# Patient Record
Sex: FEMALE | Race: WHITE | NOT HISPANIC OR LATINO | Employment: OTHER | URBAN - METROPOLITAN AREA
[De-identification: names, ages, dates, MRNs, and addresses within clinical notes are randomized per-mention and may not be internally consistent; named-entity substitution may affect disease eponyms.]

---

## 2017-10-13 ENCOUNTER — HOSPITAL ENCOUNTER (EMERGENCY)
Facility: HOSPITAL | Age: 67
Discharge: HOME | End: 2017-10-13
Attending: EMERGENCY MEDICINE | Admitting: EMERGENCY MEDICINE
Payer: MEDICARE

## 2017-10-13 ENCOUNTER — APPOINTMENT (EMERGENCY)
Dept: RADIOLOGY | Facility: HOSPITAL | Age: 67
End: 2017-10-13
Payer: MEDICARE

## 2017-10-13 VITALS
TEMPERATURE: 99.1 F | SYSTOLIC BLOOD PRESSURE: 183 MMHG | HEART RATE: 112 BPM | DIASTOLIC BLOOD PRESSURE: 79 MMHG | BODY MASS INDEX: 30.43 KG/M2 | RESPIRATION RATE: 16 BRPM | HEIGHT: 62 IN | WEIGHT: 165.34 LBS | OXYGEN SATURATION: 96 %

## 2017-10-13 DIAGNOSIS — S51.859A HUMAN BITE OF FOREARM: Primary | ICD-10-CM
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
## 2017-10-13 DIAGNOSIS — Z20.6 EXPOSURE TO HIV: ICD-10-CM

## 2017-10-13 LAB — ALT SERPL W P-5'-P-CCNC: 38 U/L (ref 12–78)

## 2017-10-13 PROCEDURE — 86706 HEP B SURFACE ANTIBODY: CPT | Performed by: EMERGENCY MEDICINE

## 2017-10-13 PROCEDURE — 99284 EMERGENCY DEPT VISIT MOD MDM: CPT

## 2017-10-13 PROCEDURE — 86803 HEPATITIS C AB TEST: CPT | Performed by: EMERGENCY MEDICINE

## 2017-10-13 PROCEDURE — 87340 HEPATITIS B SURFACE AG IA: CPT | Performed by: EMERGENCY MEDICINE

## 2017-10-13 PROCEDURE — 90715 TDAP VACCINE 7 YRS/> IM: CPT | Performed by: EMERGENCY MEDICINE

## 2017-10-13 PROCEDURE — 87389 HIV-1 AG W/HIV-1&-2 AB AG IA: CPT | Performed by: EMERGENCY MEDICINE

## 2017-10-13 PROCEDURE — 73110 X-RAY EXAM OF WRIST: CPT

## 2017-10-13 PROCEDURE — 90471 IMMUNIZATION ADMIN: CPT

## 2017-10-13 PROCEDURE — 84460 ALANINE AMINO (ALT) (SGPT): CPT | Performed by: EMERGENCY MEDICINE

## 2017-10-13 PROCEDURE — 36415 COLL VENOUS BLD VENIPUNCTURE: CPT | Performed by: EMERGENCY MEDICINE

## 2017-10-13 RX ORDER — EMTRICITABINE AND TENOFOVIR DISOPROXIL FUMARATE 200; 300 MG/1; MG/1
1 TABLET, FILM COATED ORAL DAILY
Qty: 10 TABLET | Refills: 0 | Status: SHIPPED | OUTPATIENT
Start: 2017-10-13 | End: 2022-01-06

## 2017-10-13 RX ORDER — EMTRICITABINE AND TENOFOVIR DISOPROXIL FUMARATE 200; 300 MG/1; MG/1
1 TABLET, FILM COATED ORAL DAILY
Qty: 10 TABLET | Refills: 0 | Status: SHIPPED | OUTPATIENT
Start: 2017-10-13 | End: 2017-10-13

## 2017-10-13 RX ADMIN — EMTRICITABINE: 200 CAPSULE ORAL at 18:04

## 2017-10-13 RX ADMIN — RALTEGRAVIR 400 MG: 400 TABLET, FILM COATED ORAL at 18:04

## 2017-10-13 RX ADMIN — TETANUS TOXOID, REDUCED DIPHTHERIA TOXOID AND ACELLULAR PERTUSSIS VACCINE, ADSORBED 0.5 ML: 5; 2.5; 8; 8; 2.5 SUSPENSION INTRAMUSCULAR at 18:05
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
## 2017-10-13 NOTE — DISCHARGE INSTRUCTIONS
Human Bite   WHAT YOU NEED TO KNOW:   A human bite is any wound that you get from coming into contact with a person's teeth  The wound may be deep and cause injury to bones, muscles, and other body parts  Human bites are often more serious than animal bites  Wounds are more likely to become infected because of the germs in a person's mouth  DISCHARGE INSTRUCTIONS:   Medicines:   · Antibiotics: This medicine will help fight or prevent an infection  Take your antibiotics until they are gone, even if you feel better  · Take your medicine as directed  Contact your healthcare provider if you think your medicine is not helping or if you have side effects  Tell him of her if you are allergic to any medicine  Keep a list of the medicines, vitamins, and herbs you take  Include the amounts, and when and why you take them  Bring the list or the pill bottles to follow-up visits  Carry your medicine list with you in case of an emergency  Follow up with your healthcare provider as directed:  Write down your questions so you remember to ask them during your visits  Rest:  Rest when you feel it is needed  Slowly start to do more each day  Return to your daily activities as directed  When sitting or lying, raise your wounded area above your heart  This helps decrease swelling  You may put pillows under an injured leg when lying in bed  Wear a splint or sling: Your healthcare provider may want you to limit moving your injured area for some time  A sling or splint may be used to support or elevate your injured area and make you more comfortable  Ask for more information on using a splint or sling  Wound care:   · Wash your hands before and after you care for your wound to prevent infection  · Clean your wound with mild soap and water, and pat dry  Do this as often as ordered by your healthcare provider  If you cannot reach the wound, have someone help you  · Carefully check the wound and the area around it   Look for any swelling, redness, or fluid oozing out of it  If there is bleeding, you may apply gentle pressure  · Cover your wound with a layer of clean gauze bandage  If the bandage should be wrapped around your arm or leg, wrap it snugly but not too tight  It is too tight if you feel tingling or lose feeling in that area  · Keep the bandage clean and dry  Contact your healthcare provider if:   · You have a fever  · You have a skin rash, itching, or swelling after taking your medicine  · You have numbness or tingling in the area of the bite  · You have pain or problems moving the injured area or get tender lumps in the groin or armpits  · You have questions about your condition or care  Return to the emergency department if:   · You are have trouble swallowing and your jaw and neck are stiff  · You are have trouble talking, walking, or breathing  · You have increased redness, numbness, or swelling in the bitten area  · Your wound does not stop bleeding even after you apply pressure  · Your pain is the same or worse even after taking medicine  · Your wound or bandage has pus or a bad smell, even if you clean it every day  © 2017 [employer]  Information is for End User's use only and may not be sold, redistributed or otherwise used for commercial purposes  All illustrations and images included in CareNotes® are the copyrighted property of [employer]  or [employer]  The above information is an  only  It is not intended as medical advice for individual conditions or treatments  Talk to your doctor, nurse or pharmacist before following any medical regimen to see if it is safe and effective for you  Postexposure Prophylaxis   WHAT YOU NEED TO KNOW:   Postexposure prophylaxis (PEP) is medical care given to prevent HIV, hepatitis B, and other diseases  PEP may include first aid, testing, and medicines   Exposure can occur when you have contact with certain body fluids from another person  These fluids include blood, semen, and vaginal fluid  They also include any other body fluid that may have blood in it, such as saliva or urine  You are exposed if these fluids touch an open area of your skin, such as a cut  You also are exposed if the fluids touch a mucus membrane  This is a moist area, such as in the eyes, nose, and mouth  You can be exposed by a needlestick through the skin  DISCHARGE INSTRUCTIONS:   Medicines:   · Antiretrovirals: These medicines help prevent HIV  They must be taken for 28 days, unless your healthcare provider tells you otherwise  You may be given a starter pack with enough medicine for 1 to 7 days  You may be given medicine for the full 28 days instead  If you receive a starter pack, you must return to your healthcare provider in 1 to 7 days  At this visit, you will receive the rest of the medicine  · Hepatitis B vaccine: This medicine helps prevent hepatitis B  You will need 3 doses (shots) of the vaccine  The second dose should be taken 1 to 2 months after the first dose  The third dose should be taken 4 to 6 months after the first dose  · Antibiotics: These are germ-killing medicines to help treat or prevent sexually transmitted diseases, such as gonorrhea  · Take your medicine as directed  Contact your healthcare provider if you think your medicine is not helping or if you have side effects  Tell him or her if you are allergic to any medicine  Keep a list of the medicines, vitamins, and herbs you take  Include the amounts, and when and why you take them  Bring the list or the pill bottles to follow-up visits  Carry your medicine list with you in case of an emergency  Follow up with your healthcare provider as directed: If you did not receive any treatment after the exposure, you will need to follow up with your healthcare provider within 1 week   If you were given antiretrovirals, you will need a follow-up visit in about 2 weeks  Further HIV testing will be needed 6 weeks, 3 months, and 6 months after the exposure  You may have HIV testing up to 12 months after the exposure  Precautions: In case you are infected, you will need to take steps to keep others from getting sick  These steps can help you avoid being exposed again:  · Avoid sex, or have safe sex  Safe sex means having sex only with one person who is not infected and who is only having sex with you  It also means using condoms each time you have sex  · Avoid injection drug use  If you cannot do this, always use needles that are sterile (germ-free)  · Follow all safety rules at your workplace if you are at risk of exposure  Be sure to use safety equipment as needed  · Get the hepatitis B vaccine if you have not already  · Do not breastfeed unless you know you are not infected  In case of future exposure: If you think you have been exposed, get first aid right away  If you are at work, follow work policy to report the exposure  The type of first aid you need depends on what part of your body was exposed:  · Open skin:  Wash the area right away with soap and water  Use a gel hand  if you do not have soap or running water  Do not use anything harsh, such as bleach  Do not squeeze or rub the skin  · Eyes:  Rinse your eyes with water or saline (a salt solution) right away  Be sure to clean well by moving your eyelids with your fingers as you rinse  Keep contact lenses in while rinsing your eyes, then remove and clean them as usual  Avoid soap or other   · Mouth:  Spit out the blood or body fluid right away  Rinse your mouth with water or saline several times  Avoid putting soap or other  in your mouth  For support and more information: It can be hard to cope if you think you have been exposed to a disease  You may feel scared and concerned about your health   This is normal  It can be helpful to find out all you can about the risk of exposure  Counseling or joining a support group also can help  Talk to your healthcare provider, or contact the following:   · [facility] for HIV/AIDS, Viral Hepatitis, STD, and TB Prevention, [facility]  Web Address: www cdc gov/[facility]/  · [employer] Post-Exposure Prophylaxis Hotline  Web Address: www [facility]/about_[facility]/pepline/  Contact your healthcare provider if:   · You have nausea or diarrhea  · You are more tired than usual      · You have new headaches, or you feel dizzy  · You have trouble sleeping  · Your eyes or skin turn yellow  · You are not eating because of appetite loss  · You are or may be pregnant  Return to the emergency department if:   · You have a fever  · You have a rash  · You have new muscle pain or pain in your back or abdomen  · You urinate more often than usual, have blood in your urine, or have pain while urinating  · You are more thirsty than usual      · You have trouble swallowing or breathing  © 2017 [employer] [facility] Information is for End User's use only and may not be sold, redistributed or otherwise used for commercial purposes  All illustrations and images included in CareNotes® are the copyrighted property of [employer]  or [employer]ji Massey  The above information is an  only  It is not intended as medical advice for individual conditions or treatments  Talk to your doctor, nurse or pharmacist before following any medical regimen to see if it is safe and effective for you

## 2017-10-15 LAB
HBV SURFACE AB SER-ACNC: <3.1 MIU/ML
HBV SURFACE AG SER QL: NORMAL
HCV AB SER QL: NORMAL

## 2017-10-16 LAB — HIV 1+2 AB+HIV1 P24 AG SERPL QL IA: NORMAL

## 2021-11-20 ENCOUNTER — HOSPITAL ENCOUNTER (INPATIENT)
Facility: HOSPITAL | Age: 71
LOS: 3 days | Discharge: HOME HEALTH | DRG: 069 | End: 2021-11-23
Attending: STUDENT IN AN ORGANIZED HEALTH CARE EDUCATION/TRAINING PROGRAM | Admitting: INTERNAL MEDICINE
Payer: COMMERCIAL

## 2021-11-20 ENCOUNTER — APPOINTMENT (EMERGENCY)
Dept: CT IMAGING | Facility: HOSPITAL | Age: 71
DRG: 069 | End: 2021-11-20
Payer: COMMERCIAL

## 2021-11-20 ENCOUNTER — APPOINTMENT (INPATIENT)
Dept: CT IMAGING | Facility: HOSPITAL | Age: 71
DRG: 069 | End: 2021-11-20
Payer: COMMERCIAL

## 2021-11-20 ENCOUNTER — APPOINTMENT (EMERGENCY)
Dept: RADIOLOGY | Facility: HOSPITAL | Age: 71
DRG: 069 | End: 2021-11-20
Payer: COMMERCIAL

## 2021-11-20 DIAGNOSIS — R47.01 APHASIA: Primary | ICD-10-CM
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
## 2021-11-20 DIAGNOSIS — I16.1 HYPERTENSIVE EMERGENCY: ICD-10-CM

## 2021-11-20 DIAGNOSIS — R47.02 DYSPHASIA: ICD-10-CM

## 2021-11-20 DIAGNOSIS — I65.23 BILATERAL CAROTID ARTERY STENOSIS: ICD-10-CM

## 2021-11-20 PROBLEM — E87.6 HYPOKALEMIA: Status: ACTIVE | Noted: 2021-11-20

## 2021-11-20 PROBLEM — N39.0 UTI (URINARY TRACT INFECTION): Status: ACTIVE | Noted: 2021-11-20

## 2021-11-20 PROBLEM — E83.51 HYPOCALCEMIA: Status: ACTIVE | Noted: 2021-11-20

## 2021-11-20 LAB
2HR DELTA HS TROPONIN: 4 NG/L
4HR DELTA HS TROPONIN: 11 NG/L
ALBUMIN SERPL BCP-MCNC: 2.9 G/DL (ref 3.5–5)
ALP SERPL-CCNC: 53 U/L (ref 46–116)
ALT SERPL W P-5'-P-CCNC: 20 U/L (ref 12–78)
ANION GAP SERPL CALCULATED.3IONS-SCNC: 10 MMOL/L (ref 4–13)
ANION GAP SERPL CALCULATED.3IONS-SCNC: 12 MMOL/L (ref 4–13)
AST SERPL W P-5'-P-CCNC: 16 U/L (ref 5–45)
BACTERIA UR QL AUTO: ABNORMAL /HPF
BASOPHILS # BLD AUTO: 0.06 THOUSANDS/ΜL (ref 0–0.1)
BASOPHILS NFR BLD AUTO: 1 % (ref 0–1)
BILIRUB DIRECT SERPL-MCNC: 0.09 MG/DL (ref 0–0.2)
BILIRUB SERPL-MCNC: 0.37 MG/DL (ref 0.2–1)
BILIRUB UR QL STRIP: NEGATIVE
BUN SERPL-MCNC: 10 MG/DL (ref 5–25)
BUN SERPL-MCNC: 11 MG/DL (ref 5–25)
CALCIUM SERPL-MCNC: 6.4 MG/DL (ref 8.3–10.1)
CALCIUM SERPL-MCNC: 8.7 MG/DL (ref 8.3–10.1)
CARDIAC TROPONIN I PNL SERPL HS: 12 NG/L
CARDIAC TROPONIN I PNL SERPL HS: 19 NG/L
CARDIAC TROPONIN I PNL SERPL HS: 8 NG/L
CHLORIDE SERPL-SCNC: 106 MMOL/L (ref 100–108)
CHLORIDE SERPL-SCNC: 98 MMOL/L (ref 100–108)
CK SERPL-CCNC: 99 U/L (ref 26–192)
CLARITY UR: ABNORMAL
CO2 SERPL-SCNC: 23 MMOL/L (ref 21–32)
CO2 SERPL-SCNC: 24 MMOL/L (ref 21–32)
COLOR UR: YELLOW
CREAT SERPL-MCNC: 0.83 MG/DL (ref 0.6–1.3)
CREAT SERPL-MCNC: 1.07 MG/DL (ref 0.6–1.3)
EOSINOPHIL # BLD AUTO: 0.17 THOUSAND/ΜL (ref 0–0.61)
EOSINOPHIL NFR BLD AUTO: 1 % (ref 0–6)
ERYTHROCYTE [DISTWIDTH] IN BLOOD BY AUTOMATED COUNT: 12.5 % (ref 11.6–15.1)
ETHANOL SERPL-MCNC: <3 MG/DL (ref 0–3)
GFR SERPL CREATININE-BSD FRML MDRD: 52 ML/MIN/1.73SQ M
GFR SERPL CREATININE-BSD FRML MDRD: 71 ML/MIN/1.73SQ M
GLUCOSE SERPL-MCNC: 136 MG/DL (ref 65–140)
GLUCOSE SERPL-MCNC: 144 MG/DL (ref 65–140)
GLUCOSE UR STRIP-MCNC: ABNORMAL MG/DL
HCT VFR BLD AUTO: 37.6 % (ref 34.8–46.1)
HGB BLD-MCNC: 13.3 G/DL (ref 11.5–15.4)
HGB UR QL STRIP.AUTO: ABNORMAL
IMM GRANULOCYTES # BLD AUTO: 0.06 THOUSAND/UL (ref 0–0.2)
IMM GRANULOCYTES NFR BLD AUTO: 1 % (ref 0–2)
INR PPP: 0.93 (ref 0.84–1.19)
KETONES UR STRIP-MCNC: ABNORMAL MG/DL
LACTATE SERPL-SCNC: 1.6 MMOL/L (ref 0.5–2)
LEUKOCYTE ESTERASE UR QL STRIP: ABNORMAL
LYMPHOCYTES # BLD AUTO: 1.88 THOUSANDS/ΜL (ref 0.6–4.47)
LYMPHOCYTES NFR BLD AUTO: 14 % (ref 14–44)
MAGNESIUM SERPL-MCNC: 1.1 MG/DL (ref 1.6–2.6)
MAGNESIUM SERPL-MCNC: 2.8 MG/DL (ref 1.6–2.6)
MCH RBC QN AUTO: 31.6 PG (ref 26.8–34.3)
MCHC RBC AUTO-ENTMCNC: 35.4 G/DL (ref 31.4–37.4)
MCV RBC AUTO: 89 FL (ref 82–98)
MONOCYTES # BLD AUTO: 0.96 THOUSAND/ΜL (ref 0.17–1.22)
MONOCYTES NFR BLD AUTO: 7 % (ref 4–12)
NEUTROPHILS # BLD AUTO: 10 THOUSANDS/ΜL (ref 1.85–7.62)
NEUTS SEG NFR BLD AUTO: 76 % (ref 43–75)
NITRITE UR QL STRIP: NEGATIVE
NON-SQ EPI CELLS URNS QL MICRO: ABNORMAL /HPF
NRBC BLD AUTO-RTO: 0 /100 WBCS
PH UR STRIP.AUTO: 6.5 [PH]
PLATELET # BLD AUTO: 237 THOUSANDS/UL (ref 149–390)
PMV BLD AUTO: 9.2 FL (ref 8.9–12.7)
POTASSIUM SERPL-SCNC: 2.5 MMOL/L (ref 3.5–5.3)
POTASSIUM SERPL-SCNC: 3.9 MMOL/L (ref 3.5–5.3)
POTASSIUM SERPL-SCNC: 3.9 MMOL/L (ref 3.5–5.3)
PROT SERPL-MCNC: 5.6 G/DL (ref 6.4–8.2)
PROT UR STRIP-MCNC: NEGATIVE MG/DL
PROTHROMBIN TIME: 12.5 SECONDS (ref 11.6–14.5)
RBC # BLD AUTO: 4.21 MILLION/UL (ref 3.81–5.12)
RBC #/AREA URNS AUTO: ABNORMAL /HPF
SODIUM SERPL-SCNC: 134 MMOL/L (ref 136–145)
SODIUM SERPL-SCNC: 139 MMOL/L (ref 136–145)
SP GR UR STRIP.AUTO: 1.01 (ref 1–1.03)
UROBILINOGEN UR QL STRIP.AUTO: 0.2 E.U./DL
WBC # BLD AUTO: 13.13 THOUSAND/UL (ref 4.31–10.16)
WBC #/AREA URNS AUTO: ABNORMAL /HPF

## 2021-11-20 PROCEDURE — NC001 PR NO CHARGE: Performed by: EMERGENCY MEDICINE

## 2021-11-20 PROCEDURE — 83735 ASSAY OF MAGNESIUM: CPT | Performed by: INTERNAL MEDICINE

## 2021-11-20 PROCEDURE — 83735 ASSAY OF MAGNESIUM: CPT | Performed by: STUDENT IN AN ORGANIZED HEALTH CARE EDUCATION/TRAINING PROGRAM

## 2021-11-20 PROCEDURE — 70450 CT HEAD/BRAIN W/O DYE: CPT

## 2021-11-20 PROCEDURE — 84132 ASSAY OF SERUM POTASSIUM: CPT | Performed by: INTERNAL MEDICINE

## 2021-11-20 PROCEDURE — 71260 CT THORAX DX C+: CPT

## 2021-11-20 PROCEDURE — 93308 TTE F-UP OR LMTD: CPT | Performed by: NURSE PRACTITIONER

## 2021-11-20 PROCEDURE — 70496 CT ANGIOGRAPHY HEAD: CPT

## 2021-11-20 PROCEDURE — 84295 ASSAY OF SERUM SODIUM: CPT

## 2021-11-20 PROCEDURE — 87086 URINE CULTURE/COLONY COUNT: CPT

## 2021-11-20 PROCEDURE — 74177 CT ABD & PELVIS W/CONTRAST: CPT

## 2021-11-20 PROCEDURE — 82550 ASSAY OF CK (CPK): CPT | Performed by: FAMILY MEDICINE

## 2021-11-20 PROCEDURE — 85014 HEMATOCRIT: CPT

## 2021-11-20 PROCEDURE — 82947 ASSAY GLUCOSE BLOOD QUANT: CPT

## 2021-11-20 PROCEDURE — 80048 BASIC METABOLIC PNL TOTAL CA: CPT | Performed by: FAMILY MEDICINE

## 2021-11-20 PROCEDURE — 87186 SC STD MICRODIL/AGAR DIL: CPT

## 2021-11-20 PROCEDURE — 80076 HEPATIC FUNCTION PANEL: CPT | Performed by: PSYCHIATRY & NEUROLOGY

## 2021-11-20 PROCEDURE — 36415 COLL VENOUS BLD VENIPUNCTURE: CPT

## 2021-11-20 PROCEDURE — 81001 URINALYSIS AUTO W/SCOPE: CPT

## 2021-11-20 PROCEDURE — 85610 PROTHROMBIN TIME: CPT | Performed by: STUDENT IN AN ORGANIZED HEALTH CARE EDUCATION/TRAINING PROGRAM

## 2021-11-20 PROCEDURE — 87077 CULTURE AEROBIC IDENTIFY: CPT

## 2021-11-20 PROCEDURE — 82077 ASSAY SPEC XCP UR&BREATH IA: CPT | Performed by: FAMILY MEDICINE

## 2021-11-20 PROCEDURE — G1004 CDSM NDSC: HCPCS

## 2021-11-20 PROCEDURE — 76705 ECHO EXAM OF ABDOMEN: CPT | Performed by: NURSE PRACTITIONER

## 2021-11-20 PROCEDURE — 99223 1ST HOSP IP/OBS HIGH 75: CPT | Performed by: INTERNAL MEDICINE

## 2021-11-20 PROCEDURE — 71045 X-RAY EXAM CHEST 1 VIEW: CPT

## 2021-11-20 PROCEDURE — 82803 BLOOD GASES ANY COMBINATION: CPT

## 2021-11-20 PROCEDURE — 85025 COMPLETE CBC W/AUTO DIFF WBC: CPT | Performed by: STUDENT IN AN ORGANIZED HEALTH CARE EDUCATION/TRAINING PROGRAM

## 2021-11-20 PROCEDURE — 96375 TX/PRO/DX INJ NEW DRUG ADDON: CPT

## 2021-11-20 PROCEDURE — 93005 ELECTROCARDIOGRAM TRACING: CPT

## 2021-11-20 PROCEDURE — 70498 CT ANGIOGRAPHY NECK: CPT

## 2021-11-20 PROCEDURE — 80048 BASIC METABOLIC PNL TOTAL CA: CPT | Performed by: STUDENT IN AN ORGANIZED HEALTH CARE EDUCATION/TRAINING PROGRAM

## 2021-11-20 PROCEDURE — 72125 CT NECK SPINE W/O DYE: CPT

## 2021-11-20 PROCEDURE — 83605 ASSAY OF LACTIC ACID: CPT | Performed by: FAMILY MEDICINE

## 2021-11-20 PROCEDURE — 84484 ASSAY OF TROPONIN QUANT: CPT | Performed by: STUDENT IN AN ORGANIZED HEALTH CARE EDUCATION/TRAINING PROGRAM

## 2021-11-20 PROCEDURE — 99222 1ST HOSP IP/OBS MODERATE 55: CPT | Performed by: STUDENT IN AN ORGANIZED HEALTH CARE EDUCATION/TRAINING PROGRAM

## 2021-11-20 PROCEDURE — 84132 ASSAY OF SERUM POTASSIUM: CPT

## 2021-11-20 PROCEDURE — 96365 THER/PROPH/DIAG IV INF INIT: CPT

## 2021-11-20 PROCEDURE — 99285 EMERGENCY DEPT VISIT HI MDM: CPT

## 2021-11-20 RX ORDER — TRAMADOL HYDROCHLORIDE 50 MG/1
50 TABLET ORAL EVERY 6 HOURS PRN
COMMUNITY
End: 2021-11-23 | Stop reason: HOSPADM

## 2021-11-20 RX ORDER — IBUPROFEN 600 MG/1
600 TABLET ORAL EVERY 6 HOURS PRN
COMMUNITY
End: 2021-11-23 | Stop reason: HOSPADM

## 2021-11-20 RX ORDER — METOPROLOL TARTRATE 5 MG/5ML
5 INJECTION INTRAVENOUS ONCE
Status: COMPLETED | OUTPATIENT
Start: 2021-11-20 | End: 2021-11-20

## 2021-11-20 RX ORDER — ONDANSETRON 2 MG/ML
4 INJECTION INTRAMUSCULAR; INTRAVENOUS EVERY 6 HOURS PRN
Status: DISCONTINUED | OUTPATIENT
Start: 2021-11-20 | End: 2021-11-23 | Stop reason: HOSPADM

## 2021-11-20 RX ORDER — LABETALOL 20 MG/4 ML (5 MG/ML) INTRAVENOUS SYRINGE
10 EVERY 6 HOURS PRN
Status: DISCONTINUED | OUTPATIENT
Start: 2021-11-20 | End: 2021-11-21

## 2021-11-20 RX ORDER — ATORVASTATIN CALCIUM 40 MG/1
40 TABLET, FILM COATED ORAL
Status: DISCONTINUED | OUTPATIENT
Start: 2021-11-20 | End: 2021-11-20

## 2021-11-20 RX ORDER — ACETAMINOPHEN 325 MG/1
650 TABLET ORAL EVERY 6 HOURS PRN
Status: DISCONTINUED | OUTPATIENT
Start: 2021-11-20 | End: 2021-11-23 | Stop reason: HOSPADM

## 2021-11-20 RX ORDER — POTASSIUM CHLORIDE 29.8 MG/ML
40 INJECTION INTRAVENOUS
Status: DISCONTINUED | OUTPATIENT
Start: 2021-11-20 | End: 2021-11-20

## 2021-11-20 RX ORDER — ASPIRIN 325 MG
325 TABLET ORAL DAILY
Status: DISCONTINUED | OUTPATIENT
Start: 2021-11-21 | End: 2021-11-21

## 2021-11-20 RX ORDER — MAGNESIUM SULFATE HEPTAHYDRATE 40 MG/ML
2 INJECTION, SOLUTION INTRAVENOUS ONCE
Status: COMPLETED | OUTPATIENT
Start: 2021-11-20 | End: 2021-11-20

## 2021-11-20 RX ORDER — POTASSIUM CHLORIDE 14.9 MG/ML
20 INJECTION INTRAVENOUS
Status: DISCONTINUED | OUTPATIENT
Start: 2021-11-20 | End: 2021-11-21

## 2021-11-20 RX ORDER — AMOXICILLIN 500 MG/1
500 CAPSULE ORAL EVERY 8 HOURS SCHEDULED
COMMUNITY
End: 2021-11-23 | Stop reason: HOSPADM

## 2021-11-20 RX ORDER — ASPIRIN 325 MG
325 TABLET ORAL DAILY
Status: DISCONTINUED | OUTPATIENT
Start: 2021-11-21 | End: 2021-11-20

## 2021-11-20 RX ORDER — POTASSIUM CHLORIDE 29.8 MG/ML
40 INJECTION INTRAVENOUS ONCE
Status: DISCONTINUED | OUTPATIENT
Start: 2021-11-20 | End: 2021-11-20

## 2021-11-20 RX ORDER — ATORVASTATIN CALCIUM 40 MG/1
40 TABLET, FILM COATED ORAL EVERY EVENING
Status: DISCONTINUED | OUTPATIENT
Start: 2021-11-20 | End: 2021-11-23 | Stop reason: HOSPADM

## 2021-11-20 RX ORDER — POTASSIUM CHLORIDE 14.9 MG/ML
20 INJECTION INTRAVENOUS
Status: DISCONTINUED | OUTPATIENT
Start: 2021-11-20 | End: 2021-11-20

## 2021-11-20 RX ORDER — MAGNESIUM SULFATE HEPTAHYDRATE 40 MG/ML
2 INJECTION, SOLUTION INTRAVENOUS ONCE
Status: DISCONTINUED | OUTPATIENT
Start: 2021-11-21 | End: 2021-11-21

## 2021-11-20 RX ADMIN — MAGNESIUM SULFATE HEPTAHYDRATE 2 G: 40 INJECTION, SOLUTION INTRAVENOUS at 20:55

## 2021-11-20 RX ADMIN — POTASSIUM CHLORIDE 20 MEQ: 14.9 INJECTION, SOLUTION INTRAVENOUS at 22:09

## 2021-11-20 RX ADMIN — POTASSIUM CHLORIDE 20 MEQ: 14.9 INJECTION, SOLUTION INTRAVENOUS at 16:06

## 2021-11-20 RX ADMIN — IOHEXOL 100 ML: 350 INJECTION, SOLUTION INTRAVENOUS at 13:37

## 2021-11-20 RX ADMIN — METOROPROLOL TARTRATE 5 MG: 5 INJECTION, SOLUTION INTRAVENOUS at 15:37

## 2021-11-20 RX ADMIN — IODIXANOL 85 ML: 320 INJECTION, SOLUTION INTRAVASCULAR at 20:38

## 2021-11-21 ENCOUNTER — APPOINTMENT (INPATIENT)
Dept: NON INVASIVE DIAGNOSTICS | Facility: HOSPITAL | Age: 71
DRG: 069 | End: 2021-11-21
Payer: COMMERCIAL

## 2021-11-21 PROBLEM — E83.51 HYPOCALCEMIA: Status: RESOLVED | Noted: 2021-11-20 | Resolved: 2021-11-21
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
## 2021-11-21 PROBLEM — R82.90 ABNORMAL URINALYSIS: Status: ACTIVE | Noted: 2021-11-20

## 2021-11-21 PROBLEM — N17.9 AKI (ACUTE KIDNEY INJURY) (HCC): Status: ACTIVE | Noted: 2021-11-21

## 2021-11-21 LAB
ALBUMIN SERPL BCP-MCNC: 3.4 G/DL (ref 3.5–5)
ALP SERPL-CCNC: 56 U/L (ref 46–116)
ALT SERPL W P-5'-P-CCNC: 20 U/L (ref 12–78)
ANION GAP SERPL CALCULATED.3IONS-SCNC: 12 MMOL/L (ref 4–13)
AORTIC ROOT: 2.6 CM
APICAL FOUR CHAMBER EJECTION FRACTION: 57 %
AST SERPL W P-5'-P-CCNC: 17 U/L (ref 5–45)
ATRIAL RATE: 90 BPM
BASOPHILS # BLD AUTO: 0.06 THOUSANDS/ΜL (ref 0–0.1)
BASOPHILS NFR BLD AUTO: 1 % (ref 0–1)
BILIRUB SERPL-MCNC: 0.63 MG/DL (ref 0.2–1)
BUN SERPL-MCNC: 14 MG/DL (ref 5–25)
CA-I BLD-SCNC: 1.14 MMOL/L (ref 1.12–1.32)
CALCIUM ALBUM COR SERPL-MCNC: 9.2 MG/DL (ref 8.3–10.1)
CALCIUM SERPL-MCNC: 8.7 MG/DL (ref 8.3–10.1)
CHLORIDE SERPL-SCNC: 100 MMOL/L (ref 100–108)
CHOLEST SERPL-MCNC: 171 MG/DL
CO2 SERPL-SCNC: 22 MMOL/L (ref 21–32)
CREAT SERPL-MCNC: 1.22 MG/DL (ref 0.6–1.3)
E WAVE DECELERATION TIME: 264 MS
EOSINOPHIL # BLD AUTO: 0.08 THOUSAND/ΜL (ref 0–0.61)
EOSINOPHIL NFR BLD AUTO: 1 % (ref 0–6)
ERYTHROCYTE [DISTWIDTH] IN BLOOD BY AUTOMATED COUNT: 12.6 % (ref 11.6–15.1)
FRACTIONAL SHORTENING: 33 % (ref 28–44)
GFR SERPL CREATININE-BSD FRML MDRD: 44 ML/MIN/1.73SQ M
GLUCOSE SERPL-MCNC: 147 MG/DL (ref 65–140)
HCT VFR BLD AUTO: 34.5 % (ref 34.8–46.1)
HDLC SERPL-MCNC: 53 MG/DL
HGB BLD-MCNC: 12.2 G/DL (ref 11.5–15.4)
IMM GRANULOCYTES # BLD AUTO: 0.07 THOUSAND/UL (ref 0–0.2)
IMM GRANULOCYTES NFR BLD AUTO: 1 % (ref 0–2)
INTERVENTRICULAR SEPTUM IN DIASTOLE (PARASTERNAL SHORT AXIS VIEW): 1.6 CM
LDLC SERPL CALC-MCNC: 92 MG/DL (ref 0–100)
LEFT ATRIUM AREA SYSTOLE SINGLE PLANE A4C: 14.1 CM2
LEFT INTERNAL DIMENSION IN SYSTOLE: 2.4 CM (ref 2.1–4)
LEFT VENTRICULAR INTERNAL DIMENSION IN DIASTOLE: 3.6 CM (ref 3.8–5.65)
LEFT VENTRICULAR POSTERIOR WALL IN END DIASTOLE: 1.1 CM
LEFT VENTRICULAR STROKE VOLUME: 35 ML
LYMPHOCYTES # BLD AUTO: 2.23 THOUSANDS/ΜL (ref 0.6–4.47)
LYMPHOCYTES NFR BLD AUTO: 20 % (ref 14–44)
MAGNESIUM SERPL-MCNC: 2.4 MG/DL (ref 1.6–2.6)
MCH RBC QN AUTO: 31.9 PG (ref 26.8–34.3)
MCHC RBC AUTO-ENTMCNC: 35.4 G/DL (ref 31.4–37.4)
MCV RBC AUTO: 90 FL (ref 82–98)
MONOCYTES # BLD AUTO: 1.03 THOUSAND/ΜL (ref 0.17–1.22)
MONOCYTES NFR BLD AUTO: 9 % (ref 4–12)
MV E'TISSUE VEL-LAT: 7 CM/S
MV E'TISSUE VEL-SEP: 5 CM/S
MV PEAK A VEL: 0.74 M/S
MV PEAK E VEL: 49 CM/S
MV STENOSIS PRESSURE HALF TIME: 0 MS
NEUTROPHILS # BLD AUTO: 7.54 THOUSANDS/ΜL (ref 1.85–7.62)
NEUTS SEG NFR BLD AUTO: 68 % (ref 43–75)
NRBC BLD AUTO-RTO: 0 /100 WBCS
P AXIS: 64 DEGREES
PA SYSTOLIC PRESSURE: 30 MMHG
PLATELET # BLD AUTO: 222 THOUSANDS/UL (ref 149–390)
PMV BLD AUTO: 9.1 FL (ref 8.9–12.7)
POTASSIUM SERPL-SCNC: 3.9 MMOL/L (ref 3.5–5.3)
PR INTERVAL: 142 MS
PROT SERPL-MCNC: 6.2 G/DL (ref 6.4–8.2)
QRS AXIS: 37 DEGREES
QRSD INTERVAL: 90 MS
QT INTERVAL: 368 MS
QTC INTERVAL: 450 MS
RBC # BLD AUTO: 3.83 MILLION/UL (ref 3.81–5.12)
RIGHT ATRIUM AREA SYSTOLE A4C: 10.8 CM2
RIGHT VENTRICLE ID DIMENSION: 3 CM
SL CV LV EF: 60
SL CV PED ECHO LEFT VENTRICLE DIASTOLIC VOLUME (MOD BIPLANE) 2D: 55 ML
SL CV PED ECHO LEFT VENTRICLE SYSTOLIC VOLUME (MOD BIPLANE) 2D: 20 ML
SODIUM SERPL-SCNC: 134 MMOL/L (ref 136–145)
T WAVE AXIS: 65 DEGREES
TRICUSPID VALVE PEAK REGURGITATION VELOCITY: 2.21 M/S
TRICUSPID VALVE S': 0.5 CM/S
TRIGL SERPL-MCNC: 129 MG/DL
TV PEAK GRADIENT: 20 MMHG
VENTRICULAR RATE: 90 BPM
WBC # BLD AUTO: 11.01 THOUSAND/UL (ref 4.31–10.16)
Z-SCORE OF LEFT VENTRICULAR DIMENSION IN END SYSTOLE: -2.52

## 2021-11-21 PROCEDURE — 83735 ASSAY OF MAGNESIUM: CPT | Performed by: FAMILY MEDICINE

## 2021-11-21 PROCEDURE — 85025 COMPLETE CBC W/AUTO DIFF WBC: CPT | Performed by: FAMILY MEDICINE

## 2021-11-21 PROCEDURE — 99233 SBSQ HOSP IP/OBS HIGH 50: CPT | Performed by: GENERAL PRACTICE

## 2021-11-21 PROCEDURE — 93306 TTE W/DOPPLER COMPLETE: CPT | Performed by: INTERNAL MEDICINE

## 2021-11-21 PROCEDURE — 80053 COMPREHEN METABOLIC PANEL: CPT | Performed by: FAMILY MEDICINE

## 2021-11-21 PROCEDURE — 80061 LIPID PANEL: CPT | Performed by: FAMILY MEDICINE

## 2021-11-21 PROCEDURE — 93306 TTE W/DOPPLER COMPLETE: CPT

## 2021-11-21 PROCEDURE — 93010 ELECTROCARDIOGRAM REPORT: CPT | Performed by: INTERNAL MEDICINE

## 2021-11-21 PROCEDURE — 82330 ASSAY OF CALCIUM: CPT | Performed by: FAMILY MEDICINE

## 2021-11-21 PROCEDURE — 99231 SBSQ HOSP IP/OBS SF/LOW 25: CPT | Performed by: NURSE PRACTITIONER

## 2021-11-21 PROCEDURE — 97163 PT EVAL HIGH COMPLEX 45 MIN: CPT

## 2021-11-21 PROCEDURE — 99223 1ST HOSP IP/OBS HIGH 75: CPT | Performed by: PSYCHIATRY & NEUROLOGY

## 2021-11-21 RX ORDER — METHOCARBAMOL 500 MG/1
500 TABLET, FILM COATED ORAL EVERY 6 HOURS PRN
Status: DISCONTINUED | OUTPATIENT
Start: 2021-11-21 | End: 2021-11-23 | Stop reason: HOSPADM

## 2021-11-21 RX ORDER — LANOLIN ALCOHOL/MO/W.PET/CERES
6 CREAM (GRAM) TOPICAL
Status: DISCONTINUED | OUTPATIENT
Start: 2021-11-21 | End: 2021-11-23 | Stop reason: HOSPADM

## 2021-11-21 RX ORDER — CLOPIDOGREL BISULFATE 75 MG/1
75 TABLET ORAL DAILY
Status: DISCONTINUED | OUTPATIENT
Start: 2021-11-22 | End: 2021-11-23 | Stop reason: HOSPADM

## 2021-11-21 RX ORDER — ASPIRIN 81 MG/1
81 TABLET, CHEWABLE ORAL DAILY
Status: DISCONTINUED | OUTPATIENT
Start: 2021-11-22 | End: 2021-11-23 | Stop reason: HOSPADM

## 2021-11-21 RX ORDER — CLOPIDOGREL BISULFATE 75 MG/1
300 TABLET ORAL ONCE
Status: COMPLETED | OUTPATIENT
Start: 2021-11-21 | End: 2021-11-21

## 2021-11-21 RX ORDER — METOPROLOL TARTRATE 5 MG/5ML
5 INJECTION INTRAVENOUS EVERY 6 HOURS PRN
Status: DISCONTINUED | OUTPATIENT
Start: 2021-11-21 | End: 2021-11-23 | Stop reason: HOSPADM

## 2021-11-21 RX ORDER — SODIUM CHLORIDE 9 MG/ML
75 INJECTION, SOLUTION INTRAVENOUS CONTINUOUS
Status: DISCONTINUED | OUTPATIENT
Start: 2021-11-21 | End: 2021-11-22

## 2021-11-21 RX ADMIN — CLOPIDOGREL BISULFATE 300 MG: 75 TABLET ORAL at 14:28

## 2021-11-21 RX ADMIN — Medication 6 MG: at 22:13

## 2021-11-21 RX ADMIN — ACETAMINOPHEN 650 MG: 325 TABLET, FILM COATED ORAL at 09:20

## 2021-11-21 RX ADMIN — ENOXAPARIN SODIUM 30 MG: 30 INJECTION SUBCUTANEOUS at 11:59

## 2021-11-21 RX ADMIN — ASPIRIN 325 MG ORAL TABLET 325 MG: 325 PILL ORAL at 09:20

## 2021-11-21 RX ADMIN — ACETAMINOPHEN 650 MG: 325 TABLET, FILM COATED ORAL at 02:56

## 2021-11-21 RX ADMIN — SODIUM CHLORIDE 75 ML/HR: 0.9 INJECTION, SOLUTION INTRAVENOUS at 11:50

## 2021-11-21 RX ADMIN — ATORVASTATIN CALCIUM 40 MG: 40 TABLET, FILM COATED ORAL at 17:06

## 2021-11-22 ENCOUNTER — APPOINTMENT (INPATIENT)
Dept: MRI IMAGING | Facility: HOSPITAL | Age: 71
DRG: 069 | End: 2021-11-22
Payer: COMMERCIAL

## 2021-11-22 PROBLEM — R82.71 ASYMPTOMATIC BACTERIURIA: Status: ACTIVE | Noted: 2021-11-20

## 2021-11-22 LAB
ALBUMIN SERPL BCP-MCNC: 3.2 G/DL (ref 3.5–5)
ALP SERPL-CCNC: 74 U/L (ref 46–116)
ALT SERPL W P-5'-P-CCNC: 21 U/L (ref 12–78)
ANION GAP SERPL CALCULATED.3IONS-SCNC: 9 MMOL/L (ref 4–13)
AST SERPL W P-5'-P-CCNC: 15 U/L (ref 5–45)
BACTERIA UR CULT: ABNORMAL
BASOPHILS # BLD AUTO: 0.05 THOUSANDS/ΜL (ref 0–0.1)
BASOPHILS NFR BLD AUTO: 1 % (ref 0–1)
BILIRUB SERPL-MCNC: 0.24 MG/DL (ref 0.2–1)
BUN SERPL-MCNC: 20 MG/DL (ref 5–25)
CALCIUM ALBUM COR SERPL-MCNC: 8.7 MG/DL (ref 8.3–10.1)
CALCIUM SERPL-MCNC: 8.1 MG/DL (ref 8.3–10.1)
CHLORIDE SERPL-SCNC: 104 MMOL/L (ref 100–108)
CK SERPL-CCNC: 116 U/L (ref 26–192)
CO2 SERPL-SCNC: 24 MMOL/L (ref 21–32)
CREAT SERPL-MCNC: 1.05 MG/DL (ref 0.6–1.3)
EOSINOPHIL # BLD AUTO: 0.36 THOUSAND/ΜL (ref 0–0.61)
EOSINOPHIL NFR BLD AUTO: 4 % (ref 0–6)
ERYTHROCYTE [DISTWIDTH] IN BLOOD BY AUTOMATED COUNT: 12.8 % (ref 11.6–15.1)
GFR SERPL CREATININE-BSD FRML MDRD: 53 ML/MIN/1.73SQ M
GLUCOSE SERPL-MCNC: 149 MG/DL (ref 65–140)
HCT VFR BLD AUTO: 31.6 % (ref 34.8–46.1)
HGB BLD-MCNC: 10.5 G/DL (ref 11.5–15.4)
IMM GRANULOCYTES # BLD AUTO: 0.03 THOUSAND/UL (ref 0–0.2)
IMM GRANULOCYTES NFR BLD AUTO: 0 % (ref 0–2)
LYMPHOCYTES # BLD AUTO: 2.11 THOUSANDS/ΜL (ref 0.6–4.47)
LYMPHOCYTES NFR BLD AUTO: 23 % (ref 14–44)
MAGNESIUM SERPL-MCNC: 2.1 MG/DL (ref 1.6–2.6)
MCH RBC QN AUTO: 31.1 PG (ref 26.8–34.3)
MCHC RBC AUTO-ENTMCNC: 33.2 G/DL (ref 31.4–37.4)
MCV RBC AUTO: 94 FL (ref 82–98)
MONOCYTES # BLD AUTO: 0.81 THOUSAND/ΜL (ref 0.17–1.22)
MONOCYTES NFR BLD AUTO: 9 % (ref 4–12)
NEUTROPHILS # BLD AUTO: 5.84 THOUSANDS/ΜL (ref 1.85–7.62)
NEUTS SEG NFR BLD AUTO: 63 % (ref 43–75)
NRBC BLD AUTO-RTO: 0 /100 WBCS
PHOSPHATE SERPL-MCNC: 3.1 MG/DL (ref 2.3–4.1)
PLATELET # BLD AUTO: 213 THOUSANDS/UL (ref 149–390)
PMV BLD AUTO: 9.2 FL (ref 8.9–12.7)
POTASSIUM SERPL-SCNC: 3.8 MMOL/L (ref 3.5–5.3)
PROT SERPL-MCNC: 5.8 G/DL (ref 6.4–8.2)
RBC # BLD AUTO: 3.38 MILLION/UL (ref 3.81–5.12)
SODIUM SERPL-SCNC: 137 MMOL/L (ref 136–145)
WBC # BLD AUTO: 9.2 THOUSAND/UL (ref 4.31–10.16)

## 2021-11-22 PROCEDURE — 80053 COMPREHEN METABOLIC PANEL: CPT | Performed by: GENERAL PRACTICE

## 2021-11-22 PROCEDURE — 70551 MRI BRAIN STEM W/O DYE: CPT

## 2021-11-22 PROCEDURE — 83735 ASSAY OF MAGNESIUM: CPT | Performed by: GENERAL PRACTICE

## 2021-11-22 PROCEDURE — G1004 CDSM NDSC: HCPCS

## 2021-11-22 PROCEDURE — ND001 PR NO DOCUMENTATION: Performed by: PSYCHIATRY & NEUROLOGY

## 2021-11-22 PROCEDURE — 84100 ASSAY OF PHOSPHORUS: CPT | Performed by: GENERAL PRACTICE

## 2021-11-22 PROCEDURE — 82550 ASSAY OF CK (CPK): CPT | Performed by: GENERAL PRACTICE

## 2021-11-22 PROCEDURE — 85025 COMPLETE CBC W/AUTO DIFF WBC: CPT | Performed by: GENERAL PRACTICE

## 2021-11-22 PROCEDURE — 99233 SBSQ HOSP IP/OBS HIGH 50: CPT | Performed by: GENERAL PRACTICE

## 2021-11-22 PROCEDURE — 97167 OT EVAL HIGH COMPLEX 60 MIN: CPT

## 2021-11-22 RX ORDER — LORAZEPAM 2 MG/ML
0.5 INJECTION INTRAMUSCULAR
Status: DISCONTINUED | OUTPATIENT
Start: 2021-11-22 | End: 2021-11-23 | Stop reason: HOSPADM

## 2021-11-22 RX ADMIN — CLOPIDOGREL BISULFATE 75 MG: 75 TABLET ORAL at 09:50

## 2021-11-22 RX ADMIN — ASPIRIN 81 MG: 81 TABLET, CHEWABLE ORAL at 09:49

## 2021-11-22 RX ADMIN — Medication 6 MG: at 21:38

## 2021-11-22 RX ADMIN — ACETAMINOPHEN 650 MG: 325 TABLET, FILM COATED ORAL at 09:49

## 2021-11-22 RX ADMIN — SODIUM CHLORIDE 75 ML/HR: 0.9 INJECTION, SOLUTION INTRAVENOUS at 01:58

## 2021-11-22 RX ADMIN — METOROPROLOL TARTRATE 5 MG: 5 INJECTION, SOLUTION INTRAVENOUS at 21:44

## 2021-11-22 RX ADMIN — LORAZEPAM 0.5 MG: 2 INJECTION INTRAMUSCULAR; INTRAVENOUS at 12:20

## 2021-11-22 RX ADMIN — ENOXAPARIN SODIUM 30 MG: 30 INJECTION SUBCUTANEOUS at 09:50

## 2021-11-22 RX ADMIN — ATORVASTATIN CALCIUM 40 MG: 40 TABLET, FILM COATED ORAL at 16:58

## 2021-11-22 RX ADMIN — METOROPROLOL TARTRATE 5 MG: 5 INJECTION, SOLUTION INTRAVENOUS at 02:24

## 2021-11-23 VITALS
HEART RATE: 76 BPM | OXYGEN SATURATION: 97 % | RESPIRATION RATE: 18 BRPM | WEIGHT: 149.47 LBS | BODY MASS INDEX: 27.51 KG/M2 | TEMPERATURE: 97.8 F | SYSTOLIC BLOOD PRESSURE: 180 MMHG | DIASTOLIC BLOOD PRESSURE: 74 MMHG | HEIGHT: 62 IN

## 2021-11-23 LAB
ANION GAP SERPL CALCULATED.3IONS-SCNC: 9 MMOL/L (ref 4–13)
BASOPHILS # BLD AUTO: 0.04 THOUSANDS/ΜL (ref 0–0.1)
BASOPHILS NFR BLD AUTO: 1 % (ref 0–1)
BUN SERPL-MCNC: 15 MG/DL (ref 5–25)
CALCIUM SERPL-MCNC: 8.3 MG/DL (ref 8.3–10.1)
CHLORIDE SERPL-SCNC: 101 MMOL/L (ref 100–108)
CO2 SERPL-SCNC: 26 MMOL/L (ref 21–32)
CREAT SERPL-MCNC: 0.94 MG/DL (ref 0.6–1.3)
EOSINOPHIL # BLD AUTO: 0.39 THOUSAND/ΜL (ref 0–0.61)
EOSINOPHIL NFR BLD AUTO: 5 % (ref 0–6)
ERYTHROCYTE [DISTWIDTH] IN BLOOD BY AUTOMATED COUNT: 12.4 % (ref 11.6–15.1)
GFR SERPL CREATININE-BSD FRML MDRD: 61 ML/MIN/1.73SQ M
GLUCOSE SERPL-MCNC: 182 MG/DL (ref 65–140)
HCT VFR BLD AUTO: 31.7 % (ref 34.8–46.1)
HGB BLD-MCNC: 11.1 G/DL (ref 11.5–15.4)
IMM GRANULOCYTES # BLD AUTO: 0.02 THOUSAND/UL (ref 0–0.2)
IMM GRANULOCYTES NFR BLD AUTO: 0 % (ref 0–2)
LYMPHOCYTES # BLD AUTO: 1.76 THOUSANDS/ΜL (ref 0.6–4.47)
LYMPHOCYTES NFR BLD AUTO: 20 % (ref 14–44)
MCH RBC QN AUTO: 32.3 PG (ref 26.8–34.3)
MCHC RBC AUTO-ENTMCNC: 35 G/DL (ref 31.4–37.4)
MCV RBC AUTO: 92 FL (ref 82–98)
MONOCYTES # BLD AUTO: 0.61 THOUSAND/ΜL (ref 0.17–1.22)
MONOCYTES NFR BLD AUTO: 7 % (ref 4–12)
NEUTROPHILS # BLD AUTO: 5.79 THOUSANDS/ΜL (ref 1.85–7.62)
NEUTS SEG NFR BLD AUTO: 67 % (ref 43–75)
NRBC BLD AUTO-RTO: 0 /100 WBCS
PLATELET # BLD AUTO: 217 THOUSANDS/UL (ref 149–390)
PMV BLD AUTO: 9.1 FL (ref 8.9–12.7)
POTASSIUM SERPL-SCNC: 3.7 MMOL/L (ref 3.5–5.3)
RBC # BLD AUTO: 3.44 MILLION/UL (ref 3.81–5.12)
SODIUM SERPL-SCNC: 136 MMOL/L (ref 136–145)
WBC # BLD AUTO: 8.61 THOUSAND/UL (ref 4.31–10.16)

## 2021-11-23 PROCEDURE — 85025 COMPLETE CBC W/AUTO DIFF WBC: CPT | Performed by: INTERNAL MEDICINE

## 2021-11-23 PROCEDURE — 80048 BASIC METABOLIC PNL TOTAL CA: CPT | Performed by: INTERNAL MEDICINE

## 2021-11-23 PROCEDURE — 99239 HOSP IP/OBS DSCHRG MGMT >30: CPT | Performed by: INTERNAL MEDICINE

## 2021-11-23 RX ORDER — ACETAMINOPHEN 325 MG/1
650 TABLET ORAL EVERY 6 HOURS PRN
Refills: 0
Start: 2021-11-23 | End: 2022-04-12 | Stop reason: ALTCHOICE

## 2021-11-23 RX ORDER — CLOPIDOGREL BISULFATE 75 MG/1
75 TABLET ORAL DAILY
Qty: 21 TABLET | Refills: 0 | Status: SHIPPED | OUTPATIENT
Start: 2021-11-24 | End: 2021-12-20 | Stop reason: SDUPTHER

## 2021-11-23 RX ORDER — LISINOPRIL 20 MG/1
20 TABLET ORAL DAILY
Qty: 30 TABLET | Refills: 0 | Status: SHIPPED | OUTPATIENT
Start: 2021-11-23 | End: 2021-12-20 | Stop reason: SDUPTHER

## 2021-11-23 RX ORDER — ASPIRIN 81 MG/1
81 TABLET, CHEWABLE ORAL DAILY
Qty: 30 TABLET | Refills: 0 | Status: SHIPPED | OUTPATIENT
Start: 2021-11-24 | End: 2021-12-20 | Stop reason: SDUPTHER

## 2021-11-23 RX ORDER — POTASSIUM CHLORIDE 20 MEQ/1
40 TABLET, EXTENDED RELEASE ORAL ONCE
Status: COMPLETED | OUTPATIENT
Start: 2021-11-23 | End: 2021-11-23

## 2021-11-23 RX ORDER — ATORVASTATIN CALCIUM 40 MG/1
40 TABLET, FILM COATED ORAL EVERY EVENING
Refills: 0
Start: 2021-11-23 | End: 2022-01-25 | Stop reason: SDUPTHER

## 2021-11-23 RX ORDER — CLOPIDOGREL BISULFATE 75 MG/1
75 TABLET ORAL DAILY
Qty: 21 TABLET | Refills: 0 | Status: SHIPPED | OUTPATIENT
Start: 2021-11-23 | End: 2021-12-20 | Stop reason: SDUPTHER

## 2021-11-23 RX ADMIN — POTASSIUM CHLORIDE 40 MEQ: 1500 TABLET, EXTENDED RELEASE ORAL at 06:29

## 2021-11-23 RX ADMIN — ASPIRIN 81 MG: 81 TABLET, CHEWABLE ORAL at 09:28

## 2021-11-23 RX ADMIN — METOROPROLOL TARTRATE 5 MG: 5 INJECTION, SOLUTION INTRAVENOUS at 06:29

## 2021-11-23 RX ADMIN — CLOPIDOGREL BISULFATE 75 MG: 75 TABLET ORAL at 09:28

## 2021-11-23 RX ADMIN — ENOXAPARIN SODIUM 30 MG: 30 INJECTION SUBCUTANEOUS at 09:28

## 2021-11-24 ENCOUNTER — TELEPHONE (OUTPATIENT)
Dept: NEUROLOGY | Facility: CLINIC | Age: 71
End: 2021-11-24

## 2021-11-26 LAB
BASE EXCESS BLDA CALC-SCNC: 5 MMOL/L (ref -2–3)
GLUCOSE SERPL-MCNC: 180 MG/DL (ref 65–140)
HCO3 BLDA-SCNC: 25.8 MMOL/L (ref 24–30)
HCT VFR BLD CALC: 36 % (ref 34.8–46.1)
HGB BLDA-MCNC: 12.2 G/DL (ref 11.5–15.4)
PCO2 BLD: 26.5 MM HG (ref 42–50)
PCO2 BLD: 27 MMOL/L (ref 21–32)
PH BLD: 7.6 [PH] (ref 7.3–7.4)
PO2 BLD: 113 MM HG (ref 35–45)
POTASSIUM BLD-SCNC: 6.2 MMOL/L (ref 3.5–5.3)
SAO2 % BLD FROM PO2: 99 % (ref 60–85)
SODIUM BLD-SCNC: 126 MMOL/L (ref 136–145)
SPECIMEN SOURCE: ABNORMAL

## 2021-11-30 ENCOUNTER — TELEPHONE (OUTPATIENT)
Dept: NEUROLOGY | Facility: CLINIC | Age: 71
End: 2021-11-30

## 2021-11-30 ENCOUNTER — OFFICE VISIT (OUTPATIENT)
Dept: FAMILY MEDICINE CLINIC | Facility: OTHER | Age: 71
End: 2021-11-30
Payer: COMMERCIAL

## 2021-11-30 VITALS
HEART RATE: 70 BPM | WEIGHT: 141 LBS | SYSTOLIC BLOOD PRESSURE: 118 MMHG | OXYGEN SATURATION: 98 % | HEIGHT: 62 IN | RESPIRATION RATE: 16 BRPM | BODY MASS INDEX: 25.95 KG/M2 | TEMPERATURE: 98.4 F | DIASTOLIC BLOOD PRESSURE: 58 MMHG

## 2021-11-30 DIAGNOSIS — Z12.11 SCREENING FOR COLON CANCER: ICD-10-CM

## 2021-11-30 DIAGNOSIS — Z12.31 ENCOUNTER FOR SCREENING MAMMOGRAM FOR MALIGNANT NEOPLASM OF BREAST: ICD-10-CM

## 2021-11-30 DIAGNOSIS — I70.90 ATHEROSCLEROTIC PLAQUE: ICD-10-CM

## 2021-11-30 DIAGNOSIS — E11.9 TYPE 2 DIABETES MELLITUS WITHOUT COMPLICATION, WITHOUT LONG-TERM CURRENT USE OF INSULIN (HCC): ICD-10-CM

## 2021-11-30 DIAGNOSIS — I10 HYPERTENSION, UNSPECIFIED TYPE: Primary | ICD-10-CM

## 2021-11-30 DIAGNOSIS — R47.01 APHASIA: ICD-10-CM

## 2021-11-30 DIAGNOSIS — Z76.89 ENCOUNTER FOR SUPPORT AND COORDINATION OF TRANSITION OF CARE: ICD-10-CM

## 2021-11-30 LAB — SL AMB POCT HEMOGLOBIN AIC: 5.9 (ref ?–6.5)

## 2021-11-30 PROCEDURE — 99495 TRANSJ CARE MGMT MOD F2F 14D: CPT | Performed by: FAMILY MEDICINE

## 2021-11-30 PROCEDURE — 1111F DSCHRG MED/CURRENT MED MERGE: CPT | Performed by: FAMILY MEDICINE

## 2021-11-30 PROCEDURE — 83036 HEMOGLOBIN GLYCOSYLATED A1C: CPT | Performed by: FAMILY MEDICINE

## 2021-11-30 RX ORDER — ACETAMINOPHEN 325 MG/1
650 TABLET ORAL EVERY 6 HOURS PRN
COMMUNITY
End: 2022-04-12 | Stop reason: ALTCHOICE

## 2021-11-30 RX ORDER — ATORVASTATIN CALCIUM 40 MG/1
TABLET, FILM COATED ORAL
COMMUNITY
Start: 2021-10-22 | End: 2021-12-20 | Stop reason: SDUPTHER

## 2021-11-30 RX ORDER — CLOPIDOGREL BISULFATE 75 MG/1
TABLET ORAL
COMMUNITY
Start: 2021-11-23 | End: 2021-12-20 | Stop reason: SDUPTHER

## 2021-11-30 RX ORDER — LISINOPRIL 20 MG/1
TABLET ORAL
COMMUNITY
Start: 2021-11-23 | End: 2021-12-20 | Stop reason: SDUPTHER

## 2021-11-30 RX ORDER — ASPIRIN 81 MG/1
81 TABLET, CHEWABLE ORAL DAILY
COMMUNITY

## 2021-12-01 ENCOUNTER — TELEPHONE (OUTPATIENT)
Dept: NEUROLOGY | Facility: CLINIC | Age: 71
End: 2021-12-01

## 2021-12-01 PROCEDURE — 3060F POS MICROALBUMINURIA REV: CPT | Performed by: FAMILY MEDICINE

## 2021-12-07 ENCOUNTER — OFFICE VISIT (OUTPATIENT)
Dept: FAMILY MEDICINE CLINIC | Facility: OTHER | Age: 71
End: 2021-12-07
Payer: COMMERCIAL

## 2021-12-07 VITALS
HEART RATE: 97 BPM | OXYGEN SATURATION: 98 % | RESPIRATION RATE: 18 BRPM | WEIGHT: 138.2 LBS | BODY MASS INDEX: 25.43 KG/M2 | HEIGHT: 62 IN | SYSTOLIC BLOOD PRESSURE: 132 MMHG | DIASTOLIC BLOOD PRESSURE: 80 MMHG | TEMPERATURE: 97.8 F

## 2021-12-07 DIAGNOSIS — Z00.00 MEDICARE ANNUAL WELLNESS VISIT, SUBSEQUENT: Primary | ICD-10-CM

## 2021-12-07 DIAGNOSIS — I10 HYPERTENSION, UNSPECIFIED TYPE: ICD-10-CM

## 2021-12-07 DIAGNOSIS — E11.9 TYPE 2 DIABETES MELLITUS WITHOUT COMPLICATION, WITHOUT LONG-TERM CURRENT USE OF INSULIN (HCC): ICD-10-CM

## 2021-12-07 DIAGNOSIS — R79.89 ELEVATED TSH: ICD-10-CM

## 2021-12-07 PROCEDURE — 1170F FXNL STATUS ASSESSED: CPT | Performed by: FAMILY MEDICINE

## 2021-12-07 PROCEDURE — 1125F AMNT PAIN NOTED PAIN PRSNT: CPT | Performed by: FAMILY MEDICINE

## 2021-12-07 PROCEDURE — G0439 PPPS, SUBSEQ VISIT: HCPCS | Performed by: FAMILY MEDICINE

## 2021-12-07 PROCEDURE — 3288F FALL RISK ASSESSMENT DOCD: CPT | Performed by: FAMILY MEDICINE

## 2021-12-07 PROCEDURE — 3725F SCREEN DEPRESSION PERFORMED: CPT | Performed by: FAMILY MEDICINE

## 2021-12-07 RX ORDER — MEDICAL SUPPLY, MISCELLANEOUS
EACH MISCELLANEOUS DAILY
Qty: 1 EACH | Refills: 0 | Status: SHIPPED | OUTPATIENT
Start: 2021-12-07 | End: 2022-04-12

## 2021-12-07 RX ORDER — GLIPIZIDE 5 MG/1
5 TABLET ORAL DAILY
COMMUNITY
Start: 2021-10-22 | End: 2022-01-25 | Stop reason: SDUPTHER

## 2021-12-10 ENCOUNTER — APPOINTMENT (OUTPATIENT)
Dept: LAB | Facility: CLINIC | Age: 71
End: 2021-12-10
Payer: COMMERCIAL

## 2021-12-10 DIAGNOSIS — E11.9 TYPE 2 DIABETES MELLITUS WITHOUT COMPLICATION, WITHOUT LONG-TERM CURRENT USE OF INSULIN (HCC): ICD-10-CM

## 2021-12-10 DIAGNOSIS — I10 HYPERTENSION, UNSPECIFIED TYPE: ICD-10-CM

## 2021-12-10 DIAGNOSIS — I70.90 ATHEROSCLEROTIC PLAQUE: ICD-10-CM

## 2021-12-10 LAB
ALBUMIN SERPL BCP-MCNC: 4 G/DL (ref 3.5–5)
ALP SERPL-CCNC: 73 U/L (ref 46–116)
ALT SERPL W P-5'-P-CCNC: 24 U/L (ref 12–78)
ANION GAP SERPL CALCULATED.3IONS-SCNC: 9 MMOL/L (ref 4–13)
AST SERPL W P-5'-P-CCNC: 18 U/L (ref 5–45)
BASOPHILS # BLD AUTO: 0.08 THOUSANDS/ΜL (ref 0–0.1)
BASOPHILS NFR BLD AUTO: 1 % (ref 0–1)
BILIRUB SERPL-MCNC: 0.77 MG/DL (ref 0.2–1)
BUN SERPL-MCNC: 13 MG/DL (ref 5–25)
CALCIUM SERPL-MCNC: 9.1 MG/DL (ref 8.3–10.1)
CHLORIDE SERPL-SCNC: 95 MMOL/L (ref 100–108)
CHOLEST SERPL-MCNC: 137 MG/DL
CO2 SERPL-SCNC: 25 MMOL/L (ref 21–32)
CREAT SERPL-MCNC: 1.5 MG/DL (ref 0.6–1.3)
EOSINOPHIL # BLD AUTO: 0.11 THOUSAND/ΜL (ref 0–0.61)
EOSINOPHIL NFR BLD AUTO: 1 % (ref 0–6)
ERYTHROCYTE [DISTWIDTH] IN BLOOD BY AUTOMATED COUNT: 12.9 % (ref 11.6–15.1)
GFR SERPL CREATININE-BSD FRML MDRD: 35 ML/MIN/1.73SQ M
GLUCOSE P FAST SERPL-MCNC: 142 MG/DL (ref 65–99)
HCT VFR BLD AUTO: 36 % (ref 34.8–46.1)
HDLC SERPL-MCNC: 55 MG/DL
HGB BLD-MCNC: 12.6 G/DL (ref 11.5–15.4)
IMM GRANULOCYTES # BLD AUTO: 0.06 THOUSAND/UL (ref 0–0.2)
IMM GRANULOCYTES NFR BLD AUTO: 1 % (ref 0–2)
LDLC SERPL CALC-MCNC: 50 MG/DL (ref 0–100)
LYMPHOCYTES # BLD AUTO: 1.4 THOUSANDS/ΜL (ref 0.6–4.47)
LYMPHOCYTES NFR BLD AUTO: 15 % (ref 14–44)
MCH RBC QN AUTO: 32.4 PG (ref 26.8–34.3)
MCHC RBC AUTO-ENTMCNC: 35 G/DL (ref 31.4–37.4)
MCV RBC AUTO: 93 FL (ref 82–98)
MONOCYTES # BLD AUTO: 0.88 THOUSAND/ΜL (ref 0.17–1.22)
MONOCYTES NFR BLD AUTO: 10 % (ref 4–12)
NEUTROPHILS # BLD AUTO: 6.75 THOUSANDS/ΜL (ref 1.85–7.62)
NEUTS SEG NFR BLD AUTO: 72 % (ref 43–75)
NRBC BLD AUTO-RTO: 0 /100 WBCS
PLATELET # BLD AUTO: 252 THOUSANDS/UL (ref 149–390)
PMV BLD AUTO: 9.4 FL (ref 8.9–12.7)
POTASSIUM SERPL-SCNC: 4.5 MMOL/L (ref 3.5–5.3)
PROT SERPL-MCNC: 7.3 G/DL (ref 6.4–8.2)
RBC # BLD AUTO: 3.89 MILLION/UL (ref 3.81–5.12)
SODIUM SERPL-SCNC: 129 MMOL/L (ref 136–145)
TRIGL SERPL-MCNC: 158 MG/DL
TSH SERPL DL<=0.05 MIU/L-ACNC: 3.61 UIU/ML (ref 0.36–3.74)
WBC # BLD AUTO: 9.28 THOUSAND/UL (ref 4.31–10.16)

## 2021-12-10 PROCEDURE — 83036 HEMOGLOBIN GLYCOSYLATED A1C: CPT

## 2021-12-10 PROCEDURE — 36415 COLL VENOUS BLD VENIPUNCTURE: CPT

## 2021-12-10 PROCEDURE — 80053 COMPREHEN METABOLIC PANEL: CPT

## 2021-12-10 PROCEDURE — 84443 ASSAY THYROID STIM HORMONE: CPT

## 2021-12-10 PROCEDURE — 80061 LIPID PANEL: CPT

## 2021-12-10 PROCEDURE — 85025 COMPLETE CBC W/AUTO DIFF WBC: CPT

## 2021-12-11 LAB
EST. AVERAGE GLUCOSE BLD GHB EST-MCNC: 123 MG/DL
HBA1C MFR BLD: 5.9 %

## 2021-12-11 PROCEDURE — 3044F HG A1C LEVEL LT 7.0%: CPT | Performed by: FAMILY MEDICINE

## 2021-12-14 ENCOUNTER — TELEPHONE (OUTPATIENT)
Dept: FAMILY MEDICINE CLINIC | Facility: OTHER | Age: 71
End: 2021-12-14

## 2021-12-17 ENCOUNTER — TELEPHONE (OUTPATIENT)
Dept: OTHER | Facility: OTHER | Age: 71
End: 2021-12-17

## 2021-12-20 ENCOUNTER — TELEPHONE (OUTPATIENT)
Dept: FAMILY MEDICINE CLINIC | Facility: OTHER | Age: 71
End: 2021-12-20

## 2021-12-20 DIAGNOSIS — I70.90 ATHEROSCLEROTIC PLAQUE: Primary | ICD-10-CM

## 2021-12-20 DIAGNOSIS — I16.1 HYPERTENSIVE EMERGENCY: ICD-10-CM

## 2021-12-20 DIAGNOSIS — N17.9 AKI (ACUTE KIDNEY INJURY) (HCC): Primary | ICD-10-CM

## 2021-12-20 PROCEDURE — 3066F NEPHROPATHY DOC TX: CPT | Performed by: FAMILY MEDICINE

## 2021-12-20 PROCEDURE — 4010F ACE/ARB THERAPY RXD/TAKEN: CPT | Performed by: FAMILY MEDICINE

## 2021-12-20 RX ORDER — LISINOPRIL 20 MG/1
20 TABLET ORAL DAILY
Qty: 30 TABLET | Refills: 0 | Status: CANCELLED | OUTPATIENT
Start: 2021-12-20

## 2021-12-20 RX ORDER — CLOPIDOGREL BISULFATE 75 MG/1
75 TABLET ORAL DAILY
Qty: 30 TABLET | Refills: 1 | Status: SHIPPED | OUTPATIENT
Start: 2021-12-20 | End: 2022-01-25 | Stop reason: ALTCHOICE

## 2021-12-20 RX ORDER — LISINOPRIL 20 MG/1
20 TABLET ORAL DAILY
Qty: 30 TABLET | Refills: 0 | Status: SHIPPED | OUTPATIENT
Start: 2021-12-20 | End: 2022-01-20 | Stop reason: SDUPTHER

## 2021-12-20 RX ORDER — CLOPIDOGREL BISULFATE 75 MG/1
75 TABLET ORAL DAILY
Qty: 90 TABLET | Refills: 0 | Status: CANCELLED | OUTPATIENT
Start: 2021-12-20

## 2021-12-22 ENCOUNTER — LAB (OUTPATIENT)
Dept: LAB | Facility: CLINIC | Age: 71
End: 2021-12-22
Payer: COMMERCIAL

## 2021-12-22 DIAGNOSIS — N17.9 AKI (ACUTE KIDNEY INJURY) (HCC): ICD-10-CM

## 2021-12-22 LAB
ANION GAP SERPL CALCULATED.3IONS-SCNC: 9 MMOL/L (ref 4–13)
BUN SERPL-MCNC: 8 MG/DL (ref 5–25)
CALCIUM SERPL-MCNC: 8.8 MG/DL (ref 8.3–10.1)
CHLORIDE SERPL-SCNC: 87 MMOL/L (ref 100–108)
CO2 SERPL-SCNC: 28 MMOL/L (ref 21–32)
CREAT SERPL-MCNC: 1.17 MG/DL (ref 0.6–1.3)
GFR SERPL CREATININE-BSD FRML MDRD: 46 ML/MIN/1.73SQ M
GLUCOSE P FAST SERPL-MCNC: 128 MG/DL (ref 65–99)
POTASSIUM SERPL-SCNC: 4.4 MMOL/L (ref 3.5–5.3)
SODIUM SERPL-SCNC: 124 MMOL/L (ref 136–145)

## 2021-12-22 PROCEDURE — 80048 BASIC METABOLIC PNL TOTAL CA: CPT

## 2021-12-22 PROCEDURE — 36415 COLL VENOUS BLD VENIPUNCTURE: CPT

## 2021-12-23 ENCOUNTER — HOSPITAL ENCOUNTER (INPATIENT)
Facility: HOSPITAL | Age: 71
LOS: 1 days | Discharge: HOME | DRG: 641 | End: 2021-12-24
Attending: EMERGENCY MEDICINE | Admitting: INTERNAL MEDICINE
Payer: COMMERCIAL

## 2021-12-23 ENCOUNTER — TELEPHONE (OUTPATIENT)
Dept: FAMILY MEDICINE CLINIC | Facility: OTHER | Age: 71
End: 2021-12-23

## 2021-12-23 DIAGNOSIS — E87.1 HYPONATREMIA: Primary | ICD-10-CM

## 2021-12-23 DIAGNOSIS — N17.9 AKI (ACUTE KIDNEY INJURY) (HCC): ICD-10-CM

## 2021-12-23 PROBLEM — R19.7 DIARRHEA: Status: ACTIVE | Noted: 2021-12-23

## 2021-12-23 LAB
ALBUMIN SERPL BCP-MCNC: 3.7 G/DL (ref 3.5–5)
ALP SERPL-CCNC: 79 U/L (ref 46–116)
ALT SERPL W P-5'-P-CCNC: 42 U/L (ref 12–78)
ANION GAP SERPL CALCULATED.3IONS-SCNC: 7 MMOL/L (ref 4–13)
ANION GAP SERPL CALCULATED.3IONS-SCNC: 7 MMOL/L (ref 4–13)
AST SERPL W P-5'-P-CCNC: 30 U/L (ref 5–45)
BASOPHILS # BLD AUTO: 0.04 THOUSANDS/ΜL (ref 0–0.1)
BASOPHILS NFR BLD AUTO: 1 % (ref 0–1)
BILIRUB SERPL-MCNC: 0.57 MG/DL (ref 0.2–1)
BUN SERPL-MCNC: 12 MG/DL (ref 5–25)
BUN SERPL-MCNC: 12 MG/DL (ref 5–25)
CALCIUM SERPL-MCNC: 7.6 MG/DL (ref 8.3–10.1)
CALCIUM SERPL-MCNC: 8.5 MG/DL (ref 8.3–10.1)
CHLORIDE SERPL-SCNC: 91 MMOL/L (ref 100–108)
CHLORIDE SERPL-SCNC: 94 MMOL/L (ref 100–108)
CO2 SERPL-SCNC: 26 MMOL/L (ref 21–32)
CO2 SERPL-SCNC: 28 MMOL/L (ref 21–32)
CREAT SERPL-MCNC: 1.26 MG/DL (ref 0.6–1.3)
CREAT SERPL-MCNC: 1.45 MG/DL (ref 0.6–1.3)
EOSINOPHIL # BLD AUTO: 0.01 THOUSAND/ΜL (ref 0–0.61)
EOSINOPHIL NFR BLD AUTO: 0 % (ref 0–6)
ERYTHROCYTE [DISTWIDTH] IN BLOOD BY AUTOMATED COUNT: 12.9 % (ref 11.6–15.1)
GFR SERPL CREATININE-BSD FRML MDRD: 36 ML/MIN/1.73SQ M
GFR SERPL CREATININE-BSD FRML MDRD: 42 ML/MIN/1.73SQ M
GLUCOSE SERPL-MCNC: 101 MG/DL (ref 65–140)
GLUCOSE SERPL-MCNC: 135 MG/DL (ref 65–140)
GLUCOSE SERPL-MCNC: 139 MG/DL (ref 65–140)
GLUCOSE SERPL-MCNC: 142 MG/DL (ref 65–140)
GLUCOSE SERPL-MCNC: 165 MG/DL (ref 65–140)
HCT VFR BLD AUTO: 37.4 % (ref 34.8–46.1)
HGB BLD-MCNC: 12.9 G/DL (ref 11.5–15.4)
IMM GRANULOCYTES # BLD AUTO: 0.04 THOUSAND/UL (ref 0–0.2)
IMM GRANULOCYTES NFR BLD AUTO: 1 % (ref 0–2)
LYMPHOCYTES # BLD AUTO: 1.12 THOUSANDS/ΜL (ref 0.6–4.47)
LYMPHOCYTES NFR BLD AUTO: 14 % (ref 14–44)
MCH RBC QN AUTO: 31 PG (ref 26.8–34.3)
MCHC RBC AUTO-ENTMCNC: 34.5 G/DL (ref 31.4–37.4)
MCV RBC AUTO: 90 FL (ref 82–98)
MONOCYTES # BLD AUTO: 0.66 THOUSAND/ΜL (ref 0.17–1.22)
MONOCYTES NFR BLD AUTO: 8 % (ref 4–12)
NEUTROPHILS # BLD AUTO: 6.09 THOUSANDS/ΜL (ref 1.85–7.62)
NEUTS SEG NFR BLD AUTO: 76 % (ref 43–75)
NRBC BLD AUTO-RTO: 0 /100 WBCS
PLATELET # BLD AUTO: 265 THOUSANDS/UL (ref 149–390)
PMV BLD AUTO: 9.4 FL (ref 8.9–12.7)
POTASSIUM SERPL-SCNC: 3.8 MMOL/L (ref 3.5–5.3)
POTASSIUM SERPL-SCNC: 4.2 MMOL/L (ref 3.5–5.3)
PROT SERPL-MCNC: 6.8 G/DL (ref 6.4–8.2)
RBC # BLD AUTO: 4.16 MILLION/UL (ref 3.81–5.12)
SODIUM SERPL-SCNC: 126 MMOL/L (ref 136–145)
SODIUM SERPL-SCNC: 127 MMOL/L (ref 136–145)
WBC # BLD AUTO: 7.96 THOUSAND/UL (ref 4.31–10.16)

## 2021-12-23 PROCEDURE — 99284 EMERGENCY DEPT VISIT MOD MDM: CPT

## 2021-12-23 PROCEDURE — 36415 COLL VENOUS BLD VENIPUNCTURE: CPT

## 2021-12-23 PROCEDURE — 80053 COMPREHEN METABOLIC PANEL: CPT

## 2021-12-23 PROCEDURE — 80048 BASIC METABOLIC PNL TOTAL CA: CPT | Performed by: INTERNAL MEDICINE

## 2021-12-23 PROCEDURE — 99285 EMERGENCY DEPT VISIT HI MDM: CPT | Performed by: EMERGENCY MEDICINE

## 2021-12-23 PROCEDURE — 82948 REAGENT STRIP/BLOOD GLUCOSE: CPT

## 2021-12-23 PROCEDURE — 96360 HYDRATION IV INFUSION INIT: CPT

## 2021-12-23 PROCEDURE — 85025 COMPLETE CBC W/AUTO DIFF WBC: CPT

## 2021-12-23 PROCEDURE — 99220 PR INITIAL OBSERVATION CARE/DAY 70 MINUTES: CPT | Performed by: INTERNAL MEDICINE

## 2021-12-23 RX ORDER — CLOPIDOGREL BISULFATE 75 MG/1
75 TABLET ORAL DAILY
Status: DISCONTINUED | OUTPATIENT
Start: 2021-12-23 | End: 2021-12-24 | Stop reason: HOSPADM

## 2021-12-23 RX ORDER — ONDANSETRON 2 MG/ML
4 INJECTION INTRAMUSCULAR; INTRAVENOUS EVERY 6 HOURS PRN
Status: DISCONTINUED | OUTPATIENT
Start: 2021-12-23 | End: 2021-12-24 | Stop reason: HOSPADM

## 2021-12-23 RX ORDER — HEPARIN SODIUM 5000 [USP'U]/ML
5000 INJECTION, SOLUTION INTRAVENOUS; SUBCUTANEOUS EVERY 8 HOURS SCHEDULED
Status: DISCONTINUED | OUTPATIENT
Start: 2021-12-23 | End: 2021-12-24 | Stop reason: HOSPADM

## 2021-12-23 RX ORDER — ASPIRIN 81 MG/1
81 TABLET, CHEWABLE ORAL DAILY
Status: DISCONTINUED | OUTPATIENT
Start: 2021-12-23 | End: 2021-12-24 | Stop reason: HOSPADM

## 2021-12-23 RX ORDER — ATORVASTATIN CALCIUM 40 MG/1
40 TABLET, FILM COATED ORAL EVERY EVENING
Status: DISCONTINUED | OUTPATIENT
Start: 2021-12-23 | End: 2021-12-24 | Stop reason: HOSPADM

## 2021-12-23 RX ORDER — SODIUM CHLORIDE 9 MG/ML
75 INJECTION, SOLUTION INTRAVENOUS CONTINUOUS
Status: DISPENSED | OUTPATIENT
Start: 2021-12-23 | End: 2021-12-24

## 2021-12-23 RX ADMIN — SODIUM CHLORIDE 75 ML/HR: 0.9 INJECTION, SOLUTION INTRAVENOUS at 18:34

## 2021-12-23 RX ADMIN — ATORVASTATIN CALCIUM 40 MG: 40 TABLET, FILM COATED ORAL at 18:44

## 2021-12-23 RX ADMIN — SODIUM CHLORIDE 500 ML: 0.9 INJECTION, SOLUTION INTRAVENOUS at 13:24

## 2021-12-23 RX ADMIN — HEPARIN SODIUM 5000 UNITS: 5000 INJECTION INTRAVENOUS; SUBCUTANEOUS at 21:58

## 2021-12-23 RX ADMIN — HEPARIN SODIUM 5000 UNITS: 5000 INJECTION INTRAVENOUS; SUBCUTANEOUS at 16:42

## 2021-12-23 NOTE — ED PROVIDER NOTES
History  Chief Complaint   Patient presents with    Abnormal Lab     pt had bloodwork done yesterday, resulting in low sodium & kidney function was elevated  denies any symptoms  65-year-old female with past medical history of hypertension and diabetes presents today following abnormal outpatient blood work  Patient was sent in by her primary doctor for hyponatremia and MARTINA  Patient is currently asymptomatic in the ED  Patient does report she has had chronic diarrhea for years  Patient states it starts in the evening when she lays down to watch TV  Patient states she has multiple episodes of loose, watery diarrhea that occur about every 10-15 minutes for couple hours at night  Patient states it typically does not wake her up from sleep but sometimes has episodes of fecal incontinence  Patient denies any blood in her stools  Patient denies any urinary incontinence, dysuria, frequency, urgency, hematuria  Patient was evaluated for TIA about 1 month ago  Her  states that her memory has been worsening and she has had difficulty with ambulation since then  Patient reports she sometimes feels unsteady on her feet  Patient denies fevers, recent illness, headaches, lightheadedness, changes in vision, chest pain, shortness of breath, abdominal pain, leg swelling, or any other symptoms at this time  Prior to Admission Medications   Prescriptions Last Dose Informant Patient Reported? Taking?    Blood Pressure Monitoring (B-D ASSURE BPM/AUTO ARM CUFF) MISC   No No   Sig: Use daily   acetaminophen (TYLENOL) 325 mg tablet  Self Yes No   Sig: Take 650 mg by mouth every 6 (six) hours as needed for mild pain   acetaminophen (TYLENOL) 325 mg tablet  Self No No   Sig: Take 2 tablets (650 mg total) by mouth every 6 (six) hours as needed for mild pain, headaches or fever   aspirin 81 mg chewable tablet  Self Yes No   Sig: Chew 81 mg daily   atorvastatin (LIPITOR) 40 mg tablet  Self No No   Sig: Take 1 tablet (40 mg total) by mouth every evening   clopidogrel (PLAVIX) 75 mg tablet   No No   Sig: Take 1 tablet (75 mg total) by mouth daily   emtricitabine-tenofovir (TRUVADA) 200-300 mg per tablet   No No   Sig: Take 1 tablet by mouth daily for 10 days   glipiZIDE (GLUCOTROL) 5 mg tablet  Self Yes No   lisinopril (ZESTRIL) 20 mg tablet   No No   Sig: Take 1 tablet (20 mg total) by mouth daily   raltegravir (ISENTRESS) 400 mg tablet   No No   Sig: Take 1 tablet by mouth every 12 (twelve) hours for 10 days      Facility-Administered Medications: None       Past Medical History:   Diagnosis Date    MARTINA (acute kidney injury) ([facility] ) 11/21/2021    Diabetes mellitus ([place] [address] )     Hypertension        History reviewed  No pertinent surgical history  History reviewed  No pertinent family history  I have reviewed and agree with the history as documented  E-Cigarette/Vaping    E-Cigarette Use Never User      E-Cigarette/Vaping Substances    Nicotine No     THC No     CBD No     Flavoring No     Other No     Unknown No      Social History     Tobacco Use    Smoking status: Never Smoker    Smokeless tobacco: Never Used   Vaping Use    Vaping Use: Never used   Substance Use Topics    Alcohol use: No    Drug use: No        Review of Systems   Constitutional: Negative for chills and fever  HENT: Negative for ear pain and sore throat  Eyes: Negative for pain and visual disturbance  Respiratory: Negative for cough and shortness of breath  Cardiovascular: Negative for chest pain and palpitations  Gastrointestinal: Positive for diarrhea  Negative for abdominal pain and vomiting  Genitourinary: Negative for dysuria and hematuria  Musculoskeletal: Positive for gait problem (chronic )  Negative for arthralgias and back pain  Skin: Negative for color change and rash  Neurological: Negative for seizures and syncope  Psychiatric/Behavioral: Positive for confusion (chronic)     All other systems reviewed and are negative  Physical Exam  ED Triage Vitals [12/23/21 1038]   Temperature Pulse Respirations Blood Pressure SpO2   98 °F (36 7 °C) 82 18 (!) 87/52 98 %      Temp Source Heart Rate Source Patient Position - Orthostatic VS BP Location FiO2 (%)   Oral Monitor Lying Left arm --      Pain Score       --             Orthostatic Vital Signs  Vitals:    12/23/21 1100 12/23/21 1108 12/23/21 1249 12/23/21 1300   BP: 133/61 133/61 107/53 107/53   Pulse: 76  69    Patient Position - Orthostatic VS: Sitting  Lying Lying       Physical Exam  Vitals and nursing note reviewed  Constitutional:       General: She is not in acute distress  Appearance: Normal appearance  She is well-developed  She is not ill-appearing  HENT:      Head: Normocephalic and atraumatic  Nose: Nose normal       Mouth/Throat:      Mouth: Mucous membranes are moist    Eyes:      Extraocular Movements: Extraocular movements intact  Conjunctiva/sclera: Conjunctivae normal       Pupils: Pupils are equal, round, and reactive to light  Cardiovascular:      Rate and Rhythm: Normal rate and regular rhythm  Heart sounds: No murmur heard  Pulmonary:      Effort: Pulmonary effort is normal  No respiratory distress  Breath sounds: Normal breath sounds  Abdominal:      Palpations: Abdomen is soft  Tenderness: There is no abdominal tenderness  Musculoskeletal:         General: Normal range of motion  Cervical back: Neck supple  Skin:     General: Skin is warm and dry  Neurological:      Mental Status: She is alert  Cranial Nerves: Cranial nerves are intact  No cranial nerve deficit  Sensory: Sensation is intact  Motor: Motor function is intact  Coordination: Coordination is intact   Finger-Nose-Finger Test and Heel to [facility] karlos Test normal       Gait: Tandem walk abnormal       Comments: Patient able to ambulate normally but has difficulty with tandem gait          ED Medications  Medications sodium chloride 0 9 % infusion (has no administration in time range)   ondansetron (ZOFRAN) injection 4 mg (has no administration in time range)   heparin (porcine) subcutaneous injection 5,000 Units (has no administration in time range)   aspirin chewable tablet 81 mg (has no administration in time range)   atorvastatin (LIPITOR) tablet 40 mg (has no administration in time range)   clopidogrel (PLAVIX) tablet 75 mg (has no administration in time range)   insulin lispro (HumaLOG) 100 units/mL subcutaneous injection 1-5 Units (1 Units Subcutaneous Not Given 12/23/21 1501)   sodium chloride 0 9 % bolus 500 mL (500 mL Intravenous New Bag 12/23/21 1324)       Diagnostic Studies  Results Reviewed     Procedure Component Value Units Date/Time    Sodium, urine, random [[id number]]     Lab Status: No result Specimen: Urine     Urea nitrogen, urine [[id number]]     Lab Status: No result Specimen: Urine     Creatinine, urine, random [[id number]]     Lab Status: No result Specimen: Urine     Pancreatic elastase, fecal [[id number]]     Lab Status: No result Specimen: Stool     Fingerstick Glucose (POCT) [[id number]]  (Normal) Collected: 12/23/21 1441    Lab Status: Final result Updated: 12/23/21 1443     POC Glucose 101 mg/dl     Calprotectin,Fecal [[id number]]     Lab Status: No result Specimen: Stool     Fecal leukocytes [[id number]]     Lab Status: No result Specimen: Stool     UA (URINE) with reflex to Scope [[id number]]     Lab Status: No result Specimen: Urine     Comprehensive metabolic panel [[id number]]  (Abnormal) Collected: 12/23/21 1152    Lab Status: Final result Specimen: Blood from Arm, Right Updated: 12/23/21 1336     Sodium 126 mmol/L      Potassium 4 2 mmol/L      Chloride 91 mmol/L      CO2 28 mmol/L      ANION GAP 7 mmol/L      BUN 12 mg/dL      Creatinine 1 45 mg/dL      Glucose 139 mg/dL      Calcium 8 5 mg/dL      AST 30 U/L      ALT 42 U/L      Alkaline Phosphatase 79 U/L      Total Protein 6 8 g/dL Albumin 3 7 g/dL      Total Bilirubin 0 57 mg/dL      eGFR 36 ml/min/1 73sq m     Narrative:      National Kidney Disease Foundation guidelines for Chronic Kidney Disease (CKD):     Stage 1 with normal or high GFR (GFR > 90 mL/min/1 73 square meters)    Stage 2 Mild CKD (GFR = 60-89 mL/min/1 73 square meters)    Stage 3A Moderate CKD (GFR = 45-59 mL/min/1 73 square meters)    Stage 3B Moderate CKD (GFR = 30-44 mL/min/1 73 square meters)    Stage 4 Severe CKD (GFR = 15-29 mL/min/1 73 square meters)    Stage 5 End Stage CKD (GFR <15 mL/min/1 73 square meters)  Note: GFR calculation is accurate only with a steady state creatinine    CBC and differential [[id number]]  (Abnormal) Collected: 12/23/21 1152    Lab Status: Final result Specimen: Blood from Arm, Right Updated: 12/23/21 1222     WBC 7 96 Thousand/uL      RBC 4 16 Million/uL      Hemoglobin 12 9 g/dL      Hematocrit 37 4 %      MCV 90 fL      MCH 31 0 pg      MCHC 34 5 g/dL      RDW 12 9 %      MPV 9 4 fL      Platelets 003 Thousands/uL      nRBC 0 /100 WBCs      Neutrophils Relative 76 %      Immat GRANS % 1 %      Lymphocytes Relative 14 %      Monocytes Relative 8 %      Eosinophils Relative 0 %      Basophils Relative 1 %      Neutrophils Absolute 6 09 Thousands/µL      Immature Grans Absolute 0 04 Thousand/uL      Lymphocytes Absolute 1 12 Thousands/µL      Monocytes Absolute 0 66 Thousand/µL      Eosinophils Absolute 0 01 Thousand/µL      Basophils Absolute 0 04 Thousands/µL                  No orders to display         Procedures  Procedures      ED Course                                       MDM  Number of Diagnoses or Management Options  MARTINA (acute kidney injury) ([address] )  Hyponatremia  Diagnosis management comments: 25-year-old female with past medical history of hypertension and diabetes presents today following hyponatremia and MARTINA found on outpatient labwork taken yesterday  Patient is currently asymptomatic in the ED    Patient does report she has had chronic diarrhea for years with multiple episodes occurring each night described as loose, watery stools  Patient was evaluated for TIA about 1 month ago and her  states that her memory has been worsening and she has had difficulty with ambulation since then  Patient denies any acute symptoms currently in the ED  On exam, patient is well appearing and in no acute distress  Patient has benign physical exam aside from difficulty with tandem gait  Labs were repeated here  Patient hyponatremic to 126 (sodium 124 yesterday) and creatinine elevated to 1 45  Patient was started on 500 cc NS bolus  Patient will require admission for hyponatremia and MARTINA  Patient understands and agrees with plan  Amount and/or Complexity of Data Reviewed  Clinical lab tests: ordered and reviewed  Decide to obtain previous medical records or to obtain history from someone other than the patient: yes  Obtain history from someone other than the patient: yes ()  Review and summarize past medical records: yes  Discuss the patient with other providers: yes (SLIM)    Risk of Complications, Morbidity, and/or Mortality  Presenting problems: moderate  Diagnostic procedures: low  Management options: moderate    Patient Progress  Patient progress: stable      Disposition  Final diagnoses:   Hyponatremia   MARTINA (acute kidney injury) ([facility] )     Time reflects when diagnosis was documented in both MDM as applicable and the Disposition within this note     Time User Action Codes Description Comment    12/23/2021  1:53 PM Stefano Freedman Add [E87 1] Hyponatremia     12/23/2021  1:53 PM Stefano Freedman Add [N17 9] MARTINA (acute kidney injury) [facility])       ED Disposition     ED Disposition Condition Date/Time Comment    Admit Stable Thu Dec 23, 2021  1:53 PM Case was discussed with KEVIN and the patient's admission status was agreed to be Admission Status: observation status to the service of Dr Ela Page           Follow-up Information    None         Patient's Medications   Discharge Prescriptions    No medications on file     No discharge procedures on file  PDMP Review     None           ED Provider  Attending physically available and evaluated Horace Evans I managed the patient along with the ED Attending      Electronically Signed by         Mary Goins MD  12/23/21 1216

## 2021-12-23 NOTE — ASSESSMENT & PLAN NOTE
Creatinine 1 45  Baseline around 0 9  Patient is hypovolemic  Status post  bolus in ER  Continue 75 cc/hour  Monitor I&Os  Check UA  Bladder scan

## 2021-12-23 NOTE — H&P
[facility]  H&P- Horace Ojeda 1950, 66 y o  female MRN: [id number]  Unit/Bed#: ED 26 Encounter: [id number]  Primary Care Provider: Adalgisa Ewing MD   Date and time admitted to hospital: 12/23/2021 10:45 AM    * Hyponatremia  Assessment & Plan  Hypovolemic   check urine sodium, BUN, CR  Continue IV fluid  Avoid over-correction  LUCA (acute kidney injury) ([address] )  Assessment & Plan  Creatinine 1 45  Baseline around 0 9  Patient is hypovolemic  Status post  bolus in ER  Continue 75 cc/hour  Monitor I&Os  Check UA  Bladder scan  Diarrhea  Assessment & Plan  Chronic mild intermittent diarrhea with night symptoms-  2-3 watery bowel movement a day  Check stool leukocyte, fecal calprotectin, elastase      Hypertension  Assessment & Plan  Will hold lisinopril for now due to Luca  Blood pressure systolic 175    Type 2 diabetes mellitus without complication, without long-term current use of insulin [facility])  Assessment & Plan  Lab Results   Component Value Date    HGBA1C 5 9 (H) 12/10/2021       Recent Labs     12/23/21  1441   POCGLU 101       Blood Sugar Average: Last 72 hrs:  (P) 101   Hold glipizide  Continue sliding scale insulin        VTE Prophylaxis: Heparin  Code Status: Level 1 - Full Code  Anticipated Length of Stay:  Patient will be admitted on an Observation basis with an anticipated length of stay of  < than 2 midnights  Justification for Hospital Stay: Hyponatremia  Total Time for Visit, including Counseling / Coordination of Care: 55 mins  Greater than 50% of this total time spent on direct patient counseling and coordination of care  Chief Complaint:     Chief Complaint   Patient presents with    Abnormal Lab     pt had bloodwork done yesterday, resulting in low sodium & kidney function was elevated  denies any symptoms        History of Present Illness:    Horace Ojeda is a 66 y o  female who presents with   67-year-old female patient with history of TIA, carotid artery stenosis, hypertension, hyperlipidemia, patient was called by her primary care for abnormal blood work results  Sodium was 124, repeated is 126  Creatinine 1 45  Baseline around 1  Patient has poor oral intake, she has chronic intermittent diarrhea with some night symptoms, 2-3 bowel movement a day  No hematochezia, hematemesis, melena  Denies any chest pain, shortness breath, nausea, vomiting, abdominal pain denies any dizziness, lightheadedness, denies any urinary symptoms, no chest pain, no shortness of breath     Review of Systems:  As above        Past Medical and Surgical History:   Past Medical History:   Diagnosis Date    MARTINA [facility] injury) ([facility] ) 11/21/2021    Diabetes mellitus ([place] [address] )     Hypertension      History reviewed  No pertinent surgical history  Meds/Allergies: Allergies: No Known Allergies  Prior to Admission Medications   Prescriptions Last Dose Informant Patient Reported? Taking?    Blood Pressure Monitoring (B-D ASSURE BPM/AUTO ARM CUFF) MISC   No No   Sig: Use daily   acetaminophen (TYLENOL) 325 mg tablet  Self Yes No   Sig: Take 650 mg by mouth every 6 (six) hours as needed for mild pain   acetaminophen (TYLENOL) 325 mg tablet  Self No No   Sig: Take 2 tablets (650 mg total) by mouth every 6 (six) hours as needed for mild pain, headaches or fever   aspirin 81 mg chewable tablet  Self Yes No   Sig: Chew 81 mg daily   atorvastatin (LIPITOR) 40 mg tablet  Self No No   Sig: Take 1 tablet (40 mg total) by mouth every evening   clopidogrel (PLAVIX) 75 mg tablet   No No   Sig: Take 1 tablet (75 mg total) by mouth daily   emtricitabine-tenofovir (TRUVADA) 200-300 mg per tablet   No No   Sig: Take 1 tablet by mouth daily for 10 days   glipiZIDE (GLUCOTROL) 5 mg tablet  Self Yes No   lisinopril (ZESTRIL) 20 mg tablet   No No   Sig: Take 1 tablet (20 mg total) by mouth daily   raltegravir (ISENTRESS) 400 mg tablet   No No   Sig: Take 1 tablet by mouth every 12 (twelve) hours for 10 days      Facility-Administered Medications: None     Social History:     Social History     Socioeconomic History    Marital status: /Civil Union     Spouse name: Not on file    Number of children: Not on file    Years of education: Not on file    Highest education level: Not on file   Occupational History    Not on file   Tobacco Use    Smoking status: Never Smoker    Smokeless tobacco: Never Used   Vaping Use    Vaping Use: Never used   Substance and Sexual Activity    Alcohol use: No    Drug use: No    Sexual activity: Not Currently   Other Topics Concern    Not on file   Social History Narrative    ** Merged History Encounter **          Social Determinants of Health     Financial Resource Strain: Not on file   Food Insecurity: Not on file   Transportation Needs: Not on file   Physical Activity: Not on file   Stress: Not on file   Social Connections: Not on file   Intimate Partner Violence: Not on file   Housing Stability: Not on file     Lives at home, fairly independent    Family History:  History reviewed  No pertinent family history    Physical Exam:   Vitals:   Blood Pressure: 107/53 (12/23/21 1300)  Pulse: 69 (12/23/21 1249)  Temperature: 98 °F (36 7 °C) (12/23/21 1038)  Temp Source: Oral (12/23/21 1038)  Respirations: 16 (12/23/21 1300)  Height: 5' 2" (157 5 cm) (12/23/21 1038)  Weight - Scale: 62 6 kg (138 lb) (12/23/21 1038)  SpO2: 99 % (12/23/21 1300)    General appearance: alert, appears stated age and cooperative  Constitutional- looks a little weak  HEENT - atraumatic and normocephalic-dry oral mucosa  Neck- supple  Skin - no fresh rash  Extremities no fresh focal deformities  Cardiovascular- S1-S2 heard  Respiratory- bilateral air entry present, no crackles or rhonchi  Skin - no fresh rash  Abdomen - normal bowel sounds present, no rebound tenderness  CNS- No fresh focal deficits  Psych- no acute psychosis     Lab Results: I have personally reviewed pertinent reports  Results from last 7 days   Lab Units 12/23/21  1152   WBC Thousand/uL 7 96   HEMOGLOBIN g/dL 12 9   HEMATOCRIT % 37 4   PLATELETS Thousands/uL 265   NEUTROS PCT % 76*   LYMPHS PCT % 14   MONOS PCT % 8   EOS PCT % 0     Results from last 7 days   Lab Units 12/23/21  1152 12/22/21  0956   SODIUM mmol/L 126* 124*   POTASSIUM mmol/L 4 2 4 4   CHLORIDE mmol/L 91* 87*   CO2 mmol/L 28 28   ANION GAP mmol/L 7 9   BUN mg/dL 12 8   CREATININE mg/dL 1 45* 1 17   CALCIUM mg/dL 8 5 8 8   ALBUMIN g/dL 3 7  --    TOTAL BILIRUBIN mg/dL 0 57  --    ALK PHOS U/L 79  --    ALT U/L 42  --    AST U/L 30  --    EGFR ml/min/1 73sq m 36 46   GLUCOSE RANDOM mg/dL 139  --                                         Imaging: I have personally reviewed pertinent reports  No results found  Epic Records Reviewed: Yes    Rosio Jacobo MD  [facility]    ** Please Note: This note has been constructed using a voice recognition system   **

## 2021-12-23 NOTE — ASSESSMENT & PLAN NOTE
Chronic mild intermittent diarrhea with night symptoms-  2-3 watery bowel movement a day  Check stool leukocyte, fecal calprotectin, elastase

## 2021-12-23 NOTE — ASSESSMENT & PLAN NOTE
Lab Results   Component Value Date    HGBA1C 5 9 (H) 12/10/2021       Recent Labs     12/23/21  1441   POCGLU 101       Blood Sugar Average: Last 72 hrs:  (P) 101   Hold glipizide    Continue sliding scale insulin

## 2021-12-24 VITALS
DIASTOLIC BLOOD PRESSURE: 58 MMHG | HEIGHT: 62 IN | WEIGHT: 138 LBS | TEMPERATURE: 98.3 F | OXYGEN SATURATION: 99 % | SYSTOLIC BLOOD PRESSURE: 142 MMHG | HEART RATE: 93 BPM | BODY MASS INDEX: 25.4 KG/M2 | RESPIRATION RATE: 18 BRPM

## 2021-12-24 PROBLEM — N17.9 AKI (ACUTE KIDNEY INJURY) (HCC): Status: RESOLVED | Noted: 2021-11-21 | Resolved: 2021-12-24

## 2021-12-24 LAB
ANION GAP SERPL CALCULATED.3IONS-SCNC: 7 MMOL/L (ref 4–13)
ANION GAP SERPL CALCULATED.3IONS-SCNC: 7 MMOL/L (ref 4–13)
ANION GAP SERPL CALCULATED.3IONS-SCNC: 8 MMOL/L (ref 4–13)
BUN SERPL-MCNC: 10 MG/DL (ref 5–25)
BUN SERPL-MCNC: 12 MG/DL (ref 5–25)
BUN SERPL-MCNC: 9 MG/DL (ref 5–25)
CALCIUM SERPL-MCNC: 7.8 MG/DL (ref 8.3–10.1)
CALCIUM SERPL-MCNC: 8 MG/DL (ref 8.3–10.1)
CALCIUM SERPL-MCNC: 8.3 MG/DL (ref 8.3–10.1)
CHLORIDE SERPL-SCNC: 96 MMOL/L (ref 100–108)
CHLORIDE SERPL-SCNC: 97 MMOL/L (ref 100–108)
CHLORIDE SERPL-SCNC: 98 MMOL/L (ref 100–108)
CO2 SERPL-SCNC: 24 MMOL/L (ref 21–32)
CO2 SERPL-SCNC: 25 MMOL/L (ref 21–32)
CO2 SERPL-SCNC: 25 MMOL/L (ref 21–32)
CREAT SERPL-MCNC: 0.97 MG/DL (ref 0.6–1.3)
CREAT SERPL-MCNC: 0.98 MG/DL (ref 0.6–1.3)
CREAT SERPL-MCNC: 1.1 MG/DL (ref 0.6–1.3)
GFR SERPL CREATININE-BSD FRML MDRD: 50 ML/MIN/1.73SQ M
GFR SERPL CREATININE-BSD FRML MDRD: 57 ML/MIN/1.73SQ M
GFR SERPL CREATININE-BSD FRML MDRD: 58 ML/MIN/1.73SQ M
GLUCOSE P FAST SERPL-MCNC: 167 MG/DL (ref 65–99)
GLUCOSE SERPL-MCNC: 101 MG/DL (ref 65–140)
GLUCOSE SERPL-MCNC: 102 MG/DL (ref 65–140)
GLUCOSE SERPL-MCNC: 108 MG/DL (ref 65–140)
GLUCOSE SERPL-MCNC: 137 MG/DL (ref 65–140)
GLUCOSE SERPL-MCNC: 167 MG/DL (ref 65–140)
GLUCOSE SERPL-MCNC: 178 MG/DL (ref 65–140)
GLUCOSE SERPL-MCNC: 188 MG/DL (ref 65–140)
POTASSIUM SERPL-SCNC: 3.9 MMOL/L (ref 3.5–5.3)
POTASSIUM SERPL-SCNC: 4 MMOL/L (ref 3.5–5.3)
POTASSIUM SERPL-SCNC: 4.3 MMOL/L (ref 3.5–5.3)
SODIUM SERPL-SCNC: 128 MMOL/L (ref 136–145)
SODIUM SERPL-SCNC: 129 MMOL/L (ref 136–145)
SODIUM SERPL-SCNC: 130 MMOL/L (ref 136–145)

## 2021-12-24 PROCEDURE — 99239 HOSP IP/OBS DSCHRG MGMT >30: CPT | Performed by: INTERNAL MEDICINE

## 2021-12-24 PROCEDURE — 80048 BASIC METABOLIC PNL TOTAL CA: CPT | Performed by: INTERNAL MEDICINE

## 2021-12-24 PROCEDURE — 82948 REAGENT STRIP/BLOOD GLUCOSE: CPT

## 2021-12-24 PROCEDURE — 99232 SBSQ HOSP IP/OBS MODERATE 35: CPT | Performed by: INTERNAL MEDICINE

## 2021-12-24 RX ORDER — SODIUM CHLORIDE 9 MG/ML
100 INJECTION, SOLUTION INTRAVENOUS CONTINUOUS
Status: DISCONTINUED | OUTPATIENT
Start: 2021-12-24 | End: 2021-12-24 | Stop reason: HOSPADM

## 2021-12-24 RX ADMIN — ASPIRIN 81 MG: 81 TABLET, CHEWABLE ORAL at 09:24

## 2021-12-24 RX ADMIN — HEPARIN SODIUM 5000 UNITS: 5000 INJECTION INTRAVENOUS; SUBCUTANEOUS at 13:58

## 2021-12-24 RX ADMIN — ATORVASTATIN CALCIUM 40 MG: 40 TABLET, FILM COATED ORAL at 17:20

## 2021-12-24 RX ADMIN — HEPARIN SODIUM 5000 UNITS: 5000 INJECTION INTRAVENOUS; SUBCUTANEOUS at 05:15

## 2021-12-24 RX ADMIN — CLOPIDOGREL BISULFATE 75 MG: 75 TABLET ORAL at 09:24

## 2021-12-24 RX ADMIN — INSULIN LISPRO 1 UNITS: 100 INJECTION, SOLUTION INTRAVENOUS; SUBCUTANEOUS at 12:30

## 2021-12-24 RX ADMIN — SODIUM CHLORIDE 100 ML/HR: 0.9 INJECTION, SOLUTION INTRAVENOUS at 09:26

## 2021-12-24 NOTE — PLAN OF CARE
Problem: MOBILITY - ADULT  Goal: Maintain or return to baseline ADL function  Description: INTERVENTIONS:  -  Assess patient's ability to carry out ADLs; assess patient's baseline for ADL function and identify physical deficits which impact ability to perform ADLs (bathing, care of mouth/teeth, toileting, grooming, dressing, etc )  - Assess/evaluate cause of self-care deficits   - Assess range of motion  - Assess patient's mobility; develop plan if impaired  - Assess patient's need for assistive devices and provide as appropriate  - Encourage maximum independence but intervene and supervise when necessary  - Involve family in performance of ADLs  - Assess for home care needs following discharge   - Consider OT consult to assist with ADL evaluation and planning for discharge  - Provide patient education as appropriate  Outcome: Progressing     Problem: MOBILITY - ADULT  Goal: Maintains/Returns to pre admission functional level  Description: INTERVENTIONS:  - Perform BMAT or MOVE assessment daily    - Set and communicate daily mobility goal to care team and patient/family/caregiver     - Collaborate with rehabilitation services on mobility goals if consulted  - Out of bed for toileting  - Record patient progress and toleration of activity level   Outcome: Progressing     Problem: Potential for Falls  Goal: Patient will remain free of falls  Description: INTERVENTIONS:  - Educate patient/family on patient safety including physical limitations  - Instruct patient to call for assistance with activity   - Consult OT/PT to assist with strengthening/mobility   - Keep Call bell within reach  - Keep bed low and locked with side rails adjusted as appropriate  - Keep care items and personal belongings within reach  - Initiate and maintain comfort rounds  - Make Fall Risk Sign visible to staff  - Consider moving patient to room near nurses station  Outcome: Progressing

## 2021-12-24 NOTE — DISCHARGE INSTR - AVS FIRST PAGE
Stay well hydrated  Maintain good oral intake  Repeat your blood work on Monday 12/27/2021  Discuss results with your primary care physician

## 2021-12-24 NOTE — ASSESSMENT & PLAN NOTE
Lab Results   Component Value Date    HGBA1C 5 9 (H) 12/10/2021       Recent Labs     12/23/21  2101 12/24/21  0207 12/24/21  0817 12/24/21  1118   POCGLU 165* 102 101 178*       Blood Sugar Average: Last 72 hrs:  (P) 131 5   Hold glipizide    Continue sliding scale insulin

## 2021-12-24 NOTE — ASSESSMENT & PLAN NOTE
Hypovolemic  Increase  cc/hour  Continue BMP Q8H  Avoid over-correction  Last sodium level 130  Ideally would keep the patient overnight on fluids  Patient then patient daughter eager to go home to spend the [place]  Agreed to stay well hydrated, maintain good oral intake, repeat blood work on Monday 12/27    Discuss results with the primary care physician

## 2021-12-24 NOTE — ASSESSMENT & PLAN NOTE
Chronic mild intermittent diarrhea with night symptoms-  2-3 watery bowel movement a day  Pending stool leukocyte, fecal calprotectin, elastase  Outpatient follow-up

## 2021-12-24 NOTE — UTILIZATION REVIEW
Initial Clinical Review  OBSERVATION 12/23/21 @ 1355 CONVERTED TO INPATIENT ADMISSION 12/24/21 @1453 DUE TO CONTINUED STAY REQUIRED TO EVALUATE AND TREAT PATIENT WITH HYPONATREMIA  WITH INCREASED RATE OF IVF-NSS, BMP Q8H    Admission: Date/Time/Statement:   Admission Orders (From admission, onward)     Ordered        12/24/21 1453  Inpatient Admission  Once            12/23/21 1355  Place in Observation  Once                      Orders Placed This Encounter   Procedures    Inpatient Admission     Standing Status:   Standing     Number of Occurrences:   1     Order Specific Question:   Level of Care     Answer:   Med Surg [16]     Order Specific Question:   Estimated length of stay     Answer:   More than 2 Midnights     Order Specific Question:   Certification     Answer:   I certify that inpatient services are medically necessary for this patient for a duration of greater than two midnights  See H&P and MD Progress Notes for additional information about the patient's course of treatment  ED Arrival Information     Expected Arrival Acuity    - 12/23/2021 10:15 Emergent         Means of arrival Escorted by Service Admission type    [facility] Emergency         Arrival complaint    Abnormal Bloodwork        Chief Complaint   Patient presents with    Abnormal Lab     pt had bloodwork done yesterday, resulting in low sodium & kidney function was elevated  denies any symptoms  Initial Presentation: 78-year-old female patient with history of TIA, carotid artery stenosis, hypertension, hyperlipidemia, who presents to ED from home per advise of PCP due to  abnormal blood work results  Per , pt has been having difficulty walking for about 1 month, unsteady on her feet and memory worsening   Sodium was 124, repeated is 126  Creatinine 1 45 from baseline around 1   Patient has poor oral intake, she has chronic intermittent diarrhea On exam, pt looks weak, BP low at 87/52-denies lightheadedness, dizziness  Pt hypovolemic  Pt given IVF bolus in ED  Pt admitted as OBS with hyponatremia, MARTINA  Plan - continue IVF-NSS @75ml/hr, monitor BMP and avoid overcorrection, check urine sodium, BUN, creat  Check UA, bladder scan, I/O  Sol Soulier Hold Lisinopril  Check stool leukocyte, fecal calprotectin, elastase  Date: 12/24  Pt alert, , no new complaints  Pts sodium 129-->128 today  IVF - NSS increased to 100 ml/hr from75 ml/hr  Continue BMP q8h  Lisinopril on Hold  Avoid over correction  Awaiting stool studies ordered due to diarrhea  Abdomen with normal bowel sounds  12/24 Update -pts sodium up to 130 @ 1619  Pts daughter eager to spend [place] with pt and decided to pick her up today  Advised to stay well hydrated, maintain good oral intake  Repeat blood work on Monday 12/27/2021  The patient, initially admitted to the hospital as inpatient, was discharged earlier than expected given the following: Sodium 130 -mildly low-ideally would keep the patient overnight on fluid - patient daughter decided to pick the patient up today he to spend the [place] together      ED Triage Vitals   Temperature Pulse Respirations Blood Pressure SpO2   12/23/21 1038 12/23/21 1038 12/23/21 1038 12/23/21 1038 12/23/21 1038   98 °F (36 7 °C) 82 18 (!) 87/52 98 %      Temp Source Heart Rate Source Patient Position - Orthostatic VS BP Location FiO2 (%)   12/23/21 1038 12/23/21 1038 12/23/21 1038 12/23/21 1038 --   Oral Monitor Lying Left arm       Pain Score       12/23/21 2300       No Pain          Wt Readings from Last 1 Encounters:   12/23/21 62 6 kg (138 lb)     Additional Vital Signs:   Date/Time Temp Pulse Resp BP MAP (mmHg) SpO2   12/24/21 0817 98 7 °F (37 1 °C) 78 -- 135/58 83 98 %   12/23/21 2300 98 3 °F (36 8 °C) 79 18 163/72 -- 99 %   12/23/21 1849 98 2 °F (36 8 °C) 83 18 164/72 -- 99 %   12/23/21 1300 -- -- 16 107/53 76 99 %   12/23/21 1249 -- 69 16 107/53 -- 100 %   12/23/21 1108 -- -- -- 133/61 -- --   12/23/21 1100 -- 76 18 133/61 88 100 %     Opening Best Verbal Response Best Motor Response Yesy Coma Scale Score User   12/23/21 2300 4 5 6 15   12/23/21 1136 4 5 6 15     Pertinent Labs/Diagnostic Test Results:       Results from last 7 days   Lab Units 12/23/21  1152   WBC Thousand/uL 7 96   HEMOGLOBIN g/dL 12 9   HEMATOCRIT % 37 4   PLATELETS Thousands/uL 265   NEUTROS ABS Thousands/µL 6 09         Results from last 7 days   Lab Units 12/24/21  1619 12/24/21  1017 12/24/21  0218 12/23/21  2205 12/23/21  1152   SODIUM mmol/L 130* 128* 129* 127* 126*   POTASSIUM mmol/L 4 3 3 9 4 0 3 8 4 2   CHLORIDE mmol/L 98* 97* 96* 94* 91*   CO2 mmol/L 25 24 25 26 28   ANION GAP mmol/L 7 7 8 7 7   BUN mg/dL 9 10 12 12 12   CREATININE mg/dL 0 97 0 98 1 10 1 26 1 45*   EGFR ml/min/1 73sq m 58 57 50 42 36   CALCIUM mg/dL 8 3 8 0* 7 8* 7 6* 8 5     Results from last 7 days   Lab Units 12/23/21  1152   AST U/L 30   ALT U/L 42   ALK PHOS U/L 79   TOTAL PROTEIN g/dL 6 8   ALBUMIN g/dL 3 7   TOTAL BILIRUBIN mg/dL 0 57     Results from last 7 days   Lab Units 12/24/21  1728 12/24/21  1118 12/24/21  0817 12/24/21  0207 12/23/21  2101 12/23/21  1856 12/23/21  1441   POC GLUCOSE mg/dl 188* 178* 101 102 165* 142* 101     Results from last 7 days   Lab Units 12/24/21  1619 12/24/21  1017 12/24/21  0218 12/23/21  2205 12/23/21  1152   GLUCOSE RANDOM mg/dL 137 167* 108 135 139           ED Treatment:   Medication Administration from 12/23/2021 1015 to 12/23/2021 1819       Date/Time Order Dose Route Action Comments     12/23/2021 1324 sodium chloride 0 9 % bolus 500 mL 500 mL Intravenous New Bag      12/23/2021 1642 heparin (porcine) subcutaneous injection 5,000 Units 5,000 Units Subcutaneous Given      12/23/2021 1658 aspirin chewable tablet 81 mg 81 mg Oral Not Given already taken today per family, refused to take again     12/23/2021 1659 clopidogrel (PLAVIX) tablet 75 mg 75 mg Oral Not Given already taken today per family, refused to take again Past Medical History:   Diagnosis Date    MARTINA (acute kidney injury) ([place] [address] ) 11/21/2021    Diabetes mellitus ([place] [address] )     Hypertension      Present on Admission:   Type 2 diabetes mellitus without complication, without long-term current use of insulin (HCC)   Hypertension   (Resolved) MARTINA (acute kidney injury) ([place] [address] )      Admitting Diagnosis: Hyponatremia [E87 1]  MARTINA (acute kidney injury) ([place] [address] ) [N17 9]  Age/Sex: 66 y o  female  Admission Orders:  Scheduled Medications:  aspirin, 81 mg, Oral, Daily  atorvastatin, 40 mg, Oral, QPM  clopidogrel, 75 mg, Oral, Daily  heparin (porcine), 5,000 Units, Subcutaneous, Q8H [address]  insulin lispro, 1-5 Units, Subcutaneous, Q6H LAURIE      Continuous IV Infusions:sodium chloride 0 9 % infusion  Rate: 75 mL/hr Dose: 75 mL/hr  Freq: Continuous Route: IV  Last Dose: Stopped (12/24/21 0758)  Start: 12/23/21 1430 End: 12/24/21 9528  sodium chloride, 100 mL/hr, Intravenous, Continuous- Start: 12/24/21 0930      PRN Meds:  ondansetron, 4 mg, Intravenous, Q6H PRN      SCD's   BMPQ8h      Network Utilization Review Department  ATTENTION: Please call with any questions or concerns to [phone] and carefully listen to the prompts so that you are directed to the right person  All voicemails are confidential   Everlina Blue all requests for admission clinical reviews, approved or denied determinations and any other requests to dedicated fax number below belonging to the campus where the patient is receiving treatment   List of dedicated fax numbers for the Facilities:  [address] DENIALS (Administrative/Medical Necessity) [phone]   [address] (Maternity/NICU/Pediatrics) 261 [facility],[address] [address] [place] [address] [address] [address] [place] [address] [address] [phone]

## 2021-12-24 NOTE — PROGRESS NOTES
[facility]  Progress Note Lawrence Parisi 1950, 66 y o  female MRN: [id number]  Unit/Bed#: S -30 Encounter: [id number]  Primary Care Provider: Jurgen Avalos MD   Date and time admitted to hospital: 12/23/2021 10:45 AM    * Hyponatremia  Assessment & Plan  Hypovolemic  Increase  cc/hour  Continue BMP Q8H  Avoid over-correction      Diarrhea  Assessment & Plan  Chronic mild intermittent diarrhea with night symptoms-  2-3 watery bowel movement a day  Pending stool leukocyte, fecal calprotectin, elastase  Outpatient follow-up      Hypertension  Assessment & Plan  Held lisinopril for now due to Luca  Resume on discharge  Blood pressure stable    Type 2 diabetes mellitus without complication, without long-term current use of insulin [facility])  Assessment & Plan  Lab Results   Component Value Date    HGBA1C 5 9 (H) 12/10/2021       Recent Labs     12/23/21  2101 12/24/21  0207 12/24/21  0817 12/24/21  1118   POCGLU 165* 102 101 178*       Blood Sugar Average: Last 72 hrs:  (P) 131 5   Hold glipizide  Continue sliding scale insulin        TE Pharmacologic Prophylaxis: Heparin    Patient Centered Rounds: I have performed bedside rounds with nursing staff today  Discussions with Specialists or Other Care Team Provider: RN  Education and Discussions with Family / Patient:  Patient, patient daughter    Current Length of Stay: 0 day(s)  Current Patient Status: Inpatient     Certification Statement: The patient will continue to require additional inpatient hospital stay due to Hyponatremia  Discharge Plan / Estimated Discharge Date:  Potential discharge tomorrow 12/25    Code Status: Level 1 - Full Code  ______________________________________________________________________________    Subjective:   Patient seen and examined by me  No new complaint    Objective:   Vitals: Blood pressure 135/58, pulse 78, temperature 98 7 °F (37 1 °C), temperature source Oral, resp   rate 18, height 5' 2" (1 575 m), weight 62 6 kg (138 lb), SpO2 98 %  Physical Exam:   General appearance: alert, appears stated age and cooperative  Constitutional- looks a little weak  HEENT - atraumatic and normocephalic  Neck- supple  Skin - no fresh rash  Extremities no fresh focal deformities  Cardiovascular- S1-S2 heard  Respiratory- bilateral air entry present, no crackles or rhonchi  Skin - no fresh rash  Abdomen - normal bowel sounds present, no rebound tenderness  CNS- No fresh focal deficits  Psych- no acute psychosis     Additional Data:   Labs:  Results from last 7 days   Lab Units 12/23/21  1152   WBC Thousand/uL 7 96   HEMOGLOBIN g/dL 12 9   HEMATOCRIT % 37 4   MCV fL 90   PLATELETS Thousands/uL 265     Results from last 7 days   Lab Units 12/24/21  1017 12/24/21  0218 12/23/21  2205 12/23/21  1152 12/22/21  0956   SODIUM mmol/L 128* 129* 127* 126* 124*   POTASSIUM mmol/L 3 9 4 0 3 8 4 2 4 4   CHLORIDE mmol/L 97* 96* 94* 91* 87*   CO2 mmol/L 24 25 26 28 28   ANION GAP mmol/L 7 8 7 7 9   BUN mg/dL 10 12 12 12 8   CREATININE mg/dL 0 98 1 10 1 26 1 45* 1 17   CALCIUM mg/dL 8 0* 7 8* 7 6* 8 5 8 8   ALBUMIN g/dL  --   --   --  3 7  --    TOTAL BILIRUBIN mg/dL  --   --   --  0 57  --    ALK PHOS U/L  --   --   --  79  --    ALT U/L  --   --   --  42  --    AST U/L  --   --   --  30  --    EGFR ml/min/1 73sq m 57 50 42 36 46   GLUCOSE RANDOM mg/dL 167* 108 135 139  --                       Results from last 7 days   Lab Units 12/24/21  1118 12/24/21  0817 12/24/21  0207 12/23/21  2101 12/23/21  1856 12/23/21  1441   POC GLUCOSE mg/dl 178* 101 102 165* 142* 101             * I Have Reviewed All Lab Data Listed Above  Cultures:               Imaging:  Imaging Reports Reviewed Today Include:   No results found    Scheduled Meds:  Current Facility-Administered Medications   Medication Dose Route Frequency Provider Last Rate    aspirin  81 mg Oral Daily Clemetine Closs, MD      atorvastatin  40 mg Oral QPM Clemetine Closs, MD  clopidogrel  75 mg Oral Daily Pee Bhandari MD      heparin (porcine)  5,000 Units Subcutaneous Q8H [address] Pee Bhandari MD      insulin lispro  1-5 Units Subcutaneous Q6H [address] Pee Bhandari MD      ondansetron  4 mg Intravenous Q6H PRN Lela Kirkland MD      sodium chloride  100 mL/hr Intravenous Continuous Lela Kirkland  mL/hr (12/24/21 0926)       Lela Kirkland MD  [facility] Internal Medicine    ** Please Note: This note has been constructed using a voice recognition system   **

## 2021-12-24 NOTE — DISCHARGE SUMMARY
Admitting Provider:  Inge Gonzalez MD  Discharge Provider:  Inge Gonzalez MD  Admission Date: 12/23/2021       Discharge Date: 12/24/21   LOS: 0  Primary Care Physician at Discharge: Mayela Kelly [phone]    HOSPITAL COURSE:  Nicole Tavares is a 66 y o  female who presented     David Sanchez is a 67 y o  female who presented  Hyponatremia, MARTINA, patient was dehydrated on exam upon presentation  Started on IV fluids  MARTINA resolved  Sodium improved from 126-130  Patient and patient daughter  Are eager to spending [place] together at home  Daughter decided to pick her up today  Advised to stay well hydrated, maintain good oral intake  Repeat blood work on Monday 12/27/2021  And discuss the results with the primary care physician       The patient, initially admitted to the hospital as inpatient, was discharged earlier than expected given the following: Sodium 130 -mildly low-ideally would keep the patient overnight on fluid - patient daughter decided to pick the patient up today he to spend the Lv together    REASON FOR ADMISSION/ ADMISSION DIAGNOSES    DISCHARGE DIAGNOSES  * Hyponatremia  Assessment & Plan  Hypovolemic  Increase  cc/hour  Continue BMP Q8H  Avoid over-correction  Last sodium level 130  Ideally would keep the patient overnight on fluids  Patient then patient daughter eager to go home to spend the [place]  Agreed to stay well hydrated, maintain good oral intake, repeat blood work on Monday 12/27  Discuss results with the primary care physician      Type 2 diabetes mellitus without complication, without long-term current use of insulin [facility])  Assessment & Plan  Lab Results   Component Value Date    HGBA1C 5 9 (H) 12/10/2021       Recent Labs     12/23/21  2101 12/24/21  0207 12/24/21  0817 12/24/21  1118   POCGLU 165* 102 101 178*       Blood Sugar Average: Last 72 hrs:  (P) 131 5   Hold glipizide    Continue sliding scale insulin      CONSULTING PROVIDERS None    PROCEDURES PERFORMED  * No surgery found *    RADIOLOGY RESULTS  No results found      LABS  Results from last 7 days   Lab Units 12/23/21  1152   WBC Thousand/uL 7 96   HEMOGLOBIN g/dL 12 9   HEMATOCRIT % 37 4   MCV fL 90   PLATELETS Thousands/uL 265     Results from last 7 days   Lab Units 12/24/21  1619 12/24/21  1017 12/24/21  0218 12/23/21  2205 12/23/21  1152 12/22/21  0956   SODIUM mmol/L 130* 128* 129* 127* 126* 124*   POTASSIUM mmol/L 4 3 3 9 4 0 3 8 4 2 4 4   CHLORIDE mmol/L 98* 97* 96* 94* 91* 87*   CO2 mmol/L 25 24 25 26 28 28   BUN mg/dL 9 10 12 12 12 8   CREATININE mg/dL 0 97 0 98 1 10 1 26 1 45* 1 17   CALCIUM mg/dL 8 3 8 0* 7 8* 7 6* 8 5 8 8   ALBUMIN g/dL  --   --   --   --  3 7  --    TOTAL BILIRUBIN mg/dL  --   --   --   --  0 57  --    ALK PHOS U/L  --   --   --   --  79  --    ALT U/L  --   --   --   --  42  --    AST U/L  --   --   --   --  30  --    EGFR ml/min/1 73sq m 58 57 50 42 36 46   GLUCOSE RANDOM mg/dL 137 167* 108 135 139  --                   Results from last 7 days   Lab Units 12/24/21  1118 12/24/21  0817 12/24/21  0207 12/23/21  2101 12/23/21  1856 12/23/21  1441   POC GLUCOSE mg/dl 178* 101 102 165* 142* 101                       Cultures:         Invalid input(s): URIBILINOGEN              PHYSICAL EXAM:  Vitals:   Blood Pressure: 142/58 (12/24/21 1509)  Pulse: 93 (12/24/21 1509)  Temperature: 98 3 °F (36 8 °C) (12/24/21 1509)  Temp Source: Oral (12/24/21 1509)  Respirations: 18 (12/24/21 1509)  Height: 5' 2" (157 5 cm) (12/23/21 1038)  Weight - Scale: 62 6 kg (138 lb) (12/23/21 1038)  SpO2: 99 % (12/24/21 1509)    General appearance: alert, appears stated age and cooperative  HEENT - atraumatic and normocephalic  Neck- supple  Skin - no fresh rash  Extremities no fresh focal deformities  Cardiovascular- S1-S2 heard  Respiratory- bilateral air entry present, no crackles or rhonchi  Skin - no fresh rash  Abdomen - normal bowel sounds present, no rebound tenderness  CNS- No fresh focal deficits  Psych- no acute psychosis     Planned Re-admission: NA  Discharge Disposition: Home with [address] [facility] [id number]: Home    Test Results Pending at Discharge:   Incidental findings: All  Imaging, blood work results discussed with appropriate follow-up given to the patient    Medications   · Summary of Medication Adjustments made as a result of this hospitalization:  No new medication  · Medication Dosing Tapers - Please refer to Discharge Medication List for details on any medication dosing tapers (if applicable to patient)  · Discharge Medication List: See after visit summary for reconciled discharge medications  Diet restrictions:  Refer to my discharge instruction        Diet Orders   (From admission, onward)             Start     Ordered    12/24/21 1037  Room Service  Once        Question:  Type of Service  Answer:  Room Service - Appropriate with Assistance    12/24/21 1036    12/23/21 1420  Diet Regular; Regular House  Diet effective now        References:    Nutrtion Support Algorithm Enteral vs  Parenteral   Question Answer Comment   Diet Type Regular    Regular Regular House    RD to adjust diet per protocol? Yes        12/23/21 1421              Activity restrictions: No strenuous activity  Discharge Condition: stable    Outpatient Follow-Up and Discharge Instructions  See after visit summary section titled Discharge Instructions for information provided to patient and family  Code Status: Level 1 - Full Code  Discharge Statement   I spent 35 minutes discharging the patient  This time was spent on the day of discharge  Greater than 50% of total time was spent with the patient and / or family counseling and / or coordination of care  [facility]    ** Please Note: This note has been constructed using a voice recognition system   **

## 2021-12-24 NOTE — DISCHARGE INSTRUCTIONS
Hyponatremia   WHAT YOU NEED TO KNOW:   Hyponatremia occurs when the amount of sodium (salt) in your blood is lower than normal  Sodium is an electrolyte (mineral) that helps your muscles, heart, and digestive system work properly  It helps control blood pressure and fluid balance  DISCHARGE INSTRUCTIONS:   Follow up with your healthcare provider as directed: You may need to return for more tests  Write down your questions so you remember to ask them during your visits  Nutrition:  You may need to increase your intake of sodium  Foods that are high in sodium include milk, packaged snacks such as pretzels, or processed meats (cruz, sausage, and ham)  Ask your dietitian to help you create a meal plan that is right for you  Liquids: Follow your healthcare provider's advice if you need to limit the amount of liquid you drink  Ask how much liquid to drink each day and which liquids are best for you  You may be asked to drink liquids that have water, sugar, and salt, such as juices, milk, or sports drinks  These liquids help your body hold in fluid and prevent dehydration  Contact your healthcare provider if:   · You have muscle cramps or twitching  · You feel very weak or tired  · You have nausea or are vomiting  · You have questions or concerns about your condition or care  Return to the emergency department if:   · You have a seizure  · You have an irregular heartbeat  · You have trouble breathing  · You cannot move your arms and legs  · You are confused or cannot think clearly  © Copyright [employer] 2021 Information is for End User's use only and may not be sold, redistributed or otherwise used for commercial purposes  All illustrations and images included in CareNotes® are the copyrighted property of A D A M , Inc  or [facility] Douglas Orellana   The above information is an  only  It is not intended as medical advice for individual conditions or treatments   Talk to your doctor, nurse or pharmacist before following any medical regimen to see if it is safe and effective for you

## 2021-12-27 ENCOUNTER — APPOINTMENT (OUTPATIENT)
Dept: LAB | Facility: CLINIC | Age: 71
End: 2021-12-27
Payer: COMMERCIAL

## 2021-12-27 ENCOUNTER — TRANSITIONAL CARE MANAGEMENT (OUTPATIENT)
Dept: FAMILY MEDICINE CLINIC | Facility: OTHER | Age: 71
End: 2021-12-27

## 2021-12-27 DIAGNOSIS — E87.1 HYPONATREMIA: ICD-10-CM

## 2021-12-27 LAB
ANION GAP SERPL CALCULATED.3IONS-SCNC: 8 MMOL/L (ref 4–13)
BUN SERPL-MCNC: 9 MG/DL (ref 5–25)
CALCIUM SERPL-MCNC: 9.1 MG/DL (ref 8.3–10.1)
CHLORIDE SERPL-SCNC: 93 MMOL/L (ref 100–108)
CO2 SERPL-SCNC: 27 MMOL/L (ref 21–32)
CREAT SERPL-MCNC: 1.22 MG/DL (ref 0.6–1.3)
GFR SERPL CREATININE-BSD FRML MDRD: 44 ML/MIN/1.73SQ M
GLUCOSE SERPL-MCNC: 215 MG/DL (ref 65–140)
POTASSIUM SERPL-SCNC: 4.3 MMOL/L (ref 3.5–5.3)
SODIUM SERPL-SCNC: 128 MMOL/L (ref 136–145)

## 2021-12-27 PROCEDURE — 36415 COLL VENOUS BLD VENIPUNCTURE: CPT

## 2021-12-27 PROCEDURE — 80048 BASIC METABOLIC PNL TOTAL CA: CPT

## 2021-12-28 ENCOUNTER — APPOINTMENT (OUTPATIENT)
Dept: LAB | Facility: AMBULARY SURGERY CENTER | Age: 71
End: 2021-12-28
Payer: COMMERCIAL

## 2021-12-28 ENCOUNTER — OFFICE VISIT (OUTPATIENT)
Dept: NEUROLOGY | Facility: CLINIC | Age: 71
End: 2021-12-28
Payer: COMMERCIAL

## 2021-12-28 ENCOUNTER — OFFICE VISIT (OUTPATIENT)
Dept: FAMILY MEDICINE CLINIC | Facility: OTHER | Age: 71
End: 2021-12-28
Payer: COMMERCIAL

## 2021-12-28 VITALS
HEART RATE: 77 BPM | SYSTOLIC BLOOD PRESSURE: 152 MMHG | DIASTOLIC BLOOD PRESSURE: 65 MMHG | BODY MASS INDEX: 25.58 KG/M2 | HEIGHT: 62 IN | WEIGHT: 139 LBS

## 2021-12-28 VITALS
HEIGHT: 62 IN | DIASTOLIC BLOOD PRESSURE: 68 MMHG | SYSTOLIC BLOOD PRESSURE: 150 MMHG | HEART RATE: 78 BPM | RESPIRATION RATE: 16 BRPM | BODY MASS INDEX: 25.4 KG/M2 | WEIGHT: 138 LBS | TEMPERATURE: 98.6 F | OXYGEN SATURATION: 98 %

## 2021-12-28 DIAGNOSIS — R47.01 APHASIA: ICD-10-CM

## 2021-12-28 DIAGNOSIS — N17.9 AKI (ACUTE KIDNEY INJURY) (HCC): ICD-10-CM

## 2021-12-28 DIAGNOSIS — I10 HYPERTENSION, UNSPECIFIED TYPE: ICD-10-CM

## 2021-12-28 DIAGNOSIS — E87.1 HYPONATREMIA: Primary | ICD-10-CM

## 2021-12-28 DIAGNOSIS — Z86.73 HISTORY OF TIA (TRANSIENT ISCHEMIC ATTACK): Primary | ICD-10-CM

## 2021-12-28 DIAGNOSIS — I70.90 ATHEROSCLEROTIC PLAQUE: ICD-10-CM

## 2021-12-28 DIAGNOSIS — E11.9 TYPE 2 DIABETES MELLITUS WITHOUT COMPLICATION, WITHOUT LONG-TERM CURRENT USE OF INSULIN (HCC): ICD-10-CM

## 2021-12-28 DIAGNOSIS — E87.1 HYPONATREMIA: ICD-10-CM

## 2021-12-28 DIAGNOSIS — G31.84 MILD COGNITIVE IMPAIRMENT: ICD-10-CM

## 2021-12-28 LAB
ANION GAP SERPL CALCULATED.3IONS-SCNC: 7 MMOL/L (ref 4–13)
BUN SERPL-MCNC: 13 MG/DL (ref 5–25)
CALCIUM SERPL-MCNC: 9.4 MG/DL (ref 8.3–10.1)
CHLORIDE SERPL-SCNC: 94 MMOL/L (ref 100–108)
CO2 SERPL-SCNC: 28 MMOL/L (ref 21–32)
CREAT SERPL-MCNC: 1.02 MG/DL (ref 0.6–1.3)
GFR SERPL CREATININE-BSD FRML MDRD: 55 ML/MIN/1.73SQ M
GLUCOSE SERPL-MCNC: 164 MG/DL (ref 65–140)
OSMOLALITY UR/SERPL-RTO: 278 MMOL/KG (ref 282–298)
POTASSIUM SERPL-SCNC: 4 MMOL/L (ref 3.5–5.3)
SODIUM SERPL-SCNC: 129 MMOL/L (ref 136–145)

## 2021-12-28 PROCEDURE — 1111F DSCHRG MED/CURRENT MED MERGE: CPT | Performed by: FAMILY MEDICINE

## 2021-12-28 PROCEDURE — 3077F SYST BP >= 140 MM HG: CPT | Performed by: FAMILY MEDICINE

## 2021-12-28 PROCEDURE — 99215 OFFICE O/P EST HI 40 MIN: CPT | Performed by: PHYSICIAN ASSISTANT

## 2021-12-28 PROCEDURE — 1036F TOBACCO NON-USER: CPT | Performed by: FAMILY MEDICINE

## 2021-12-28 PROCEDURE — 83930 ASSAY OF BLOOD OSMOLALITY: CPT

## 2021-12-28 PROCEDURE — 99213 OFFICE O/P EST LOW 20 MIN: CPT | Performed by: FAMILY MEDICINE

## 2021-12-28 PROCEDURE — 1160F RVW MEDS BY RX/DR IN RCRD: CPT | Performed by: FAMILY MEDICINE

## 2021-12-28 PROCEDURE — 80048 BASIC METABOLIC PNL TOTAL CA: CPT

## 2021-12-28 PROCEDURE — 36415 COLL VENOUS BLD VENIPUNCTURE: CPT

## 2021-12-28 PROCEDURE — 3008F BODY MASS INDEX DOCD: CPT | Performed by: FAMILY MEDICINE

## 2021-12-28 PROCEDURE — 3078F DIAST BP <80 MM HG: CPT | Performed by: FAMILY MEDICINE

## 2021-12-30 ENCOUNTER — APPOINTMENT (OUTPATIENT)
Dept: LAB | Facility: AMBULARY SURGERY CENTER | Age: 71
End: 2021-12-30
Payer: COMMERCIAL

## 2021-12-30 LAB
OSMOLALITY UR: 263 MMOL/KG
SODIUM 24H UR-SCNC: 43 MOL/L

## 2021-12-30 PROCEDURE — 83935 ASSAY OF URINE OSMOLALITY: CPT | Performed by: FAMILY MEDICINE

## 2021-12-30 PROCEDURE — 84300 ASSAY OF URINE SODIUM: CPT | Performed by: FAMILY MEDICINE

## 2022-01-06 ENCOUNTER — CONSULT (OUTPATIENT)
Dept: GASTROENTEROLOGY | Facility: CLINIC | Age: 72
End: 2022-01-06
Payer: COMMERCIAL

## 2022-01-06 ENCOUNTER — TELEPHONE (OUTPATIENT)
Dept: GASTROENTEROLOGY | Facility: CLINIC | Age: 72
End: 2022-01-06

## 2022-01-06 ENCOUNTER — CONSULT (OUTPATIENT)
Dept: VASCULAR SURGERY | Facility: CLINIC | Age: 72
End: 2022-01-06
Payer: COMMERCIAL

## 2022-01-06 VITALS
BODY MASS INDEX: 24.66 KG/M2 | WEIGHT: 134 LBS | TEMPERATURE: 98.2 F | DIASTOLIC BLOOD PRESSURE: 56 MMHG | HEIGHT: 62 IN | SYSTOLIC BLOOD PRESSURE: 98 MMHG

## 2022-01-06 VITALS
DIASTOLIC BLOOD PRESSURE: 84 MMHG | BODY MASS INDEX: 24.66 KG/M2 | HEART RATE: 77 BPM | SYSTOLIC BLOOD PRESSURE: 132 MMHG | HEIGHT: 62 IN | RESPIRATION RATE: 18 BRPM | WEIGHT: 134 LBS

## 2022-01-06 DIAGNOSIS — I65.23 BILATERAL CAROTID ARTERY STENOSIS: Primary | ICD-10-CM

## 2022-01-06 DIAGNOSIS — I10 HYPERTENSION, UNSPECIFIED TYPE: ICD-10-CM

## 2022-01-06 DIAGNOSIS — Z12.11 SCREENING FOR COLON CANCER: ICD-10-CM

## 2022-01-06 DIAGNOSIS — R47.1 DYSARTHRIA: ICD-10-CM

## 2022-01-06 PROCEDURE — 99205 OFFICE O/P NEW HI 60 MIN: CPT | Performed by: SURGERY

## 2022-01-06 PROCEDURE — 99203 OFFICE O/P NEW LOW 30 MIN: CPT | Performed by: INTERNAL MEDICINE

## 2022-01-06 RX ORDER — POLYETHYLENE GLYCOL 3350 17 G/17G
POWDER, FOR SOLUTION ORAL
Qty: 238 G | Refills: 0 | Status: SHIPPED | OUTPATIENT
Start: 2022-01-06 | End: 2022-04-12

## 2022-01-06 NOTE — PATIENT INSTRUCTIONS
Scheduled date of colonoscopy (as of today):  3/1/22  Physician performing colonoscopy: Dr Lesli Robles  Location of colonoscopy: [facility]   Bowel prep reviewed with patient: Miralax/dulcolax  Instructions reviewed with patient by: Cee Calloway  Clearances:  Plavix - Dr Jose Verde (pcp), Dr Dorothea Leo Doctor (vascular)

## 2022-01-06 NOTE — TELEPHONE ENCOUNTER
Our mutual patient is scheduled for procedure:  Colonoscopy    On: 3/1/22      With: Dr Corinne Nava is taking the following blood thinner: Plavix    Can this be stopped 5 days prior to the procedure?       Physician Approving clearance: ________________________

## 2022-01-06 NOTE — PATIENT INSTRUCTIONS
1) Carotid stenosis  -when you were in the hospital recently, you had a cat scan that showed blockages in the carotid arteries in the neck  -we are going to do an ultrasound to help figure out the degree of blockage  -I am going to discuss with your neurologist regarding the cause of your symptoms  -we will meet again and decide if surgery is necessary for the carotid arteries  -please continue taking your aspirin and statin medications  -please stop your plavix as you completed the 3 week course recommended by neurology    Carotid Artery Disease   AMBULATORY CARE:   Carotid artery disease (CAD)  means the major blood vessels in your neck are narrowed or becoming blocked  These 2 major blood vessels are called the carotid arteries  They supply your brain with blood  The narrow or blocked blood vessels increase your risk for a stroke  CAD is also called carotid artery stenosis  Have someone call your local emergency number (911 in the [address] [place] [address],[address]) if:   · You have any of the following signs of a stroke:      ? Numbness or drooping on one side of your face     ? Weakness in an arm or leg    ? Confusion or difficulty speaking    ? Dizziness, a severe headache, or vision loss    · You have any of the following signs of a heart attack:      ? Squeezing, pressure, or pain in your chest    ? You may  also have any of the following:     § Discomfort or pain in your back, neck, jaw, stomach, or arm    § Shortness of breath    § Nausea or vomiting    § Lightheadedness or a sudden cold sweat    Call your doctor if:   · You have questions or concerns about your condition or care  Common signs and symptoms:  CAD develops slowly  You may have no signs or symptoms until you have a mini-stroke, or transient ischemic attack (TIA)  A TIA is a temporary lack of blood flow to your brain  A TIA goes away quickly and does not cause permanent damage  A TIA may be a warning sign that you are about to have a stroke   If you have any symptoms of a TIA or stroke, seek care immediately  Warning signs of a stroke: The words BE FAST can help you remember and recognize warning signs of a stroke:  · B = Balance:  Sudden loss of balance    · E = Eyes:  Loss of vision in one or both eyes    · F = Face:  Face droops on one side    · A = Arms:  Arm drops when both arms are raised    · S = Speech:  Speech is slurred or sounds different    · T = Time:  Time to get help immediately       Treatment  depends on how narrow your arteries have become  Treatment also depends on your symptoms and your general health  The goal of treatment is to lower your risk for a stroke  You may need any of the following:  · Medicines:      ? Aspirin,  or other blood thinner, may be recommended  These will help prevent blood clots from forming in your carotid arteries  If your healthcare provider wants you to take aspirin, do not take acetaminophen or ibuprofen instead  ? Cholesterol medicine  lowers your cholesterol level  ? Blood pressure medicine  lowers or helps control your blood pressure  · Procedures  can help open blocked arteries:     ? Carotid endarterectomy (CEA)  is used to cut and remove plaque buildup from your arteries  ? Carotid angioplasty and stenting (DENNISE)  is used to push the plaque against the artery wall with a balloon device  Then, a stent is placed to keep the artery open  A stent is a small metal mesh tube  Prevent a stroke:   · Do not smoke, and avoid secondhand smoke  Nicotine and other chemicals in cigarettes and cigars increase your risk for a stroke  Ask your healthcare provider for information if you currently smoke and need help to quit  E-cigarettes or smokeless tobacco still contain nicotine  Talk to your healthcare provider before you use these products  · Eat a variety of healthy foods  Healthy foods include fruit, vegetables, whole-grain breads, low-fat dairy products, chicken, and fish   Choose fish that are high in omega-3 fatty acids, such as salmon and fresh tuna  Ask your healthcare provider for more information on a heart healthy diet and the DASH eating plan  · Limit sodium (salt)  Sodium may increase your blood pressure  Add less table salt to your foods  Read food labels and choose foods that are low in sodium  Your healthcare provider may suggest you follow a low sodium diet  · Reach or maintain a healthy weight  Extra weight makes your heart work harder  Ask your healthcare provider what a healthy weight is for you  He or she can help you create a safe weight loss plan  Even a weight loss of 10% of your extra body weight can help your heart function better  · Exercise regularly  Exercise helps improve heart function and can help you manage your weight  Exercise can also help lower your cholesterol and blood sugar levels  Try to get at least 30 minutes of exercise 5 times each week  Try to be physically active every day  This may include walking, riding a bicycle, or swimming  Your healthcare provider can help you create an exercise plan that works best for you  · Limit alcohol  Alcohol can increase your blood pressure and triglyceride levels  A drink of alcohol is 12 ounces of beer, 5 ounces of wine, or 1½ ounces of liquor  Follow up with your doctor as directed:  Write down your questions so you remember to ask them during your visits  © Copyright [employer] 2021 Information is for End User's use only and may not be sold, redistributed or otherwise used for commercial purposes  All illustrations and images included in CareNotes® are the copyrighted property of [employer]  or Clara Orellana   The above information is an  only  It is not intended as medical advice for individual conditions or treatments  Talk to your doctor, nurse or pharmacist before following any medical regimen to see if it is safe and effective for you

## 2022-01-06 NOTE — PROGRESS NOTES
Assessment/Plan:    Pt is a 67 yo F w/ DM, HTN, presented recently w/ fall/aphagia, found to have B ICA stenosis    Bilateral carotid artery stenosis  -     Ambulatory referral to Vascular Surgery  -     VAS carotid complete study; Future  -reviewed MRI which was negative for CVA  -reviewed CTA neck which shows heavy calcification of the B ICA; the vessels run quite medially in the neck B; although the official read states 80% stenosis, I do not think they are as severe as this by my examination, closer to 50-60% on the right and 60-70% on the left  -discussed the pathophysiology of carotid disease and indications for treatment; if her symptoms are being considered TIA, then I would favor surgical management for her carotid; however, with negative MRI and no "sided" symptoms, is difficult to tell which is the symptomatic side; best guess would be the left side as she had speech symptoms, however this is not definitive; I have messaged neurology for an opinion regarding this as well  -will get carotid DU as well for second modality to evaluate degree of stenosis  -f/u after carotid DU complete    Hypertension, unspecified type  Dysarthria  -differential for recent symptoms included TIA and hypertensive emergency, and electrolyte abnormalities  -per neurology notes, TIA is the leading diagnosis     Medications  -continue ASA/statin daily  -patient continues plavix although she has completed the recommended 3wk treatment per neurology; I have had her stop this today as therapy is complete    Subjective:      Patient ID: Winston Bright is a 66 y o  female  Patient was in [facility] from 11/20/21 to 11/23/21 for dysphasia  Pt had a CTA head and neck on 11/20/21  Pt denies TIA or CVA symptoms  Pt is on ASA 81 mg, Plavix, and Atorvastatin  HPI:    Patient had a fall in Nov w/ dysarthria that led to stroke workup and finding of carotid stenosis      She had an unwitnessed fall in her kitchen which was followed by speech issues  She was repeated "offline" "online" but could not express other thoughts/words  This lasted about 24 hours and then slowly improved  She also had electrolyte abnormalities and hypertension >200s in the hospital   ALso had MARTINA  Denies any prior episodes or anything more recently  Denies amaurosis, facial droop, unilateral weakness/numbness, speech changes  Currently on ASA, plavix, statin      The following portions of the patient's history were reviewed and updated as appropriate: allergies, current medications, past family history, past medical history, past social history, past surgical history and problem list     Review of Systems   Constitutional: Negative  HENT: Negative  Eyes: Negative for visual disturbance  Respiratory: Positive for cough  Negative for shortness of breath  Cardiovascular: Negative  Negative for chest pain  Gastrointestinal: Negative  Endocrine: Negative  Musculoskeletal: Negative  Skin: Negative  Allergic/Immunologic: Negative  Neurological: Negative for dizziness, facial asymmetry, speech difficulty, weakness and numbness  Hematological: Negative  Psychiatric/Behavioral: Negative  Objective:      /84 (BP Location: Right arm, Patient Position: Sitting)   Pulse 77   Resp 18   Ht 5' 2" (1 575 m)   Wt 60 8 kg (134 lb)   BMI 24 51 kg/m²          Physical Exam  Vitals and nursing note reviewed  Constitutional:       Appearance: She is well-developed  HENT:      Head: Normocephalic and atraumatic  Eyes:      Conjunctiva/sclera: Conjunctivae normal    Cardiovascular:      Rate and Rhythm: Normal rate and regular rhythm  Pulses:           Radial pulses are 2+ on the right side and 2+ on the left side  Dorsalis pedis pulses are 2+ on the right side and 1+ on the left side  Posterior tibial pulses are 0 on the right side and 0 on the left side  Heart sounds: Normal heart sounds  No murmur heard  Comments: No carotid bruits B  Pulmonary:      Effort: Pulmonary effort is normal  No respiratory distress  Breath sounds: Normal breath sounds  No wheezing or rales  Abdominal:      General: There is no distension  Palpations: Abdomen is soft  Tenderness: There is no abdominal tenderness  There is no rebound  Musculoskeletal:         General: Normal range of motion  Cervical back: Normal range of motion and neck supple  Right lower leg: No edema  Left lower leg: No edema  Skin:     General: Skin is warm and dry  Neurological:      Mental Status: She is alert and oriented to person, place, and time  Psychiatric:         Behavior: Behavior normal            I have reviewed and made appropriate changes to the review of systems input by the medical assistant  Vitals:    01/06/22 1104 01/06/22 1105   BP: 130/82 132/84   BP Location: Left arm Right arm   Patient Position: Sitting Sitting   Pulse: 77    Resp: 18    Weight: 60 8 kg (134 lb)    Height: 5' 2" (1 575 m)        Patient Active Problem List   Diagnosis    Type 2 diabetes mellitus without complication, without long-term current use of insulin (HCC)    Atherosclerotic plaque    Aphasia    Hypertension    Dysphasia    Hypertensive emergency    Asymptomatic bacteriuria    Hypokalemia    Fall    Hyponatremia    Diarrhea    History of TIA (transient ischemic attack)    Mild cognitive impairment       No past surgical history on file      Family History   Problem Relation Age of Onset    Breast cancer Mother        Social History     Socioeconomic History    Marital status: /Civil Union     Spouse name: Not on file    Number of children: Not on file    Years of education: Not on file    Highest education level: Not on file   Occupational History    Not on file   Tobacco Use    Smoking status: Never Smoker    Smokeless tobacco: Never Used   Vaping Use    Vaping Use: Never used   Substance and Sexual Activity    Alcohol use: No    Drug use: No    Sexual activity: Not Currently   Other Topics Concern    Not on file   Social History Narrative    ** Merged History Encounter **          Social Determinants of Health     Financial Resource Strain: Not on file   Food Insecurity: Not on file   Transportation Needs: Not on file   Physical Activity: Not on file   Stress: Not on file   Social Connections: Not on file   Intimate Partner Violence: Not on file   Housing Stability: Not on file       No Known Allergies      Current Outpatient Medications:     acetaminophen (TYLENOL) 325 mg tablet, Take 2 tablets (650 mg total) by mouth every 6 (six) hours as needed for mild pain, headaches or fever, Disp: , Rfl: 0    aspirin 81 mg chewable tablet, Chew 81 mg daily, Disp: , Rfl:     atorvastatin (LIPITOR) 40 mg tablet, Take 1 tablet (40 mg total) by mouth every evening, Disp: , Rfl: 0    clopidogrel (PLAVIX) 75 mg tablet, Take 1 tablet (75 mg total) by mouth daily, Disp: 30 tablet, Rfl: 1    glipiZIDE (GLUCOTROL) 5 mg tablet, Take 5 mg by mouth daily  , Disp: , Rfl:     lisinopril (ZESTRIL) 20 mg tablet, Take 1 tablet (20 mg total) by mouth daily, Disp: 30 tablet, Rfl: 0    polyethylene glycol (GLYCOLAX) 17 GM/SCOOP powder, At 5pm take 5mgx2 dulcolax  At 6pm 32oz miralax in 64oz gatorade  Drink 8oz glass every 5 mins until 32oz finished   Drink remaining as rec , Disp: 238 g, Rfl: 0    acetaminophen (TYLENOL) 325 mg tablet, Take 650 mg by mouth every 6 (six) hours as needed for mild pain, Disp: , Rfl:     Blood Pressure Monitoring (B-D ASSURE BPM/AUTO ARM CUFF) MISC, Use daily (Patient not taking: Reported on 1/6/2022 ), Disp: 1 each, Rfl: 0

## 2022-01-06 NOTE — PROGRESS NOTES
[facility] - Outpatient Consultation  Mei Calvo 66 y o  female MRN: [id number]  Encounter: [id number]          ASSESSMENT AND PLAN:        1  Screening for colon cancer    Will proceed with screening colonoscopy  The rationale for colonoscopy with possible biopsy, possible polypectomy, with IV sedation as well as all risks, benefits, and alternatives were discussed with the patient in detail  Risks including but not limited to perforation, bleeding, need for blood transfusion or emergent surgery, and missed neoplasm were reviewed in detail with the patient  The patient demonstrates full understanding and wishes to proceed with the colonoscopy   ______________________________________________________________    HPI:  Mei Calvo is a 66y o  year old female who presents to the office for evaluation for colorectal cancer screening  Reported symptoms: watery stools  Typically once a day  Does take imodium  Family history: no known risk factors  Previous colonoscopy: none    No blood in stool  She has lost approximately 40-50 lbs  The last 4-5 years  This was unintentional  She was started on diabetes medicine glipizide during which the times she lost weight  She is on plavix and aspirin everyday for TIA  She has PMHx of CVA, hypertension, hyponatremia  REVIEW OF SYSTEMS:    CONSTITUTIONAL: Denies any fever, chills, rigors, and weight loss  HEENT: No earache or tinnitus  Denies hearing loss or visual disturbances  CARDIOVASCULAR: No chest pain or palpitations  RESPIRATORY: Denies any cough, hemoptysis, shortness of breath or dyspnea on exertion  GASTROINTESTINAL: As noted in the History of Present Illness  GENITOURINARY: No problems with urination  Denies any hematuria or dysuria  NEUROLOGIC: No dizziness or vertigo, denies headaches  MUSCULOSKELETAL: Denies any muscle or joint pain  SKIN: Denies skin rashes or itching     ENDOCRINE: Denies excessive thirst  Denies intolerance to heat or cold  PSYCHOSOCIAL: Denies depression or anxiety  Denies any recent memory loss  Historical Information   Past Medical History:   Diagnosis Date    MARTINA (acute kidney injury) ([facility] ) 11/21/2021    Diabetes mellitus ([facility] )     Hypertension      No past surgical history on file  Social History   Social History     Substance and Sexual Activity   Alcohol Use No     Social History     Substance and Sexual Activity   Drug Use No     Social History     Tobacco Use   Smoking Status Never Smoker   Smokeless Tobacco Never Used     Family History   Problem Relation Age of Onset    Breast cancer Mother        Meds/Allergies       Current Outpatient Medications:     acetaminophen (TYLENOL) 325 mg tablet    acetaminophen (TYLENOL) 325 mg tablet    aspirin 81 mg chewable tablet    atorvastatin (LIPITOR) 40 mg tablet    Blood Pressure Monitoring (B-D ASSURE BPM/AUTO ARM CUFF) MISC    clopidogrel (PLAVIX) 75 mg tablet    emtricitabine-tenofovir (TRUVADA) 200-300 mg per tablet    glipiZIDE (GLUCOTROL) 5 mg tablet    lisinopril (ZESTRIL) 20 mg tablet    raltegravir (ISENTRESS) 400 mg tablet    No Known Allergies        Objective     There were no vitals taken for this visit  There is no height or weight on file to calculate BMI  PHYSICAL EXAM:      General Appearance:   Alert, cooperative, no distress   HEENT:   Normocephalic, atraumatic, anicteric  Lungs:   Equal chest rise and unlabored breathing, normal cough   Heart:   No visualized JVD   Abdomen:   Soft, non-tender, non-distended; no masses, no organomegaly    Genitalia:   Deferred    Rectal:   Deferred    Extremities:  No cyanosis, clubbing or edema    Pulses:  Musculoskeletal:  2+ and symmetric  Normal range of motion visualized    Skin:  Neuro:  No jaundice, rashes, or lesions   Alert and appropriate       Lab Results:   No visits with results within 1 Day(s) from this visit     Latest known visit with results is:   Appointment on 12/28/2021   Component Date Value    Osmolality Serum 12/28/2021 278*    Sodium 12/28/2021 129*    Potassium 12/28/2021 4 0     Chloride 12/28/2021 94*    CO2 12/28/2021 28     ANION GAP 12/28/2021 7     BUN 12/28/2021 13     Creatinine 12/28/2021 1 02     Glucose 12/28/2021 164*    Calcium 12/28/2021 9 4     eGFR 12/28/2021 55          Radiology Results:   No results found

## 2022-01-13 ENCOUNTER — HOSPITAL ENCOUNTER (OUTPATIENT)
Dept: NON INVASIVE DIAGNOSTICS | Facility: CLINIC | Age: 72
Discharge: HOME | End: 2022-01-13
Payer: COMMERCIAL

## 2022-01-13 DIAGNOSIS — I65.23 BILATERAL CAROTID ARTERY STENOSIS: ICD-10-CM

## 2022-01-13 PROCEDURE — 93880 EXTRACRANIAL BILAT STUDY: CPT

## 2022-01-14 PROCEDURE — 93880 EXTRACRANIAL BILAT STUDY: CPT | Performed by: SURGERY

## 2022-01-20 DIAGNOSIS — I16.1 HYPERTENSIVE EMERGENCY: ICD-10-CM

## 2022-01-21 RX ORDER — LISINOPRIL 20 MG/1
20 TABLET ORAL DAILY
Qty: 30 TABLET | Refills: 0 | Status: SHIPPED | OUTPATIENT
Start: 2022-01-21 | End: 2022-01-24 | Stop reason: SDUPTHER

## 2022-01-24 DIAGNOSIS — I16.1 HYPERTENSIVE EMERGENCY: ICD-10-CM

## 2022-01-24 RX ORDER — LISINOPRIL 20 MG/1
20 TABLET ORAL DAILY
Qty: 30 TABLET | Refills: 0 | Status: SHIPPED | OUTPATIENT
Start: 2022-01-24 | End: 2022-01-25 | Stop reason: SDUPTHER

## 2022-01-25 ENCOUNTER — OFFICE VISIT (OUTPATIENT)
Dept: FAMILY MEDICINE CLINIC | Facility: OTHER | Age: 72
End: 2022-01-25
Payer: COMMERCIAL

## 2022-01-25 VITALS
BODY MASS INDEX: 24.66 KG/M2 | DIASTOLIC BLOOD PRESSURE: 70 MMHG | SYSTOLIC BLOOD PRESSURE: 130 MMHG | WEIGHT: 134 LBS | HEART RATE: 76 BPM | OXYGEN SATURATION: 96 % | RESPIRATION RATE: 16 BRPM | HEIGHT: 62 IN | TEMPERATURE: 98 F

## 2022-01-25 DIAGNOSIS — E87.1 HYPONATREMIA: Primary | ICD-10-CM

## 2022-01-25 DIAGNOSIS — K52.9 CHRONIC DIARRHEA: ICD-10-CM

## 2022-01-25 DIAGNOSIS — E11.9 TYPE 2 DIABETES MELLITUS WITHOUT COMPLICATION, WITHOUT LONG-TERM CURRENT USE OF INSULIN (HCC): ICD-10-CM

## 2022-01-25 DIAGNOSIS — R19.7 WATERY DIARRHEA: ICD-10-CM

## 2022-01-25 DIAGNOSIS — I16.1 HYPERTENSIVE EMERGENCY: ICD-10-CM

## 2022-01-25 DIAGNOSIS — I65.23 BILATERAL CAROTID ARTERY STENOSIS: ICD-10-CM

## 2022-01-25 PROCEDURE — 4010F ACE/ARB THERAPY RXD/TAKEN: CPT | Performed by: SURGERY

## 2022-01-25 PROCEDURE — 99213 OFFICE O/P EST LOW 20 MIN: CPT | Performed by: FAMILY MEDICINE

## 2022-01-25 PROCEDURE — 1123F ACP DISCUSS/DSCN MKR DOCD: CPT | Performed by: FAMILY MEDICINE

## 2022-01-25 RX ORDER — ATORVASTATIN CALCIUM 40 MG/1
40 TABLET, FILM COATED ORAL EVERY EVENING
Qty: 90 TABLET | Refills: 3
Start: 2022-01-25 | End: 2022-03-24 | Stop reason: SDUPTHER

## 2022-01-25 RX ORDER — GLIPIZIDE 5 MG/1
5 TABLET ORAL DAILY
Qty: 90 TABLET | Refills: 3 | Status: SHIPPED | OUTPATIENT
Start: 2022-01-25 | End: 2022-03-24 | Stop reason: SDUPTHER

## 2022-01-25 RX ORDER — LISINOPRIL 20 MG/1
20 TABLET ORAL DAILY
Qty: 90 TABLET | Refills: 3 | Status: SHIPPED | OUTPATIENT
Start: 2022-01-25 | End: 2022-03-24 | Stop reason: SDUPTHER

## 2022-01-25 NOTE — PROGRESS NOTES
Assessment/Plan:    67 yo female with ongoing mild hyponatremia  Workup consistent with SIADH, unknown etiology  Discussed trial of fluid restriction and repeat Na in 1 week with Nephrology referral previously placed and visit upcoming on 2/1  Refilled meds as below  For ongoing diarrhea will check stool culture, fecal leukocytes, fecal calprotectin and celiac panel to complete previous inpatient workup that was not drawn in the hospital as planned  Proceed with GI follow up and colonoscopy as scheduled  Discussed importance of fiber in her diet and provided handout  Follow up as scheduled in march for chronic conditions  Diagnoses and all orders for this visit:    Hyponatremia  -     Basic metabolic panel; Future    Hypertensive emergency  -     lisinopril (ZESTRIL) 20 mg tablet; Take 1 tablet (20 mg total) by mouth daily    Bilateral carotid artery stenosis  -     atorvastatin (LIPITOR) 40 mg tablet; Take 1 tablet (40 mg total) by mouth every evening    Type 2 diabetes mellitus without complication, without long-term current use of insulin (HCC)  -     glipiZIDE (GLUCOTROL) 5 mg tablet; Take 1 tablet (5 mg total) by mouth daily    Chronic diarrhea  -     Stool culture; Future  -     Calprotectin,Fecal; Future  -     FECAL LEUKOCYTES; Future  -     Celiac Disease Antibody Profile; Future    Watery diarrhea  -     Stool culture; Future  -     Calprotectin,Fecal; Future  -     FECAL LEUKOCYTES; Future  -     Celiac Disease Antibody Profile; Future        Subjective:      Patient ID: Jorge Arora is a 66 y o  female  Paitent presents for follow up of hyponatremia  She continues with corrected sodium level of 130 on most recent blood work  Urine soidum was 43, urine osm was 263 and serum osm was 278  Discussed likelihood of SIADH as the cause of her ongoing hyponatremia  Patient remains asymptomatic, ROS as below  She is also with hypertension, better controlled with BP today of 130/70   She has upcoming visit with nephrology for her hyponatremia on 2/1/22  Discussed fluid restriction and recheck of Na level prior to that visit  Patient also reports ongoing watery stools for "months" now  She takes imodium daily with some improvement  She reports this usually occurs at nighttime  She saw GI and will be getting a colonoscopy in March  She had pending labs during her last hospitalization to assess fecal calprotectin and fecal leukocytes but did not get these labs collected inpatient  She denies headaches, fatigue, dizziness, lightheadedness, nausea, vomiting, abdominal pain/bloating, fever, night sweats, weight changes, vision changes, chest pain, SOB, edema  She requested refill of her glipizide, lisinopril, and atorvastatin    The following portions of the patient's history were reviewed and updated as appropriate: allergies, current medications, past family history, past medical history, past social history, past surgical history and problem list     Review of Systems   Constitutional: Negative for activity change, appetite change, chills, diaphoresis, fatigue, fever and unexpected weight change  Eyes: Negative for visual disturbance  Respiratory: Negative for shortness of breath  Cardiovascular: Negative for chest pain, palpitations and leg swelling  Gastrointestinal: Positive for diarrhea  Negative for abdominal distention, abdominal pain, blood in stool, constipation, nausea and vomiting  Genitourinary: Negative for difficulty urinating, frequency and urgency  Neurological: Negative for dizziness, syncope, light-headedness and headaches  Objective:  /70   Pulse 76   Temp 98 °F (36 7 °C)   Resp 16   Ht 5' 2" (1 575 m)   Wt 60 8 kg (134 lb)   SpO2 96%   BMI 24 51 kg/m²      Physical Exam  Constitutional:       General: She is not in acute distress  Appearance: She is well-developed and normal weight  She is not ill-appearing     HENT:      Head: Normocephalic and atraumatic  Nose: Nose normal       Mouth/Throat:      Mouth: Mucous membranes are moist       Pharynx: Oropharynx is clear  Eyes:      General:         Right eye: No discharge  Left eye: No discharge  Extraocular Movements: Extraocular movements intact  Conjunctiva/sclera: Conjunctivae normal       Pupils: Pupils are equal, round, and reactive to light  Neck:      Thyroid: No thyromegaly  Vascular: No carotid bruit  Cardiovascular:      Rate and Rhythm: Normal rate and regular rhythm  Pulses: Normal pulses  Heart sounds: Normal heart sounds  No murmur heard  Pulmonary:      Effort: Pulmonary effort is normal  No respiratory distress  Breath sounds: Normal breath sounds  No wheezing  Abdominal:      General: Abdomen is flat  Bowel sounds are normal  There is no distension  Palpations: Abdomen is soft  There is no mass  Tenderness: There is no abdominal tenderness  There is no guarding or rebound  Musculoskeletal:      Cervical back: Normal range of motion and neck supple  Right lower leg: No edema  Left lower leg: No edema  Lymphadenopathy:      Cervical: No cervical adenopathy  Skin:     General: Skin is warm and dry  Neurological:      Mental Status: She is alert and oriented to person, place, and time  Psychiatric:         Mood and Affect: Mood normal          Behavior: Behavior normal          Thought Content:  Thought content normal

## 2022-01-25 NOTE — PATIENT INSTRUCTIONS
Syndrome of Inappropriate Antidiuretic Hormone Secretion   WHAT YOU NEED TO KNOW:   The syndrome of inappropriate antidiuretic hormone secretion (SIADH) is a condition that causes your body to make too much antidiuretic hormone (ADH)  ADH is a chemical that helps keep the right balance of fluids in your body  Increased ADH may cause too much water to remain inside your body  Chemicals in your blood, such as salt, may decrease  This may prevent your organs from working properly  DISCHARGE INSTRUCTIONS:   Medicines:   · Medicines  will decrease the amount of fluid in your body  You will urinate more often when you take these medicines  · Take your medicine as directed  Contact your healthcare provider if you think your medicine is not helping or if you have side effects  Tell him of her if you are allergic to any medicine  Keep a list of the medicines, vitamins, and herbs you take  Include the amounts, and when and why you take them  Bring the list or the pill bottles to follow-up visits  Carry your medicine list with you in case of an emergency  Eat a variety of healthy foods: You may need to increase the amount of salt you eat  You may also need to increase the amount of protein you eat  Some foods that are high in protein are beans, nuts, eggs, poultry (such as chicken and turkey), and fish  Ask if you need to be on a special diet  Drink liquids as directed:  Ask your healthcare provider how much liquid to drink each day and which liquids are best for you  You may need to limit the amount of liquid you drink to balance the fluid and chemicals in your body  Follow up with your healthcare provider as directed:  Write down your questions so you remember to ask them during your visits  Contact your healthcare provider if:   · You feel weak or have muscle cramps most of the time  · You feel like vomiting when you eat      · Your urine is darker than usual     · You urinate less than usual     · You have trouble staying awake  · You have questions or concerns about your condition or care  Return to the emergency department if:   · You have a sudden, severe headache  · You see or hear things that are not there  · You cannot think clearly  · You have swelling in your arms or legs  · You have a seizure  © Copyright [employer] 2021 Information is for End User's use only and may not be sold, redistributed or otherwise used for commercial purposes  All illustrations and images included in CareNotes® are the copyrighted property of [employer] , Inc  or [facility] Douglas Orellana   The above information is an  only  It is not intended as medical advice for individual conditions or treatments  Talk to your doctor, nurse or pharmacist before following any medical regimen to see if it is safe and effective for you  High Fiber Diet   WHAT YOU NEED TO KNOW:   A high-fiber diet includes foods that have a high amount of fiber  Fiber is the part of fruits, vegetables, and grains that is not broken down by your body  Fiber keeps your bowel movements regular  Fiber can also help lower your cholesterol level, control blood sugar in people with diabetes, and relieve constipation  Fiber can also help you control your weight because it helps you feel full faster  Most adults should eat 25 to 35 grams of fiber each day  Talk to your dietitian or healthcare provider about the amount of fiber you need  DISCHARGE INSTRUCTIONS:   Good sources of fiber:       · Foods with at least 4 grams of fiber per serving:      ? ? to ½ cup of high-fiber cereal (check the nutrition label on the box)    ? ½ cup of blackberries or raspberries    ? 4 dried prunes    ? 1 cooked artichoke    ? ½ cup of cooked legumes, such as lentils, or red, kidney, and cummings beans    · Foods with 1 to 3 grams of fiber per serving:      ? 1 slice of whole-wheat, pumpernickel, or rye bread    ? ½ cup of cooked brown rice    ?  4 whole-wheat crackers    ? 1 cup of oatmeal    ? ½ cup of cereal with 1 to 3 grams of fiber per serving (check the nutrition label on the box)    ? 1 small piece of fruit, such as an apple, banana, pear, kiwi, or orange    ? 3 dates    ? ½ cup of canned apricots, fruit cocktail, peaches, or pears    ? ½ cup of raw or cooked vegetables, such as carrots, cauliflower, cabbage, spinach, squash, or corn    Ways that you can increase fiber in your diet:   · Choose brown or wild rice instead of white rice  · Use whole wheat flour in recipes instead of white or all-purpose flour  · Add beans and peas to casseroles or soups  · Choose fresh fruit and vegetables with peels or skins on instead of juices  Other diet guidelines to follow:   · Add fiber to your diet slowly  You may have abdominal discomfort, bloating, and gas if you add fiber to your diet too quickly  · Drink plenty of liquids as you add fiber to your diet  You may have nausea or develop constipation if you do not drink enough water  Ask how much liquid to drink each day and which liquids are best for you  © Copyright [employer] 2021 Information is for End User's use only and may not be sold, redistributed or otherwise used for commercial purposes  All illustrations and images included in CareNotes® are the copyrighted property of [employer]  or Clara Hernandez  The above information is an  only  It is not intended as medical advice for individual conditions or treatments  Talk to your doctor, nurse or pharmacist before following any medical regimen to see if it is safe and effective for you

## 2022-01-27 ENCOUNTER — OFFICE VISIT (OUTPATIENT)
Dept: VASCULAR SURGERY | Facility: CLINIC | Age: 72
End: 2022-01-27
Payer: COMMERCIAL

## 2022-01-27 VITALS
HEART RATE: 83 BPM | WEIGHT: 133 LBS | RESPIRATION RATE: 18 BRPM | BODY MASS INDEX: 24.48 KG/M2 | HEIGHT: 62 IN | SYSTOLIC BLOOD PRESSURE: 126 MMHG | DIASTOLIC BLOOD PRESSURE: 88 MMHG

## 2022-01-27 DIAGNOSIS — R47.01 APHASIA: ICD-10-CM

## 2022-01-27 DIAGNOSIS — R47.1 DYSARTHRIA: ICD-10-CM

## 2022-01-27 DIAGNOSIS — I10 HYPERTENSION, UNSPECIFIED TYPE: ICD-10-CM

## 2022-01-27 DIAGNOSIS — I65.23 BILATERAL CAROTID ARTERY STENOSIS: Primary | ICD-10-CM

## 2022-01-27 PROCEDURE — 3074F SYST BP LT 130 MM HG: CPT | Performed by: SURGERY

## 2022-01-27 PROCEDURE — 3008F BODY MASS INDEX DOCD: CPT | Performed by: SURGERY

## 2022-01-27 PROCEDURE — 1036F TOBACCO NON-USER: CPT | Performed by: SURGERY

## 2022-01-27 PROCEDURE — 3079F DIAST BP 80-89 MM HG: CPT | Performed by: SURGERY

## 2022-01-27 PROCEDURE — 99215 OFFICE O/P EST HI 40 MIN: CPT | Performed by: SURGERY

## 2022-01-27 PROCEDURE — 1160F RVW MEDS BY RX/DR IN RCRD: CPT | Performed by: SURGERY

## 2022-01-27 NOTE — PATIENT INSTRUCTIONS
1) Carotid stenosis  -when you were in the hospital recently, you had a cat scan that showed blockages in the carotid arteries in the neck  -your ultrasound showed a less severe blockage than the cat scan  -putting everything together with the level of blockage in the neck, lack of stroke on your MRI, and the symptoms you had, I do not recommend surgery at this time  -we are going to continue to monitor this area with ultrasound on a yearly basis    2) Medications:  -please make sure that you continue taking your aspirin and Lipitor (atorvastatin) medications  -you should have stopped plavix (clopidogril) after our last visit    Carotid Artery Disease   AMBULATORY CARE:   Carotid artery disease (CAD)  means the major blood vessels in your neck are narrowed or becoming blocked  These 2 major blood vessels are called the carotid arteries  They supply your brain with blood  The narrow or blocked blood vessels increase your risk for a stroke  CAD is also called carotid artery stenosis  Have someone call your local emergency number (911 in the [address] [place] [address],[address]) if:   · You have any of the following signs of a stroke:      ? Numbness or drooping on one side of your face     ? Weakness in an arm or leg    ? Confusion or difficulty speaking    ? Dizziness, a severe headache, or vision loss    · You have any of the following signs of a heart attack:      ? Squeezing, pressure, or pain in your chest    ? You may  also have any of the following:     § Discomfort or pain in your back, neck, jaw, stomach, or arm    § Shortness of breath    § Nausea or vomiting    § Lightheadedness or a sudden cold sweat    Call your doctor if:   · You have questions or concerns about your condition or care  Common signs and symptoms:  CAD develops slowly  You may have no signs or symptoms until you have a mini-stroke, or transient ischemic attack (TIA)  A TIA is a temporary lack of blood flow to your brain   A TIA goes away quickly and does not cause permanent damage  A TIA may be a warning sign that you are about to have a stroke  If you have any symptoms of a TIA or stroke, seek care immediately  Warning signs of a stroke: The words BE FAST can help you remember and recognize warning signs of a stroke:  · B = Balance:  Sudden loss of balance    · E = Eyes:  Loss of vision in one or both eyes    · F = Face:  Face droops on one side    · A = Arms:  Arm drops when both arms are raised    · S = Speech:  Speech is slurred or sounds different    · T = Time:  Time to get help immediately       Treatment  depends on how narrow your arteries have become  Treatment also depends on your symptoms and your general health  The goal of treatment is to lower your risk for a stroke  You may need any of the following:  · Medicines:      ? Aspirin,  or other blood thinner, may be recommended  These will help prevent blood clots from forming in your carotid arteries  If your healthcare provider wants you to take aspirin, do not take acetaminophen or ibuprofen instead  ? Cholesterol medicine  lowers your cholesterol level  ? Blood pressure medicine  lowers or helps control your blood pressure  · Procedures  can help open blocked arteries:     ? Carotid endarterectomy (CEA)  is used to cut and remove plaque buildup from your arteries  ? Carotid angioplasty and stenting (DENNISE)  is used to push the plaque against the artery wall with a balloon device  Then, a stent is placed to keep the artery open  A stent is a small metal mesh tube  Prevent a stroke:   · Do not smoke, and avoid secondhand smoke  Nicotine and other chemicals in cigarettes and cigars increase your risk for a stroke  Ask your healthcare provider for information if you currently smoke and need help to quit  E-cigarettes or smokeless tobacco still contain nicotine  Talk to your healthcare provider before you use these products  · Eat a variety of healthy foods    Healthy foods include fruit, vegetables, whole-grain breads, low-fat dairy products, chicken, and fish  Choose fish that are high in omega-3 fatty acids, such as salmon and fresh tuna  Ask your healthcare provider for more information on a heart healthy diet and the DASH eating plan  · Limit sodium (salt)  Sodium may increase your blood pressure  Add less table salt to your foods  Read food labels and choose foods that are low in sodium  Your healthcare provider may suggest you follow a low sodium diet  · Reach or maintain a healthy weight  Extra weight makes your heart work harder  Ask your healthcare provider what a healthy weight is for you  He or she can help you create a safe weight loss plan  Even a weight loss of 10% of your extra body weight can help your heart function better  · Exercise regularly  Exercise helps improve heart function and can help you manage your weight  Exercise can also help lower your cholesterol and blood sugar levels  Try to get at least 30 minutes of exercise 5 times each week  Try to be physically active every day  This may include walking, riding a bicycle, or swimming  Your healthcare provider can help you create an exercise plan that works best for you  · Limit alcohol  Alcohol can increase your blood pressure and triglyceride levels  A drink of alcohol is 12 ounces of beer, 5 ounces of wine, or 1½ ounces of liquor  Follow up with your doctor as directed:  Write down your questions so you remember to ask them during your visits  © Copyright [employer] 2021 Information is for End User's use only and may not be sold, redistributed or otherwise used for commercial purposes  All illustrations and images included in CareNotes® are the copyrighted property of [employer]  or [facility] Douglas Orellana   The above information is an  only  It is not intended as medical advice for individual conditions or treatments   Talk to your doctor, nurse or pharmacist before following any medical regimen to see if it is safe and effective for you

## 2022-01-27 NOTE — PROGRESS NOTES
Assessment/Plan:    Pt is a 65 yo F w/ DM, HTN, presented recently w/ fall/aphagia, found to have B ICA stenosis    Bilateral carotid artery stenosis  -     VAS carotid complete study; Future  -reviewed MRI which was negative for CVA  -reviewed CTA neck which shows heavy calcification of the B ICA; the vessels run quite medially in the neck B; although the official read states 80% stenosis, I do not think they are as severe as this by my examination, closer to 50-60% on the right and 60-70% on the left  -reviewed carotid DU which shows B ICA <50% stenosis  -discussed the pathophysiology of carotid disease and indications for treatment; given her whole clinical picture with negative MRI, and no "sided" symptoms, no high grade stenosis of either carotid, and no recurrent symptoms, would favor medical management at this time; would also appreciate neuro input and will continue to communicate with them; also counseled patient on stroke symptoms and to go to ER for evaluation if any further symptoms occurred; repeat symptoms or MRI+ would change our management plan  -plan for repeat carotid DU in 1 year  -f/u 1 year    Dysarthria  Hypertension, unspecified type  -differential for recent symptoms included TIA and hypertensive emergency, and electrolyte abnormalities    Medications  -continue ASA/statin daily  -requested that patient double check to make sure she is taking this medications as she had no list and does not fill her pill box herself    Subjective:      Patient ID: Viky Santos is a 66 y o  female  Patient had a CV on 1/13/22  Pt denies TIA or CVA symptoms  Pt had a TIA on 12/28/21 and is not having long term effects  Pt is on  Atorvastatin  HPI:    Patient had a fall in Nov w/ dysarthria that led to stroke workup and finding of carotid stenosis  She had an unwitnessed fall in her kitchen which was followed by speech issues    She was repeated "offline" "online" but could not express other thoughts/words  This lasted about 24 hours and then slowly improved  She also had electrolyte abnormalities and hypertension >200s in the hospital   Also had MARTINA  Denies any prior episodes or anything more recently  Denies amaurosis, facial droop, unilateral weakness/numbness, speech changes  Denies any further falls  Currently on ASA, statin  Plavix stopped at last visit  Daughter fills her pill container and isn't here today  Patient doesn't know what medications she is taking/not taking  Accompanied by her son today      The following portions of the patient's history were reviewed and updated as appropriate: allergies, current medications, past family history, past medical history, past social history, past surgical history and problem list     Review of Systems   Constitutional: Negative  HENT: Negative  Negative for trouble swallowing  Eyes: Negative for visual disturbance  Respiratory: Negative  Negative for shortness of breath  Cardiovascular: Negative  Negative for chest pain  Gastrointestinal: Negative  Endocrine: Negative  Genitourinary: Negative  Musculoskeletal: Negative  Skin: Negative  Negative for wound  Allergic/Immunologic: Negative  Neurological: Negative for dizziness, facial asymmetry, speech difficulty, weakness and numbness  Hematological: Negative  Psychiatric/Behavioral: Negative  Objective:      /88 (BP Location: Right arm, Patient Position: Sitting)   Pulse 83   Resp 18   Ht 5' 2" (1 575 m)   Wt 60 3 kg (133 lb)   BMI 24 33 kg/m²          Physical Exam  Vitals and nursing note reviewed  Constitutional:       Appearance: She is well-developed  HENT:      Head: Normocephalic and atraumatic  Eyes:      Conjunctiva/sclera: Conjunctivae normal    Cardiovascular:      Rate and Rhythm: Normal rate and regular rhythm  Pulses:           Radial pulses are 2+ on the right side and 2+ on the left side  Dorsalis pedis pulses are 2+ on the right side and 1+ on the left side  Posterior tibial pulses are 0 on the right side and 0 on the left side  Heart sounds: Normal heart sounds  No murmur heard  Comments: No carotid bruits B  Pulmonary:      Effort: Pulmonary effort is normal  No respiratory distress  Breath sounds: Normal breath sounds  No wheezing or rales  Abdominal:      General: There is no distension  Palpations: Abdomen is soft  Tenderness: There is no abdominal tenderness  There is no rebound  Musculoskeletal:         General: Normal range of motion  Cervical back: Normal range of motion and neck supple  Right lower leg: No edema  Left lower leg: No edema  Skin:     General: Skin is warm and dry  Neurological:      Mental Status: She is alert and oriented to person, place, and time  Psychiatric:         Behavior: Behavior normal            I have reviewed and made appropriate changes to the review of systems input by the medical assistant  Vitals:    01/27/22 1132 01/27/22 1134   BP: 122/84 126/88   BP Location: Left arm Right arm   Patient Position: Sitting Sitting   Pulse: 83    Resp: 18    Weight: 60 3 kg (133 lb)    Height: 5' 2" (1 575 m)        Patient Active Problem List   Diagnosis    Type 2 diabetes mellitus without complication, without long-term current use of insulin (HCC)    Atherosclerotic plaque    Aphasia    Hypertension    Hypertensive emergency    Asymptomatic bacteriuria    Hypokalemia    Fall    Hyponatremia    Diarrhea    History of TIA (transient ischemic attack)    Mild cognitive impairment    Bilateral carotid artery stenosis    Dysarthria       History reviewed  No pertinent surgical history      Family History   Problem Relation Age of Onset    Breast cancer Mother        Social History     Socioeconomic History    Marital status: /Civil Union     Spouse name: Not on file    Number of children: Not on file    Years of education: Not on file    Highest education level: Not on file   Occupational History    Not on file   Tobacco Use    Smoking status: Never Smoker    Smokeless tobacco: Never Used   Vaping Use    Vaping Use: Never used   Substance and Sexual Activity    Alcohol use: No    Drug use: No    Sexual activity: Not Currently   Other Topics Concern    Not on file   Social History Narrative    ** Merged History Encounter **          Social Determinants of Health     Financial Resource Strain: Not on file   Food Insecurity: Not on file   Transportation Needs: Not on file   Physical Activity: Not on file   Stress: Not on file   Social Connections: Not on file   Intimate Partner Violence: Not on file   Housing Stability: Not on file       No Known Allergies      Current Outpatient Medications:     atorvastatin (LIPITOR) 40 mg tablet, Take 1 tablet (40 mg total) by mouth every evening, Disp: 90 tablet, Rfl: 3    glipiZIDE (GLUCOTROL) 5 mg tablet, Take 1 tablet (5 mg total) by mouth daily, Disp: 90 tablet, Rfl: 3    lisinopril (ZESTRIL) 20 mg tablet, Take 1 tablet (20 mg total) by mouth daily, Disp: 90 tablet, Rfl: 3    acetaminophen (TYLENOL) 325 mg tablet, Take 650 mg by mouth every 6 (six) hours as needed for mild pain (Patient not taking: Reported on 1/27/2022 ), Disp: , Rfl:     acetaminophen (TYLENOL) 325 mg tablet, Take 2 tablets (650 mg total) by mouth every 6 (six) hours as needed for mild pain, headaches or fever (Patient not taking: Reported on 1/27/2022 ), Disp: , Rfl: 0    aspirin 81 mg chewable tablet, Chew 81 mg daily (Patient not taking: Reported on 1/27/2022 ), Disp: , Rfl:     Blood Pressure Monitoring (B-D ASSURE BPM/AUTO ARM CUFF) MISC, Use daily (Patient not taking: Reported on 1/6/2022 ), Disp: 1 each, Rfl: 0    polyethylene glycol (GLYCOLAX) 17 GM/SCOOP powder, At 5pm take 5mgx2 dulcolax  At 6pm 32oz miralax in 64oz gatorade   Drink 8oz glass every 5 mins until 32oz finished  Drink remaining as rec   (Patient not taking: Reported on 1/27/2022 ), Disp: 238 g, Rfl: 0

## 2022-01-31 ENCOUNTER — LAB (OUTPATIENT)
Dept: LAB | Facility: AMBULARY SURGERY CENTER | Age: 72
End: 2022-01-31
Payer: COMMERCIAL

## 2022-01-31 DIAGNOSIS — E87.1 HYPONATREMIA: ICD-10-CM

## 2022-01-31 DIAGNOSIS — K52.9 CHRONIC DIARRHEA: ICD-10-CM

## 2022-01-31 DIAGNOSIS — R19.7 WATERY DIARRHEA: ICD-10-CM

## 2022-01-31 LAB
ANION GAP SERPL CALCULATED.3IONS-SCNC: 8 MMOL/L (ref 4–13)
BUN SERPL-MCNC: 11 MG/DL (ref 5–25)
CALCIUM SERPL-MCNC: 9.8 MG/DL (ref 8.3–10.1)
CHLORIDE SERPL-SCNC: 100 MMOL/L (ref 100–108)
CO2 SERPL-SCNC: 26 MMOL/L (ref 21–32)
CREAT SERPL-MCNC: 0.99 MG/DL (ref 0.6–1.3)
GFR SERPL CREATININE-BSD FRML MDRD: 57 ML/MIN/1.73SQ M
GLUCOSE P FAST SERPL-MCNC: 147 MG/DL (ref 65–99)
POTASSIUM SERPL-SCNC: 3.9 MMOL/L (ref 3.5–5.3)
SODIUM SERPL-SCNC: 134 MMOL/L (ref 136–145)

## 2022-01-31 PROCEDURE — 82784 ASSAY IGA/IGD/IGG/IGM EACH: CPT

## 2022-01-31 PROCEDURE — 80048 BASIC METABOLIC PNL TOTAL CA: CPT

## 2022-01-31 PROCEDURE — 86364 TISS TRNSGLTMNASE EA IG CLAS: CPT

## 2022-01-31 PROCEDURE — 36415 COLL VENOUS BLD VENIPUNCTURE: CPT

## 2022-01-31 PROCEDURE — 86231 EMA EACH IG CLASS: CPT

## 2022-01-31 PROCEDURE — 86258 DGP ANTIBODY EACH IG CLASS: CPT

## 2022-02-01 ENCOUNTER — CONSULT (OUTPATIENT)
Dept: NEPHROLOGY | Facility: CLINIC | Age: 72
End: 2022-02-01
Payer: COMMERCIAL

## 2022-02-01 VITALS
SYSTOLIC BLOOD PRESSURE: 146 MMHG | BODY MASS INDEX: 24.48 KG/M2 | DIASTOLIC BLOOD PRESSURE: 66 MMHG | WEIGHT: 133 LBS | HEIGHT: 62 IN

## 2022-02-01 DIAGNOSIS — N18.30 BENIGN HYPERTENSION WITH CKD (CHRONIC KIDNEY DISEASE) STAGE III (HCC): Primary | ICD-10-CM

## 2022-02-01 DIAGNOSIS — I12.9 BENIGN HYPERTENSION WITH CKD (CHRONIC KIDNEY DISEASE) STAGE III (HCC): Primary | ICD-10-CM

## 2022-02-01 DIAGNOSIS — N18.31 STAGE 3A CHRONIC KIDNEY DISEASE (HCC): ICD-10-CM

## 2022-02-01 DIAGNOSIS — E87.1 HYPONATREMIA: ICD-10-CM

## 2022-02-01 DIAGNOSIS — R80.9 MICROALBUMINURIA: ICD-10-CM

## 2022-02-01 LAB
ENDOMYSIUM IGA SER QL: NEGATIVE
GLIADIN PEPTIDE IGA SER-ACNC: 3 UNITS (ref 0–19)
GLIADIN PEPTIDE IGG SER-ACNC: 2 UNITS (ref 0–19)
IGA SERPL-MCNC: 153 MG/DL (ref 64–422)
TTG IGA SER-ACNC: <2 U/ML (ref 0–3)
TTG IGG SER-ACNC: <2 U/ML (ref 0–5)

## 2022-02-01 PROCEDURE — 3078F DIAST BP <80 MM HG: CPT | Performed by: INTERNAL MEDICINE

## 2022-02-01 PROCEDURE — 3077F SYST BP >= 140 MM HG: CPT | Performed by: INTERNAL MEDICINE

## 2022-02-01 PROCEDURE — 1160F RVW MEDS BY RX/DR IN RCRD: CPT | Performed by: INTERNAL MEDICINE

## 2022-02-01 PROCEDURE — 3008F BODY MASS INDEX DOCD: CPT | Performed by: INTERNAL MEDICINE

## 2022-02-01 PROCEDURE — 99204 OFFICE O/P NEW MOD 45 MIN: CPT | Performed by: INTERNAL MEDICINE

## 2022-02-01 PROCEDURE — 3066F NEPHROPATHY DOC TX: CPT | Performed by: INTERNAL MEDICINE

## 2022-02-01 PROCEDURE — 1036F TOBACCO NON-USER: CPT | Performed by: INTERNAL MEDICINE

## 2022-02-01 NOTE — PROGRESS NOTES
NEPHROLOGY OUTPATIENT CONSULTATION   John Nguyen 66 y o  female MRN: [id number]  Date: 2/1/2022  Reason for consultation:   Chief Complaint   Patient presents with    Follow-up    Hypertension    Hyponatremia       ASSESSMENT AND PLAN:  Suspect CKD stage IIIA, baseline creatinine 0 9 to 1 2 since November 2021, no prior recent baseline available, creatinine 0 9 in 2018  -most recent creatinine 0 9 in January 2022  -may have mild CKD in the setting of underlying hypertension, diabetes, age-related nephron loss  -patient unable to provide urine sample in the office today  Recent UA showed 0 to 1 RBCs, no proteinuria in November 2021  -will do repeat UA with microscopy, urine microalbumin/creatinine ratio, BMP before next visit  -avoid nephrotoxins or NSAIDs    Microalbuminuria  -last urine microalbumin/creatinine ratio 225 mg in December 2021  -remains on lisinopril 20 mg  -? Suspect in the setting of hypertension, diabetes  -last hemoglobin A1c 5 9 in December 2021  -repeat before next visit    Hyponatremia  -noted chronic hyponatremia issues since November 2021    -workup includes serum osmolality 278, urine sodium 43, urine osmolality 263  -suspect underlying SIADH with somewhat decrease solute intake  -CT scan of chest, abdomen, pelvis in November 2021 shows no obvious evidence of malignancy except thickened uterine endometrium, mild apical scarring and emphysematous changes in the lungs  -strongly recommended to follow strict fluid restriction less than 1 5 L per day  May have somewhat salt liberal diet   -most recent sodium level has improved 134 with fluid restriction alone   -will do repeat BMP, urine sodium, urine osmolality in one month and again before next visit  -patient will be following with with OBGYN regarding CT scan finding of endometrial thickness  Hypertension  -blood pressure slightly above goal in the office today    Her home BP readings are much better controlled 110s/60s  -given better controlled BP readings at home, will not change regimen, continue lisinopril 20 mg daily   -if BP remains above goal, consider renal artery Doppler    HISTORY OF PRESENT ILLNESS:  Shaka Long is a 66y o  year old female with medical issues of hypertension diagnosed in November 2021, bilateral carotid artery stenosis, diabetes for last 2 to 3 years, chronic intermittent diarrhea, CKD stage IIIA with baseline creatinine 0 9 to 1 2 since November 2021 who presents for initial consultation for hyponatremia, hypertension, CKD  Old medical records including [facility]  She was found to have chronic hyponatremia issues since November 2021  Recently was recommended to follow fluid restriction by PCP and since then sodium level has improved 134  She used to have chronic intermittent pain issues including joint pain and was taking NSAIDs up until November 2021 and now remains off NSAIDs  She admits to be following fluid restriction lately  Denies any significant pain issues currently  Denies nausea, vomiting  She does have chronic diarrhea issues and follows with PCP  Denies any obvious history of malignancy  Denies any headache  REVIEW OF SYSTEMS:    More than 10 point review of systems were obtained and discussed in length with the patient  Complete review of systems were negative / unremarkable except mentioned in the HPI section  PHYSICAL EXAM:  Vitals:    02/01/22 1333 02/01/22 1432   BP:  146/66   Weight: 60 3 kg (133 lb)    Height: 5' 2" (1 575 m)      Body mass index is 24 33 kg/m²  Physical Exam  Vitals reviewed  Constitutional:       Appearance: She is well-developed  HENT:      Head: Normocephalic and atraumatic        Right Ear: External ear normal       Left Ear: External ear normal    Eyes:      Conjunctiva/sclera: Conjunctivae normal    Cardiovascular:      Comments: S1, S2 present  Pulmonary:      Effort: Pulmonary effort is normal       Breath sounds: Normal breath sounds  No wheezing or rales  Abdominal:      General: Bowel sounds are normal  There is no distension  Palpations: Abdomen is soft  Tenderness: There is no abdominal tenderness  Musculoskeletal:         General: No deformity  Right lower leg: No edema  Left lower leg: No edema  Lymphadenopathy:      Cervical: No cervical adenopathy  Skin:     Findings: No rash  Neurological:      Mental Status: She is alert and oriented to person, place, and time  Psychiatric:         Behavior: Behavior normal          PAST MEDICAL HISTORY:  Past Medical History:   Diagnosis Date    MARTINA (acute kidney injury) ([facility] ) 11/21/2021    Diabetes mellitus ([place] [address] )     Hypertension        PAST SURGICAL HISTORY:  History reviewed  No pertinent surgical history      ALLERGIES:  No Known Allergies    SOCIAL HISTORY:  Social History     Substance and Sexual Activity   Alcohol Use No     Social History     Substance and Sexual Activity   Drug Use No     Social History     Tobacco Use   Smoking Status Never Smoker   Smokeless Tobacco Never Used       FAMILY HISTORY:  Family History   Problem Relation Age of Onset    Breast cancer Mother        MEDICATIONS:    Current Outpatient Medications:     aspirin 81 mg chewable tablet, Chew 81 mg daily  , Disp: , Rfl:     atorvastatin (LIPITOR) 40 mg tablet, Take 1 tablet (40 mg total) by mouth every evening, Disp: 90 tablet, Rfl: 3    glipiZIDE (GLUCOTROL) 5 mg tablet, Take 1 tablet (5 mg total) by mouth daily, Disp: 90 tablet, Rfl: 3    lisinopril (ZESTRIL) 20 mg tablet, Take 1 tablet (20 mg total) by mouth daily, Disp: 90 tablet, Rfl: 3    acetaminophen (TYLENOL) 325 mg tablet, Take 650 mg by mouth every 6 (six) hours as needed for mild pain (Patient not taking: Reported on 1/27/2022 ), Disp: , Rfl:     acetaminophen (TYLENOL) 325 mg tablet, Take 2 tablets (650 mg total) by mouth every 6 (six) hours as needed for mild pain, headaches or fever (Patient not taking: Reported on 1/27/2022 ), Disp: , Rfl: 0    Blood Pressure Monitoring (B-D ASSURE BPM/AUTO ARM CUFF) MISC, Use daily (Patient not taking: Reported on 1/6/2022 ), Disp: 1 each, Rfl: 0    polyethylene glycol (GLYCOLAX) 17 GM/SCOOP powder, At 5pm take 5mgx2 dulcolax  At 6pm 32oz miralax in 64oz gatorade  Drink 8oz glass every 5 mins until 32oz finished  Drink remaining as rec  (Patient not taking: Reported on 1/27/2022 ), Disp: 238 g, Rfl: 0    Lab Results:   Results for orders placed or performed in visit on 25/00/79   Basic metabolic panel   Result Value Ref Range    Sodium 134 (L) 136 - 145 mmol/L    Potassium 3 9 3 5 - 5 3 mmol/L    Chloride 100 100 - 108 mmol/L    CO2 26 21 - 32 mmol/L    ANION GAP 8 4 - 13 mmol/L    BUN 11 5 - 25 mg/dL    Creatinine 0 99 0 60 - 1 30 mg/dL    Glucose, Fasting 147 (H) 65 - 99 mg/dL    Calcium 9 8 8 3 - 10 1 mg/dL    eGFR 57 ml/min/1 73sq m       Portions of the record may have been created with voice recognition software  Occasional wrong word or "sound a like" substitutions may have occurred due to the inherent limitations of voice recognition software  Read the chart carefully and recognize, using context, where substitutions have occurred

## 2022-02-09 ENCOUNTER — TELEPHONE (OUTPATIENT)
Dept: GERIATRICS | Age: 72
End: 2022-02-09

## 2022-02-09 NOTE — TELEPHONE ENCOUNTER
[address] [place], [place] [address] [phone]    Telephone Intake: Geriatric Assessment     Referral source: Adilene Young MD and Denzel Berumen, DO                                          Caller who is scheduling/relationship to patient: Chris mayers phone number: [phone]              Is there a living will/healthcare POA in place/If so, who? Yes , Dorie Edwards    Reason for referral: Family member concerns and Provider concerns regarding memory concerns and home safety concerns  What is the goal of the visit? initial assessment, diagnosis, medication, plan for future care  Has the patient been seen by a Neurologist or Geriatrician? Yes, 12/28/21 w/6 month follow-up scheduled  Saskia Balling  Has the patient ever been diagnosed with dementia? No      Preferred language? English  Highest education level? High School Graduate  Does the patient wear glasses? Yes   Does the patient use hearing aids? No     Does the patient and/or caregiver have access for a virtual visit (computer or smart phone, working audio and video)? Yes, Serena Hermosillo was informed:   Please make sure the pt is accompanied by someone who knows them well / caregiver / family member to participate in this appointment  If a pt plans to attend alone, please route a message to the assigned provider for approval  Who will accompany the patient (name and relationship)? Dorie Edwards, daughter, and step-father - Borismarcello Italo    Office packet mailed out to: [id number] [facility] [id number]   Patient added to wait list for sooner appointments? Yes     NOTE FOR : If the patient was recently hospitalized, please route intake to assigned provider for chart review

## 2022-02-22 ENCOUNTER — HOSPITAL ENCOUNTER (OUTPATIENT)
Dept: RADIOLOGY | Age: 72
Discharge: HOME | End: 2022-02-22
Payer: COMMERCIAL

## 2022-02-22 VITALS — WEIGHT: 133 LBS | HEIGHT: 62 IN | BODY MASS INDEX: 24.48 KG/M2

## 2022-02-22 DIAGNOSIS — Z12.31 ENCOUNTER FOR SCREENING MAMMOGRAM FOR MALIGNANT NEOPLASM OF BREAST: ICD-10-CM

## 2022-02-22 PROCEDURE — 77067 SCR MAMMO BI INCL CAD: CPT

## 2022-02-22 PROCEDURE — 77063 BREAST TOMOSYNTHESIS BI: CPT

## 2022-02-25 ENCOUNTER — RA CDI HCC (OUTPATIENT)
Dept: OTHER | Facility: HOSPITAL | Age: 72
End: 2022-02-25

## 2022-02-25 NOTE — PROGRESS NOTES
E11 22    [facility]  coding opportunities             Chart Reviewed * (Number of) Inbasket suggestions sent to Provider: 1                  Patients insurance company: [employer] (Medicare Advantage and Commercial)

## 2022-02-28 ENCOUNTER — TELEPHONE (OUTPATIENT)
Dept: GASTROENTEROLOGY | Facility: HOSPITAL | Age: 72
End: 2022-02-28

## 2022-03-01 ENCOUNTER — HOSPITAL ENCOUNTER (OUTPATIENT)
Dept: GASTROENTEROLOGY | Facility: HOSPITAL | Age: 72
Setting detail: OUTPATIENT SURGERY
Discharge: HOME | End: 2022-03-01
Attending: INTERNAL MEDICINE | Admitting: INTERNAL MEDICINE
Payer: COMMERCIAL

## 2022-03-01 ENCOUNTER — ANESTHESIA (OUTPATIENT)
Dept: GASTROENTEROLOGY | Facility: HOSPITAL | Age: 72
End: 2022-03-01

## 2022-03-01 ENCOUNTER — ANESTHESIA EVENT (OUTPATIENT)
Dept: GASTROENTEROLOGY | Facility: HOSPITAL | Age: 72
End: 2022-03-01

## 2022-03-01 VITALS
DIASTOLIC BLOOD PRESSURE: 60 MMHG | SYSTOLIC BLOOD PRESSURE: 139 MMHG | HEART RATE: 80 BPM | TEMPERATURE: 96 F | OXYGEN SATURATION: 100 % | RESPIRATION RATE: 15 BRPM

## 2022-03-01 DIAGNOSIS — Z12.11 SCREENING FOR COLON CANCER: ICD-10-CM

## 2022-03-01 PROCEDURE — 45385 COLONOSCOPY W/LESION REMOVAL: CPT | Performed by: INTERNAL MEDICINE

## 2022-03-01 PROCEDURE — 88305 TISSUE EXAM BY PATHOLOGIST: CPT | Performed by: PATHOLOGY

## 2022-03-01 RX ORDER — LIDOCAINE HYDROCHLORIDE 10 MG/ML
INJECTION, SOLUTION EPIDURAL; INFILTRATION; INTRACAUDAL; PERINEURAL AS NEEDED
Status: DISCONTINUED | OUTPATIENT
Start: 2022-03-01 | End: 2022-03-01

## 2022-03-01 RX ORDER — PROPOFOL 10 MG/ML
INJECTION, EMULSION INTRAVENOUS AS NEEDED
Status: DISCONTINUED | OUTPATIENT
Start: 2022-03-01 | End: 2022-03-01

## 2022-03-01 RX ORDER — EPHEDRINE SULFATE 50 MG/ML
INJECTION INTRAVENOUS AS NEEDED
Status: DISCONTINUED | OUTPATIENT
Start: 2022-03-01 | End: 2022-03-01

## 2022-03-01 RX ORDER — SODIUM CHLORIDE 9 MG/ML
INJECTION, SOLUTION INTRAVENOUS CONTINUOUS PRN
Status: DISCONTINUED | OUTPATIENT
Start: 2022-03-01 | End: 2022-03-01

## 2022-03-01 RX ORDER — PROPOFOL 10 MG/ML
INJECTION, EMULSION INTRAVENOUS CONTINUOUS PRN
Status: DISCONTINUED | OUTPATIENT
Start: 2022-03-01 | End: 2022-03-01

## 2022-03-01 RX ADMIN — PROPOFOL 20 MG: 10 INJECTION, EMULSION INTRAVENOUS at 09:11

## 2022-03-01 RX ADMIN — PROPOFOL 70 MG: 10 INJECTION, EMULSION INTRAVENOUS at 08:55

## 2022-03-01 RX ADMIN — PROPOFOL 20 MG: 10 INJECTION, EMULSION INTRAVENOUS at 09:06

## 2022-03-01 RX ADMIN — PROPOFOL 30 MG: 10 INJECTION, EMULSION INTRAVENOUS at 09:17

## 2022-03-01 RX ADMIN — LIDOCAINE HYDROCHLORIDE 50 MG: 10 INJECTION, SOLUTION EPIDURAL; INFILTRATION; INTRACAUDAL; PERINEURAL at 08:55

## 2022-03-01 RX ADMIN — PROPOFOL 30 MG: 10 INJECTION, EMULSION INTRAVENOUS at 09:02

## 2022-03-01 RX ADMIN — EPHEDRINE SULFATE 10 MG: 50 INJECTION INTRAVENOUS at 09:08

## 2022-03-01 RX ADMIN — SODIUM CHLORIDE: 0.9 INJECTION, SOLUTION INTRAVENOUS at 08:49

## 2022-03-01 RX ADMIN — PROPOFOL 30 MG: 10 INJECTION, EMULSION INTRAVENOUS at 08:58

## 2022-03-01 NOTE — ANESTHESIA POSTPROCEDURE EVALUATION
Post-Op Assessment Note    CV Status:  Stable    Pain management: adequate     Mental Status:  Arousable   PONV Controlled:  None      Post Op Vitals Reviewed: Yes      Staff: CRNA         No complications documented      BP (!) 98/46 (03/01/22 0923)    Temp (!) 96 °F (35 6 °C) (03/01/22 0923)    Pulse 75 (03/01/22 0923)   Resp 15 (03/01/22 0923)    SpO2 100 % (03/01/22 0923)

## 2022-03-01 NOTE — H&P
History and Physical -  [facility]  Sang De Leon 67 y o  female MRN: [id number]                  HPI: Sang De Leon is a 67y o  year old female who presents for colonoscopy      REVIEW OF SYSTEMS: Per the HPI, and otherwise unremarkable  Historical Information   Past Medical History:   Diagnosis Date    [facility] ([facility] ) 11/21/2021    Diabetes mellitus ([facility] )     Hypertension      History reviewed  No pertinent surgical history    Social History   Social History     Substance and Sexual Activity   Alcohol Use No     Social History     Substance and Sexual Activity   Drug Use No     Social History     Tobacco Use   Smoking Status Never Smoker   Smokeless Tobacco Never Used     Family History   Problem Relation Age of Onset    Breast cancer Mother         unknown age   Merle Hussein No Known Problems Father     No Known Problems Daughter     No Known Problems Maternal Grandmother     No Known Problems Maternal Grandfather     No Known Problems Paternal Grandmother     No Known Problems Paternal Grandfather     No Known Problems Son     No Known Problems Son     No Known Problems Maternal Aunt     No Known Problems Maternal Aunt     No Known Problems Maternal Aunt     No Known Problems Maternal Aunt     No Known Problems Paternal Aunt     No Known Problems Paternal Aunt     No Known Problems Paternal Aunt        Meds/Allergies       Current Outpatient Medications:     aspirin 81 mg chewable tablet    atorvastatin (LIPITOR) 40 mg tablet    glipiZIDE (GLUCOTROL) 5 mg tablet    lisinopril (ZESTRIL) 20 mg tablet    acetaminophen (TYLENOL) 325 mg tablet    acetaminophen (TYLENOL) 325 mg tablet    Blood Pressure Monitoring (B-D ASSURE BPM/AUTO ARM CUFF) MISC    polyethylene glycol (GLYCOLAX) 17 GM/SCOOP powder    No Known Allergies    Objective     /57   Pulse 84   Temp 97 5 °F (36 4 °C) (Tympanic)   Resp 18   SpO2 100%       PHYSICAL EXAM    Gen: NAD  Head: NCAT  CV: RRR  CHEST: Clear  ABD: soft, NT/ND  EXT: no edema      ASSESSMENT/PLAN:  This is a 67y o  year old female here for colonoscopy, and she is stable and optimized for her procedure

## 2022-03-01 NOTE — ANESTHESIA PREPROCEDURE EVALUATION
Procedure:  COLONOSCOPY    Relevant Problems   CARDIO   (+) Bilateral carotid artery stenosis   (+) Hypertension      ENDO   (+) Type 2 diabetes mellitus without complication, without long-term current use of insulin (HCC)      /RENAL   (+) Benign hypertension with CKD (chronic kidney disease) stage III (HCC)   (+) Stage 3a chronic kidney disease (HCC)      NEURO/PSYCH   (+) History of TIA (transient ischemic attack)      Other   (+) Hyponatremia   (+) Mild cognitive impairment        Physical Exam    Airway    Mallampati score: III  TM Distance: >3 FB  Neck ROM: full     Dental   No notable dental hx     Cardiovascular      Pulmonary      Other Findings    11/21/21 TTE:    Left Ventricle: Left ventricular cavity size is normal  The left ventricular ejection fraction is 60%  Systolic function is normal  Wall motion is normal  Diastolic function is mildly abnormal, consistent with grade I (abnormal) relaxation  Wall thickness is moderately increased  There is mild to moderate concentric hypertrophy    Atrial Septum: No diagnostic evidence of patent foramen ovale at rest by color flow Doppler  Agitated saline (bubble study) not performed    Tricuspid Valve: There is mild regurgitation  Anesthesia Plan  ASA Score- 3     Anesthesia Type- IV sedation with anesthesia with ASA Monitors  Additional Monitors:   Airway Plan:     Comment: I discussed the risks and benefits of IV sedation anesthesia including the possibility of the need to convert to general anesthesia and the potential risk of awareness  Plan Factors-    Chart reviewed  EKG reviewed  Existing labs reviewed  Patient summary reviewed  Induction- intravenous  Postoperative Plan-     Informed Consent- Anesthetic plan and risks discussed with patient

## 2022-03-07 ENCOUNTER — APPOINTMENT (OUTPATIENT)
Dept: LAB | Facility: CLINIC | Age: 72
End: 2022-03-07
Payer: COMMERCIAL

## 2022-03-07 DIAGNOSIS — E87.1 HYPONATREMIA: ICD-10-CM

## 2022-03-07 LAB
ANION GAP SERPL CALCULATED.3IONS-SCNC: 9 MMOL/L (ref 4–13)
BUN SERPL-MCNC: 10 MG/DL (ref 5–25)
CALCIUM SERPL-MCNC: 8.3 MG/DL (ref 8.3–10.1)
CHLORIDE SERPL-SCNC: 96 MMOL/L (ref 100–108)
CO2 SERPL-SCNC: 29 MMOL/L (ref 21–32)
CREAT SERPL-MCNC: 1.01 MG/DL (ref 0.6–1.3)
GFR SERPL CREATININE-BSD FRML MDRD: 55 ML/MIN/1.73SQ M
GLUCOSE SERPL-MCNC: 180 MG/DL (ref 65–140)
POTASSIUM SERPL-SCNC: 3.7 MMOL/L (ref 3.5–5.3)
SODIUM SERPL-SCNC: 134 MMOL/L (ref 136–145)
URATE SERPL-MCNC: 4.9 MG/DL (ref 2–6.8)

## 2022-03-07 PROCEDURE — 84550 ASSAY OF BLOOD/URIC ACID: CPT

## 2022-03-07 PROCEDURE — 80048 BASIC METABOLIC PNL TOTAL CA: CPT

## 2022-03-07 PROCEDURE — 36415 COLL VENOUS BLD VENIPUNCTURE: CPT

## 2022-03-08 ENCOUNTER — TELEPHONE (OUTPATIENT)
Dept: GASTROENTEROLOGY | Facility: CLINIC | Age: 72
End: 2022-03-08

## 2022-03-08 NOTE — TELEPHONE ENCOUNTER
----- Message from Aga Reed MA sent at 3/8/2022  9:30 AM EST -----    ----- Message -----  From: Shahid Ocampo MD  Sent: 3/8/2022   9:24 AM EST  To: , #    There was no polyp material obtained on pathology just fecal material, it was very small so may have not survived processing or it was mixed with stool    No need to repeat colonoscopy as previously recommended

## 2022-03-10 ENCOUNTER — TELEPHONE (OUTPATIENT)
Dept: NEPHROLOGY | Facility: CLINIC | Age: 72
End: 2022-03-10

## 2022-03-11 NOTE — TELEPHONE ENCOUNTER
I spoke with patient's daughter [facility] and relayed message, [facility] verbalized understanding with no questions or concerns

## 2022-03-14 ENCOUNTER — APPOINTMENT (OUTPATIENT)
Dept: LAB | Facility: CLINIC | Age: 72
End: 2022-03-14
Payer: COMMERCIAL

## 2022-03-14 ENCOUNTER — TELEPHONE (OUTPATIENT)
Dept: NEPHROLOGY | Facility: CLINIC | Age: 72
End: 2022-03-14

## 2022-03-14 DIAGNOSIS — E87.1 HYPONATREMIA: ICD-10-CM

## 2022-03-14 DIAGNOSIS — R19.7 WATERY DIARRHEA: ICD-10-CM

## 2022-03-14 DIAGNOSIS — N18.31 STAGE 3A CHRONIC KIDNEY DISEASE (HCC): ICD-10-CM

## 2022-03-14 DIAGNOSIS — K52.9 CHRONIC DIARRHEA: ICD-10-CM

## 2022-03-14 LAB
BACTERIA UR QL AUTO: ABNORMAL /HPF
BILIRUB UR QL STRIP: NEGATIVE
CLARITY UR: CLEAR
COLOR UR: YELLOW
CREAT UR-MCNC: 87.9 MG/DL
GLUCOSE UR STRIP-MCNC: NEGATIVE MG/DL
HGB UR QL STRIP.AUTO: ABNORMAL
KETONES UR STRIP-MCNC: NEGATIVE MG/DL
LEUKOCYTE ESTERASE UR QL STRIP: ABNORMAL
MICROALBUMIN UR-MCNC: 92.9 MG/L (ref 0–20)
MICROALBUMIN/CREAT 24H UR: 106 MG/G CREATININE (ref 0–30)
MUCOUS THREADS UR QL AUTO: ABNORMAL
NITRITE UR QL STRIP: NEGATIVE
NON-SQ EPI CELLS URNS QL MICRO: ABNORMAL /HPF
OSMOLALITY UR: 188 MMOL/KG
PH UR STRIP.AUTO: 6 [PH]
PROT UR STRIP-MCNC: ABNORMAL MG/DL
RBC #/AREA URNS AUTO: ABNORMAL /HPF
SODIUM 24H UR-SCNC: 15 MOL/L
SP GR UR STRIP.AUTO: 1.01 (ref 1–1.03)
UROBILINOGEN UR QL STRIP.AUTO: 0.2 E.U./DL
WBC #/AREA URNS AUTO: ABNORMAL /HPF

## 2022-03-14 PROCEDURE — 81001 URINALYSIS AUTO W/SCOPE: CPT

## 2022-03-14 PROCEDURE — 83935 ASSAY OF URINE OSMOLALITY: CPT

## 2022-03-14 PROCEDURE — 3060F POS MICROALBUMINURIA REV: CPT | Performed by: INTERNAL MEDICINE

## 2022-03-14 PROCEDURE — 82043 UR ALBUMIN QUANTITATIVE: CPT

## 2022-03-14 PROCEDURE — 82570 ASSAY OF URINE CREATININE: CPT

## 2022-03-14 PROCEDURE — 84300 ASSAY OF URINE SODIUM: CPT

## 2022-03-17 NOTE — TELEPHONE ENCOUNTER
I called and left detailed message for Reba Raphael to call us back at earliest convenience to discuss if patient is having any UTI symptoms  Manda Arnett

## 2022-03-24 ENCOUNTER — OFFICE VISIT (OUTPATIENT)
Dept: FAMILY MEDICINE CLINIC | Facility: OTHER | Age: 72
End: 2022-03-24
Payer: COMMERCIAL

## 2022-03-24 VITALS
HEIGHT: 62 IN | SYSTOLIC BLOOD PRESSURE: 100 MMHG | DIASTOLIC BLOOD PRESSURE: 50 MMHG | OXYGEN SATURATION: 98 % | TEMPERATURE: 98 F | RESPIRATION RATE: 16 BRPM | HEART RATE: 76 BPM | WEIGHT: 128 LBS | BODY MASS INDEX: 23.55 KG/M2

## 2022-03-24 DIAGNOSIS — Z78.0 POSTMENOPAUSAL: ICD-10-CM

## 2022-03-24 DIAGNOSIS — E11.9 TYPE 2 DIABETES MELLITUS WITHOUT COMPLICATION, WITHOUT LONG-TERM CURRENT USE OF INSULIN (HCC): Primary | ICD-10-CM

## 2022-03-24 DIAGNOSIS — I65.23 BILATERAL CAROTID ARTERY STENOSIS: ICD-10-CM

## 2022-03-24 DIAGNOSIS — I10 PRIMARY HYPERTENSION: ICD-10-CM

## 2022-03-24 LAB — SL AMB POCT HEMOGLOBIN AIC: 6 (ref ?–6.5)

## 2022-03-24 PROCEDURE — 99213 OFFICE O/P EST LOW 20 MIN: CPT | Performed by: FAMILY MEDICINE

## 2022-03-24 PROCEDURE — 4010F ACE/ARB THERAPY RXD/TAKEN: CPT | Performed by: INTERNAL MEDICINE

## 2022-03-24 PROCEDURE — 83036 HEMOGLOBIN GLYCOSYLATED A1C: CPT | Performed by: FAMILY MEDICINE

## 2022-03-24 PROCEDURE — 3008F BODY MASS INDEX DOCD: CPT | Performed by: INTERNAL MEDICINE

## 2022-03-24 PROCEDURE — 3044F HG A1C LEVEL LT 7.0%: CPT | Performed by: INTERNAL MEDICINE

## 2022-03-24 RX ORDER — LISINOPRIL 20 MG/1
20 TABLET ORAL DAILY
Qty: 90 TABLET | Refills: 3 | Status: SHIPPED | OUTPATIENT
Start: 2022-03-24

## 2022-03-24 RX ORDER — GLIPIZIDE 5 MG/1
5 TABLET ORAL DAILY
Qty: 90 TABLET | Refills: 3 | Status: SHIPPED | OUTPATIENT
Start: 2022-03-24

## 2022-03-24 RX ORDER — ATORVASTATIN CALCIUM 40 MG/1
40 TABLET, FILM COATED ORAL EVERY EVENING
Qty: 90 TABLET | Refills: 3
Start: 2022-03-24

## 2022-03-24 NOTE — PROGRESS NOTES
Assessment/Plan:       Diagnoses and all orders for this visit:    Type 2 diabetes mellitus without complication, without long-term current use of insulin ([facility])  Comments:  A1c 6 0  continue glipizide  Orders:  -     POCT hemoglobin A1c  -     glipiZIDE (GLUCOTROL) 5 mg tablet; Take 1 tablet (5 mg total) by mouth daily    Postmenopausal  -     DXA bone density spine hip and pelvis; Future    Bilateral carotid artery stenosis  -     atorvastatin (LIPITOR) 40 mg tablet; Take 1 tablet (40 mg total) by mouth every evening    Primary hypertension  Comments:  BP controlled without side effects or symptoms  continue lisinopril   Orders:  -     lisinopril (ZESTRIL) 20 mg tablet; Take 1 tablet (20 mg total) by mouth daily          Subjective:      Patient ID: Sarkis Perla is a 67 y o  female  Hypertension  This is a chronic problem  The current episode started more than 1 month ago  The problem has been resolved since onset  The problem is controlled  Pertinent negatives include no anxiety, blurred vision, chest pain, headaches, malaise/fatigue, neck pain, orthopnea, palpitations, peripheral edema, PND, shortness of breath or sweats  There are no associated agents to hypertension  Risk factors for coronary artery disease include diabetes mellitus  Past treatments include ACE inhibitors  The current treatment provides significant improvement  There are no compliance problems  Hypertensive end-organ damage includes kidney disease  There is no history of PVD or retinopathy  Identifiable causes of hypertension include chronic renal disease  Diabetes  She presents for her follow-up diabetic visit  She has type 2 diabetes mellitus  Her disease course has been stable  There are no hypoglycemic associated symptoms  Pertinent negatives for hypoglycemia include no headaches or sweats   Pertinent negatives for diabetes include no blurred vision, no chest pain, no fatigue, no foot paresthesias, no polydipsia, no polyphagia, no polyuria, no visual change, no weakness and no weight loss  There are no hypoglycemic complications  Symptoms are resolved  Diabetic complications include nephropathy  Pertinent negatives for diabetic complications include no PVD or retinopathy  Risk factors for coronary artery disease include hypertension, post-menopausal and diabetes mellitus  Current diabetic treatment includes oral agent (monotherapy)  She is compliant with treatment all of the time  She is following a generally healthy diet  When asked about meal planning, she reported none  She has not had a previous visit with a dietitian  She participates in exercise intermittently  There is no change in her home blood glucose trend  An ACE inhibitor/angiotensin II receptor blocker is being taken  She does not see a podiatrist Eye exam is current  The following portions of the patient's history were reviewed and updated as appropriate: allergies, current medications, past family history, past medical history, past social history, past surgical history and problem list     Review of Systems   Constitutional: Negative for fatigue, malaise/fatigue and weight loss  Eyes: Negative for blurred vision  Respiratory: Negative for shortness of breath  Cardiovascular: Negative for chest pain, palpitations, orthopnea and PND  Endocrine: Negative for polydipsia, polyphagia and polyuria  Musculoskeletal: Negative for neck pain  Neurological: Negative for weakness and headaches  Objective:    /50   Pulse 76   Temp 98 °F (36 7 °C)   Resp 16   Ht 5' 2" (1 575 m)   Wt 58 1 kg (128 lb)   SpO2 98%   BMI 23 41 kg/m²     Lab Results   Component Value Date    HGBA1C 6 0 03/24/2022      Physical Exam  Constitutional:       General: She is not in acute distress  Appearance: She is normal weight  She is not ill-appearing  HENT:      Head: Normocephalic and atraumatic        Right Ear: External ear normal       Left Ear: External ear normal       Nose: Nose normal       Mouth/Throat:      Mouth: Mucous membranes are moist       Pharynx: Oropharynx is clear  Eyes:      Extraocular Movements: Extraocular movements intact  Conjunctiva/sclera: Conjunctivae normal    Neck:      Vascular: No carotid bruit  Cardiovascular:      Rate and Rhythm: Normal rate and regular rhythm  Pulses: Normal pulses  Heart sounds: Normal heart sounds  Pulmonary:      Effort: Pulmonary effort is normal       Breath sounds: Normal breath sounds  Abdominal:      General: Abdomen is flat  Bowel sounds are normal       Palpations: Abdomen is soft  Musculoskeletal:         General: No swelling  Cervical back: Normal range of motion and neck supple  No tenderness  Right lower leg: No edema  Left lower leg: No edema  Skin:     General: Skin is warm and dry  Capillary Refill: Capillary refill takes less than 2 seconds  Findings: No rash  Neurological:      General: No focal deficit present  Mental Status: She is alert and oriented to person, place, and time  Sensory: No sensory deficit  Motor: No weakness        Gait: Gait normal    Psychiatric:         Mood and Affect: Mood normal          Behavior: Behavior normal

## 2022-03-24 NOTE — PATIENT INSTRUCTIONS
Hypertension   WHAT YOU NEED TO KNOW:   Hypertension is high blood pressure  Your blood pressure is the force of your blood moving against the walls of your arteries  Hypertension causes your blood pressure to get so high that your heart has to work much harder than normal  This can damage your heart  The cause of hypertension may not be known  This is called essential or primary hypertension  Hypertension caused by another medical condition, such as kidney disease, is called secondary hypertension  DISCHARGE INSTRUCTIONS:   Call your local emergency number (911 in the [address] [facility]) or have someone call if:   · You have chest pain  · You have any of the following signs of a heart attack:      ? Squeezing, pressure, or pain in your chest    ? You may  also have any of the following:     § Discomfort or pain in your back, neck, jaw, stomach, or arm    § Shortness of breath    § Nausea or vomiting    § Lightheadedness or a sudden cold sweat    · You become confused or have trouble speaking  · You suddenly feel lightheaded or have trouble breathing  Return to the emergency department if:   · You have a severe headache or vision loss  · You have weakness in an arm or leg  Call your doctor or cardiologist if:   · You feel faint, dizzy, confused, or drowsy  · You have been taking your blood pressure medicine but your pressure is higher than your provider says it should be  · You have questions or concerns about your condition or care  Medicines: You may  need any of the following:  · Antihypertensives  may be used to help lower your blood pressure  Several kinds of medicines are available  Your healthcare provider will choose medicines based on the kind of hypertension you have  You may need more than one type of medicine  Take the medicine exactly as directed  · Diuretics  help decrease extra fluid that collects in your body  This will help lower your blood pressure   You may urinate more often while you take this medicine  · Cholesterol medicine  helps lower your cholesterol level  A low cholesterol level helps prevent heart disease and makes it easier to control your blood pressure  · Take your medicine as directed  Contact your healthcare provider if you think your medicine is not helping or if you have side effects  Tell him or her if you are allergic to any medicine  Keep a list of the medicines, vitamins, and herbs you take  Include the amounts, and when and why you take them  Bring the list or the pill bottles to follow-up visits  Carry your medicine list with you in case of an emergency  Follow up with your doctor or cardiologist as directed: You will need to return to have your blood pressure checked and to have other lab tests done  Write down your questions so you remember to ask them during your visits  Stages of hypertension:       · Normal blood pressure is 119/79 or lower   Your healthcare provider may only check your blood pressure each year if it stays at a normal level  · Elevated blood pressure is 120/79 to 129/79   This is sometimes called prehypertension  Your healthcare provider may suggest lifestyle changes to help lower your blood pressure to a normal level  He or she may then check it again in 3 to 6 months  · Stage 1 hypertension is 130/80  to 139/89   Your provider may recommend lifestyle changes, medication, and checks every 3 to 6 months until your blood pressure is controlled  · Stage 2 hypertension is 140/90 or higher   Your provider will recommend lifestyle changes and have you take 2 kinds of hypertension medicines  You will also need to have your blood pressure checked monthly until it is controlled  Manage hypertension:   · Check your blood pressure at home  Avoid smoking, caffeine, and exercise at least 30 minutes before checking your blood pressure  Sit and rest for 5 minutes before you take your blood pressure   Extend your arm and support it on a flat surface  Your arm should be at the same level as your heart  Follow the directions that came with your blood pressure monitor  Check your blood pressure 2 times, 1 minute apart, before you take your medicine in the morning  Also check your blood pressure before your evening meal  Keep a record of your readings and bring it to your follow-up visits  Ask your healthcare provider what your blood pressure should be  · Manage any other health conditions you have  Health conditions such as diabetes can increase your risk for hypertension  Follow your healthcare provider's instructions and take all your medicines as directed  · Ask about all medicines  Certain medicines can increase your blood pressure  Examples include oral birth control pills, decongestants, herbal supplements, and NSAIDs, such as ibuprofen  Your healthcare provider can tell you which medicines are safe for you to take  This includes prescription and over-the-counter medicines  Lifestyle changes you can make to manage hypertension:  Your healthcare provider may recommend you work with a team to manage hypertension  The team may include medical experts such as a dietitian, an exercise or physical therapist, and a behavior therapist  Your family members may be included in helping you create lifestyle changes  · Limit sodium (salt) as directed  Too much sodium can affect your fluid balance  Check labels to find low-sodium or no-salt-added foods  Some low-sodium foods use potassium salts for flavor  Too much potassium can also cause health problems  Your healthcare provider will tell you how much sodium and potassium are safe for you to have in a day  He or she may recommend that you limit sodium to 2,300 mg a day  · Follow the meal plan recommended by your healthcare provider  A dietitian or your provider can give you more information on low-sodium plans or the DASH (Dietary Approaches to Stop Hypertension) eating plan   The DASH plan is low in sodium, processed sugar, unhealthy fats, and total fat  It is high in potassium, calcium, and fiber  These can be found in vegetables, fruit, and whole-grain foods  · Be physically active throughout the day  Physical activity, such as exercise, can help control your blood pressure and your weight  Be physically active for at least 30 minutes per day, on most days of the week  Include aerobic activity, such as walking or riding a bicycle  Also include strength training at least 2 times each week  Your healthcare providers can help you create a physical activity plan  · Decrease stress  This may help lower your blood pressure  Learn ways to relax, such as deep breathing or listening to music  · Limit alcohol as directed  Alcohol can increase your blood pressure  A drink of alcohol is 12 ounces of beer, 5 ounces of wine, or 1½ ounces of liquor  · Do not smoke  Nicotine and other chemicals in cigarettes and cigars can increase your blood pressure and also cause lung damage  Ask your healthcare provider for information if you currently smoke and need help to quit  E-cigarettes or smokeless tobacco still contain nicotine  Talk to your healthcare provider before you use these products  © Copyright [employer] 2022 Information is for End User's use only and may not be sold, redistributed or otherwise used for commercial purposes  All illustrations and images included in CareNotes® are the copyrighted property of [employer]  or Clara Orellana   The above information is an  only  It is not intended as medical advice for individual conditions or treatments  Talk to your doctor, nurse or pharmacist before following any medical regimen to see if it is safe and effective for you

## 2022-04-26 ENCOUNTER — NEW PATIENT (OUTPATIENT)
Dept: URBAN - METROPOLITAN AREA CLINIC 6 | Facility: CLINIC | Age: 72
End: 2022-04-26

## 2022-04-26 DIAGNOSIS — H25.813: ICD-10-CM

## 2022-04-26 DIAGNOSIS — E11.9: ICD-10-CM

## 2022-04-26 PROCEDURE — 92004 COMPRE OPH EXAM NEW PT 1/>: CPT

## 2022-04-26 ASSESSMENT — VISUAL ACUITY
OS_CC: 20/25-2
OD_CC: 20/25-2
OU_CC: 20/25-1
OU_CC: J2
OU_OTHER: @12""

## 2022-04-26 ASSESSMENT — TONOMETRY
OS_IOP_MMHG: 16
OD_IOP_MMHG: 16

## 2022-05-03 ENCOUNTER — CONSULT (OUTPATIENT)
Dept: GERIATRICS | Age: 72
End: 2022-05-03
Payer: COMMERCIAL

## 2022-05-03 VITALS
OXYGEN SATURATION: 97 % | TEMPERATURE: 97.2 F | DIASTOLIC BLOOD PRESSURE: 60 MMHG | SYSTOLIC BLOOD PRESSURE: 92 MMHG | RESPIRATION RATE: 14 BRPM | HEART RATE: 87 BPM | HEIGHT: 63 IN | BODY MASS INDEX: 21.97 KG/M2 | WEIGHT: 124 LBS

## 2022-05-03 DIAGNOSIS — F41.8 TEST ANXIETY: ICD-10-CM

## 2022-05-03 DIAGNOSIS — I10 PRIMARY HYPERTENSION: ICD-10-CM

## 2022-05-03 DIAGNOSIS — E87.1 HYPONATREMIA: ICD-10-CM

## 2022-05-03 DIAGNOSIS — N18.31 STAGE 3A CHRONIC KIDNEY DISEASE (HCC): ICD-10-CM

## 2022-05-03 DIAGNOSIS — Z86.73 HISTORY OF TIA (TRANSIENT ISCHEMIC ATTACK): ICD-10-CM

## 2022-05-03 DIAGNOSIS — F03.90 MILD DEMENTIA (HCC): Primary | ICD-10-CM

## 2022-05-03 DIAGNOSIS — G31.84 MILD COGNITIVE IMPAIRMENT: ICD-10-CM

## 2022-05-03 DIAGNOSIS — H91.93 BILATERAL HEARING LOSS, UNSPECIFIED HEARING LOSS TYPE: ICD-10-CM

## 2022-05-03 DIAGNOSIS — E11.9 TYPE 2 DIABETES MELLITUS WITHOUT COMPLICATION, WITHOUT LONG-TERM CURRENT USE OF INSULIN (HCC): ICD-10-CM

## 2022-05-03 DIAGNOSIS — E78.49 OTHER HYPERLIPIDEMIA: ICD-10-CM

## 2022-05-03 PROBLEM — F03.A0 MILD DEMENTIA: Status: ACTIVE | Noted: 2022-05-03

## 2022-05-03 PROCEDURE — 1036F TOBACCO NON-USER: CPT | Performed by: STUDENT IN AN ORGANIZED HEALTH CARE EDUCATION/TRAINING PROGRAM

## 2022-05-03 PROCEDURE — 99205 OFFICE O/P NEW HI 60 MIN: CPT | Performed by: STUDENT IN AN ORGANIZED HEALTH CARE EDUCATION/TRAINING PROGRAM

## 2022-05-03 PROCEDURE — 3008F BODY MASS INDEX DOCD: CPT | Performed by: STUDENT IN AN ORGANIZED HEALTH CARE EDUCATION/TRAINING PROGRAM

## 2022-05-03 PROCEDURE — 3074F SYST BP LT 130 MM HG: CPT | Performed by: STUDENT IN AN ORGANIZED HEALTH CARE EDUCATION/TRAINING PROGRAM

## 2022-05-03 PROCEDURE — 3078F DIAST BP <80 MM HG: CPT | Performed by: STUDENT IN AN ORGANIZED HEALTH CARE EDUCATION/TRAINING PROGRAM

## 2022-05-03 RX ORDER — LORAZEPAM 0.5 MG/1
0.25 TABLET ORAL ONCE
Qty: 1 TABLET | Refills: 0 | Status: SHIPPED | OUTPATIENT
Start: 2022-05-03 | End: 2022-08-03 | Stop reason: ALTCHOICE
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
## 2022-05-03 NOTE — PATIENT INSTRUCTIONS
Adult Day Centers  Adult Day Centers offer an interactive, safe, supervised environment for older adults and adults with a dementia-related disease, Parkinsons Disease, or other organic brain syndromes  They provide personal care, nursing services, , therapeutic activities, nutrition, and therapeutic diets and emergency care  Some centers offer additional services such as physical therapy, occupational therapy, speech therapy, medical services, podiatry, etc  This community-based alternative to institutionalization also acts as a reliable source of support for caregivers  Information obtained from website: http://www matthew21st [employer]/  pa Octonius/local-resources/Pages/Adult-Day-Center  aspx    [address]:    1  [facility] [address] , [place] [address]  [address], [address] [place] [address]  Phone: [phone]  Website:  Tera   2  Adult Day Services at [address] [place] [address], [address] [place] [address]  Phone: [phone]  Website: https://info  presterianseniorliving org/adult-day-services-tr-tldmbnklfst-rftjudo-your-very-best-day  3  [facility] Services  [address] [facility], [facility], [place] [address]  Phone: [phone]  Website: [email or web address] [email or web address]  [email or web address]/    [address]:    1  [facility]  [address] [facility] [address]  Phone: [phone]  Website:  Latosha grant  2  [facility] Adult [facility]  [address], [address] [place] [address]  Phone: [phone]  Website:  Chandler cotto  com/    [address]:     1  [address], [facility], [address] [place] [address]  Phone: [phone]  Website: Anzu  com/   2  [address]  [address] [facility], [address] [facility]  Phone: [phone]  3  [facility] [place]  [address], [facility], [address] [address] [address]  [phone]  Website: [employer]aceutical[employer]st pl  com  4  [facility]  [address]  [address], [address], [address], [place], [address]  Phone: [phone]  Website: [employer]  5  [address], [place], [address] [facility] [address]  Phone: [phone]    Home and Community-Based Services Waiver for Individuals Ages 61 and Older  Aging Home and Community-Based Waiver Services may be available to Camila over the age of 61 to enable them to continue to live in their homes and communities with support and services  To be eligible for the Aging Waiver, you must:   · Be a resident of [place]  · Be a [place]  citizen or a qualified Non-citizen  · Have a Social Security Number  · Be 61years of age or older  · Meet the level of care needs for a [place]  · Meet financial requirements as determined by the [employer] assistance office  Services that may be available include:  · Accessibility Adaptations, Equipment, Technology and Medical Supplies  · Adult Daily Living Services  · [employer] Transition Services  · Home Delivered Meals  · [address]  · Non-Medical Transportation Services  · Participant-Directed Community Supports  · Participant-Directed Goods and Services  · [facility]  · Personal Emergency Response System (PERS)  · Respite  · Service Coordination  · TeleCare  · Therapeutic and Counseling Services  · Nursing Services  · Physical Therapy Services  · Specialized Medical Equipment and Supplies  · Speech and Language Therapy Services  · Assistive Technology  · Home Adaptations  · Nutritional Consultation Services    There is no cap on the services that Aging Waiver participants receive, no cost-sharing and no contributions allowed  Learn More  In order to qualify for Aging Waiver services, you must meet eligibility requirements  This includes a level of care assessment and a determination of financial eligibility   For more information, contact your local Area Agency on Aging
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
## 2022-05-04 PROBLEM — E78.49 OTHER HYPERLIPIDEMIA: Status: ACTIVE | Noted: 2022-05-04

## 2022-05-04 PROBLEM — H91.90 HEARING LOSS: Status: ACTIVE | Noted: 2022-05-04

## 2022-05-04 NOTE — ASSESSMENT & PLAN NOTE
Most recent sodium 134, at baseline  Denies any acute symptoms  Would recommend a fluid restriction  Continue routine follow-up with Nephrology

## 2022-05-04 NOTE — ASSESSMENT & PLAN NOTE
Continues atorvastatin 40 mg daily  Recommend adherence to a diabetic, heart healthy diet  Follow-up with PCP for continued monitoring

## 2022-05-04 NOTE — ASSESSMENT & PLAN NOTE
MOCA 18/30, GDS 1, TUGT 11 sec  Patient significant deficits in visual spatial, executive function, attention, language, delayed recall and orientation domains  Given history, physical exam and above neurocognitive screening, this places the patient at a level of mild dementia    Etiology:  Neuro degenerative vs vascular component  CTH: chronic microangiopathy  MRI Brain: unremarkable  TSH: normal  Will order B12, folate, RPR  Will order MRI neuro quant of the brain  Will consider referral for cognitive rehabilitation  Will also recommend referral to occupational therapy for fit to drive  Recommend blister packaging for ease of medication administration  Consider enrolling into a senior center, the memory cafe positive socialization  Encourage use of cognitively stimulating apps as cognifit, brain HQ  Reorientation redirection as needed  Manage chronic conditions  Maintain Falls precautions  Encourage patient remain active mentally, physically and socially  Participate in cognitively stimulating exercises as able  Will discuss pharmacotherapy options care plan conference

## 2022-05-04 NOTE — ASSESSMENT & PLAN NOTE
Patient does own hearing aids however is noncompliant with them  Recommend facing patient directly and standing in close proximity when communicating to avoid confusion  Follow-up with audiology as scheduled

## 2022-05-04 NOTE — ASSESSMENT & PLAN NOTE
No new focal neurological deficits  Continue aspirin, atorvastatin  Maintain Falls precautions  Manage vascular risk factors closely and follow with PCP

## 2022-05-04 NOTE — ASSESSMENT & PLAN NOTE
BP 92/60  Per daughter, patient's blood pressure has been persistently low  Recommend following up with PCP and decreasing lisinopril  Also recommend keeping a blood pressure log and following up with PCP for further monitoring

## 2022-05-04 NOTE — ASSESSMENT & PLAN NOTE
Lab Results   Component Value Date    EGFR 55 03/07/2022    EGFR 57 01/31/2022    EGFR 55 12/28/2021    CREATININE 1 01 03/07/2022    CREATININE 0 99 01/31/2022    CREATININE 1 02 12/28/2021   Avoid nephrotoxic agents  Medications to be renally dosed  Follow-up with PCP continued monitoring

## 2022-05-04 NOTE — ASSESSMENT & PLAN NOTE
Lab Results   Component Value Date    HGBA1C 6 0 03/24/2022     Controlled  Continue glipizide 5 mg daily  Recommend adherence to a diabetic, heart healthy diet  Follow-up with PCP for continued monitoring

## 2022-05-10 ENCOUNTER — APPOINTMENT (OUTPATIENT)
Dept: LAB | Facility: CLINIC | Age: 72
End: 2022-05-10
Payer: COMMERCIAL

## 2022-05-10 DIAGNOSIS — G31.84 MILD COGNITIVE IMPAIRMENT: ICD-10-CM

## 2022-05-10 LAB
FOLATE SERPL-MCNC: >20 NG/ML (ref 3.1–17.5)
RPR SER QL: NORMAL
VIT B12 SERPL-MCNC: 231 PG/ML (ref 100–900)

## 2022-05-10 PROCEDURE — 36415 COLL VENOUS BLD VENIPUNCTURE: CPT

## 2022-05-10 PROCEDURE — 82607 VITAMIN B-12: CPT

## 2022-05-10 PROCEDURE — 86592 SYPHILIS TEST NON-TREP QUAL: CPT

## 2022-05-10 PROCEDURE — 82746 ASSAY OF FOLIC ACID SERUM: CPT

## 2022-06-04 ENCOUNTER — HOSPITAL ENCOUNTER (OUTPATIENT)
Dept: MRI IMAGING | Facility: HOSPITAL | Age: 72
Discharge: HOME | End: 2022-06-04
Payer: COMMERCIAL

## 2022-06-04 DIAGNOSIS — G31.84 MILD COGNITIVE IMPAIRMENT: ICD-10-CM

## 2022-06-04 PROCEDURE — 70551 MRI BRAIN STEM W/O DYE: CPT

## 2022-06-04 PROCEDURE — G1004 CDSM NDSC: HCPCS

## 2022-06-08 ENCOUNTER — TELEPHONE (OUTPATIENT)
Dept: NEPHROLOGY | Facility: CLINIC | Age: 72
End: 2022-06-08

## 2022-06-15 NOTE — PROGRESS NOTES
[facility]  [address] 1 Located within [facility], [address] [facility]  [phone]    Care Conference    NAME: Neela Sotelo  AGE: 67 y o  SEX: female  YOB: 1950  DATE OF ASSESSMENT: 05/03/22  DATE OF CONFERENCE: 06/17/22    Family Present: Estela Myesha (daughter)  Staff Present: Dr Chapito Walters    Patient / Family Goals of Care: Review cognitive decline and associated symptoms, discuss treatment options and care planning  Medical Concerns (Current/Historical): Type 2 diabetes mellitus, Stage 3a chronic kidney disease, Primary Hypertension, History of TIA (transient ischemic attack), Other hyperlipidemia, Hyponatremia    Geriatric Syndromes/Age Related Syndromes: Mild dementia, Bilateral hearing loss, unspecified hearing loss type    Neuropsychological   Mild Dementia  o Claudio Cognitive Assessment: 18/30  o Geriatric Depression Screen: 1/15   Patient with significant deficits in visual spatial, executive function, attention, language, delayed recall and orientation domains   Given history, physical exam and neurocognitive screening, this places the patient at a level of mild dementia   Etiology: Possible early neurodegenerative (not noted on MRI) vs vascular component (chronic microangiopathy on imaging)   Decision-making capacity: The patient should not make any independent medical or financial decisions without the presence of her POA as assessed during this visit   Staging: Mild dementia, FAST stage 4     Remain active physically, mentally and socially   Referral for Cognitive Rehabilitation   Daughter advised to call the office if any driving concerns and will then refer for a driving evaluation   Pharmaceutical and non-pharmaceutical interventions discussed  Daughter interested in trialing Aricept  Educated on side effects given patient's prior history of diarrhea and will start at 5 mg daily  Will follow-up in 5 weeks for medication check in  Daughter advised to call the office should side effects occur   Start vitamin B12 supplementation 500 mcg daily   Recommend Mediterranean diet, shown to decrease risk of memory loss and CVA   Engage in cognitively challenging exercises such as crosswords and puzzles   Consider use of apps such as Lumosity  com, DriverSaveClub.com, [employer] for cognitively stimulating online games   Maintain chronic conditions under control   Repeat cognitive assessment in 6 months    Diagnostic Studies   Review of bloodwork: TSH, B12, folate, RPR   Review of MRI NeuroQuant / previous imaging: IMPRESSION: Normal examination  NeuroQuant analysis was performed: Normal study; does not support neurodegeneration     CTH: chronic microangiopathy    Physical Finding Impacting Function   Timed Up and Go Test: 11 seconds (Fall risk assessment: low)   Activities of Daily Living: Independent   Instrumental Activities of Daily Living: Mostly Independent     Encourage appropriate footwear at all times   Review fall risk prevention tips and adjust within the home environment as needed    Medications Reviewed    Medications seem appropriate for present conditions   Check with PCP before using over the counter medications   Avoid over the counter medications that can affect cognition (e g , Benadryl, Tylenol PM)    Avoid NSAIDs due to risk of GI bleed and renal impairment    Other Findings   Overall health   BMI: 22 32 kg/m2   Continue following with primary care physician regularly  Type 2 diabetes mellitus without complication, without long-term current use of insulin   HGBA1C 6 0 03/24/22   Controlled   Continue glipizide 5 mg daily   Recommend adherence to a diabetic, heart healthy diet   Follow-up with PCP for continued monitoring  Stage 3a chronic kidney disease   Avoid nephrotoxic agents   Medications to be renally dosed   Follow-up with PCP for continued monitoring  Primary Hypertension   Per daughter, patient's blood pressure has been persistently low   Recommend following up with PCP and decreasing lisinopril   Recommend keeping a blood pressure log and following up with PCP for further monitoring  History of TIA (transient ischemic attack)   No new focal neurological deficits   Continue aspirin, atorvastatin   Maintain Falls precautions   Manage vascular risk factors closely and follow with PCP  Other hyperlipidemia   Continues atorvastatin 40 mg daily   Recommend adherence to a diabetic, heart healthy diet   Follow-up with PCP for continued monitoring  Bilateral hearing loss, unspecified hearing loss type   Patient does own hearing aides however is noncompliant with them   Recommend facing patient directly and standing in close proximity when communicating to avoid confusion   Follow-up with audiology as scheduled  Hyponatremia   Most recent sodium 134, at baseline   Denies any acute symptoms   Recommend a fluid restriction   Continue routine follow-up with Nephrology  Vit B12 deficiency   Start vitamin B12 500 mcg daily    Recommended Health Maintenance   Immunizations, if not contraindicated:    Influenza vaccine yearly   Pneumo vaccine every 5 years (65 years and over)   Shingles vaccine   COVID-19 vaccine    Social / Safety Considerations   Consider an Adult Day Program or [employer] for positive socialization, physical exercise, cognitive stimulation and family respite   Consider attending the Memory Cafe for positive socialization    Stay in touch with family and friends   Plan self-care activities for your mental well-being each week   Recommend review of fall risk prevention tips   Recommend use of fall precautions including fall alert device   Consider assistance for medication administration such as blister packaging or use of an automated pill dispenser   Recommend contacting your local [facility] [place] on Aging for possible eligible programs such as OPTIONS, Caregiver Support Program, or [facility] Maintain updated advance directives and provide a copy to your primary care provider   Consider caregiver support groups and educational resources through the Alzheimer's Association; access Alzheimer's Association 24/7 Helpline at 8-698.693.9529  ? [address] [facility] does offer a monthly caregiver support group  If interested, please speak with a    Utilize reorientation and redirection as needed (dependent on situation)   Educational information provided    Patient and family verbalized understanding of above care plan  For care coordination purposes, this care plan will be shared with your primary care provider  With any questions, please contact our office at [phone]

## 2022-06-16 ENCOUNTER — TELEPHONE (OUTPATIENT)
Dept: NEUROLOGY | Facility: CLINIC | Age: 72
End: 2022-06-16

## 2022-06-16 NOTE — TELEPHONE ENCOUNTER
Called patient to reschedule appointment since [facility] no longer here  Spoke to daughter Adin Cedeño who said patient has regular follow ups very often with senior care and other doctors and does not feel she needs to be seen by neurology  Said she will call us with any concerns

## 2022-06-17 ENCOUNTER — TELEMEDICINE (OUTPATIENT)
Dept: GERIATRICS | Age: 72
End: 2022-06-17
Payer: COMMERCIAL

## 2022-06-17 DIAGNOSIS — Z86.73 HISTORY OF TIA (TRANSIENT ISCHEMIC ATTACK): ICD-10-CM

## 2022-06-17 DIAGNOSIS — H91.93 BILATERAL HEARING LOSS, UNSPECIFIED HEARING LOSS TYPE: ICD-10-CM

## 2022-06-17 DIAGNOSIS — E11.9 TYPE 2 DIABETES MELLITUS WITHOUT COMPLICATION, WITHOUT LONG-TERM CURRENT USE OF INSULIN (HCC): ICD-10-CM

## 2022-06-17 DIAGNOSIS — N18.31 STAGE 3A CHRONIC KIDNEY DISEASE (HCC): ICD-10-CM

## 2022-06-17 DIAGNOSIS — E87.1 HYPONATREMIA: ICD-10-CM

## 2022-06-17 DIAGNOSIS — F03.90 MILD DEMENTIA (HCC): Primary | ICD-10-CM

## 2022-06-17 PROCEDURE — 1160F RVW MEDS BY RX/DR IN RCRD: CPT | Performed by: STUDENT IN AN ORGANIZED HEALTH CARE EDUCATION/TRAINING PROGRAM

## 2022-06-17 PROCEDURE — 99483 ASSMT & CARE PLN PT COG IMP: CPT | Performed by: STUDENT IN AN ORGANIZED HEALTH CARE EDUCATION/TRAINING PROGRAM

## 2022-06-17 PROCEDURE — 1036F TOBACCO NON-USER: CPT | Performed by: STUDENT IN AN ORGANIZED HEALTH CARE EDUCATION/TRAINING PROGRAM

## 2022-06-17 RX ORDER — DONEPEZIL HYDROCHLORIDE 5 MG/1
5 TABLET, FILM COATED ORAL
Qty: 30 TABLET | Refills: 1 | Status: SHIPPED | OUTPATIENT
Start: 2022-06-17 | End: 2022-07-17

## 2022-06-17 NOTE — PROGRESS NOTES
Virtual Regular Visit    Verification of patient location:    Patient is located in the following state in which I hold an active license [place]      Assessment/Plan:    Problem List Items Addressed This Visit        Endocrine    Type 2 diabetes mellitus without complication, without long-term current use of insulin (HCC)       Nervous and Auditory    Mild dementia ([facility] ) - Primary    Relevant Medications    donepezil (ARICEPT) 5 mg tablet    Hearing loss       Genitourinary    Stage 3a chronic kidney disease ([facility] )       Other    Hyponatremia    History of TIA (transient ischemic attack)        See care plan for detailed assessment and plan       Reason for visit is   Chief Complaint   Patient presents with    Virtual Regular Visit        Encounter provider Chaim Siu MD    Provider located at [address] [place] [address]  [phone]      Recent Visits  Date Type Provider Dept   06/17/22 Piper Waggoner  [address]   Showing recent visits within past 7 days and meeting all other requirements  Future Appointments  No visits were found meeting these conditions  Showing future appointments within next 150 days and meeting all other requirements       The patient was identified by name and date of birth  Sofiya Lawton was informed that this is a telemedicine visit and that the visit is being conducted through [employer] and patient was informed that this is a secure, HIPAA-compliant platform  She agrees to proceed     My office door was closed  No one else was in the room  She acknowledged consent and understanding of privacy and security of the video platform  The patient has agreed to participate and understands they can discontinue the visit at any time  Patient is aware this is a billable service       Subjective  Sofiya Lawton is a 67 y o  female who presents for her care plan conference   HPI     Patient presents for her care plan conference after recent comprehensive geriatric assessment  She is accompanied by her daughter who participates in her primary care taking  Since her geriatric intake, there have not been any significant or acute changes in cognition, mood or behaviors  The patient remains independent in all ADLs and IADLs  Per daughter, she continues to drive locally without any concerns at this time  No safety concerns at home  The patient has been tolerating oral intake, sleeping well and having regular bowel movements  No cardiorespiratory distress, fever, chills, URI or urinary symptoms  Decision-making capacity:  The patient should not make any medical or financial decisions independently without her POA present as assessed during this visit    Staging:  Mild dementia, FAST stage 4    Medications Review:  Medications appropriate for chronic conditions      COGNITION:  Memory Issues noticed since over 10 years    Memory affected: short term memory loss    Symptoms started: gradual  Over time the memory has:  worsened  Memory issue(s) were noted by: family   Patient has difficulties with medication errors  Difficulty finding the right word while speaking: No  Requires repeat information or asking the same question repeatedly: Yes  Fluctuation in alertness: No  Changes in mood or personality:No  Current or previous treatment for depression or anxiety: No    Family member with dementia and what type?  yes  History of head trauma: Yes  History of stroke: Yes  History of alcohol or substance abuse: No    FUNCTIONAL STATUS:  BADLs  Does patient require assistance with:  Bathing: No  Dressing: No  Toileting: No  Transferring: No  Continence: No  Feeding: No    IADLs  Dose patient require assistance with:  Telephone: No  Shopping: No  Food Preparation: Yes  Housekeeping: Yes  Laundry: No  Transportation: No  Medications: Yes  Finances: Yes    NEUROPSYCH SYMPTOMS:  Does patient get angry or hostile? Resist care from others? Yes  Does patient see or hear things that no one else can see or hear? No  Does patient act impatient and cranky? Does mood frequently change for no apparent reason? Yes  Does patient act suspicious without good reason (example: believes that others are stealing from him or her, or planning to harm him or her in some way)? Yes, in the past  Does patient less interested in his or her usual activities or in the activities and plans of others? Yes  Does patient have trouble sleeping at night? No  Dose patient have abnormal movements while asleep? No    SAFETY:  Hearing and vision issue: Yes, has hearing aids  Any gait or balance disorder: Yes  Uses: none  Any falls in the last year: Yes  Any history of wandering: No  Are there firearms or guns in the home: No  Does patient drive: Yes  Any driving accidents or citations in the last year: No  Any concerns about patient's ability to drive: No    ACP REVIEW:  Does patient have POA: Yes  Does patient have a Living will: No  Any legal assistance needed for healthcare planning?: No      Past Medical History:   Diagnosis Date    MARTINA (acute kidney injury) ([facility] ) 11/21/2021    Diabetes mellitus ([facility] )     Hypertension        History reviewed  No pertinent surgical history      Current Outpatient Medications   Medication Sig Dispense Refill    aspirin 81 mg chewable tablet Chew 81 mg daily        atorvastatin (LIPITOR) 40 mg tablet Take 1 tablet (40 mg total) by mouth every evening 90 tablet 3    donepezil (ARICEPT) 5 mg tablet Take 1 tablet (5 mg total) by mouth daily at bedtime 30 tablet 1    glipiZIDE (GLUCOTROL) 5 mg tablet Take 1 tablet (5 mg total) by mouth daily 90 tablet 3    lisinopril (ZESTRIL) 20 mg tablet Take 1 tablet (20 mg total) by mouth daily 90 tablet 3    LORazepam (Ativan) 0 5 mg tablet Take 0 5 tablets (0 25 mg total) by mouth 1 (one) time for 1 dose Take 0 25 mg ( half tablet ) 45 minutes prior to MRI  May repeat if ineffective after 45 minutes 1 tablet 0     No current facility-administered medications for this visit  No Known Allergies    Review of Systems   Constitutional: Negative for activity change, chills and fever  HENT: Positive for hearing loss  Negative for congestion, ear pain, rhinorrhea and sore throat  Eyes: Negative for discharge  Respiratory: Negative for cough, chest tightness, shortness of breath, wheezing and stridor  Cardiovascular: Negative for chest pain, palpitations and leg swelling  Gastrointestinal: Negative for abdominal pain, constipation, diarrhea, nausea and vomiting  Genitourinary: Negative for decreased urine volume and dysuria  Musculoskeletal: Negative for gait problem  Skin: Negative for color change, pallor, rash and wound  Neurological: Negative for dizziness and speech difficulty  Psychiatric/Behavioral: Positive for decreased concentration  Negative for behavioral problems, confusion, dysphoric mood and sleep disturbance  The patient is not nervous/anxious  Video Exam    There were no vitals filed for this visit  Physical Exam  Constitutional:       General: She is not in acute distress  Appearance: Normal appearance  She is well-developed  She is not ill-appearing or diaphoretic  HENT:      Head: Normocephalic and atraumatic  Right Ear: External ear normal       Left Ear: External ear normal       Nose: Nose normal       Mouth/Throat:      Pharynx: No oropharyngeal exudate  Eyes:      General: No scleral icterus  Right eye: No discharge  Left eye: No discharge  Conjunctiva/sclera: Conjunctivae normal    Neck:      Vascular: No JVD  Pulmonary:      Effort: Pulmonary effort is normal  No respiratory distress  Abdominal:      General: There is no distension  Palpations: Abdomen is soft  Tenderness: There is no abdominal tenderness     Musculoskeletal:         General: Normal range of motion  Cervical back: Normal range of motion  Skin:     General: Skin is dry  Coloration: Skin is not pale  Findings: No erythema or rash  Neurological:      General: No focal deficit present  Mental Status: She is alert and oriented to person, place, and time  Mental status is at baseline  Cranial Nerves: No cranial nerve deficit  Gait: Gait normal       Deep Tendon Reflexes: Reflexes are normal and symmetric  Psychiatric:         Mood and Affect: Mood normal         MOCA 18/30    LABS/IMAGING    Lab Results   Component Value Date    WBC 7 96 12/23/2021    HGB 12 9 12/23/2021    HCT 37 4 12/23/2021    MCV 90 12/23/2021     12/23/2021     Lab Results   Component Value Date    FOLATE >20 0 (H) 05/10/2022     Lab Results   Component Value Date    NEIAYGXB77 231 05/10/2022     Lab Results   Component Value Date    RPR Non-Reactive 05/10/2022     Lab Results   Component Value Date    NWX2WGSXOTID 3 607 12/10/2021           MRI BRAIN WITHOUT CONTRAST, NeuroQuant IMAGING     INDICATION: G31 84: Mild cognitive impairment, so stated      COMPARISON:   None      TECHNIQUE:  Sagittal T1, axial T2, axial [place], axial T1, axial FLAIR, axial diffusion imaging  Coronal T2  Sagittal 3D volumetric imaging processed by Jennifer dexter General Morphology and Age Related Atrophy reports         IMAGE QUALITY:  Diagnostic      FINDINGS:     BRAIN PARENCHYMA:  There is no discrete mass, mass effect or midline shift  Brainstem and cerebellum demonstrate normal signal  There is no intracranial hemorrhage  There is no evidence of acute infarction and diffusion imaging is unremarkable    There   are no white matter changes in the cerebral hemispheres       QUANTITATIVE:      Exam Quality: Adequate for volumetric analysis      Hippocampus  Hippocampal Occupancy Score (HOC):                   0 71        Percentile for similar age: 27     Total hippocampal volume (cc):                           6 54    Percentile for similar age:                              80     Entorhinal cortex (cc)                                            5 50      Percentile for similar age:                                   80                Superior Lateral ventricular volume (cc):             53 72    Percentile for similar age:                             80     Inferior lateral ventricular volume (cc):                    2 71    Percentile for similar age:                                80     Quantitative conclusions:        Hippocampal Volume:                       Normal volume        Entorhinal Volume:                            Normal volume        Superior Lateral Ventricle Volume:     Normal Volume       Inferior Lateral Ventricle Volume:       Normal Volume     Concordance between qualitative and quantitative hippocampal volume assessment: Concordant       Change in brain volumes: No previous volumetric study for comparison     Mean hippocampal volume loss among normal elderly: 0 7% per year, (-0 3 to 1 7;  Conner 2008; also Christiano 2010)  VENTRICLES:  The ventricles are normal in size and contour      SELLA AND PITUITARY GLAND:  Normal      ORBITS:  Normal      PARANASAL SINUSES:  Right mastoid air cell opacification      VASCULATURE:  Evaluation of the major intracranial vasculature demonstrates appropriate flow voids      CALVARIUM AND SKULL BASE:  Normal      EXTRACRANIAL SOFT TISSUES:  Normal            IMPRESSION:     Normal examination      NeuroQuant analysis was performed: Normal study; Does not support neurodegeneration          I spent 50 minutes directly with the patient during this visit    VIRTUAL VISIT DISCLAIMER      Tremayne Deshpande verbally agrees to participate in [employer]   Pt is aware that Virtual Care Services could be limited without vital signs or the ability to perform a full hands-on physical exam  Gayathri Fern Saunders understands she or the provider may request at any time to terminate the video visit and request the patient to seek care or treatment in person

## 2022-06-17 NOTE — PROGRESS NOTES
Coty Ryder [facility]  [address], [address] [facility][1], [address] [place] [address]  [phone]    Care Conference: Resources Provided    LSW participated in today's conference and provided the following resources, along with a copy of care plan:  [id number] [facility] [id number]    General Information  - 10 Ways to Love Your Brain  - Memory loss ladder  - Packet with Alzheimer's Association information including: definition of dementia, communication tips for different stages, common behaviors and response strategies    Caregiver Support  - Flyer for [address] (meeting monthly by [employer])    Safety  - CDC fall prevention / home safety [address]  Aging in [facility]  *Both guides contain information on home care agencies, senior centers, adult day centers and more

## 2022-07-17 DIAGNOSIS — F03.A0 MILD DEMENTIA: ICD-10-CM

## 2022-07-17 RX ORDER — DONEPEZIL HYDROCHLORIDE 5 MG/1
TABLET, FILM COATED ORAL
Qty: 90 TABLET | Refills: 1 | Status: SHIPPED | OUTPATIENT
Start: 2022-07-17 | End: 2022-09-29 | Stop reason: SDUPTHER

## 2022-08-02 ENCOUNTER — TELEMEDICINE (OUTPATIENT)
Dept: GERIATRICS | Age: 72
End: 2022-08-02
Payer: COMMERCIAL

## 2022-08-02 ENCOUNTER — TELEPHONE (OUTPATIENT)
Dept: GERIATRICS | Age: 72
End: 2022-08-02

## 2022-08-02 DIAGNOSIS — F03.A0 MILD DEMENTIA: Primary | ICD-10-CM

## 2022-08-02 DIAGNOSIS — I10 PRIMARY HYPERTENSION: ICD-10-CM

## 2022-08-02 DIAGNOSIS — E11.9 TYPE 2 DIABETES MELLITUS WITHOUT COMPLICATION, WITHOUT LONG-TERM CURRENT USE OF INSULIN (HCC): ICD-10-CM

## 2022-08-02 DIAGNOSIS — H91.93 BILATERAL HEARING LOSS, UNSPECIFIED HEARING LOSS TYPE: ICD-10-CM

## 2022-08-02 DIAGNOSIS — E78.49 OTHER HYPERLIPIDEMIA: ICD-10-CM

## 2022-08-02 DIAGNOSIS — Z86.73 HISTORY OF TIA (TRANSIENT ISCHEMIC ATTACK): ICD-10-CM

## 2022-08-02 PROCEDURE — 99214 OFFICE O/P EST MOD 30 MIN: CPT | Performed by: STUDENT IN AN ORGANIZED HEALTH CARE EDUCATION/TRAINING PROGRAM

## 2022-08-02 NOTE — TELEPHONE ENCOUNTER
Called pt daughter to schedule 6mnth follow up appt with Yeni May   Pt daughter states that she was not home at the time of the call  however she would call tomorrow to conf or change Fed appt/6mnth follow

## 2022-08-02 NOTE — PROGRESS NOTES
Virtual Regular Visit    Verification of patient location:    Patient is located in the following state in which I hold an active license [place]      Assessment/Plan:    Problem List Items Addressed This Visit        Endocrine    Type 2 diabetes mellitus without complication, without long-term current use of insulin ([facility] )       Lab Results   Component Value Date    HGBA1C 6 0 03/24/2022   Patient maintained on glipizide  Recommend healthy lifestyle changes and compliance with a low sugar, heart healthy diet  Exercise as able            Cardiovascular and Mediastinum    Hypertension     Maintained lisinopril 20 mg daily  Continue a diabetic, healthy diet  Exercise as able            Nervous and Auditory    Mild dementia ([facility] ) - Primary     Patient continues episodes of short-term loss  Appointment scheduled for medication follow-up after starting Aricept 5 mg daily  Per patient and daughter, no untoward side effects noted  Will plan to increase Aricept to 10 mg at nighttime  Again educated patient and daughter in side effects of Aricept and to call the office should any occur  The patient is currently enrolled in speech therapy for cognitive rehabilitation  Daughter states the patient is noncompliant with cognitive exercises once her speech therapist sleeves    Patient was encouraged to participate in cognitively stimulating exercises  She was also encouraged to remain active mentally, physically and socially  She remains independent in all ADLs and most IADLs  No safety concerns at this time at home or with driving  Continue reorientation redirection as needed  Manage chronic conditions and follow with PCP  Discussed options to participate in activities to remain active such as joining a senior center however daughter states the patient has declined this option         Hearing loss     Practicing patient directly and standing in close proximity when communicating to avoid confusion  Patient does own hearing aids however not been compliant with this            Other    History of TIA (transient ischemic attack)     No new focal neurological deficits   Continue management of vascular risk factors and follow with PCP         Other hyperlipidemia     Continues on atorvastatin 40 mg daily  Recommend a diabetic, heart healthy diet                    Reason for visit is   Chief Complaint   Patient presents with    Virtual      Follow up     Virtual Regular Visit        Encounter provider Royer Tariq MD    Provider located at [address] [place] [address] [address] [place] [address]  [phone]      Recent Visits  Date Type Provider Dept   08/02/22 Telephone [address]   08/02/22 Javi Couch  [address]   Showing recent visits within past 7 days and meeting all other requirements  Future Appointments  No visits were found meeting these conditions  Showing future appointments within next 150 days and meeting all other requirements       The patient was identified by name and date of birth  Florinda Bartlett was informed that this is a telemedicine visit and that the visit is being conducted through [employer] and patient was informed that this is a secure, HIPAA-compliant platform  She agrees to proceed     My office door was closed  No one else was in the room  She acknowledged consent and understanding of privacy and security of the video platform  The patient has agreed to participate and understands they can discontinue the visit at any time  Patient is aware this is a billable service  Subjective  Florinda Bartlett is a 67 y o  female who presents for follow up of dementia  HPI     Patient presents for follow-up of dementia and medication check in  She is accompanied by her daughter who participates in her primary care taking    At prior visit, daughter had requested trialing Aricept for which the patient was started on 5 mg nighttime  Per patient and daughter, she has not experienced any dizziness, GI symptoms or cardiorespiratory distress  Patient and daughter were reeducated on side effects of Aricept and will plan to increase to 10 mg at nighttime as she has not any untoward side effects  Today daughter explains that the patient remains forgetful with short-term memory loss and does require prompting  No recent safety concerns at home or with her driving  Overall she remains independent in all ADLs and most IADLs  Daughter was frustrated today as the patient continues in speech therapy however does not participate in any cognitively stimulating activities once her speech therapist has gone  The patient is assigned homework from her therapist however she has not been compliant with this and is resistant to her daughter's encouragement  Daughter was also very proactive in finding a senior center for the patient to participate in social activities however the patient has refused to go  The patient was again encouraged and reminded about the importance of staying active mentally, physically and socially  Also discussed potential for cognitive decline if she continues to be very sedentary  Will plan to follow-up for six-month reassessment  Past Medical History:   Diagnosis Date    [facility] ) 11/21/2021    Diabetes mellitus ([facility] )     Hypertension        History reviewed  No pertinent surgical history      Current Outpatient Medications   Medication Sig Dispense Refill    aspirin 81 mg chewable tablet Chew 81 mg daily        atorvastatin (LIPITOR) 40 mg tablet Take 1 tablet (40 mg total) by mouth every evening 90 tablet 3    donepezil (ARICEPT) 5 mg tablet TAKE 1 TABLET BY MOUTH AT BEDTIME 90 tablet 1    glipiZIDE (GLUCOTROL) 5 mg tablet Take 1 tablet (5 mg total) by mouth daily 90 tablet 3    lisinopril (ZESTRIL) 20 mg tablet Take 1 tablet (20 mg total) by mouth daily 90 tablet 3     No current facility-administered medications for this visit  No Known Allergies    Review of Systems   Constitutional: Positive for activity change (chronic)  Negative for chills and fever  HENT: Positive for hearing loss  Negative for congestion, ear pain, rhinorrhea and sore throat  Eyes: Negative for discharge  Respiratory: Negative for cough, chest tightness, shortness of breath, wheezing and stridor  Cardiovascular: Negative for chest pain, palpitations and leg swelling  Gastrointestinal: Negative for abdominal pain, constipation, diarrhea, nausea and vomiting  Genitourinary: Negative for decreased urine volume and dysuria  Musculoskeletal: Negative for gait problem  Skin: Negative for color change, pallor, rash and wound  Neurological: Negative for dizziness and speech difficulty  Psychiatric/Behavioral: Positive for decreased concentration  Negative for confusion, dysphoric mood, hallucinations and sleep disturbance  Video Exam    There were no vitals filed for this visit  Physical Exam  Constitutional:       General: She is not in acute distress  Appearance: Normal appearance  She is not ill-appearing or diaphoretic  HENT:      Head: Normocephalic and atraumatic  Right Ear: External ear normal       Left Ear: External ear normal       Nose: Nose normal       Mouth/Throat:      Mouth: Mucous membranes are moist    Eyes:      General:         Right eye: No discharge  Left eye: No discharge  Conjunctiva/sclera: Conjunctivae normal    Pulmonary:      Effort: Pulmonary effort is normal  No respiratory distress  Abdominal:      General: There is no distension  Palpations: Abdomen is soft  Tenderness: There is no abdominal tenderness  Musculoskeletal:         General: Normal range of motion  Cervical back: Normal range of motion  Skin:     General: Skin is dry     Neurological:      General: No focal deficit present  Mental Status: She is alert and oriented to person, place, and time  Mental status is at baseline  Cranial Nerves: No cranial nerve deficit  Gait: Gait normal           I spent 30 minutes directly with the patient during this visit (video/phone)    VIRTUAL VISIT DISCLAIMER      Sang De Leon verbally agrees to participate in [employer]  Pt is aware that [employer] could be limited without vital signs or the ability to perform a full hands-on physical Nupur Rizo understands she or the provider may request at any time to terminate the video visit and request the patient to seek care or treatment in person

## 2022-08-03 NOTE — ASSESSMENT & PLAN NOTE
No new focal neurological deficits   Continue management of vascular risk factors and follow with PCP

## 2022-08-03 NOTE — ASSESSMENT & PLAN NOTE
Practicing patient directly and standing in close proximity when communicating to avoid confusion  Patient does own hearing aids however not been compliant with this

## 2022-08-03 NOTE — ASSESSMENT & PLAN NOTE
Patient continues episodes of short-term loss  Appointment scheduled for medication follow-up after starting Aricept 5 mg daily  Per patient and daughter, no untoward side effects noted  Will plan to increase Aricept to 10 mg at nighttime  Again educated patient and daughter in side effects of Aricept and to call the office should any occur  The patient is currently enrolled in speech therapy for cognitive rehabilitation  Daughter states the patient is noncompliant with cognitive exercises once her speech therapist sleeves    Patient was encouraged to participate in cognitively stimulating exercises  She was also encouraged to remain active mentally, physically and socially  She remains independent in all ADLs and most IADLs  No safety concerns at this time at home or with driving  Continue reorientation redirection as needed  Manage chronic conditions and follow with PCP  Discussed options to participate in activities to remain active such as joining a senior center however daughter states the patient has declined this option

## 2022-08-03 NOTE — ASSESSMENT & PLAN NOTE
Lab Results   Component Value Date    HGBA1C 6 0 03/24/2022   Patient maintained on glipizide  Recommend healthy lifestyle changes and compliance with a low sugar, heart healthy diet  Exercise as able

## 2022-08-10 ENCOUNTER — APPOINTMENT (OUTPATIENT)
Dept: LAB | Facility: CLINIC | Age: 72
End: 2022-08-10
Payer: COMMERCIAL

## 2022-08-10 DIAGNOSIS — E87.1 HYPONATREMIA: ICD-10-CM

## 2022-08-10 DIAGNOSIS — N18.31 STAGE 3A CHRONIC KIDNEY DISEASE (HCC): ICD-10-CM

## 2022-08-10 LAB
ANION GAP SERPL CALCULATED.3IONS-SCNC: 7 MMOL/L (ref 4–13)
BUN SERPL-MCNC: 11 MG/DL (ref 5–25)
CALCIUM SERPL-MCNC: 9.2 MG/DL (ref 8.4–10.2)
CHLORIDE SERPL-SCNC: 94 MMOL/L (ref 96–108)
CO2 SERPL-SCNC: 31 MMOL/L (ref 21–32)
CREAT SERPL-MCNC: 0.96 MG/DL (ref 0.6–1.3)
GFR SERPL CREATININE-BSD FRML MDRD: 58 ML/MIN/1.73SQ M
GLUCOSE SERPL-MCNC: 202 MG/DL (ref 65–140)
POTASSIUM SERPL-SCNC: 4.2 MMOL/L (ref 3.5–5.3)
SODIUM SERPL-SCNC: 132 MMOL/L (ref 135–147)

## 2022-08-10 PROCEDURE — 80048 BASIC METABOLIC PNL TOTAL CA: CPT

## 2022-08-10 PROCEDURE — 36415 COLL VENOUS BLD VENIPUNCTURE: CPT

## 2022-08-23 ENCOUNTER — OFFICE VISIT (OUTPATIENT)
Dept: NEPHROLOGY | Facility: CLINIC | Age: 72
End: 2022-08-23
Payer: COMMERCIAL

## 2022-08-23 VITALS
BODY MASS INDEX: 22.68 KG/M2 | HEIGHT: 63 IN | SYSTOLIC BLOOD PRESSURE: 124 MMHG | WEIGHT: 128 LBS | HEART RATE: 82 BPM | DIASTOLIC BLOOD PRESSURE: 62 MMHG

## 2022-08-23 DIAGNOSIS — N18.31 STAGE 3A CHRONIC KIDNEY DISEASE (HCC): ICD-10-CM

## 2022-08-23 DIAGNOSIS — N18.30 BENIGN HYPERTENSION WITH CKD (CHRONIC KIDNEY DISEASE) STAGE III (HCC): Primary | ICD-10-CM

## 2022-08-23 DIAGNOSIS — R80.9 MICROALBUMINURIA: ICD-10-CM

## 2022-08-23 DIAGNOSIS — E87.1 HYPONATREMIA: ICD-10-CM

## 2022-08-23 DIAGNOSIS — I12.9 BENIGN HYPERTENSION WITH CKD (CHRONIC KIDNEY DISEASE) STAGE III (HCC): Primary | ICD-10-CM

## 2022-08-23 PROCEDURE — 3066F NEPHROPATHY DOC TX: CPT | Performed by: INTERNAL MEDICINE

## 2022-08-23 PROCEDURE — 99214 OFFICE O/P EST MOD 30 MIN: CPT | Performed by: INTERNAL MEDICINE

## 2022-08-23 PROCEDURE — 3078F DIAST BP <80 MM HG: CPT | Performed by: INTERNAL MEDICINE

## 2022-08-23 PROCEDURE — 1160F RVW MEDS BY RX/DR IN RCRD: CPT | Performed by: INTERNAL MEDICINE

## 2022-08-23 PROCEDURE — 3074F SYST BP LT 130 MM HG: CPT | Performed by: INTERNAL MEDICINE

## 2022-08-23 NOTE — PROGRESS NOTES
NEPHROLOGY OUTPATIENT PROGRESS NOTE   Yael Sexton 67 y o  female MRN: [id number]  DATE: 8/23/2022  Reason for visit:   Chief Complaint   Patient presents with    Follow-up     CKD3a     ASSESSMENT and PLAN:  Suspect CKD stage IIIA, baseline creatinine 0 9 to 1 2 since November 2021, no prior recent baseline available, creatinine 0 9 in 2018  -most recent creatinine 0 9 in August 2022   -may have mild CKD in the setting of underlying hypertension, diabetes, age-related nephron loss  -UA in March 2022 shows multiple WBCs, 2 to 5 RBCs, trace proteinuria, moderate bacteria  Patient denies any urinary complaint  -repeat BMP, urine studies before next visit  -avoid nephrotoxins or NSAIDs     Microalbuminuria  -last urine microalbumin/creatinine ratio slightly improved 106 mg in March 2022     -remains on lisinopril 20 mg  -? Suspect in the setting of hypertension, diabetes  -last hemoglobin A1c 5 9 in December 2021  -repeat before next visit     Hyponatremia  -noted chronic hyponatremia issues since November 2021    -workup includes urine sodium 15, urine osmolality 188 in March 2022  Prior  serum osmolality 278, urine sodium 43, urine osmolality 263  -suspect underlying SIADH with decrease solute intake  -CT scan of chest, abdomen, pelvis in November 2021 shows no obvious evidence of malignancy except thickened uterine endometrium, mild apical scarring and emphysematous changes in the lungs  -strongly recommended to follow strict fluid restriction less than 1 5 L per day  Discussed salt liberal diet  -corrected serum sodium around 133-134   -if dropping further in the future, consider salt tablet   -patient still has not followed with OBGYN regarding endometrial thickness  Daughter mentioned that they will be making appointment soon in the future        Hypertension  -blood pressure controlled in the office today  continue lisinopril 20 mg daily      Diagnoses and all orders for this visit:    Benign hypertension with CKD (chronic kidney disease) stage III (HCC)  -     Basic metabolic panel; Future  -     CBC; Future  -     Microalbumin / creatinine urine ratio; Future    Stage 3a chronic kidney disease (HCC)  -     Basic metabolic panel; Future  -     CBC; Future  -     Microalbumin / creatinine urine ratio; Future    Hyponatremia  -     Basic metabolic panel; Future    Microalbuminuria  -     Microalbumin / creatinine urine ratio; Future        SUBJECTIVE / HPI:  Winston Bright is a 67y o  year old female with medical issues of hypertension diagnosed in November 2021, bilateral carotid artery stenosis, diabetes for last 3 years, chronic intermittent diarrhea, CKD stage IIIA with baseline creatinine 0 9 to 1 2 since November 2021 who presents for regular follow-up of hyponatremia, hypertension, CKD  She was found to have chronic hyponatremia issues since November 2021  Last sodium level relatively stable  She used to have chronic intermittent pain issues including joint pain and was taking NSAIDs up until November 2021 and now remains off NSAIDs      She admits to be following fluid restriction lately  Denies any significant pain issues currently  Denies nausea, vomiting  Denies any obvious history of malignancy  Denies any headache  Overall feels well    REVIEW OF SYSTEMS:  More than 10 point review of systems were obtained and discussed in length with the patient  Complete review of systems were negative / unremarkable except mentioned above  PHYSICAL EXAM:  Vitals:    08/23/22 1412 08/23/22 1425   BP: 108/64 124/62   BP Location: Right arm    Patient Position: Sitting    Cuff Size: Adult    Pulse: 82    Weight: 58 1 kg (128 lb)    Height: 5' 2 5" (1 588 m)      Body mass index is 23 04 kg/m²  Physical Exam  Vitals reviewed  Constitutional:       Appearance: She is well-developed  HENT:      Head: Normocephalic and atraumatic        Right Ear: External ear normal       Left Ear: External ear normal    Eyes:      Conjunctiva/sclera: Conjunctivae normal    Cardiovascular:      Comments: No significant edema in legs  Pulmonary:      Effort: Pulmonary effort is normal       Breath sounds: Normal breath sounds  No wheezing or rales  Abdominal:      General: Bowel sounds are normal  There is no distension  Palpations: Abdomen is soft  Tenderness: There is no abdominal tenderness  Musculoskeletal:         General: No deformity  Lymphadenopathy:      Cervical: No cervical adenopathy  Skin:     Findings: No rash  Neurological:      Mental Status: She is alert and oriented to person, place, and time  Psychiatric:         Behavior: Behavior normal          PAST MEDICAL HISTORY:  Past Medical History:   Diagnosis Date    MARTINA (acute kidney injury) ([facility] ) 11/21/2021    Diabetes mellitus ([place] [address] )     Hypertension        PAST SURGICAL HISTORY:  History reviewed  No pertinent surgical history      SOCIAL HISTORY:  Social History     Substance and Sexual Activity   Alcohol Use No     Social History     Substance and Sexual Activity   Drug Use No     Social History     Tobacco Use   Smoking Status Never Smoker   Smokeless Tobacco Never Used       FAMILY HISTORY:  Family History   Problem Relation Age of Onset    Breast cancer Mother         unknown age   Ardeth Needs No Known Problems Father     No Known Problems Daughter     No Known Problems Maternal Grandmother     No Known Problems Maternal Grandfather     No Known Problems Paternal Grandmother     No Known Problems Paternal Grandfather     No Known Problems Son     No Known Problems Son     No Known Problems Maternal Aunt     No Known Problems Maternal Aunt     No Known Problems Maternal Aunt     No Known Problems Maternal Aunt     No Known Problems Paternal Aunt     No Known Problems Paternal Aunt     No Known Problems Paternal Aunt        MEDICATIONS:    Current Outpatient Medications:     aspirin 81 mg chewable tablet, Chew 81 mg daily , Disp: , Rfl:     atorvastatin (LIPITOR) 40 mg tablet, Take 1 tablet (40 mg total) by mouth every evening, Disp: 90 tablet, Rfl: 3    donepezil (ARICEPT) 5 mg tablet, TAKE 1 TABLET BY MOUTH AT BEDTIME (Patient taking differently: Take 10 mg by mouth daily at bedtime), Disp: 90 tablet, Rfl: 1    glipiZIDE (GLUCOTROL) 5 mg tablet, Take 1 tablet (5 mg total) by mouth daily, Disp: 90 tablet, Rfl: 3    lisinopril (ZESTRIL) 20 mg tablet, Take 1 tablet (20 mg total) by mouth daily, Disp: 90 tablet, Rfl: 3    Lab Results:   Results for orders placed or performed in visit on 16/00/57   Basic metabolic panel   Result Value Ref Range    Sodium 132 (L) 135 - 147 mmol/L    Potassium 4 2 3 5 - 5 3 mmol/L    Chloride 94 (L) 96 - 108 mmol/L    CO2 31 21 - 32 mmol/L    ANION GAP 7 4 - 13 mmol/L    BUN 11 5 - 25 mg/dL    Creatinine 0 96 0 60 - 1 30 mg/dL    Glucose 202 (H) 65 - 140 mg/dL    Calcium 9 2 8 4 - 10 2 mg/dL    eGFR 58 ml/min/1 73sq m

## 2022-09-09 ENCOUNTER — VBI (OUTPATIENT)
Dept: ADMINISTRATIVE | Facility: OTHER | Age: 72
End: 2022-09-09

## 2022-09-29 DIAGNOSIS — F03.A0 MILD DEMENTIA: ICD-10-CM

## 2022-09-29 RX ORDER — DONEPEZIL HYDROCHLORIDE 10 MG/1
10 TABLET, FILM COATED ORAL
Qty: 30 TABLET | Refills: 0 | Status: SHIPPED | OUTPATIENT
Start: 2022-09-29 | End: 2022-10-24

## 2022-09-29 NOTE — TELEPHONE ENCOUNTER
Merary Wilson, daughter called ([phone]) to advise that Donepezil 10mg  be called into [facility]  [phone]  Merary Wilson advised patient is out of medication

## 2022-10-22 DIAGNOSIS — F03.A0 MILD DEMENTIA: ICD-10-CM

## 2022-10-24 RX ORDER — DONEPEZIL HYDROCHLORIDE 10 MG/1
TABLET, FILM COATED ORAL
Qty: 90 TABLET | Refills: 1 | Status: SHIPPED | OUTPATIENT
Start: 2022-10-24

## 2022-10-25 ENCOUNTER — TELEPHONE (OUTPATIENT)
Dept: NEPHROLOGY | Facility: CLINIC | Age: 72
End: 2022-10-25

## 2022-10-25 NOTE — TELEPHONE ENCOUNTER
Spoke with Chapis Burger from billing she checked into this and stated the patient has a $0 balance  Thank you

## 2022-10-25 NOTE — TELEPHONE ENCOUNTER
Patient's daughter, Pedro Haddad, called as they had received a letter from [facility] stating that the 8/23/22 appointment with Dr Jad Silverio was put in under the wrong diagnosis code that they will not cover  Please advise if this would go through Dr Jad Silverio for adjustment or the billing department

## 2022-10-26 NOTE — TELEPHONE ENCOUNTER
Left message with patient's daughter advising to contact our billing department and the patient's insurance in regards to this letter

## 2022-11-30 DIAGNOSIS — I65.23 BILATERAL CAROTID ARTERY STENOSIS: ICD-10-CM

## 2022-11-30 RX ORDER — ATORVASTATIN CALCIUM 40 MG/1
40 TABLET, FILM COATED ORAL EVERY EVENING
Qty: 90 TABLET | Refills: 3 | Status: SHIPPED | OUTPATIENT
Start: 2022-11-30

## 2022-12-11 ENCOUNTER — RA CDI HCC (OUTPATIENT)
Dept: OTHER | Facility: HOSPITAL | Age: 72
End: 2022-12-11

## 2022-12-11 NOTE — PROGRESS NOTES
[id number] 22  [facility]  coding opportunities          Chart Reviewed number of suggestions sent to Provider: 1     Patients Insurance     Medicare Insurance: Joelle Wyman

## 2022-12-19 ENCOUNTER — OFFICE VISIT (OUTPATIENT)
Dept: FAMILY MEDICINE CLINIC | Facility: OTHER | Age: 72
End: 2022-12-19

## 2022-12-19 ENCOUNTER — PATIENT OUTREACH (OUTPATIENT)
Dept: FAMILY MEDICINE CLINIC | Facility: OTHER | Age: 72
End: 2022-12-19

## 2022-12-19 VITALS
SYSTOLIC BLOOD PRESSURE: 102 MMHG | OXYGEN SATURATION: 99 % | HEART RATE: 76 BPM | TEMPERATURE: 98 F | DIASTOLIC BLOOD PRESSURE: 64 MMHG | WEIGHT: 118 LBS | BODY MASS INDEX: 20.91 KG/M2 | HEIGHT: 63 IN | RESPIRATION RATE: 16 BRPM

## 2022-12-19 DIAGNOSIS — E11.9 TYPE 2 DIABETES MELLITUS WITHOUT COMPLICATION, WITHOUT LONG-TERM CURRENT USE OF INSULIN (HCC): ICD-10-CM

## 2022-12-19 DIAGNOSIS — R29.6 FREQUENT FALLS: ICD-10-CM

## 2022-12-19 DIAGNOSIS — R79.89 ELEVATED TSH: ICD-10-CM

## 2022-12-19 DIAGNOSIS — F03.A0 MILD DEMENTIA, UNSPECIFIED DEMENTIA TYPE, UNSPECIFIED WHETHER BEHAVIORAL, PSYCHOTIC, OR MOOD DISTURBANCE OR ANXIETY: ICD-10-CM

## 2022-12-19 DIAGNOSIS — Z59.9 FINANCIAL DIFFICULTIES: ICD-10-CM

## 2022-12-19 DIAGNOSIS — Z71.89 COMPLEX CARE COORDINATION: ICD-10-CM

## 2022-12-19 DIAGNOSIS — I10 PRIMARY HYPERTENSION: ICD-10-CM

## 2022-12-19 DIAGNOSIS — R19.7 DIARRHEA, UNSPECIFIED TYPE: ICD-10-CM

## 2022-12-19 DIAGNOSIS — R63.0 DECREASED APPETITE: ICD-10-CM

## 2022-12-19 DIAGNOSIS — Z00.00 MEDICARE ANNUAL WELLNESS VISIT, SUBSEQUENT: Primary | ICD-10-CM

## 2022-12-19 LAB — SL AMB POCT HEMOGLOBIN AIC: 5.6 (ref ?–6.5)

## 2022-12-19 RX ORDER — LISINOPRIL 5 MG/1
15 TABLET ORAL DAILY
Qty: 90 TABLET | Refills: 0 | Status: SHIPPED | OUTPATIENT
Start: 2022-12-19

## 2022-12-19 RX ORDER — MELATONIN
1000 DAILY
COMMUNITY

## 2022-12-19 RX ORDER — SODIUM FLUORIDE 6 MG/ML
PASTE, DENTIFRICE DENTAL
COMMUNITY
Start: 2022-11-16

## 2022-12-19 RX ORDER — GLIPIZIDE 5 MG/1
2.5 TABLET ORAL DAILY
Qty: 90 TABLET | Refills: 0 | Status: SHIPPED | OUTPATIENT
Start: 2022-12-19

## 2022-12-19 SDOH — ECONOMIC STABILITY - INCOME SECURITY: PROBLEM RELATED TO HOUSING AND ECONOMIC CIRCUMSTANCES, UNSPECIFIED: Z59.9

## 2022-12-19 NOTE — PROGRESS NOTES
Assessment and Plan:     Martha Albert presented today for a Medicare wellness visit and follow-up of chronic conditions  Daughter was present during visit  For frequent falls, discussed multiple etiologies as differential diagnoses (see detailed assessment and plan below)  Will refer to physical therapy and social work for coordination of possible in-home services    Encourage patient to obtain laboratory studies to further evaluate chronic conditions and further investigation of above problem  Decreased lisinopril to 15 mg   Decreased glipizide to 2 5 mg  For decreased appetite, encourage Ensure supplementation along with other nutrition suggestions; TSH also ordered for further work-up in addition to other laboratory evaluations previously ordered  For Diarrhea (see below)  Has upcoming appointment with [facility] in early February  Anticipatory guidance discussed regarding symptoms of cognitive decline/worsening of dementia  We will complete diabetic foot exam at next visit  Fall precautions discussed   DXA scan has been ordered for patient in the past, encouraged to schedule  Both patient and daughter expressed understanding, all questions answered  See below for further details      Problem List Items Addressed This Visit        Endocrine    Type 2 diabetes mellitus without complication, without long-term current use of insulin ([facility] [address] )       Lab Results   Component Value Date    HGBA1C 5 6 12/19/2022     Pts hba1c 5 6  Will decrease glipizide to 2 5mg and recheck a1c at next visit   Hypoglycemic symptoms and precautions discussed   Encouraged patient to eat more of a healthy diet rather than a "tea and toast diet"  Can supplement with Ensure   Despite not having appetite she should try to eat to maintain nutrition, incorporating fresh fruits and vegetables into her diet         Relevant Medications    glipiZIDE (GLUCOTROL) 5 mg tablet    Other Relevant Orders    POCT hemoglobin A1c (Completed)    Lipid Panel with Direct LDL reflex       Cardiovascular and Mediastinum    Primary hypertension     BP reviewed and acceptable, 102/64  Patient lisinopril dose of 20 mg was decreased to 15 mg daily in setting of patient's frequent falls and poor appetite  Hypotensive symptoms discussed with both patient and daughter  Encouraged home BP monitoring  We will follow-up at next office visit with BP check  Expressed understanding, all questions answered         Relevant Medications    lisinopril (ZESTRIL) 5 mg tablet    Other Relevant Orders    Lipid Panel with Direct LDL reflex       Nervous and Auditory    Mild dementia     Current medication Aricept to 10 mg at nighttime  She remains relatively non compliant with speech therapy and other cognitive rehab exercises despite counseling by both geriatrician and family   I encouraged her to continue cognitively stimulating exercises such as puzzles, word searches, etc   Encouraged her to attend activities at her local senior center though daughter states this is difficult to get patient to agree to   Also discussed with daughter to monitor for signs symptoms of decline or change in her functional status  Reports it has been stable since last geriatrician visit in August without acute change  Though currently no concern regarding patient's driving did discuss things to monitor for such as getting lost, disorientation, worsening confusion, and that these are reasons to call our office or geriatrician  Did discuss possibility in near future for patient to obtain driving test per recommendation by geriatrician    We will follow-up with this at next visit  Social work referral also placed to help with complex care coordination and for possible recommendation of services to aid with patient's ambulatory dysfunction and mild dementia  Both patient and daughter expressed understanding, all questions answered         Relevant Orders    Ambulatory Referral to Physical Therapy       Other    Diarrhea Per chart review, this is not a new occurrence for patient -history of chronic intermittent diarrhea with night symptoms, 2-3 watery bowel movements per day  Discussed with both patient and daughter this is likely multifactorial in setting of poor diet, current medications, alternate metabolic etiology  Had been recommended to obtain stool leukocyte, fecal calprotectin, elastase in the past; studies have not yet been obtained, encourage patient to do so  Also encourage patient to incorporate more fiber into her diet and fresh fruits and veggies  Supplement diet with Ensure  We will follow-up on the symptoms at next office visit  With patient daughter expressed understanding, all questions answered         Complex care coordination     Patient with history of mild dementia  Has begun to have multiple incidents of falling within the past few months, did discuss multiple etiologies for this such as hypotension, hypoglycemia, hyponatremia, progression of dementia, side effect of medications, etc  Did recommend physical therapy however as patient with limited driving capabilities/access to transportation (only drives between home and [employer]) would be unable to attend physical therapy in person  In addition patient's daughter has concerns regarding patient's medical insurance and financial difficulties  Also requesting social work aid with programs that offer in-home services for geriatric population        Other Visit Diagnoses     Medicare annual wellness visit, subsequent    -  Primary    Financial difficulties        Relevant Orders    Ambulatory referral to social work care management program    Frequent falls        Relevant Orders    Ambulatory Referral to Physical Therapy    Elevated TSH        Relevant Orders    TSH, 3rd generation with Free T4 reflex    Decreased appetite              Depression Screening and Follow-up Plan: Patient was screened for depression during today's encounter   They screened negative with a PHQ-2 score of 1  Falls Plan of Care: referral to physical therapy  Recommended assistive device to help with gait and balance  Medications that increase falls were reviewed  Referral to geriatrics was provided  Follows with geriatrics       Preventive health issues were discussed with patient, and age appropriate screening tests were ordered as noted in patient's After Visit Summary  Personalized health advice and appropriate referrals for health education or preventive services given if needed, as noted in patient's After Visit Summary  History of Present Illness:     Patient presents for a Medicare Wellness Visit  Faith Baumgarten is accompanied today by her daughter Perla Contreras  Daughter reports that she is concerned that her mom has been having frequent falls over the past few months, about 2 months, and seems unsteady on her feet  Also reports that patient's appetite has been decreased and hardly eats  For example this a m  she had a piece of toast and tea  Also report patient lost 10 pounds since visit in August, was 128lbs, today 118lbs  Patient denies any syncopal or presyncopal symptoms regarding falls, states that sometimes she just gets off balance  Has not had trauma to the head  Did scrape elbow at one point but that has healed  Regarding medication handling, patients significant other handles medication and making sure patient takes them; daughter reports patient is compliant with her medications because of this  Fall  Incident onset: over the past two  months has had multiple incidents of falling; family reports she is unsteady on her feet  Pertinent negatives include no fever, headaches or vomiting  Patient Care Team:  Deanna Chaidez MD as PCP - General (Family Medicine)  García Boyd MD as PCP - [address] [place] [address] (RTE)  García Boyd MD (Internal Medicine)     Review of Systems:     Review of Systems   Constitutional: Positive for appetite change  Negative for fever  Overall reports decreased appetite  Daughter reports patient has lost 10 pounds since August   HENT: Negative for trouble swallowing  Eyes: Negative for discharge  Respiratory: Negative for shortness of breath  Cardiovascular: Negative for chest pain and palpitations  Gastrointestinal: Negative for vomiting  Frequent loose stools   Endocrine: Negative for polyuria  Genitourinary: Negative for difficulty urinating and dysuria  Musculoskeletal: Negative for arthralgias  Skin: Negative for wound  Neurological: Negative for speech difficulty and headaches  Psychiatric/Behavioral: The patient is not nervous/anxious  All other systems reviewed and are negative  Problem List:     Patient Active Problem List   Diagnosis   • Type 2 diabetes mellitus without complication, without long-term current use of insulin ([facility])   • Atherosclerotic plaque   • Aphasia   • Benign hypertension with CKD (chronic kidney disease) stage III ([facility])   • Asymptomatic bacteriuria   • Hypokalemia   • Fall   • Hyponatremia   • Diarrhea   • History of TIA (transient ischemic attack)   • Bilateral carotid artery stenosis   • Dysarthria   • Stage 3a chronic kidney disease ([facility])   • Microalbuminuria   • Primary hypertension   • Mild dementia   • Other hyperlipidemia   • Hearing loss   • Complex care coordination      Past Medical and Surgical History:     Past Medical History:   Diagnosis Date   • MARTINA (acute kidney injury) ([facility] [address] ) 11/21/2021   • Diabetes mellitus ([facility] )    • Hypertension      History reviewed  No pertinent surgical history     Family History:     Family History   Problem Relation Age of Onset   • Breast cancer Mother         unknown age   • No Known Problems Father    • No Known Problems Daughter    • No Known Problems Maternal Grandmother    • No Known Problems Maternal Grandfather    • No Known Problems Paternal Grandmother    • No Known Problems Paternal Grandfather    • No Known Problems Son    • No Known Problems Son    • No Known Problems Maternal Aunt    • No Known Problems Maternal Aunt    • No Known Problems Maternal Aunt    • No Known Problems Maternal Aunt    • No Known Problems Paternal Aunt    • No Known Problems Paternal Aunt    • No Known Problems Paternal Aunt       Social History:     Social History     Socioeconomic History   • Marital status: /Civil Union     Spouse name: None   • Number of children: None   • Years of education: None   • Highest education level: None   Occupational History   • None   Tobacco Use   • Smoking status: Never   • Smokeless tobacco: Never   Vaping Use   • Vaping Use: Never used   Substance and Sexual Activity   • Alcohol use: No   • Drug use: No   • Sexual activity: Not Currently   Other Topics Concern   • None   Social History Narrative    ** Merged History Encounter **          Social Determinants of Health     Financial Resource Strain: High Risk   • Difficulty of Paying Living Expenses: Very hard   Food Insecurity: Not on file   Transportation Needs: No Transportation Needs   • Lack of Transportation (Medical): No   • Lack of Transportation (Non-Medical):  No   Physical Activity: Not on file   Stress: Not on file   Social Connections: Not on file   Intimate Partner Violence: Not on file   Housing Stability: Not on file      Medications and Allergies:     Current Outpatient Medications   Medication Sig Dispense Refill   • aspirin 81 mg chewable tablet Chew 81 mg daily       • atorvastatin (LIPITOR) 40 mg tablet Take 1 tablet (40 mg total) by mouth every evening 90 tablet 3   • cholecalciferol (VITAMIN D3) 1,000 units tablet Take 1,000 Units by mouth daily     • donepezil (ARICEPT) 10 mg tablet TAKE 1 TABLET BY MOUTH DAILY AT BEDTIME 90 tablet 1   • glipiZIDE (GLUCOTROL) 5 mg tablet Take 0 5 tablets (2 5 mg total) by mouth daily 90 tablet 0   • lisinopril (ZESTRIL) 5 mg tablet Take 3 tablets (15 mg total) by mouth daily 90 tablet 0 • Sodium Fluoride 5000 PPM 1 1 % PSTE BRUSH TWICE A DAY  SPIT OUT  DO NOT DRINK OR EAT FOR 1 HOUR  • vitamin B-12 (CYANOCOBALAMIN) 250 MCG tablet Take 250 mcg by mouth daily       No current facility-administered medications for this visit  No Known Allergies   Immunizations:     Immunization History   Administered Date(s) Administered   • COVID-19 MODERNA VACC 0 5 ML IM 04/21/2021, 05/19/2021   • Tdap 10/13/2017      Health Maintenance:         Topic Date Due   • Breast Cancer Screening: Mammogram  02/22/2023   • Hepatitis C Screening  Completed   • Colorectal Cancer Screening  Discontinued         Topic Date Due   • Hepatitis B Vaccine (1 of 3 - 3-dose series) Never done   • Pneumococcal Vaccine: 65+ Years (1 - PCV) Never done   • COVID-19 Vaccine (3 - Booster for Moderna series) 10/19/2021   • Influenza Vaccine (1) Never done      Medicare Screening Tests and Risk Assessments:     Anni Del Toro is here for her Subsequent Wellness visit  Last Medicare Wellness visit information reviewed, patient interviewed and updates made to the record today  Health Risk Assessment:   Patient rates overall health as fair  Patient feels that their physical health rating is slightly worse  Patient is very satisfied with their life  Eyesight was rated as same  Hearing was rated as same  Patient feels that their emotional and mental health rating is same  Patients states they are sometimes angry  Patient states they are sometimes unusually tired/fatigued  Pain experienced in the last 7 days has been none  Patient states that she has experienced weight loss or gain in last 6 months  Depression Screening:   PHQ-2 Score: 1      Fall Risk Screening:    In the past year, patient has experienced: history of falling in past year    Number of falls: 2 or more  Injured during fall?: Yes    Feels unsteady when standing or walking?: Yes    Worried about falling?: No      Urinary Incontinence Screening:   Patient has not leaked urine accidently in the last six months  Home Safety:  Patient does not have trouble with stairs inside or outside of their home  Patient has working smoke alarms and has working carbon monoxide detector  Home safety hazards include: none  Nutrition:   Current diet is Regular  Medications:   Patient is currently taking over-the-counter supplements  OTC medications include: see medication list  Patient is able to manage medications  Activities of Daily Living (ADLs)/Instrumental Activities of Daily Living (IADLs):   Walk and transfer into and out of bed and chair?: Yes  Dress and groom yourself?: Yes    Bathe or shower yourself?: Yes    Feed yourself? Yes  Do your laundry/housekeeping?: Yes  Manage your money, pay your bills and track your expenses?: Yes  Make your own meals?: Yes    Do your own shopping?: Yes    Previous Hospitalizations:   Any hospitalizations or ED visits within the last 12 months?: No      Advance Care Planning:   Living will: No    Durable POA for healthcare: Yes    Advanced directive: Yes      PREVENTIVE SCREENINGS      Cardiovascular Screening:    General: Screening Not Indicated and History Lipid Disorder      Diabetes Screening:     General: Screening Not Indicated and History Diabetes      Colorectal Cancer Screening:     General: Screening Current      Breast Cancer Screening:     General: Screening Current      Cervical Cancer Screening:    General: Screening Not Indicated      Lung Cancer Screening:     General: Screening Not Indicated      Hepatitis C Screening:    General: Screening Current    Screening, Brief Intervention, and Referral to Treatment (SBIRT)    Screening  Typical number of drinks in a day: 0  Typical number of drinks in a week: 0  Interpretation: Low risk drinking behavior      Single Item Drug Screening:  How often have you used an illegal drug (including marijuana) or a prescription medication for non-medical reasons in the past year? never    Single Item Drug Screen Score: 0  Interpretation: Negative screen for possible drug use disorder    No results found  Physical Exam:     /64   Pulse 76   Temp 98 °F (36 7 °C)   Resp 16   Ht 5' 2 5" (1 588 m)   Wt 53 5 kg (118 lb)   SpO2 99%   BMI 21 24 kg/m²     Physical Exam  Vitals and nursing note reviewed  Constitutional:       General: She is not in acute distress  Appearance: She is well-developed  She is not ill-appearing, toxic-appearing or diaphoretic  Comments: Daughter present at bedside  Pt in NAD   HENT:      Head: Normocephalic and atraumatic  Right Ear: External ear normal       Left Ear: External ear normal       Nose: No congestion  Mouth/Throat:      Mouth: Mucous membranes are moist       Pharynx: No oropharyngeal exudate or posterior oropharyngeal erythema  Eyes:      General: No scleral icterus  Conjunctiva/sclera: Conjunctivae normal    Cardiovascular:      Rate and Rhythm: Normal rate and regular rhythm  Heart sounds: Normal heart sounds  No murmur heard  Pulmonary:      Effort: Pulmonary effort is normal  No respiratory distress  Breath sounds: Normal breath sounds  No wheezing  Abdominal:      General: Bowel sounds are normal  There is no distension  Palpations: Abdomen is soft  Tenderness: There is no abdominal tenderness  There is no guarding  Musculoskeletal:      Right lower leg: No edema  Left lower leg: No edema  Skin:     General: Skin is warm  Coloration: Skin is not jaundiced  Neurological:      Mental Status: She is alert and oriented to person, place, and time  Motor: No tremor or seizure activity  Comments: Oriented to person and place  Did state that the month was January (not December) correctly identified season and year     Reported president was trump     Gait appears cautious, not shuffling    Psychiatric:         Attention and Perception: Attention normal          Mood and Affect: Mood normal  Behavior: Behavior normal       Comments: Mood becomes slightly irritable when daughter speaks          Jasper Ponce MD

## 2022-12-19 NOTE — ASSESSMENT & PLAN NOTE
Current medication Aricept to 10 mg at nighttime  She remains relatively non compliant with speech therapy and other cognitive rehab exercises despite counseling by both geriatrician and family   I encouraged her to continue cognitively stimulating exercises such as puzzles, word searches, etc   Encouraged her to attend activities at her local senior center though daughter states this is difficult to get patient to agree to   Also discussed with daughter to monitor for signs symptoms of decline or change in her functional status  Reports it has been stable since last geriatrician visit in August without acute change  Though currently no concern regarding patient's driving did discuss things to monitor for such as getting lost, disorientation, worsening confusion, and that these are reasons to call our office or geriatrician  Did discuss possibility in near future for patient to obtain driving test per recommendation by geriatrician    We will follow-up with this at next visit  Social work referral also placed to help with complex care coordination and for possible recommendation of services to aid with patient's ambulatory dysfunction and mild dementia  Both patient and daughter expressed understanding, all questions answered

## 2022-12-19 NOTE — ASSESSMENT & PLAN NOTE
Lab Results   Component Value Date    HGBA1C 5 6 12/19/2022     Pts hba1c 5 6  Will decrease glipizide to 2 5mg and recheck a1c at next visit   Hypoglycemic symptoms and precautions discussed   Encouraged patient to eat more of a healthy diet rather than a "tea and toast diet"  Can supplement with Ensure   Despite not having appetite she should try to eat to maintain nutrition, incorporating fresh fruits and vegetables into her diet

## 2022-12-19 NOTE — PROGRESS NOTES
SW called pt and reached dtr's voicemail  SW left message with reason for all and requested call back

## 2022-12-19 NOTE — ASSESSMENT & PLAN NOTE
Patient with history of mild dementia  Has begun to have multiple incidents of falling within the past few months, did discuss multiple etiologies for this such as hypotension, hypoglycemia, hyponatremia, progression of dementia, side effect of medications, etc    Did recommend physical therapy however as patient with limited driving capabilities/access to transportation (only drives between home and [employer]) would be unable to attend physical therapy in person  In addition patient's daughter has concerns regarding patient's medical insurance and financial difficulties  Also requesting social work aid with programs that offer in-home services for geriatric population

## 2022-12-19 NOTE — ASSESSMENT & PLAN NOTE
Per chart review, this is not a new occurrence for patient -history of chronic intermittent diarrhea with night symptoms, 2-3 watery bowel movements per day  Discussed with both patient and daughter this is likely multifactorial in setting of poor diet, current medications, alternate metabolic etiology  Had been recommended to obtain stool leukocyte, fecal calprotectin, elastase in the past; studies have not yet been obtained, encourage patient to do so  Also encourage patient to incorporate more fiber into her diet and fresh fruits and veggies  Supplement diet with Ensure  We will follow-up on the symptoms at next office visit  With patient daughter expressed understanding, all questions answered

## 2022-12-19 NOTE — ASSESSMENT & PLAN NOTE
BP reviewed and acceptable, 102/64  Patient lisinopril dose of 20 mg was decreased to 15 mg daily in setting of patient's frequent falls and poor appetite  Hypotensive symptoms discussed with both patient and daughter  Encouraged home BP monitoring  We will follow-up at next office visit with BP check  Expressed understanding, all questions answered

## 2022-12-19 NOTE — PATIENT INSTRUCTIONS
LISINOPRIL CHANGED TO 15MG DAILY   Obtain blood work       Daily [facility] Preventive Visit Patient Instructions  Thank you for completing your [facility] to [facility] Visit or Medicare Annual Wellness Visit today  Your next wellness visit will be due in one year (12/20/2023)  The screening/preventive services that you may require over the next 5-10 years are detailed below  Some tests may not apply to you based off risk factors and/or age  Screening tests ordered at today's visit but not completed yet may show as past due  Also, please note that scanned in results may not display below  Preventive Screenings:  Service Recommendations Previous Testing/Comments   Colorectal Cancer Screening  * Colonoscopy    * Fecal Occult Blood Test (FOBT)/Fecal Immunochemical Test (FIT)  * Fecal DNA/Cologuard Test  * Flexible Sigmoidoscopy Age: 39-70 years old   Colonoscopy: every 10 years (may be performed more frequently if at higher risk)  OR  FOBT/FIT: every 1 year  OR  Cologuard: every 3 years  OR  Sigmoidoscopy: every 5 years  Screening may be recommended earlier than age 39 if at higher risk for colorectal cancer  Also, an individualized decision between you and your healthcare provider will decide whether screening between the ages of 74-80 would be appropriate  Colonoscopy: 03/01/2022  FOBT/FIT: Not on file  Cologuard: Not on file  Sigmoidoscopy: Not on file    Screening Current     Breast Cancer Screening Age: 36 years old  Frequency: every 1-2 years  Not required if history of left and right mastectomy Mammogram: 02/22/2022    Screening Current   Cervical Cancer Screening Between the ages of 21-29, pap smear recommended once every 3 years  Between the ages of 33-67, can perform pap smear with HPV co-testing every 5 years     Recommendations may differ for women with a history of total hysterectomy, cervical cancer, or abnormal pap smears in past  Pap Smear: Not on file    Screening Not Indicated   Hepatitis C Screening Once for adults born between [birth year] and 1965  More frequently in patients at high risk for Hepatitis C Hep C Antibody: 10/13/2017    Screening Current   Diabetes Screening 1-2 times per year if you're at risk for diabetes or have pre-diabetes Fasting glucose: 147 mg/dL (1/31/2022)  A1C: 6 0 (3/24/2022)  Screening Not Indicated  History Diabetes   Cholesterol Screening Once every 5 years if you don't have a lipid disorder  May order more often based on risk factors  Lipid panel: 12/10/2021    Screening Not Indicated  History Lipid Disorder     Other Preventive Screenings Covered by Medicare:  Abdominal Aortic Aneurysm (AAA) Screening: covered once if your at risk  You're considered to be at risk if you have a family history of AAA  Lung Cancer Screening: covers low dose CT scan once per year if you meet all of the following conditions: (1) Age 50-69; (2) No signs or symptoms of lung cancer; (3) Current smoker or have quit smoking within the last 15 years; (4) You have a tobacco smoking history of at least 20 pack years (packs per day multiplied by number of years you smoked); (5) You get a written order from a healthcare provider  Glaucoma Screening: covered annually if you're considered high risk: (1) You have diabetes OR (2) Family history of glaucoma OR (3)  aged 48 and older OR (3)  American aged 72 and older  Osteoporosis Screening: covered every 2 years if you meet one of the following conditions: (1) You're estrogen deficient and at risk for osteoporosis based off medical history and other findings; (2) Have a vertebral abnormality; (3) On glucocorticoid therapy for more than 3 months; (4) Have primary hyperparathyroidism; (5) On osteoporosis medications and need to assess response to drug therapy  Last bone density test (DXA Scan): Not on file  HIV Screening: covered annually if you're between the age of 12-76   Also covered annually if you are younger than 13 and older than 72 with risk factors for HIV infection  For pregnant patients, it is covered up to 3 times per pregnancy  Immunizations:  Immunization Recommendations   Influenza Vaccine Annual influenza vaccination during flu season is recommended for all persons aged >= 6 months who do not have contraindications   Pneumococcal Vaccine   * Pneumococcal conjugate vaccine = PCV13 (Prevnar 13), PCV15 (Vaxneuvance), PCV20 (Prevnar 20)  * Pneumococcal polysaccharide vaccine = PPSV23 (Pneumovax) Adults 25-60 years old: 1-3 doses may be recommended based on certain risk factors  Adults 72 years old: 1-2 doses may be recommended based off what pneumonia vaccine you previously received   Hepatitis B Vaccine 3 dose series if at intermediate or high risk (ex: diabetes, end stage renal disease, liver disease)   Tetanus (Td) Vaccine - COST NOT COVERED BY MEDICARE PART B Following completion of primary series, a booster dose should be given every 10 years to maintain immunity against tetanus  Td may also be given as tetanus wound prophylaxis  Tdap Vaccine - COST NOT COVERED BY MEDICARE PART B Recommended at least once for all adults  For pregnant patients, recommended with each pregnancy  Shingles Vaccine (Shingrix) - COST NOT COVERED BY MEDICARE PART B  2 shot series recommended in those aged 48 and above     Health Maintenance Due:      Topic Date Due    Breast Cancer Screening: Mammogram  02/22/2023    Hepatitis C Screening  Completed    Colorectal Cancer Screening  Discontinued     Immunizations Due:      Topic Date Due    Hepatitis B Vaccine (1 of 3 - 3-dose series) Never done    Pneumococcal Vaccine: 65+ Years (1 - PCV) Never done    COVID-19 Vaccine (3 - Booster for Moderna series) 10/19/2021    Influenza Vaccine (1) Never done     Advance Directives   What are advance directives? Advance directives are legal documents that state your wishes and plans for medical care   These plans are made ahead of time in case you lose your ability to make decisions for yourself  Advance directives can apply to any medical decision, such as the treatments you want, and if you want to donate organs  What are the types of advance directives? There are many types of advance directives, and each state has rules about how to use them  You may choose a combination of any of the following:  Living will: This is a written record of the treatment you want  You can also choose which treatments you do not want, which to limit, and which to stop at a certain time  This includes surgery, medicine, IV fluid, and tube feedings  Durable power of  for healthcare [facility]): This is a written record that states who you want to make healthcare choices for you when you are unable to make them for yourself  This person, called a proxy, is usually a family member or a friend  You may choose more than 1 proxy  Do not resuscitate (DNR) order:  A DNR order is used in case your heart stops beating or you stop breathing  It is a request not to have certain forms of treatment, such as CPR  A DNR order may be included in other types of advance directives  Medical directive: This covers the care that you want if you are in a coma, near death, or unable to make decisions for yourself  You can list the treatments you want for each condition  Treatment may include pain medicine, surgery, blood transfusions, dialysis, IV or tube feedings, and a ventilator (breathing machine)  Values history: This document has questions about your views, beliefs, and how you feel and think about life  This information can help others choose the care that you would choose  Why are advance directives important? An advance directive helps you control your care  Although spoken wishes may be used, it is better to have your wishes written down  Spoken wishes can be misunderstood, or not followed  Treatments may be given even if you do not want them   An advance directive may make it easier for your family to make difficult choices about your care  Fall Prevention    Fall prevention  includes ways to make your home and other areas safer  It also includes ways you can move more carefully to prevent a fall  Health conditions that cause changes in your blood pressure, vision, or muscle strength and coordination may increase your risk for falls  Medicines may also increase your risk for falls if they make you dizzy, weak, or sleepy  Fall prevention tips:   Stand or sit up slowly  Use assistive devices as directed  Wear shoes that fit well and have soles that   Wear a personal alarm  Stay active  Manage your medical conditions  Home Safety Tips:  Add items to prevent falls in the bathroom  Keep paths clear  Install bright lights in your home  Keep items you use often on shelves within reach  [place] or place reflective tape on the edges of your stairs  © Copyright [employer] 2018 Information is for End User's use only and may not be sold, redistributed or otherwise used for commercial purposes   All illustrations and images included in CareNotes® are the copyrighted property of [employer]  or [address] [place] [address] [employer]

## 2022-12-21 ENCOUNTER — PATIENT OUTREACH (OUTPATIENT)
Dept: FAMILY MEDICINE CLINIC | Facility: OTHER | Age: 72
End: 2022-12-21

## 2022-12-21 ENCOUNTER — APPOINTMENT (OUTPATIENT)
Dept: LAB | Facility: AMBULARY SURGERY CENTER | Age: 72
End: 2022-12-21

## 2022-12-21 DIAGNOSIS — R79.89 ELEVATED TSH: ICD-10-CM

## 2022-12-21 DIAGNOSIS — I10 PRIMARY HYPERTENSION: ICD-10-CM

## 2022-12-21 DIAGNOSIS — E11.9 TYPE 2 DIABETES MELLITUS WITHOUT COMPLICATION, WITHOUT LONG-TERM CURRENT USE OF INSULIN (HCC): ICD-10-CM

## 2022-12-21 NOTE — PROGRESS NOTES
SW received message from pt's dtr Martin Sesay returning SW call  SW called and spoke with Martin Sesay explained she is trying to get help for her mother, who has dementia  Martin Sesay said she tried an aide last year for her mom but her mom said it's a waste of her time as she did not participate   Per Martin Sesay, her mother is in denial regading her dementia   She lives home with her spouse, who is  "just as bad" and sits home and 'does nothing'  Martin Sesay asked about food stamps  SW advised Martin Sesay that SW can assist by providing her with SNAP benefits site  Martin Sesay requested SW send something to her email where she can print out the application, as she, herself, already has an account set up to get her own SNAP benefits and she cannot apply again (for her mom)  Martin Sesay explained her level of stress in trying to get help for her mother brought on a TIA  SW offered support group and caregiver resources  Martin Sesay declined, saying she is familiar with dementia and pt has a geriatrician Dr Estrada Brooks saw note in chart from [place] [id number] and told Elise Bauer I can reach out to [place] but not sure if she can provide any additional info as pt is not willing to make any changes  Martin Sesay appreciated the help  Pt still drives, to [employer], and is not homebound  There is an order for PT in chart which Martin Sesay asked about  SW informed Martin Sesay that,  and per doctor, it is for home PT  SW can refer to VNA, however, if they evaluate and pt is not homebound, pt will need to do PT outpatient  Martin Sesay understood  SW will email Elise Bauer and refer pt to VNAs

## 2022-12-22 ENCOUNTER — PATIENT OUTREACH (OUTPATIENT)
Dept: FAMILY MEDICINE CLINIC | Facility: OTHER | Age: 72
End: 2022-12-22

## 2022-12-22 NOTE — PROGRESS NOTES
Referrals for home PT placed  Email sent to daughter with [facility] information  SW received response from [place] that they may be able to accept pt after pt discharges  SW explained pt is not in hospital but home  [id number] responded they will get back to this SW ASAP  CHERELLE sent [place] Text to pt's PCP regarding eligibility and criteria for pts with dementia, referrals sent, option of waiver services and what this entails,  and explained that needs eval and treat at home as she has not been in hospital in the last 30 days  CHREELLE explained that pt may need to go to OP PT if she is not accepted by any VNAs  CHERELLE informed PCP that SW will follow up with PCP by Tuesday, when SW returns to office  SW advised PCP that OP CM SW Anamaria Salazar is covering for this SW while this SW is out of office  SW emailed pt's daughter Daniel Lo regarding referrals sent and that SW will follow up with Daniel Lo Tuesday  Received confirmation of receipt of [employer] updates from PCP, stating she appreciates the help  SW will follow up Tuesday

## 2022-12-23 ENCOUNTER — PATIENT OUTREACH (OUTPATIENT)
Dept: FAMILY MEDICINE CLINIC | Facility: OTHER | Age: 72
End: 2022-12-23

## 2022-12-23 NOTE — PROGRESS NOTES
SW is temporarily covering for SW [employer]  SW had received an updated via [facility] that [facility] is accepting the patient for services  SW had reserved referral via [address]  [facility] will outreach patient's daughter, Parth Barrera about services  SW routed note to Kourtney  SWCM will continue to be available as needed

## 2022-12-27 ENCOUNTER — PATIENT OUTREACH (OUTPATIENT)
Dept: FAMILY MEDICINE CLINIC | Facility: OTHER | Age: 72
End: 2022-12-27

## 2022-12-27 ENCOUNTER — APPOINTMENT (OUTPATIENT)
Dept: LAB | Facility: AMBULARY SURGERY CENTER | Age: 72
End: 2022-12-27

## 2022-12-27 DIAGNOSIS — R79.89 ELEVATED TSH: ICD-10-CM

## 2022-12-27 DIAGNOSIS — E11.9 TYPE 2 DIABETES MELLITUS WITHOUT COMPLICATION, WITHOUT LONG-TERM CURRENT USE OF INSULIN (HCC): ICD-10-CM

## 2022-12-27 DIAGNOSIS — I10 PRIMARY HYPERTENSION: ICD-10-CM

## 2022-12-27 LAB
CHOLEST SERPL-MCNC: 160 MG/DL
HDLC SERPL-MCNC: 65 MG/DL
LDLC SERPL CALC-MCNC: 54 MG/DL (ref 0–100)
TRIGL SERPL-MCNC: 207 MG/DL
TSH SERPL DL<=0.05 MIU/L-ACNC: 2.89 UIU/ML (ref 0.45–4.5)

## 2022-12-27 NOTE — PROGRESS NOTES
LATE NOTE:    SW received email from [address] [place] [address] that [facility] had accepted pt, on Friday 12/23/22  SW sent email to pt's dtr Jesus Snyder, and Dr Gricelda Durham, relaying the above  SW added Anum Stout to referral in [address] [place] [address] to continue to follow if needed while SW was out

## 2022-12-28 ENCOUNTER — PATIENT OUTREACH (OUTPATIENT)
Dept: FAMILY MEDICINE CLINIC | Facility: OTHER | Age: 72
End: 2022-12-28

## 2023-01-03 ENCOUNTER — TELEPHONE (OUTPATIENT)
Dept: FAMILY MEDICINE CLINIC | Facility: OTHER | Age: 73
End: 2023-01-03

## 2023-01-03 NOTE — TELEPHONE ENCOUNTER
The patient's daughter left a voice message stating she has had diahrrea for 2 days  They would like to know what she can take? Please advise    Thank you!

## 2023-01-05 ENCOUNTER — PATIENT OUTREACH (OUTPATIENT)
Dept: FAMILY MEDICINE CLINIC | Facility: OTHER | Age: 73
End: 2023-01-05

## 2023-01-05 DIAGNOSIS — R19.7 WATERY DIARRHEA: ICD-10-CM

## 2023-01-05 DIAGNOSIS — K52.9 CHRONIC DIARRHEA: Primary | ICD-10-CM

## 2023-01-05 NOTE — PROGRESS NOTES
SW attempted to reach pt's daughter confirming start date with [facility]  No response, SW will close episode

## 2023-01-06 ENCOUNTER — TELEPHONE (OUTPATIENT)
Dept: FAMILY MEDICINE CLINIC | Facility: OTHER | Age: 73
End: 2023-01-06

## 2023-01-06 NOTE — TELEPHONE ENCOUNTER
I left a voice message on pt's daughter's voice mail to see if they were able to get the CDiff test completed that Dr Jose Verde has ordered

## 2023-01-06 NOTE — TELEPHONE ENCOUNTER
Daughter called back and patient did not get it done daughter will take care of it on Tuesday   And daughter lives an hour away

## 2023-01-19 ENCOUNTER — HOSPITAL ENCOUNTER (INPATIENT)
Facility: HOSPITAL | Age: 73
LOS: 5 days | Discharge: HOME | End: 2023-01-24
Attending: SURGERY | Admitting: INTERNAL MEDICINE

## 2023-01-19 ENCOUNTER — APPOINTMENT (EMERGENCY)
Dept: RADIOLOGY | Facility: HOSPITAL | Age: 73
End: 2023-01-19

## 2023-01-19 ENCOUNTER — TELEPHONE (OUTPATIENT)
Dept: FAMILY MEDICINE CLINIC | Facility: OTHER | Age: 73
End: 2023-01-19

## 2023-01-19 ENCOUNTER — APPOINTMENT (EMERGENCY)
Dept: CT IMAGING | Facility: HOSPITAL | Age: 73
End: 2023-01-19

## 2023-01-19 DIAGNOSIS — R42 POSTURAL DIZZINESS WITH PRESYNCOPE: ICD-10-CM

## 2023-01-19 DIAGNOSIS — E87.1 HYPONATREMIA: Primary | ICD-10-CM

## 2023-01-19 DIAGNOSIS — R55 SYNCOPE AND COLLAPSE: ICD-10-CM

## 2023-01-19 DIAGNOSIS — S00.432A HEMATOMA OF LEFT AURICULAR REGION: ICD-10-CM

## 2023-01-19 DIAGNOSIS — S00.431A HEMATOMA OF RIGHT AURICULAR REGION: ICD-10-CM

## 2023-01-19 DIAGNOSIS — F32.A DEPRESSION: ICD-10-CM

## 2023-01-19 DIAGNOSIS — R26.2 AMBULATORY DYSFUNCTION: ICD-10-CM

## 2023-01-19 DIAGNOSIS — R55 POSTURAL DIZZINESS WITH PRESYNCOPE: ICD-10-CM

## 2023-01-19 PROBLEM — I10 HYPERTENSION: Status: ACTIVE | Noted: 2023-01-19

## 2023-01-19 PROBLEM — R73.03 PREDIABETES: Status: ACTIVE | Noted: 2023-01-19

## 2023-01-19 LAB
2HR DELTA HS TROPONIN: 0 NG/L
2HR DELTA HS TROPONIN: 0 NG/L
ANION GAP SERPL CALCULATED.3IONS-SCNC: 10 MMOL/L (ref 4–13)
ANION GAP SERPL CALCULATED.3IONS-SCNC: 10 MMOL/L (ref 4–13)
BASE EXCESS BLDA CALC-SCNC: 0 MMOL/L (ref -2–3)
BASE EXCESS BLDA CALC-SCNC: 0 MMOL/L (ref -2–3)
BASOPHILS # BLD AUTO: 0.04 THOUSANDS/ÂΜL (ref 0–0.1)
BASOPHILS # BLD AUTO: 0.04 THOUSANDS/ÂΜL (ref 0–0.1)
BASOPHILS NFR BLD AUTO: 0 % (ref 0–1)
BASOPHILS NFR BLD AUTO: 0 % (ref 0–1)
BUN SERPL-MCNC: 11 MG/DL (ref 5–25)
BUN SERPL-MCNC: 11 MG/DL (ref 5–25)
CA-I BLD-SCNC: 1.21 MMOL/L (ref 1.12–1.32)
CA-I BLD-SCNC: 1.21 MMOL/L (ref 1.12–1.32)
CALCIUM SERPL-MCNC: 9.7 MG/DL (ref 8.4–10.2)
CALCIUM SERPL-MCNC: 9.7 MG/DL (ref 8.4–10.2)
CARDIAC TROPONIN I PNL SERPL HS: 5 NG/L
CHLORIDE SERPL-SCNC: 92 MMOL/L (ref 96–108)
CHLORIDE SERPL-SCNC: 92 MMOL/L (ref 96–108)
CO2 SERPL-SCNC: 24 MMOL/L (ref 21–32)
CO2 SERPL-SCNC: 24 MMOL/L (ref 21–32)
CREAT SERPL-MCNC: 1.28 MG/DL (ref 0.6–1.3)
CREAT SERPL-MCNC: 1.28 MG/DL (ref 0.6–1.3)
EOSINOPHIL # BLD AUTO: 0.11 THOUSAND/ÂΜL (ref 0–0.61)
EOSINOPHIL # BLD AUTO: 0.11 THOUSAND/ÂΜL (ref 0–0.61)
EOSINOPHIL NFR BLD AUTO: 1 % (ref 0–6)
EOSINOPHIL NFR BLD AUTO: 1 % (ref 0–6)
ERYTHROCYTE [DISTWIDTH] IN BLOOD BY AUTOMATED COUNT: 12.9 % (ref 11.6–15.1)
ERYTHROCYTE [DISTWIDTH] IN BLOOD BY AUTOMATED COUNT: 12.9 % (ref 11.6–15.1)
GFR SERPL CREATININE-BSD FRML MDRD: 41 ML/MIN/1.73SQ M
GFR SERPL CREATININE-BSD FRML MDRD: 41 ML/MIN/1.73SQ M
GLUCOSE SERPL-MCNC: 141 MG/DL (ref 65–140)
GLUCOSE SERPL-MCNC: 141 MG/DL (ref 65–140)
GLUCOSE SERPL-MCNC: 147 MG/DL (ref 65–140)
GLUCOSE SERPL-MCNC: 147 MG/DL (ref 65–140)
GLUCOSE SERPL-MCNC: 175 MG/DL (ref 65–140)
GLUCOSE SERPL-MCNC: 175 MG/DL (ref 65–140)
GLUCOSE SERPL-MCNC: 183 MG/DL (ref 65–140)
GLUCOSE SERPL-MCNC: 183 MG/DL (ref 65–140)
HCO3 BLDA-SCNC: 25 MMOL/L (ref 24–30)
HCO3 BLDA-SCNC: 25 MMOL/L (ref 24–30)
HCT VFR BLD AUTO: 33.4 % (ref 34.8–46.1)
HCT VFR BLD AUTO: 33.4 % (ref 34.8–46.1)
HCT VFR BLD CALC: 35 % (ref 34.8–46.1)
HCT VFR BLD CALC: 35 % (ref 34.8–46.1)
HGB BLD-MCNC: 11.4 G/DL (ref 11.5–15.4)
HGB BLD-MCNC: 11.4 G/DL (ref 11.5–15.4)
HGB BLDA-MCNC: 11.9 G/DL (ref 11.5–15.4)
HGB BLDA-MCNC: 11.9 G/DL (ref 11.5–15.4)
IMM GRANULOCYTES # BLD AUTO: 0.04 THOUSAND/UL (ref 0–0.2)
IMM GRANULOCYTES # BLD AUTO: 0.04 THOUSAND/UL (ref 0–0.2)
IMM GRANULOCYTES NFR BLD AUTO: 0 % (ref 0–2)
IMM GRANULOCYTES NFR BLD AUTO: 0 % (ref 0–2)
LYMPHOCYTES # BLD AUTO: 1.33 THOUSANDS/ÂΜL (ref 0.6–4.47)
LYMPHOCYTES # BLD AUTO: 1.33 THOUSANDS/ÂΜL (ref 0.6–4.47)
LYMPHOCYTES NFR BLD AUTO: 12 % (ref 14–44)
LYMPHOCYTES NFR BLD AUTO: 12 % (ref 14–44)
MCH RBC QN AUTO: 31.5 PG (ref 26.8–34.3)
MCH RBC QN AUTO: 31.5 PG (ref 26.8–34.3)
MCHC RBC AUTO-ENTMCNC: 34.1 G/DL (ref 31.4–37.4)
MCHC RBC AUTO-ENTMCNC: 34.1 G/DL (ref 31.4–37.4)
MCV RBC AUTO: 92 FL (ref 82–98)
MCV RBC AUTO: 92 FL (ref 82–98)
MONOCYTES # BLD AUTO: 0.69 THOUSAND/ÂΜL (ref 0.17–1.22)
MONOCYTES # BLD AUTO: 0.69 THOUSAND/ÂΜL (ref 0.17–1.22)
MONOCYTES NFR BLD AUTO: 6 % (ref 4–12)
MONOCYTES NFR BLD AUTO: 6 % (ref 4–12)
NEUTROPHILS # BLD AUTO: 8.57 THOUSANDS/ÂΜL (ref 1.85–7.62)
NEUTROPHILS # BLD AUTO: 8.57 THOUSANDS/ÂΜL (ref 1.85–7.62)
NEUTS SEG NFR BLD AUTO: 81 % (ref 43–75)
NEUTS SEG NFR BLD AUTO: 81 % (ref 43–75)
NRBC BLD AUTO-RTO: 0 /100 WBCS
NRBC BLD AUTO-RTO: 0 /100 WBCS
OSMOLALITY UR/SERPL-RTO: 270 MMOL/KG (ref 282–298)
OSMOLALITY UR/SERPL-RTO: 270 MMOL/KG (ref 282–298)
PCO2 BLD: 26 MMOL/L (ref 21–32)
PCO2 BLD: 26 MMOL/L (ref 21–32)
PCO2 BLD: 42.5 MM HG (ref 42–50)
PCO2 BLD: 42.5 MM HG (ref 42–50)
PH BLD: 7.38 [PH] (ref 7.3–7.4)
PH BLD: 7.38 [PH] (ref 7.3–7.4)
PLATELET # BLD AUTO: 312 THOUSANDS/UL (ref 149–390)
PLATELET # BLD AUTO: 312 THOUSANDS/UL (ref 149–390)
PMV BLD AUTO: 8.9 FL (ref 8.9–12.7)
PMV BLD AUTO: 8.9 FL (ref 8.9–12.7)
PO2 BLD: 25 MM HG (ref 35–45)
PO2 BLD: 25 MM HG (ref 35–45)
POTASSIUM BLD-SCNC: 4.3 MMOL/L (ref 3.5–5.3)
POTASSIUM BLD-SCNC: 4.3 MMOL/L (ref 3.5–5.3)
POTASSIUM SERPL-SCNC: 3.9 MMOL/L (ref 3.5–5.3)
POTASSIUM SERPL-SCNC: 3.9 MMOL/L (ref 3.5–5.3)
RBC # BLD AUTO: 3.62 MILLION/UL (ref 3.81–5.12)
RBC # BLD AUTO: 3.62 MILLION/UL (ref 3.81–5.12)
SAO2 % BLD FROM PO2: 45 % (ref 60–85)
SAO2 % BLD FROM PO2: 45 % (ref 60–85)
SODIUM BLD-SCNC: 128 MMOL/L (ref 136–145)
SODIUM BLD-SCNC: 128 MMOL/L (ref 136–145)
SODIUM SERPL-SCNC: 126 MMOL/L (ref 135–147)
SODIUM SERPL-SCNC: 126 MMOL/L (ref 135–147)
SPECIMEN SOURCE: ABNORMAL
SPECIMEN SOURCE: ABNORMAL
TSH SERPL DL<=0.05 MIU/L-ACNC: 2.64 UIU/ML (ref 0.45–4.5)
TSH SERPL DL<=0.05 MIU/L-ACNC: 2.64 UIU/ML (ref 0.45–4.5)
WBC # BLD AUTO: 10.78 THOUSAND/UL (ref 4.31–10.16)
WBC # BLD AUTO: 10.78 THOUSAND/UL (ref 4.31–10.16)

## 2023-01-19 RX ORDER — GLIPIZIDE 5 MG/1
5 TABLET ORAL
COMMUNITY
End: 2023-02-02

## 2023-01-19 RX ORDER — ATORVASTATIN CALCIUM 40 MG/1
40 TABLET, FILM COATED ORAL DAILY
COMMUNITY
End: 2023-02-02

## 2023-01-19 RX ORDER — INSULIN LISPRO 100 [IU]/ML
1-5 INJECTION, SOLUTION INTRAVENOUS; SUBCUTANEOUS
Status: DISCONTINUED | OUTPATIENT
Start: 2023-01-19 | End: 2023-01-24 | Stop reason: HOSPADM

## 2023-01-19 RX ORDER — ATORVASTATIN CALCIUM 40 MG/1
40 TABLET, FILM COATED ORAL
Status: DISCONTINUED | OUTPATIENT
Start: 2023-01-19 | End: 2023-01-24 | Stop reason: HOSPADM

## 2023-01-19 RX ORDER — ASPIRIN 81 MG/1
81 TABLET ORAL DAILY
COMMUNITY

## 2023-01-19 RX ORDER — DONEPEZIL HYDROCHLORIDE 5 MG/1
10 TABLET, FILM COATED ORAL
COMMUNITY
End: 2023-02-02

## 2023-01-19 RX ORDER — ENOXAPARIN SODIUM 100 MG/ML
40 INJECTION SUBCUTANEOUS DAILY
Status: DISCONTINUED | OUTPATIENT
Start: 2023-01-20 | End: 2023-01-24 | Stop reason: HOSPADM

## 2023-01-19 RX ORDER — LOPERAMIDE HYDROCHLORIDE 2 MG/1
2 CAPSULE ORAL
Status: DISCONTINUED | OUTPATIENT
Start: 2023-01-19 | End: 2023-01-19

## 2023-01-19 RX ORDER — LISINOPRIL 5 MG/1
15 TABLET ORAL DAILY
COMMUNITY
End: 2023-01-24

## 2023-01-19 RX ORDER — DONEPEZIL HYDROCHLORIDE 5 MG/1
10 TABLET, FILM COATED ORAL
Status: DISCONTINUED | OUTPATIENT
Start: 2023-01-19 | End: 2023-01-24 | Stop reason: HOSPADM

## 2023-01-19 RX ORDER — ACETAMINOPHEN 325 MG/1
650 TABLET ORAL EVERY 6 HOURS PRN
Status: DISCONTINUED | OUTPATIENT
Start: 2023-01-19 | End: 2023-01-24 | Stop reason: HOSPADM

## 2023-01-19 RX ORDER — ASPIRIN 81 MG/1
81 TABLET, CHEWABLE ORAL DAILY
Status: DISCONTINUED | OUTPATIENT
Start: 2023-01-20 | End: 2023-01-24 | Stop reason: HOSPADM

## 2023-01-19 RX ORDER — SODIUM CHLORIDE 9 MG/ML
100 INJECTION, SOLUTION INTRAVENOUS CONTINUOUS
Status: DISCONTINUED | OUTPATIENT
Start: 2023-01-19 | End: 2023-01-23

## 2023-01-19 RX ADMIN — SODIUM CHLORIDE 75 ML/HR: 0.9 INJECTION, SOLUTION INTRAVENOUS at 18:27

## 2023-01-19 RX ADMIN — ATORVASTATIN CALCIUM 40 MG: 40 TABLET, FILM COATED ORAL at 18:54

## 2023-01-19 RX ADMIN — ACETAMINOPHEN 650 MG: 325 TABLET, FILM COATED ORAL at 22:38

## 2023-01-19 RX ADMIN — DONEPEZIL HYDROCHLORIDE 10 MG: 5 TABLET ORAL at 21:04

## 2023-01-19 NOTE — ASSESSMENT & PLAN NOTE
Blood pressure stable on this admission  Typically maintained on lisinopril 5 mg  Orthostatics positive  Plan: Will hold antihypertensives for this admission  Will need to reconsider if patient truly needs them on discharge

## 2023-01-19 NOTE — TELEPHONE ENCOUNTER
FYI ~ Daughter called and said mom (pt) fell again and she is on the way to the [facility] ER with the squad

## 2023-01-19 NOTE — H&P
H&P - Trauma   uLcia Robbins 67 y o  female MRN: [id number]  Unit/Bed#: ED-03 Encounter: [id number]    Trauma Alert: Level B   Model of Arrival: Ambulance    Trauma Team: Attending Dr Wisam Nassar, Residents Ciro and S[facility]  Consultants:     None     Assessment/Plan   Active Problems / Assessment:   Fall out of bed   Hyponatremia (Na 128 on istat)      Plan:   No traumatic injuries on CT head or CT cervical spine   Will obtain CBC, BMP, and UA   Will admit to medicine for syncope and hyponatremia   Will require PT/OT evaluation     History of Present Illness     Chief Complaint: Fall out of bed   Mechanism:Fall     HPI:    Lucia Robbins is a 67 y o  female with PMHX of HTN, DM2 and mild dementia, who presents with EMS status post fall out of bed   reports hearing her fall and walked upstairs to find patient on the ground  Patient was able to stand up on her own but began complaining of neck pain, which prompted  to call EMS  Patient does not remember the incident but denies LOC  EMS reported confusion en route  Additional history obtained from  after initial trauma evaluation who stated that patient has been having near syncopal events for the past few weeks  Review of Systems   Constitutional: Negative for chills and fever  HENT: Negative for congestion  Respiratory: Negative for shortness of breath  Cardiovascular: Negative for chest pain  Gastrointestinal: Negative for abdominal pain  Genitourinary:        Incontinence   Musculoskeletal: Negative for back pain  Skin: Negative for wound  Neurological: Negative for headaches  12-point, complete review of systems was reviewed and negative except as stated above  Historical Information     No past medical history on file  No past surgical history on file  Unable to obtain history due to Dementia         There is no immunization history on file for this patient    Last Tetanus: Unknown   Family History: Non-contributory    1  Before the illness or injury that brought you to the Emergency, did you need someone to help you on a regular basis? 1=Yes   2  Since the illness or injury that brought you to the Emergency, have you needed more help than usual to take care of yourself? 1=Yes   3  Have you been hospitalized for one or more nights during the past 6 months (excluding a stay in the Emergency Department)? 0=No   4  In general, do you see well? 0=Yes   5  In general, do you have serious problems with your memory? 1=Yes   6  Do you take more than three different medications everyday? 1=Yes   TOTAL   4     Did you order a geriatric consult if the score was 2 or greater?: yes     Meds/Allergies   all current active meds have been reviewed  Allergies have not been reviewed; Not on File    Objective   Initial Vitals:   Temperature: 97 8 °F (36 6 °C) (01/19/23 1408)  Pulse: 74 (01/19/23 1408)  Respirations: 18 (01/19/23 1408)  Blood Pressure: 142/95 (01/19/23 1408)    Primary Survey:   Airway:        Status: patent;        Pre-hospital Interventions: none        Hospital Interventions: none  Breathing:        Pre-hospital Interventions: none       Effort: normal       Right breath sounds: normal       Left breath sounds: normal  Circulation:        Rhythm: regular       Rate: regular   Right Pulses Left Pulses    R radial: 2+  R femoral: 2+  R pedal: 2+     L radial: 2+  L femoral: 2+  L pedal: 2+       Disability:        GCS: Eye: 4; Verbal: 5 Motor: 6 Total: 15       Right Pupil: round;  reactive         Left Pupil:  round;  reactive      R Motor Strength L Motor Strength    R : 5/5  R dorsiflex: 5/5  R plantarflex: 5/5 L : 5/5  L dorsiflex: 5/5  L plantarflex: 5/5        Sensory:  No sensory deficit  Exposure:       Completed: Yes      Secondary Survey:  Physical Exam  Constitutional:       General: She is not in acute distress  Appearance: Normal appearance  She is not toxic-appearing     HENT:      Head: Normocephalic and atraumatic  Ears:      Comments: Ecchymosis to external aspect of right ear      Nose: Nose normal       Mouth/Throat:      Mouth: Mucous membranes are moist    Eyes:      Extraocular Movements: Extraocular movements intact  Pupils: Pupils are equal, round, and reactive to light  Neck:      Comments: Cervical collar placed  Cardiovascular:      Rate and Rhythm: Normal rate and regular rhythm  Pulses: Normal pulses  Radial pulses are 2+ on the right side and 2+ on the left side  Femoral pulses are 2+ on the right side and 2+ on the left side  Dorsalis pedis pulses are 2+ on the right side and 2+ on the left side  Heart sounds: Normal heart sounds  No murmur heard  Pulmonary:      Effort: Pulmonary effort is normal  No respiratory distress  Breath sounds: Normal breath sounds  No wheezing  Abdominal:      General: Abdomen is flat  Palpations: Abdomen is soft  Tenderness: There is no abdominal tenderness  Genitourinary:     Comments: Incontinent of urine   Musculoskeletal:         General: No deformity or signs of injury (no external signs of trauma to extremities )  Right lower leg: No edema  Left lower leg: No edema  Comments: No spinous process tenderness, step offs or deformities     Skin:     General: Skin is warm and dry  Capillary Refill: Capillary refill takes less than 2 seconds  Comments: Evidence of sacral irritation and erythema but no ulcers or skin breakdown noted    Neurological:      General: No focal deficit present  Mental Status: She is alert and oriented to person, place, and time  GCS: GCS eye subscore is 4  GCS verbal subscore is 5  GCS motor subscore is 6  Motor: Motor function is intact  No weakness           Invasive Devices     Peripheral Intravenous Line  Duration           Peripheral IV 01/19/23 Right Antecubital <1 day          Drain  Duration           External Urinary Catheter <1 day              Lab Results: Results: I have personally reviewed all pertinent laboratory/tests results    Imaging Results: I have personally reviewed pertinent reports  Chest Xray(s): negative for acute findings   FAST exam(s): negative for acute findings   CT Scan(s): negative for acute findings   Additional Xray(s): N/A     Other Studies:     Code Status: No Order  Advance Directive and Living Will:      Power of :    POLST:    I have spent 60 minutes with Patient and family today in which greater than 50% of this time was spent in counseling/coordination of care regarding trauma alert

## 2023-01-19 NOTE — ASSESSMENT & PLAN NOTE
Patient presents with a sodium of 126   reports her diet consist mostly of tea, toast and diet soda  She has been unsteady on her feet for several months due to dizziness  Patient reports drinking only a couple glasses of water per day  Found to be orthostatic positive in the emergency department  CXR negative for cardiopulmonary pathology  CT negative for intracranial abnormalities  Serum osmolality depressed at 270    Patient appears dry on physical exam   Given the patient's history, her low sodium is most likely in the setting of poor solute intake    Plan:  Urine sodium and urine osmolality ordered  We will start on gentle IV normal saline in the meantime, goal is to correct sodium by 6 to 8 mEq within the first 24 hours  Trend BMP

## 2023-01-19 NOTE — CASE MANAGEMENT
CM responded to trauma alert  Patient was transported into trauma bay by [facility] for evaluation  Patient able to verbally respond to medical team's questions  Per EMS patients  was there when patient was picked up  CM located patients emergency contact from [address], daughter/ [address] [place] [address] [address]; [phone]  Call made to [facility], general update provided  [facility] stating she will be at the hospital in a bit, and she informed her brother Jared Mcintosh, who should be here shortly  [facility] confirmed phone number is good for call backs  Trauma AP aware of above  No current identified CM needs  CM will follow and update screening, assessment, and discharge planning as appropriate

## 2023-01-19 NOTE — H&P
Jaime  H&P- Tim Hull 1950, 67 y o  female MRN: [id number]  Unit/Bed#: ED-03 Encounter: [id number]  Primary Care Provider: Cara Campbell MD   Date and time admitted to hospital: 1/19/2023  2:05 PM    * Postural dizziness with presyncope  Assessment & Plan  Patient brought to the emergency department by her  after being noticeably dizzy in the kitchen this morning  Her symptoms have been occurring for months according to her  however the patient seems indifferent  She has reported poor oral intake with her diet consisting mostly of tea, toast and diet soda  Patient has no prior history of coronary artery disease but did have TIA in 2021  Hemodynamically stable on arrival to the emergency department  No troponins or EKG available however bedside monitor shows her to be in normal sinus rhythm and saturating well on room air  Patient appears dry on physical exam   Orthostatics are positive  No focal neurological deficits identified  Trauma work-up negative  CT head negative for intracranial pathology  CXR negative  Given patient's history, her symptoms are most likely vasovagal in nature secondary to poor oral intake  will continue to work-up for orthostatic hypotension versus cardiogenic syncope  Plan:  EKG ordered  Troponins ordered  IV hydration  TSH levels    Ambulatory dysfunction  Assessment & Plan  Patient is reportedly very unsteady on her feet  Uses a walker to ambulate in her two-story home  Has a history of multiple prior falls  She has been complaining of dizziness for several months  Plan:  PT eval and treat -recommendations are appreciated  Case management consultation-recommendations are appreciated  Fall precautions  Up with assistance    Hypertension  Assessment & Plan  Blood pressure stable on this admission  Typically maintained on lisinopril 5 mg  Orthostatics positive  Plan:   Will hold antihypertensives for this admission  Will need to reconsider if patient truly needs them on discharge  Prediabetes  Assessment & Plan  Mealtime sliding scale insulin  Hypoglycemia protocol  Diabetic diet    Hyponatremia  Assessment & Plan  Patient presents with a sodium of 126   reports her diet consist mostly of tea, toast and diet soda  She has been unsteady on her feet for several months due to dizziness  Patient reports drinking only a couple glasses of water per day  Found to be orthostatic positive in the emergency department  CXR negative for cardiopulmonary pathology  CT negative for intracranial abnormalities  Serum osmolality depressed at 270  Patient appears dry on physical exam   Given the patient's history, her low sodium is most likely in the setting of poor solute intake    Plan:  Urine sodium and urine osmolality ordered  We will start on gentle IV normal saline in the meantime, goal is to correct sodium by 6 to 8 mEq within the first 24 hours  Trend BMP          VTE Pharmacologic Prophylaxis: VTE Score: 3 Moderate Risk (Score 3-4) - Pharmacological DVT Prophylaxis Ordered: enoxaparin (Lovenox)  Code Status: No Order   Discussion with family: Updated  () at bedside  Anticipated Length of Stay: Patient will be admitted on an observation basis with an anticipated length of stay of less than 2 midnights secondary to Orthostatic hypotension  Chief Complaint: Dizziness    History of Present Illness:  Chapin Jay is a 67 y o  female with a PMH of prediabetes, hypertension, and TIA who is being brought to the emergency department by her  due to concerns over dizziness  Patient is rather indifferent to her symptoms and the majority of the history was collected from the   He reports today that he was calling up to her on the second floor of their home and she took a rather long time for her to respond    Patient eventually came downstairs to the kitchen to make breakfast and was seen propping herself up on the countertop and struggling to maintain her balance prompting her  to bring her to the emergency department  Patient did apparently hit the ground during that episode  The patient's  states that the symptoms have been persistent over the last several months and she has sustained multiple falls  Patient gets around her home with a walker  She has no prior history of myocardial infarction but did have a TIA in 2021 with residual issues with her memory  Her  reports that the patient's diet is very poor and mostly consists of tea, toast and diet sodas  Patient also apparently loves to chew gum and cough drops prior to falling asleep which worries him about her choking  Other than her dizziness she has had no other complaints  Patient denies any fever, headache, chills, chest pain, respiratory distress, sick contacts, difficulty in urination or constipation  Her  does note that she frequently has diarrhea at nighttime  On arrival to the emergency department patient was hemodynamically stable and saturating well on room air  Bedside monitor showed normal sinus rhythm and troponins were negative  Orthostatics, however, was strongly positive  Initial blood work was also remarkable for hyponatremia of 126  Trauma work-up was negative  Patient is being admitted for further management of orthostatic hypotension and hyponatremia  Review of Systems:  Pertinent positives and negatives of the review of systems are mentioned in the HPI  The review of systems is otherwise negative  Past Medical and Surgical History:   No past medical history on file  No past surgical history on file  Meds/Allergies:  Prior to Admission medications    Not on File     I have reviewed home medications using recent Epic encounter      Allergies: Not on File    Social History:  Marital Status: /Civil Union   Occupation:   Patient Pre-hospital Living Situation: Home  Patient Pre-hospital Level of Mobility: walks with walker  Patient Pre-hospital Diet Restrictions:   Substance Use History:   Social History     Substance and Sexual Activity   Alcohol Use Not on file     Social History     Tobacco Use   Smoking Status Not on file   Smokeless Tobacco Not on file     Social History     Substance and Sexual Activity   Drug Use Not on file       Family History:  No family history on file  Physical Exam:     Vitals:   Blood Pressure: 91/51 (01/19/23 1635)  Pulse: 73 (01/19/23 1635)  Temperature: 97 8 °F (36 6 °C) (01/19/23 1408)  Temp Source: Oral (01/19/23 1408)  Respirations: 18 (01/19/23 1631)  Weight - Scale: 58 2 kg (128 lb 4 9 oz) (01/19/23 1408)  SpO2: 96 % (01/19/23 1635)    Physical Exam  Vitals and nursing note reviewed  Constitutional:       General: She is not in acute distress  Appearance: She is well-developed  HENT:      Head: Normocephalic and atraumatic  Mouth/Throat:      Mouth: Mucous membranes are moist       Pharynx: Oropharynx is clear  No oropharyngeal exudate  Eyes:      General: No scleral icterus  Extraocular Movements: Extraocular movements intact  Conjunctiva/sclera: Conjunctivae normal       Pupils: Pupils are equal, round, and reactive to light  Cardiovascular:      Rate and Rhythm: Normal rate and regular rhythm  Heart sounds: No murmur heard  Comments: Pulses weak  Pulmonary:      Effort: Pulmonary effort is normal  No respiratory distress  Breath sounds: Normal breath sounds  Abdominal:      General: Abdomen is flat  Bowel sounds are normal  There is no distension  Palpations: Abdomen is soft  Tenderness: There is no abdominal tenderness  There is no guarding  Musculoskeletal:         General: No swelling  Cervical back: Normal range of motion and neck supple  Right lower leg: No edema  Left lower leg: No edema        Comments: Lower extremity skin appears dry and redundant   Skin:     General: Skin is warm and dry  Capillary Refill: Capillary refill takes less than 2 seconds  Coloration: Skin is not jaundiced  Findings: No erythema or rash  Neurological:      General: No focal deficit present  Mental Status: She is alert and oriented to person, place, and time  Mental status is at baseline  Cranial Nerves: No cranial nerve deficit  Sensory: No sensory deficit  Motor: No weakness  Psychiatric:         Mood and Affect: Mood normal          Behavior: Behavior normal           Additional Data:     Lab Results:  Results from last 7 days   Lab Units 01/19/23  1459   WBC Thousand/uL 10 78*   HEMOGLOBIN g/dL 11 4*   HEMATOCRIT % 33 4*   PLATELETS Thousands/uL 312   NEUTROS PCT % 81*   LYMPHS PCT % 12*   MONOS PCT % 6   EOS PCT % 1     Results from last 7 days   Lab Units 01/19/23  1459   SODIUM mmol/L 126*   POTASSIUM mmol/L 3 9   CHLORIDE mmol/L 92*   CO2 mmol/L 24   BUN mg/dL 11   CREATININE mg/dL 1 28   ANION GAP mmol/L 10   CALCIUM mg/dL 9 7   GLUCOSE RANDOM mg/dL 183*                       Lines/Drains:  Invasive Devices     Peripheral Intravenous Line  Duration           Peripheral IV 01/19/23 Right Antecubital <1 day          Drain  Duration           External Urinary Catheter <1 day                    Imaging: Reviewed radiology reports from this admission including: chest xray, CT head and CT spine  TRAUMA - CT head wo contrast   Final Result by Matthew Thorne MD (01/19 1447)      No acute intracranial abnormality  I personally discussed this study with Jarod Hathaway on 1/19/2023 at 2:47 PM             Workstation performed: [id number]         TRAUMA - CT spine cervical wo contrast   Final Result by Matthew Thorne MD (01/19 1447)      No cervical spine fracture or traumatic malalignment                  I personally discussed this study with Jarod Hathaway on 1/19/2023 at 2:47 PM  Workstation performed: [id number]         XR Trauma multiple (SLB/SLRA trauma bay ONLY)   Final Result by Víctor Ruby MD (01/19 1432)      No supine radiographic evidence of acute thoracic injury  Workstation performed: [id number]         XR chest 1 view   Final Result by Víctor Ruby MD (01/19 145)      No supine radiographic evidence of acute thoracic injury  Workstation performed: [id number]             EKG and Other Studies Reviewed on Admission:   · EKG: Reviewed  ** Please Note: This note has been constructed using a voice recognition system   **

## 2023-01-19 NOTE — ED NOTES
Pt given dinner meal at this time    Arminda Bravo, [facility]  01/19/23 6982

## 2023-01-19 NOTE — ASSESSMENT & PLAN NOTE
Patient brought to the emergency department by her  after being noticeably dizzy in the kitchen this morning  Her symptoms have been occurring for months according to her  however the patient seems indifferent  She has reported poor oral intake with her diet consisting mostly of tea, toast and diet soda  Patient has no prior history of coronary artery disease but did have TIA in 2021  Hemodynamically stable on arrival to the emergency department  No troponins or EKG available however bedside monitor shows her to be in normal sinus rhythm and saturating well on room air  Patient appears dry on physical exam   Orthostatics are positive  No focal neurological deficits identified  Trauma work-up negative  CT head negative for intracranial pathology  CXR negative  Given patient's history, her symptoms are most likely vasovagal in nature secondary to poor oral intake  will continue to work-up for orthostatic hypotension versus cardiogenic syncope      Plan:  EKG ordered  Troponins ordered  IV hydration  TSH levels

## 2023-01-19 NOTE — ASSESSMENT & PLAN NOTE
Patient is reportedly very unsteady on her feet  Uses a walker to ambulate in her two-story home  Has a history of multiple prior falls  She has been complaining of dizziness for several months      Plan:  PT eval and treat -recommendations are appreciated  Case management consultation-recommendations are appreciated  Fall precautions  Up with assistance

## 2023-01-20 ENCOUNTER — TELEPHONE (OUTPATIENT)
Dept: FAMILY MEDICINE CLINIC | Facility: OTHER | Age: 73
End: 2023-01-20

## 2023-01-20 LAB
4HR DELTA HS TROPONIN: 3 NG/L
4HR DELTA HS TROPONIN: 3 NG/L
ANION GAP SERPL CALCULATED.3IONS-SCNC: 7 MMOL/L (ref 4–13)
ANION GAP SERPL CALCULATED.3IONS-SCNC: 7 MMOL/L (ref 4–13)
ANION GAP SERPL CALCULATED.3IONS-SCNC: 8 MMOL/L (ref 4–13)
ANION GAP SERPL CALCULATED.3IONS-SCNC: 8 MMOL/L (ref 4–13)
ATRIAL RATE: 69 BPM
ATRIAL RATE: 69 BPM
BACTERIA UR QL AUTO: ABNORMAL /HPF
BACTERIA UR QL AUTO: ABNORMAL /HPF
BILIRUB UR QL STRIP: NEGATIVE
BILIRUB UR QL STRIP: NEGATIVE
BUN SERPL-MCNC: 12 MG/DL (ref 5–25)
BUN SERPL-MCNC: 12 MG/DL (ref 5–25)
BUN SERPL-MCNC: 13 MG/DL (ref 5–25)
BUN SERPL-MCNC: 13 MG/DL (ref 5–25)
CALCIUM SERPL-MCNC: 8.6 MG/DL (ref 8.4–10.2)
CALCIUM SERPL-MCNC: 8.6 MG/DL (ref 8.4–10.2)
CALCIUM SERPL-MCNC: 8.8 MG/DL (ref 8.4–10.2)
CALCIUM SERPL-MCNC: 8.8 MG/DL (ref 8.4–10.2)
CARDIAC TROPONIN I PNL SERPL HS: 8 NG/L
CARDIAC TROPONIN I PNL SERPL HS: 8 NG/L
CHLORIDE SERPL-SCNC: 95 MMOL/L (ref 96–108)
CHLORIDE SERPL-SCNC: 95 MMOL/L (ref 96–108)
CHLORIDE SERPL-SCNC: 98 MMOL/L (ref 96–108)
CHLORIDE SERPL-SCNC: 98 MMOL/L (ref 96–108)
CLARITY UR: ABNORMAL
CLARITY UR: ABNORMAL
CO2 SERPL-SCNC: 24 MMOL/L (ref 21–32)
COLOR UR: ABNORMAL
COLOR UR: ABNORMAL
CREAT SERPL-MCNC: 0.73 MG/DL (ref 0.6–1.3)
CREAT SERPL-MCNC: 0.73 MG/DL (ref 0.6–1.3)
CREAT SERPL-MCNC: 0.92 MG/DL (ref 0.6–1.3)
CREAT SERPL-MCNC: 0.92 MG/DL (ref 0.6–1.3)
ERYTHROCYTE [DISTWIDTH] IN BLOOD BY AUTOMATED COUNT: 12.9 % (ref 11.6–15.1)
ERYTHROCYTE [DISTWIDTH] IN BLOOD BY AUTOMATED COUNT: 12.9 % (ref 11.6–15.1)
GFR SERPL CREATININE-BSD FRML MDRD: 61 ML/MIN/1.73SQ M
GFR SERPL CREATININE-BSD FRML MDRD: 61 ML/MIN/1.73SQ M
GFR SERPL CREATININE-BSD FRML MDRD: 81 ML/MIN/1.73SQ M
GFR SERPL CREATININE-BSD FRML MDRD: 81 ML/MIN/1.73SQ M
GLUCOSE SERPL-MCNC: 114 MG/DL (ref 65–140)
GLUCOSE SERPL-MCNC: 114 MG/DL (ref 65–140)
GLUCOSE SERPL-MCNC: 129 MG/DL (ref 65–140)
GLUCOSE SERPL-MCNC: 129 MG/DL (ref 65–140)
GLUCOSE SERPL-MCNC: 132 MG/DL (ref 65–140)
GLUCOSE SERPL-MCNC: 132 MG/DL (ref 65–140)
GLUCOSE SERPL-MCNC: 166 MG/DL (ref 65–140)
GLUCOSE SERPL-MCNC: 166 MG/DL (ref 65–140)
GLUCOSE SERPL-MCNC: 91 MG/DL (ref 65–140)
GLUCOSE SERPL-MCNC: 91 MG/DL (ref 65–140)
GLUCOSE SERPL-MCNC: 96 MG/DL (ref 65–140)
GLUCOSE SERPL-MCNC: 96 MG/DL (ref 65–140)
GLUCOSE UR STRIP-MCNC: NEGATIVE MG/DL
GLUCOSE UR STRIP-MCNC: NEGATIVE MG/DL
HCT VFR BLD AUTO: 29.4 % (ref 34.8–46.1)
HCT VFR BLD AUTO: 29.4 % (ref 34.8–46.1)
HGB BLD-MCNC: 10.3 G/DL (ref 11.5–15.4)
HGB BLD-MCNC: 10.3 G/DL (ref 11.5–15.4)
HGB UR QL STRIP.AUTO: NEGATIVE
HGB UR QL STRIP.AUTO: NEGATIVE
HYALINE CASTS #/AREA URNS LPF: ABNORMAL /LPF
HYALINE CASTS #/AREA URNS LPF: ABNORMAL /LPF
KETONES UR STRIP-MCNC: NEGATIVE MG/DL
KETONES UR STRIP-MCNC: NEGATIVE MG/DL
LEUKOCYTE ESTERASE UR QL STRIP: ABNORMAL
LEUKOCYTE ESTERASE UR QL STRIP: ABNORMAL
MCH RBC QN AUTO: 32.2 PG (ref 26.8–34.3)
MCH RBC QN AUTO: 32.2 PG (ref 26.8–34.3)
MCHC RBC AUTO-ENTMCNC: 35 G/DL (ref 31.4–37.4)
MCHC RBC AUTO-ENTMCNC: 35 G/DL (ref 31.4–37.4)
MCV RBC AUTO: 92 FL (ref 82–98)
MCV RBC AUTO: 92 FL (ref 82–98)
NITRITE UR QL STRIP: POSITIVE
NITRITE UR QL STRIP: POSITIVE
NON-SQ EPI CELLS URNS QL MICRO: ABNORMAL /HPF
NON-SQ EPI CELLS URNS QL MICRO: ABNORMAL /HPF
OSMOLALITY UR: 268 MMOL/KG
OSMOLALITY UR: 268 MMOL/KG
P AXIS: -4 DEGREES
P AXIS: -4 DEGREES
PH UR STRIP.AUTO: 5.5 [PH]
PH UR STRIP.AUTO: 5.5 [PH]
PLATELET # BLD AUTO: 254 THOUSANDS/UL (ref 149–390)
PLATELET # BLD AUTO: 254 THOUSANDS/UL (ref 149–390)
PMV BLD AUTO: 8.5 FL (ref 8.9–12.7)
PMV BLD AUTO: 8.5 FL (ref 8.9–12.7)
POTASSIUM SERPL-SCNC: 3.7 MMOL/L (ref 3.5–5.3)
POTASSIUM SERPL-SCNC: 3.7 MMOL/L (ref 3.5–5.3)
POTASSIUM SERPL-SCNC: 4 MMOL/L (ref 3.5–5.3)
POTASSIUM SERPL-SCNC: 4 MMOL/L (ref 3.5–5.3)
PR INTERVAL: 138 MS
PR INTERVAL: 138 MS
PROT UR STRIP-MCNC: ABNORMAL MG/DL
PROT UR STRIP-MCNC: ABNORMAL MG/DL
QRS AXIS: 53 DEGREES
QRS AXIS: 53 DEGREES
QRSD INTERVAL: 82 MS
QRSD INTERVAL: 82 MS
QT INTERVAL: 406 MS
QT INTERVAL: 406 MS
QTC INTERVAL: 435 MS
QTC INTERVAL: 435 MS
RBC # BLD AUTO: 3.2 MILLION/UL (ref 3.81–5.12)
RBC # BLD AUTO: 3.2 MILLION/UL (ref 3.81–5.12)
RBC #/AREA URNS AUTO: ABNORMAL /HPF
RBC #/AREA URNS AUTO: ABNORMAL /HPF
SODIUM 24H UR-SCNC: 23 MOL/L
SODIUM 24H UR-SCNC: 23 MOL/L
SODIUM SERPL-SCNC: 126 MMOL/L (ref 135–147)
SODIUM SERPL-SCNC: 126 MMOL/L (ref 135–147)
SODIUM SERPL-SCNC: 130 MMOL/L (ref 135–147)
SODIUM SERPL-SCNC: 130 MMOL/L (ref 135–147)
SP GR UR STRIP.AUTO: 1.01 (ref 1–1.03)
SP GR UR STRIP.AUTO: 1.01 (ref 1–1.03)
T WAVE AXIS: -76 DEGREES
T WAVE AXIS: -76 DEGREES
UROBILINOGEN UR STRIP-ACNC: <2 MG/DL
UROBILINOGEN UR STRIP-ACNC: <2 MG/DL
VENTRICULAR RATE: 69 BPM
VENTRICULAR RATE: 69 BPM
WBC # BLD AUTO: 8.69 THOUSAND/UL (ref 4.31–10.16)
WBC # BLD AUTO: 8.69 THOUSAND/UL (ref 4.31–10.16)
WBC #/AREA URNS AUTO: ABNORMAL /HPF
WBC #/AREA URNS AUTO: ABNORMAL /HPF

## 2023-01-20 RX ORDER — MIRTAZAPINE 15 MG/1
15 TABLET, FILM COATED ORAL
Status: DISCONTINUED | OUTPATIENT
Start: 2023-01-20 | End: 2023-01-24 | Stop reason: HOSPADM

## 2023-01-20 RX ADMIN — INSULIN LISPRO 1 UNITS: 100 INJECTION, SOLUTION INTRAVENOUS; SUBCUTANEOUS at 17:24

## 2023-01-20 RX ADMIN — ATORVASTATIN CALCIUM 40 MG: 40 TABLET, FILM COATED ORAL at 17:24

## 2023-01-20 RX ADMIN — ASPIRIN 81 MG: 81 TABLET, CHEWABLE ORAL at 10:00

## 2023-01-20 RX ADMIN — ACETAMINOPHEN 650 MG: 325 TABLET, FILM COATED ORAL at 21:40

## 2023-01-20 RX ADMIN — MIRTAZAPINE 15 MG: 15 TABLET, FILM COATED ORAL at 21:40

## 2023-01-20 RX ADMIN — DONEPEZIL HYDROCHLORIDE 10 MG: 5 TABLET ORAL at 21:40

## 2023-01-20 RX ADMIN — ENOXAPARIN SODIUM 40 MG: 40 INJECTION SUBCUTANEOUS at 10:00

## 2023-01-20 NOTE — UTILIZATION REVIEW
Initial Clinical Review    Admission: Date/Time/Statement:   Admission Orders (From admission, onward)     Ordered        01/19/23 1721  Inpatient Admission  Once                      Orders Placed This Encounter   Procedures   • Inpatient Admission     Standing Status:   Standing     Number of Occurrences:   1     Order Specific Question:   Level of Care     Answer:   Med Surg [16]     Order Specific Question:   Estimated length of stay     Answer:   More than 2 Midnights     Order Specific Question:   Certification     Answer:   I certify that inpatient services are medically necessary for this patient for a duration of greater than two midnights  See H&P and MD Progress Notes for additional information about the patient's course of treatment  ED Arrival Information     Expected   -    Arrival   1/19/2023 14:05    Acuity   Emergent            Means of arrival   Ambulance    Escorted by   [facility]    Service   Hospitalist    Admission type   [address] complaint   Trauma B           Chief Complaint   Patient presents with   • Trauma       Initial Presentation: 67 y o  female with a PMH of prediabetes, hypertension, and TIA who is being brought to the emergency department by her  due to concerns over dizziness  Patient is rather indifferent to her symptoms and the majority of the history was collected from the   He reports today that he was calling up to her on the second floor of their home and she took a rather long time for her to respond  Patient eventually came downstairs to the kitchen to make breakfast and was seen propping herself up on the countertop and struggling to maintain her balance prompting her  to bring her to the emergency department  Patient did apparently hit the ground during that episode  The patient's  states that the symptoms have been persistent over the last several months and she has sustained multiple falls  Patient gets around her home with a walker  Her  reports that the patient's diet is very poor and mostly consists of tea, toast and diet sodas  On arrival to the emergency department patient was hemodynamically stable and saturating well on room air  Bedside monitor showed normal sinus rhythm and troponins were negative  Orthostatics, however, was strongly positive  Initial blood work was also remarkable for hyponatremia of 126  Plan: Inpatient admission for evaluation and treatment of Postural dizziness with presyncope, ambulatory dysfunction, HTN, prediabetes, hyponatremia: troponins, IV fluids, TSH levels, PT eval, fall precautions, hold antihypertensives, SSI, diabetic diet, urine sodium and urine osmolality, trend BMP  Date: 1/20   Day 2:     Trauma: S/p syncope and collapse, Right ear auricle hematoma  Recommend ENT consult for auricular hematoma drainage  Trauma service signing off  Internal medicine: She says she does not feel dizzy when sitting, only when she gets up to walk around  She says she is eating and drinking okay, and drinks about a cup or two of water a day  Continue IV fluids  Will hold antihypertensives for this admission  Will need to reconsider if patient truly needs them on discharge  Continue diabetic diet  Fall precautions  CM on consult for discharge dispo       ED Triage Vitals   Temperature Pulse Respirations Blood Pressure SpO2   01/19/23 1408 01/19/23 1408 01/19/23 1408 01/19/23 1408 01/19/23 1408   97 8 °F (36 6 °C) 74 18 142/95 98 %      Temp Source Heart Rate Source Patient Position - Orthostatic VS BP Location FiO2 (%)   01/19/23 1408 01/19/23 1408 01/19/23 1430 01/19/23 1430 --   Oral Monitor Lying Left arm       Pain Score       01/19/23 1430       No Pain          Wt Readings from Last 1 Encounters:   01/19/23 58 2 kg (128 lb 4 9 oz)     Additional Vital Signs:     Date/Time Temp Pulse Resp BP MAP (mmHg) SpO2 O2 Device   01/20/23 07:36:37 98 4 °F (36 9 °C) 74 -- 159/69 99 100 % --   01/19/23 20:30:49 98 2 °F (36 8 °C) 78 -- 141/62 88 99 % --   01/19/23 1858 -- 85 18 168/72 -- 99 % None (Room air)   01/19/23 1815 -- 78 18 144/65 93 97 % None (Room air)   01/19/23 1800 -- 76 18 152/67 96 97 % None (Room air)   01/19/23 1745 -- 82 -- 152/65 94 98 % --   01/19/23 1730 -- 77 18 142/66 -- 97 % --   01/19/23 1715 -- 75 -- 161/67 97 99 % --   01/19/23 1700 -- 75 18 141/65 -- 98 % --   01/19/23 1645 -- 70 18 134/64 -- 97 % None (Room air)   01/19/23 1635 -- 73 -- 91/51 -- 96 % --   01/19/23 1631 -- 73 18 -- -- 98 % None (Room air)   01/19/23 1626 -- 98 18 91/51 -- -- --   01/19/23 1624 -- 93 18 87/49 Abnormal  -- 98 % --   01/19/23 1622 -- 86 18 121/57 -- 99 % None (Room air)   01/19/23 1621 -- 71 18 147/65 -- 99 % None (Room air)   01/19/23 1619 -- 74 -- -- -- 99 % --   01/19/23 1615 -- 73 18 145/63 -- 99 % None (Room air)   01/19/23 1605 -- 75 -- -- -- 99 % --   01/19/23 1600 -- 70 18 121/59 -- 95 % --   01/19/23 1555 -- 70 -- -- -- 96 % --   01/19/23 1545 -- 73 18 137/64 -- 98 % --   01/19/23 1530 -- 71 18 122/72 -- 99 % None (Room air)   01/19/23 1520 -- 69 -- -- -- 99 % --   01/19/23 1515 -- 67 18 121/80 -- 97 % None (Room air)   01/19/23 1500 -- 68 18  117/58 84 99 % None (Room air)   01/19/23 1447 -- 70 18 121/58 83 98 % None (Room air)   01/19/23 1445 -- 71 18 131/56 80 98 % None (Room air)   01/19/23 14:43:04 -- 74 18 132/62 -- 98 % None (Room air)   01/19/23 1440 -- -- -- -- -- -- None (Room air)   01/19/23 1430 -- 75 18 137/55 79 97 % None (Room air)   01/19/23 1415 -- 72 18 135/63 -- 98 % None (Room air)     Pertinent Labs/Diagnostic Test Results:   TRAUMA - CT head wo contrast   Final Result by Bernadette Whitley MD (01/19 1447)      No acute intracranial abnormality        I personally discussed this study with Yon Eason on 1/19/2023 at 2:47 PM             Workstation performed: [id number]         TRAUMA - CT spine cervical wo contrast   Final Result by Leo Romo MD (01/19 5427)      No cervical spine fracture or traumatic malalignment  I personally discussed this study with Ren Erickson on 1/19/2023 at 2:47 PM                    Workstation performed: [id number]         XR Trauma multiple (SLB/[facility]RA trauma bay ONLY)   Final Result by Leo Romo MD (01/19 1432)      No supine radiographic evidence of acute thoracic injury  Workstation performed: [id number]         XR chest 1 view   Final Result by Leo Romo MD (01/19 1456)      No supine radiographic evidence of acute thoracic injury                 Workstation performed: [id number]           1/19 EKG:  Normal sinus rhythm  T wave abnormality, consider anterolateral ischemia  Abnormal ECG  No previous ECGs available      Results from last 7 days   Lab Units 01/20/23  0442 01/19/23  1459 01/19/23  1419   WBC Thousand/uL 8 69 10 78*  --    HEMOGLOBIN g/dL 10 3* 11 4*  --    I STAT HEMOGLOBIN g/dl  --   --  11 9   HEMATOCRIT % 29 4* 33 4*  --    HEMATOCRIT, ISTAT %  --   --  35   PLATELETS Thousands/uL 254 312  --    NEUTROS ABS Thousands/µL  --  8 57*  --          Results from last 7 days   Lab Units 01/20/23  0442 01/19/23  1459 01/19/23  1419   SODIUM mmol/L 126* 126*  --    POTASSIUM mmol/L 4 0 3 9  --    CHLORIDE mmol/L 95* 92*  --    CO2 mmol/L 24 24  --    CO2, I-STAT mmol/L  --   --  26   ANION GAP mmol/L 7 10  --    BUN mg/dL 12 11  --    CREATININE mg/dL 0 92 1 28  --    EGFR ml/min/1 73sq m 61 41  --    CALCIUM mg/dL 8 8 9 7  --    CALCIUM, IONIZED, ISTAT mmol/L  --   --  1 21         Results from last 7 days   Lab Units 01/20/23  1119 01/20/23  0753 01/19/23  2140 01/19/23  1850   POC GLUCOSE mg/dl 129 96 147* 141*     Results from last 7 days   Lab Units 01/20/23  0442 01/19/23  1459   GLUCOSE RANDOM mg/dL 91 183*     Results from last 7 days   Lab Units 01/19/23  1613   OSMOLALITY, SERUM mmol/*         Results from last 7 days   Lab Units 01/19/23  1419   PH, KAREN I-STAT  7 376   PCO2, KAREN ISTAT mm HG 42 5   PO2, KAREN ISTAT mm HG 25 0*   HCO3, KAREN ISTAT mmol/L 25 0   I STAT BASE EXC mmol/L 0   I STAT O2 SAT % 45*         Results from last 7 days   Lab Units 01/20/23  0002 01/19/23  1824 01/19/23  1613   HS TNI 0HR ng/L  --   --  5   HS TNI 2HR ng/L  --  5  --    HSTNI D2 ng/L  --  0  --    HS TNI 4HR ng/L 8  --   --    HSTNI D4 ng/L 3  --   --              Results from last 7 days   Lab Units 01/19/23  1613   TSH 3RD GENERATON uIU/mL 2 639         Results from last 7 days   Lab Units 01/19/23  1613   OSMOLALITY, SERUM mmol/*         ED Treatment:   Medication Administration from 01/19/2023 1402 to 01/19/2023 1958       Date/Time Order Dose Route Action     01/19/2023 1827 EST sodium chloride 0 9 % infusion 75 mL/hr Intravenous New Bag     01/19/2023 1854 EST atorvastatin (LIPITOR) tablet 40 mg 40 mg Oral Given        History reviewed  No pertinent past medical history  Present on Admission:  • Postural dizziness with presyncope      Admitting Diagnosis: Syncope and collapse [R55]  Hyponatremia [E87 1]  Unspecified multiple injuries, initial encounter [T07  XXXA]  Age/Sex: 67 y o  female  Admission Orders:  Scheduled Medications:  aspirin, 81 mg, Oral, Daily  atorvastatin, 40 mg, Oral, Daily With Dinner  donepezil, 10 mg, Oral, HS  enoxaparin, 40 mg, Subcutaneous, Daily  insulin lispro, 1-5 Units, Subcutaneous, TID AC  mirtazapine, 15 mg, Oral, HS      Continuous IV Infusions:  sodium chloride, 100 mL/hr, Intravenous, Continuous      PRN Meds:  acetaminophen, 650 mg, Oral, Q6H PRN        IP CONSULT TO NUTRITION SERVICES  IP CONSULT TO ENT    Network Utilization Review Department  ATTENTION: Please call with any questions or concerns to [phone] and carefully listen to the prompts so that you are directed to the right person   All voicemails are confidential   Dandy Ochoa all requests for admission clinical reviews, approved or denied determinations and any other requests to dedicated fax number below belonging to the campus where the patient is receiving treatment   List of dedicated fax numbers for the Facilities:  [address] [place] [address] DENIALS (Administrative/Medical Necessity) [phone]   [address] [place] [address] (Maternity/NICU/Pediatrics) [phone] [address] [phone]   [facility] [address] [phone]   [address] 28 [phone]   1558 [facility] 134 815 [facility] [phone]

## 2023-01-20 NOTE — PHYSICAL THERAPY NOTE
PHYSICAL THERAPY EVALUATION NOTE    Patient Name: Helen Segura  YMB[facility] Date: 1/20/2023  AGE:   67 y o  Mrn:   [id number]  ADMIT DX:  Syncope and collapse [R55]  Hyponatremia [E87 1]  Unspecified multiple injuries, initial encounter [T07  XXXA]    History reviewed  No pertinent past medical history  Length Of Stay: 1  PHYSICAL THERAPY EVALUATION :    01/20/23 0905   PT Last Visit   PT Visit Date 01/20/23   Pain Assessment   Pain Assessment Tool 0-10   Pain Score No Pain   Restrictions/Precautions   Other Precautions Chair Alarm; Bed Alarm;Cognitive; Fall Risk;Multiple lines  (Masimo)   Home Living   Type of [address] [place] [address] Ave Two level;1/2 bath on main level;Bed/bath upstairs; Other (Comment)  (1 RAO)   Additional Comments lives w/ spouse  ambulates w/o device  owns walker (which spouse uses)  independent w/ ADLs and IADLs  1 fall in last 6 months  General   Additional Pertinent History 1/19/23 at 16:35 blood pressure was 91/51 and at 16:24 was 87/49  Family/Caregiver Present No   Cognition   Arousal/Participation Alert   Orientation Level Oriented to person; Other (Comment)  (pt was identiifed w/ full name, birth date)   Following Commands Follows one step commands with increased time or repetition   Subjective   Subjective pt seen supine in bed  agreed to PT eval  denied pain or dizziness  occasional cues were needed for task focus  RUE Assessment   RUE Assessment WFL   LUE Assessment   LUE Assessment WFL   RLE Assessment   RLE Assessment WFL  (3+ to 4-/5)   LLE Assessment   LLE Assessment WFL  (3+ to 4-/5)   Vision-Basic Assessment   Current Vision Wears glasses only for reading   Light Touch   RLE Light Touch Grossly intact   LLE Light Touch Grossly intact   Bed Mobility   Supine to Sit 5  Supervision   Additional items HOB elevated; Bedrails; Increased time required   Transfers   Sit to Stand 5  Supervision Additional items Increased time required;Verbal cues  (for LE positioning)   Stand to Sit 5  Supervision   Additional items Increased time required;Verbal cues  (for body positioning)   Ambulation/Elevation   Gait pattern Foward flexed; Short stride; Excessively slow   Gait Assistance 5  Supervision   Additional items Verbal cues  (for full step length)   Assistive Device None   Distance 140 feet x2 w/ standing rest break  (additional not possible due to fatigue)   Stair Management Assistance 5  Supervision   Additional items Verbal cues; Increased time required  (for foot clearance)   Stair Management Technique One rail R;Step to pattern   Number of Stairs 5   Balance   Static Sitting Good   Dynamic Sitting Fair   Static Standing Fair -   Ambulatory Fair -   Activity Tolerance   Activity Tolerance Patient limited by fatigue   Nurse Made Aware spoke to [address] [place] [address]   Assessment   Problem List Decreased strength;Decreased endurance; Impaired balance;Decreased mobility; Decreased safety awareness   Assessment Pt was brought to the emergency department by her  due to concerns over dizziness  Dx: HTN, prediabetes, hyponatremia, ambulatory dysfunction, syncope and collapse, and right ear auricle hematoma  order placed for PT eval and tx, w/ activity order of up w/ assist and fall precautions  pt presents w/ comorbidities of HTN, carotid artery stenosis, DM, CKD, hyperlipidemia, and TIA and personal factors of advanced age, living in 2 story house, stair(s) to enter home and positive fall history  pt presents w/ weakness, decreased endurance, impaired balance, gait deviations, decreased safety awareness and fall risk   these impairments are evident in findings from physical examination (weakness), mobility assessment (need for standby assist w/ all phases of mobility when usually mobilizing independently, tolerance to only 140 feet of ambulation and need for cueing for mobility technique), and Barthel Index: 70/100 and Comfortable Gait Speed: 0 37 m/s (less than 1 0 m/s indicates need for intervention to address falls risk)  pt needed input for task focus and mobility technique  pt is at risk for falls due to physical and safety awareness deficits  pt's clinical presentation is unstable/unpredictable (evident in poor blood pressure control, need for assist w/ all phases of mobility when usually mobilizing independently, tolerance to only 140 feet of ambulation and need for input for mobility technique/safety)  pt needs inpatient PT tx to improve mobility deficits and progress mobility training as appropriate  discharge recommendation is for home w/ family support and home PT to reduce fall risk and maximize level of functional independence  Goals   Patient Goals get home  STG Expiration Date 01/30/23   Short Term Goal #1 pt will: Increase bilateral LE strength 1/2 grade to facilitate independent mobility, Perform bed mobility independently to increase level of independence, Perform all transfers independently to improve independence, Ambulate 300 ft  without assistive device independently w/o LOB to improve functional independence, Navigate 10 stair(s) independently with unilateral handrail to facilitate return to previous living environment, Increase ambulatory balance 1/2 grade to decrease risk for falls, Complete exercise program independently to increase strength and endurance, Tolerate 3 hr OOB to faciliate upright tolerance, Improve gait speed to 0 47 m/s to reduce fall risk and increase independence and Improve Barthel Index score to 90 or greater to facilitate independence   PT Treatment Day 1   Plan   Treatment/Interventions Functional transfer training;LE strengthening/ROM; Elevations; Endurance training; Therapeutic exercise;Patient/family training;Equipment eval/education;Gait training;Bed mobility   PT Frequency 2-3x/wk   Recommendation   PT Discharge Recommendation Home with home health rehabilitation AM-PAC Basic Mobility Inpatient   Turning in Flat Bed Without Bedrails 4   Lying on Back to Sitting on Edge of Flat Bed Without Bedrails 3   Moving Bed to Chair 3   Standing Up From Chair Using Arms 3   Walk in Room 3   Climb 3-5 Stairs With Railing 3   Basic Mobility Inpatient Raw Score 19   Basic Mobility Standardized Score 42 48   Highest Level Of Mobility   -HL Goal 6: Walk 10 steps or more   JH-HLM Achieved 8: Walk 250 feet ot more   Barthel Index   Feeding 10   Bathing 0   Grooming Score 5   Dressing Score 10   Bladder Score 5   Bowels Score 10   Toilet Use Score 5   Transfers (Bed/Chair) Score 10   Mobility (Level Surface) Score 10   Stairs Score 5   Barthel Index Score 70   Additional Treatment Session   Start Time 0905   End Time 0915   Treatment Assessment Pt agreed to particiapte in PT intervention  Pt was initiated w/ participation in exercise program to address physical and mobility deficits noted during eval  Seated heel/toe raises x 20 each  long arc quads and seated hip flexion 10 each  Pt had fair understanding of exercise technique after initial introduction and demonstration  Occasional rest breaks were needed due to fatigue  Handout was provided to expedite understanding of technique  continued inpatient PT is indicated to facilitate return to independent level of mobility and function  Additional Treatment Day 1   End of Consult   Patient Position at End of Consult Bedside chair;Bed/Chair alarm activated; All needs within reach     The patient's AM-PAC Basic Mobility Inpatient Short Form Raw Score is 19  A Raw score of greater than 16 suggests the patient may benefit from discharge to home  Please also refer to the recommendation of the Physical Therapist for safe discharge planning      Comfortable Gait Speed: 0 37 m/s  Gait Speed Interpretation:  Gain of 0 1 m/s is a predictor of well-being in those w/ abnormal walking speed compared to age-patched peers  <0 7m/s is at an increased risk for falls    Household ambulator: <0 4 m/s  Limited community ambulator: 0 4-0 8 m/s  [employer] ambulator: 0 8-1 2 m/s  Able to safely cross streets: >1 2 m/s     Skilled PT recommended while in hospital and upon DC to progress pt toward treatment goals       Ashlyn Gibbons, PT

## 2023-01-20 NOTE — CONSULTS
Consultation - ENT   Tad Mcdowell 67 y o  female MRN: [id number]  Unit/Bed#: S -01 Encounter: [id number]      Assessment/Plan     Assessment:  Right auricular ecchymosis, but I don't appreciate any hematoma  Plan:  Avoid additional trauma to right ear  Supportive care  No need for I&D  History of Present Illness   Physician Requesting Consult: Jey Andersen DO  Reason for Consult / Principal Problem: right auricular hematoma  HPI: Tad Mcdowell is a 67y o  year old female who presents with right ear injured in fall  Consults    Review of Systems    Historical Information   History reviewed  No pertinent past medical history  History reviewed  No pertinent surgical history  Social History   Social History     Substance and Sexual Activity   Alcohol Use None     Social History     Substance and Sexual Activity   Drug Use Never     Social History     Tobacco Use   Smoking Status Never   Smokeless Tobacco Never     Family History: non-contributory    Meds/Allergies   all current active meds have been reviewed    No Known Allergies    Objective     No intake or output data in the 24 hours ending 01/20/23 1517    Invasive Devices     Peripheral Intravenous Line  Duration           Peripheral IV 01/19/23 Right Antecubital 1 day          Drain  Duration           External Urinary Catheter 1 day                Physical Exam  Neck: no thyromegaly nor adenopathy  Nose: unremarkable  Oropharynx: unremarkable  Ears:    AD: ecchymosis and mild edema of auricle, but no hematoma  EAC normal    AS: external ear normal, EAC normal     Lab Results: I have personally reviewed pertinent lab results  Imaging Studies: I have personally reviewed pertinent reports  EKG, Pathology, and Other Studies: I have personally reviewed pertinent reports

## 2023-01-20 NOTE — QUICK NOTE
Updated daughter Lyndsey Rose by phone today  She is primary  / POA and should be updated daily  Best to update in afternoon        Brissa Umanzor,

## 2023-01-20 NOTE — PROGRESS NOTES
Progress Note - Trauma Tertiary Survery   Logan Arreola 67 y o  female [id number]   Unit/Bed#: S -01 Encounter: [id number]     Assessment & Plan   Summary of Diagnosed Injuries:   - S/p syncope and collapse  - Right ear auricle hematoma  - No additional injuries on trauma imaging    PLAN:  - Recommend ENT consult for auricular hematoma drainage  - No additional injuries identified on tertiary trauma survey  - No additional images warranted  - Trauma service signing off      VTE Prophylaxis:Sequential compression device (Venodyne)  and Enoxaparin (Lovenox)     Disposition: All further workup per internal medicine    Code status:  Level 1 - Full Code    Consultants: None     Subjective   Transfer from: none    Mechanism of Injury:Fall     Chief Complaint: "I'm fine"    HPI/Last 24 hour events: Pt denies any pain this morning  She is resting comfortably in bed  She denies headaches, dizziness, chest pain, shortness of breath, abdominal pain, numbness or tingling in extremities  Objective   Vitals:   Temp:  [97 8 °F (36 6 °C)-98 2 °F (36 8 °C)] 98 2 °F (36 8 °C)  HR:  [67-98] 78  Resp:  [18] 18  BP: ()/(49-95) 141/62    I/O       01/18 0701  01/19 0700 01/19 0701  01/20 0700          Unmeasured Urine Occurrence  1 x           Physical Exam:   GENERAL APPEARANCE: Patient in no acute distress  HEENT: NCAT; PERRL, EOMs intact; Mucous membranes moist; right auricular hematoma  NECK / BACK: Nontender, ROM intact  CV: Regular rate and rhythm; no murmur/gallops/rubs appreciated  CHEST / LUNGS: Clear to auscultation; no wheezes/rales/rhonci  ABD: NABS; soft; non-distended; non-tender  : Voiding  EXT: +2 pulses bilaterally upper & lower extremities; no edema  NEURO: GCS 15; no focal neurologic deficits; neurovascularly intact  SKIN: Warm, dry and well perfused; no rash; no jaundice        Invasive Devices     Peripheral Intravenous Line  Duration           Peripheral IV 01/19/23 Right Antecubital <1 day          Drain  Duration           External Urinary Catheter <1 day                   Lab Results:   Results: I have personally reviewed all pertinent laboratory/tests results, BMP/CMP:   Lab Results   Component Value Date    SODIUM 126 (L) 01/20/2023    K 4 0 01/20/2023    CL 95 (L) 01/20/2023    CO2 24 01/20/2023    CO2 26 01/19/2023    BUN 12 01/20/2023    CREATININE 0 92 01/20/2023    GLUCOSE 175 (H) 01/19/2023    CALCIUM 8 8 01/20/2023    EGFR 61 01/20/2023    and CBC:   Lab Results   Component Value Date    WBC 8 69 01/20/2023    HGB 10 3 (L) 01/20/2023    HCT 29 4 (L) 01/20/2023    MCV 92 01/20/2023     01/20/2023    MCH 32 2 01/20/2023    MCHC 35 0 01/20/2023    RDW 12 9 01/20/2023    MPV 8 5 (L) 01/20/2023    NRBC 0 01/19/2023       Imaging Results: I have personally reviewed pertinent reports  Chest Xray(s): negative for acute findings   FAST exam(s): negative for acute findings   CT Scan(s): negative for acute findings   Additional Xray(s): negative for acute findings     Other Studies:   TRAUMA - CT head wo contrast   Final Result by Radha Poe MD (01/19 8017)      No acute intracranial abnormality  I personally discussed this study with Laura Asp on 1/19/2023 at 2:47 PM             Workstation performed: [id number]         TRAUMA - CT spine cervical wo contrast   Final Result by Radha Poe MD (01/19 3527)      No cervical spine fracture or traumatic malalignment  I personally discussed this study with Laura Asp on 1/19/2023 at 2:47 PM                    Workstation performed: [id number]         XR Trauma multiple ([facility] trauma bay ONLY)   Final Result by Radha Poe MD (01/19 6767)      No supine radiographic evidence of acute thoracic injury                 Workstation performed: [id number]         XR chest 1 view   Final Result by Radha Poe MD (01/19 6784)      No supine radiographic evidence of acute thoracic injury                 Workstation performed: [id number]

## 2023-01-20 NOTE — PLAN OF CARE
Problem: Potential for Falls  Goal: Patient will remain free of falls  Description: INTERVENTIONS:  - Educate patient/family on patient safety including physical limitations  - Instruct patient to call for assistance with activity   - Consult OT/PT to assist with strengthening/mobility   - Keep Call bell within reach  - Keep bed low and locked with side rails adjusted as appropriate  - Keep care items and personal belongings within reach  - Initiate and maintain comfort rounds  - Make Fall Risk Sign visible to staff  - Offer Toileting every  Hours, in advance of need  - Initiate/Maintain alarm  - Obtain necessary fall risk management equipment:   - Apply yellow socks and bracelet for high fall risk patients  - Consider moving patient to room near nurses station  Outcome: Progressing     Problem: MOBILITY - ADULT  Goal: Maintain or return to baseline ADL function  Description: INTERVENTIONS:  -  Assess patient's ability to carry out ADLs; assess patient's baseline for ADL function and identify physical deficits which impact ability to perform ADLs (bathing, care of mouth/teeth, toileting, grooming, dressing, etc )  - Assess/evaluate cause of self-care deficits   - Assess range of motion  - Assess patient's mobility; develop plan if impaired  - Assess patient's need for assistive devices and provide as appropriate  - Encourage maximum independence but intervene and supervise when necessary  - Involve family in performance of ADLs  - Assess for home care needs following discharge   - Consider OT consult to assist with ADL evaluation and planning for discharge  - Provide patient education as appropriate  Outcome: Progressing  Goal: Maintains/Returns to pre admission functional level  Description: INTERVENTIONS:  - Perform BMAT or MOVE assessment daily    - Set and communicate daily mobility goal to care team and patient/family/caregiver     - Collaborate with rehabilitation services on mobility goals if consulted  - Perform Range of Motion  times a day  - Reposition patient every  hours    - Dangle patient  times a day  - Stand patient  times a day  - Ambulate patient  times a day  - Out of bed to chair  times a day   - Out of bed for meals  times a day  - Out of bed for toileting  - Record patient progress and toleration of activity level   Outcome: Progressing     Problem: SKIN/TISSUE INTEGRITY - ADULT  Goal: Skin Integrity remains intact(Skin Breakdown Prevention)  Description: Assess:  -Perform Shad assessment every   -Clean and moisturize skin every   -Inspect skin when repositioning, toileting, and assisting with ADLS  -Assess under medical devices such as  every   -Assess extremities for adequate circulation and sensation     Bed Management:  -Have minimal linens on bed & keep smooth, unwrinkled  -Change linens as needed when moist or perspiring  -Avoid sitting or lying in one position for more than  hours while in bed  -Keep HOB at degrees     Toileting:  -Offer bedside commode  -Assess for incontinence every   -Use incontinent care products after each incontinent episode such as     Activity:  -Mobilize patient  times a day  -Encourage activity and walks on unit  -Encourage or provide ROM exercises   -Turn and reposition patient every  Hours  -Use appropriate equipment to lift or move patient in bed  -Instruct/ Assist with weight shifting every  when out of bed in chair  -Consider limitation of chair time hour intervals    Skin Care:  -Avoid use of baby powder, tape, friction and shearing, hot water or constrictive clothing  -Relieve pressure over bony prominences using   -Do not massage red bony areas    Next Steps:  -Teach patient strategies to minimize risks such as    -Consider consults to  interdisciplinary teams such as   Outcome: Progressing  Goal: Incision(s), wounds(s) or drain site(s) healing without S/S of infection  Description: INTERVENTIONS  - Assess and document dressing, incision, wound bed, drain sites and surrounding tissue  - Provide patient and family education  - Perform skin care/dressing changes every   Outcome: Progressing     Problem: MUSCULOSKELETAL - ADULT  Goal: Maintain or return mobility to safest level of function  Description: INTERVENTIONS:  - Assess patient's ability to carry out ADLs; assess patient's baseline for ADL function and identify physical deficits which impact ability to perform ADLs (bathing, care of mouth/teeth, toileting, grooming, dressing, etc )  - Assess/evaluate cause of self-care deficits   - Assess range of motion  - Assess patient's mobility  - Assess patient's need for assistive devices and provide as appropriate  - Encourage maximum independence but intervene and supervise when necessary  - Involve family in performance of ADLs  - Assess for home care needs following discharge   - Consider OT consult to assist with ADL evaluation and planning for discharge  - Provide patient education as appropriate  Outcome: Progressing  Goal: Maintain proper alignment of affected body part  Description: INTERVENTIONS:  - Support, maintain and protect limb and body alignment  - Provide patient/ family with appropriate education  Outcome: Progressing     Problem: SAFETY ADULT  Goal: Patient will remain free of falls  Description: INTERVENTIONS:  - Educate patient/family on patient safety including physical limitations  - Instruct patient to call for assistance with activity   - Consult OT/PT to assist with strengthening/mobility   - Keep Call bell within reach  - Keep bed low and locked with side rails adjusted as appropriate  - Keep care items and personal belongings within reach  - Initiate and maintain comfort rounds  - Make Fall Risk Sign visible to staff  - Offer Toileting every  Hours, in advance of need  - Initiate/Maintain alarm  - Obtain necessary fall risk management equipment:   - Apply yellow socks and bracelet for high fall risk patients  - Consider moving patient to room near nurses station  Outcome: Progressing  Goal: Maintain or return to baseline ADL function  Description: INTERVENTIONS:  -  Assess patient's ability to carry out ADLs; assess patient's baseline for ADL function and identify physical deficits which impact ability to perform ADLs (bathing, care of mouth/teeth, toileting, grooming, dressing, etc )  - Assess/evaluate cause of self-care deficits   - Assess range of motion  - Assess patient's mobility; develop plan if impaired  - Assess patient's need for assistive devices and provide as appropriate  - Encourage maximum independence but intervene and supervise when necessary  - Involve family in performance of ADLs  - Assess for home care needs following discharge   - Consider OT consult to assist with ADL evaluation and planning for discharge  - Provide patient education as appropriate  Outcome: Progressing  Goal: Maintains/Returns to pre admission functional level  Description: INTERVENTIONS:  - Perform BMAT or MOVE assessment daily    - Set and communicate daily mobility goal to care team and patient/family/caregiver  - Collaborate with rehabilitation services on mobility goals if consulted  - Perform Range of Motion  times a day  - Reposition patient every  hours    - Dangle patient  times a day  - Stand patient  times a day  - Ambulate patient  times a day  - Out of bed to chair  times a day   - Out of bed for meals times a day  - Out of bed for toileting  - Record patient progress and toleration of activity level   Outcome: Progressing     Problem: DISCHARGE PLANNING  Goal: Discharge to home or other facility with appropriate resources  Description: INTERVENTIONS:  - Identify barriers to discharge w/patient and caregiver  - Arrange for needed discharge resources and transportation as appropriate  - Identify discharge learning needs (meds, wound care, etc )  - Arrange for interpretive services to assist at discharge as needed  - Refer to Case Management Department for coordinating discharge planning if the patient needs post-hospital services based on physician/advanced practitioner order or complex needs related to functional status, cognitive ability, or social support system  Outcome: Progressing     Problem: Knowledge Deficit  Goal: Patient/family/caregiver demonstrates understanding of disease process, treatment plan, medications, and discharge instructions  Description: Complete learning assessment and assess knowledge base    Interventions:  - Provide teaching at level of understanding  - Provide teaching via preferred learning methods  Outcome: Progressing

## 2023-01-20 NOTE — DISCHARGE SUMMARY
[facility]  Discharge- Chemo Alvarado 1950, 67 y o  female MRN: [id number]  Unit/Bed#: S -01 Encounter: [id number]  Primary Care Provider: Kalyani Hendrickson MD   Date and time admitted to hospital: 1/19/2023  2:05 PM    * Postural dizziness with presyncope  Assessment & Plan  She continues to have positive orthostatics after IV hydration, Na correction, compression stockings, and increase in midodrine  However, patient is no longer experiencing any symptoms related to changes in positioning  -EKG unremarkable, troponin WNL  - TSH WNL  - Trauma workup negative  -Na status improved with fluids  Plan:  · Orthostatics qshift   · Midodrine to 5mg TID started   · Thigh high compression stocking  · Hold BP medications  · Measure BP readings at home (sitting, not lying down)  · Midodrine side effect is supine HTN    Abnormal urinalysis  Assessment & Plan  Patient has no urinary sxs  No white count, fever, etc   Maintain off antibiotics and monitor for symptom development      Hypertension  Assessment & Plan  Blood pressure stable on this admission  Typically maintained on lisinopril 5 mg  Orthostatics positive but patient asymptomatic  Plan: Will hold antihypertensives for this admission  Will need to reconsider if patient truly needs them on discharge  Will hold antihypertensives and Dc with midodrine  Monitor home Bps when sitting up, follow up with PCP    [facility])  Assessment & Plan  Mealtime sliding scale insulin  Hypoglycemia protocol  Diabetic diet      Hyponatremia  Assessment & Plan  Patient presents with a sodium of 126   reports her diet consist mostly of tea, toast and diet soda  She has been unsteady on her feet for several months due to dizziness  Patient reports drinking only a couple glasses of water per day  Found to be orthostatic positive in the emergency department  CXR negative for cardiopulmonary pathology    CT negative for intracranial abnormalities  Serum osmolality depressed at 270  Patient appears dry on physical exam   Given the patient's history, her low sodium is most likely in the setting of poor solute intake  Serum sodium today:130    Urine sodium: 23  Urine OSM: 268  Serum osm: 270    Sodium status resolved with fluids and increased PO intake  Plan:  Trend BMP    Ambulatory dysfunction  Assessment & Plan  Patient is reportedly very unsteady on her feet  Uses a walker to ambulate in her two-story home  Has a history of multiple prior falls  She has been complaining of dizziness for several months  PT [facility] - home health w/ rehabilitation    Plan:  · Case management consultation-recommendations are appreciated  · Fall precautions  · Up with assistance      Medical Problems     Resolved Problems  Date Reviewed: 1/22/2023   None       Discharging Resident: Marifer Klein  Discharging Attending: Brandee Weiss MD  PCP: Sherine Soriano MD  Admission Date:   Admission Orders (From admission, onward)     Ordered        01/19/23 1721  Inpatient Admission  Once                      Discharge Date: 01/24/23    Consultations During Hospital Stay:  · ENT    Procedures Performed:   · none    Significant Findings / Test Results:   · Positive orthostatics    Incidental Findings:   · none    Test Results Pending at Discharge (will require follow up):  · none     Outpatient Tests Requested:  · none    Complications:  none    Reason for Admission: Postural syncope with presyncope    Hospital Course:   Delmis Silva is a 67 y o  female patient with a PMHx of prediabetes, HTN, and TIA who originally presented to the hospital on 1/19/2023 due to a syncopal episode, presyncopal symptoms, and poor diet reported by her   In the ED, trauma workup was negative  The patient was found to be dry on exam and had strongly positive orthostatic vitals  Additionally, she was found to be hyponatremic    She was started on IVF, home medications were restarted except for her hypertensive medications, which were held, and she was admitted for observation  Her sodium status began to resolve with improved PO intake and fluids  Once she achieved proper volume and hydration status fluids were stopped, however she continued to have positive orthostatic readings so midodrine was started at 2 5mg and eventually titrated up to 5mg  Her symptoms resolved towards the end of her hospital stay, however she continued to have positive orthostatic vital readings  She was deemed stable for discharge with plans for home blood pressure monitoring and pcp followup  Please see above list of diagnoses and related plan for additional information  Condition at Discharge: stable and asymptomatic     Discharge Day Visit / Exam:   Subjective:  No acute events overnight  Patient was relaxing in bed upon examination  She denies any light headedness, weakness, or dizziness with postural changes or at rest   She says she has been eating and drinking okay and feels ready to go home  She denies any fever/chills, headache, blurry vision, chest pain, SOB, abdominal pain, N/V/D  Vitals: Blood Pressure: 108/53 (01/24/23 0903)  Pulse: 96 (01/24/23 0903)  Temperature: 98 °F (36 7 °C) (01/24/23 0807)  Temp Source: Oral (01/23/23 2137)  Respirations: 12 (01/24/23 0807)  Height: 5' 2 5" (158 8 cm) (per previous encounter) (01/20/23 1302)  Weight - Scale: 58 2 kg (128 lb 4 9 oz) (01/19/23 1408)  SpO2: 100 % (01/24/23 0903)  Exam:   Physical Exam  Constitutional:       General: She is not in acute distress  Appearance: She is not ill-appearing  HENT:      Head: Normocephalic and atraumatic  Right Ear: External ear normal       Left Ear: External ear normal       Nose: Nose normal       Mouth/Throat:      Mouth: Mucous membranes are moist       Pharynx: Oropharynx is clear  Eyes:      Extraocular Movements: Extraocular movements intact  Conjunctiva/sclera: Conjunctivae normal    Cardiovascular:      Rate and Rhythm: Normal rate and regular rhythm  Pulses: Normal pulses  Heart sounds: Normal heart sounds  Pulmonary:      Breath sounds: Normal breath sounds  Abdominal:      General: Abdomen is flat  Bowel sounds are normal       Palpations: Abdomen is soft  Musculoskeletal:         General: Normal range of motion  Cervical back: Normal range of motion  Skin:     General: Skin is warm and dry  Neurological:      General: No focal deficit present  Mental Status: She is alert and oriented to person, place, and time  Discussion with Family: Updated  (daughter) via phone  Attending updated daughter    Discharge instructions/Information to patient and family:   See after visit summary for information provided to patient and family  Provisions for Follow-Up Care:  See after visit summary for information related to follow-up care and any pertinent home health orders  Disposition:   Home    Planned Readmission: none    Discharge Medications:  See after visit summary for reconciled discharge medications provided to patient and/or family        **Please Note: This note may have been constructed using a voice recognition system**

## 2023-01-20 NOTE — ASSESSMENT & PLAN NOTE
Patient brought to the emergency department by her  after being noticeably dizzy in the kitchen this morning  Her symptoms have been occurring for months according to her  however the patient seems indifferent  She has reported poor oral intake with her diet consisting mostly of tea, toast and diet soda  Patient has no prior history of coronary artery disease but did have TIA in 2021  Hemodynamically stable on arrival to the emergency department  No troponins or EKG available however bedside monitor shows her to be in normal sinus rhythm and saturating well on room air  Patient appears dry on physical exam   Orthostatics are positive  No focal neurological deficits identified  Trauma work-up negative  CT head negative for intracranial pathology  CXR negative  Given patient's history, her symptoms are most likely vasovagal in nature secondary to poor oral intake  will continue to work-up for orthostatic hypotension versus cardiogenic syncope      -EKG unremarkable, troponin WNL  - TSH WNL    Plan:  Repeat orthostatics  IV hydration

## 2023-01-20 NOTE — PLAN OF CARE
Problem: PHYSICAL THERAPY ADULT  Goal: Performs mobility at highest level of function for planned discharge setting  See evaluation for individualized goals  Description: Treatment/Interventions: Functional transfer training, LE strengthening/ROM, Elevations, Endurance training, Therapeutic exercise, Patient/family training, Equipment eval/education, Gait training, Bed mobility          See flowsheet documentation for full assessment, interventions and recommendations  Note:    Problem List: Decreased strength, Decreased endurance, Impaired balance, Decreased mobility, Decreased safety awareness  Assessment: Pt was brought to the emergency department by her  due to concerns over dizziness  Dx: HTN, prediabetes, hyponatremia, ambulatory dysfunction, syncope and collapse, and right ear auricle hematoma  order placed for PT eval and tx, w/ activity order of up w/ assist and fall precautions  pt presents w/ comorbidities of HTN, carotid artery stenosis, DM, CKD, hyperlipidemia, and TIA and personal factors of advanced age, living in 2 story house, stair(s) to enter home and positive fall history  pt presents w/ weakness, decreased endurance, impaired balance, gait deviations, decreased safety awareness and fall risk  these impairments are evident in findings from physical examination (weakness), mobility assessment (need for standby assist w/ all phases of mobility when usually mobilizing independently, tolerance to only 140 feet of ambulation and need for cueing for mobility technique), and Barthel Index: 70/100 and Comfortable Gait Speed: 0 37 m/s (less than 1 0 m/s indicates need for intervention to address falls risk)  pt needed input for task focus and mobility technique  pt is at risk for falls due to physical and safety awareness deficits   pt's clinical presentation is unstable/unpredictable (evident in poor blood pressure control, need for assist w/ all phases of mobility when usually mobilizing independently, tolerance to only 140 feet of ambulation and need for input for mobility technique/safety)  pt needs inpatient PT tx to improve mobility deficits and progress mobility training as appropriate  discharge recommendation is for home w/ family support and home PT to reduce fall risk and maximize level of functional independence  PT Discharge Recommendation: Home with home health rehabilitation    See flowsheet documentation for full assessment

## 2023-01-20 NOTE — TELEPHONE ENCOUNTER
The patient's daughter called asking if anyone from our hospital would be at the hospital to check on her mom    She stated that she was having a hard time talking with a dr in the hospital and could not find any nurses to see what was going on with her mom

## 2023-01-20 NOTE — ASSESSMENT & PLAN NOTE
Patient presents with a sodium of 126   reports her diet consist mostly of tea, toast and diet soda  She has been unsteady on her feet for several months due to dizziness  Patient reports drinking only a couple glasses of water per day  Found to be orthostatic positive in the emergency department  CXR negative for cardiopulmonary pathology  CT negative for intracranial abnormalities  Serum osmolality depressed at 270    Patient appears dry on physical exam   Given the patient's history, her low sodium is most likely in the setting of poor solute intake    Plan:  Urine sodium and urine osmolality ordered  We will start on gentle IV normal saline in the meantime, goal is to correct sodium by 6 to 8 mEq within the first 24 hours  Trend BMP  Potential for salt tabs and endocrine consult

## 2023-01-20 NOTE — PROGRESS NOTES
[facility]  Progress Note - Tad Mcdowell 1950, 67 y o  female MRN: [id number]  Unit/Bed#: S -01 Encounter: [id number]  Primary Care Provider: Chacho Andujar MD   Date and time admitted to hospital: 1/19/2023  2:05 PM    * Postural dizziness with presyncope  Assessment & Plan  Patient brought to the emergency department by her  after being noticeably dizzy in the kitchen this morning  Her symptoms have been occurring for months according to her  however the patient seems indifferent  She has reported poor oral intake with her diet consisting mostly of tea, toast and diet soda  Patient has no prior history of coronary artery disease but did have TIA in 2021  Hemodynamically stable on arrival to the emergency department  No troponins or EKG available however bedside monitor shows her to be in normal sinus rhythm and saturating well on room air  Patient appears dry on physical exam   Orthostatics are positive  No focal neurological deficits identified  Trauma work-up negative  CT head negative for intracranial pathology  CXR negative  Given patient's history, her symptoms are most likely vasovagal in nature secondary to poor oral intake  will continue to work-up for orthostatic hypotension versus cardiogenic syncope  -EKG unremarkable, troponin WNL  - TSH WNL    Plan:  Repeat orthostatics  IV hydration      Hypertension  Assessment & Plan  Blood pressure stable on this admission  Typically maintained on lisinopril 5 mg  Orthostatics positive  Plan: Will hold antihypertensives for this admission  Will need to reconsider if patient truly needs them on discharge  Prediabetes  Assessment & Plan  Mealtime sliding scale insulin  Hypoglycemia protocol  Diabetic diet    Hyponatremia  Assessment & Plan  Patient presents with a sodium of 126   reports her diet consist mostly of tea, toast and diet soda    She has been unsteady on her feet for several months due to dizziness  Patient reports drinking only a couple glasses of water per day  Found to be orthostatic positive in the emergency department  CXR negative for cardiopulmonary pathology  CT negative for intracranial abnormalities  Serum osmolality depressed at 270  Patient appears dry on physical exam   Given the patient's history, her low sodium is most likely in the setting of poor solute intake    Plan:  Urine sodium and urine osmolality ordered  We will start on gentle IV normal saline in the meantime, goal is to correct sodium by 6 to 8 mEq within the first 24 hours  Trend BMP  Potential for salt tabs and endocrine consult          Ambulatory dysfunction  Assessment & Plan  Patient is reportedly very unsteady on her feet  Uses a walker to ambulate in her two-story home  Has a history of multiple prior falls  She has been complaining of dizziness for several months  Plan:  PT eval and treat -recommendations are appreciated  Case management consultation-recommendations are appreciated  Fall precautions  Up with assistance        VTE Pharmacologic Prophylaxis: VTE Score: 3 Moderate Risk (Score 3-4) - Pharmacological DVT Prophylaxis Ordered: enoxaparin (Lovenox)  Patient Centered Rounds: I performed bedside rounds with nursing staff today  Discussions with Specialists or Other Care Team Provider: PT    Education and Discussions with Family / Patient: Updated  () at bedside  Current Length of Stay: 1 day(s)  Current Patient Status: Inpatient   Discharge Plan: Anticipate discharge in 24-48 hrs to home with home services  Code Status: Level 1 - Full Code    Subjective:   No acute events overnight  Patient resting comfortably in a chair upon examination  She says she does not feel dizzy when sitting, only when she gets up to walk around  She says she is eating and drinking okay, and drinks about a cup or two of water a day    She denies headache, blurry vision, chest pain, SOB, stomach pain, N/V/D at this time  Objective:     Vitals:   Temp (24hrs), Av 1 °F (36 7 °C), Min:97 7 °F (36 5 °C), Max:98 4 °F (36 9 °C)    Temp:  [97 7 °F (36 5 °C)-98 4 °F (36 9 °C)] 97 7 °F (36 5 °C)  HR:  [69-98] 75  Resp:  [18] 18  BP: ()/(49-79) 92/79  SpO2:  [95 %-100 %] 100 %  Body mass index is 23 09 kg/m²  Input and Output Summary (last 24 hours):   No intake or output data in the 24 hours ending 23 1517    Physical Exam:   Physical Exam  Vitals and nursing note reviewed  Constitutional:       General: She is not in acute distress  Appearance: She is well-developed  She is not ill-appearing  HENT:      Head: Normocephalic and atraumatic  Nose: No congestion or rhinorrhea  Mouth/Throat:      Mouth: Mucous membranes are moist       Pharynx: Oropharynx is clear  Eyes:      Conjunctiva/sclera: Conjunctivae normal    Cardiovascular:      Rate and Rhythm: Normal rate and regular rhythm  Heart sounds: No murmur heard  Pulmonary:      Effort: Pulmonary effort is normal  No respiratory distress  Breath sounds: Normal breath sounds  Abdominal:      General: Bowel sounds are normal       Palpations: Abdomen is soft  Tenderness: There is no abdominal tenderness  Musculoskeletal:         General: No swelling  Cervical back: Neck supple  Skin:     General: Skin is warm and dry  Capillary Refill: Capillary refill takes less than 2 seconds  Neurological:      General: No focal deficit present  Mental Status: She is alert and oriented to person, place, and time     Psychiatric:         Mood and Affect: Mood normal          Behavior: Behavior normal           Additional Data:     Labs:  Results from last 7 days   Lab Units 23  0442 23  1459   WBC Thousand/uL 8 69 10 78*   HEMOGLOBIN g/dL 10 3* 11 4*   HEMATOCRIT % 29 4* 33 4*   PLATELETS Thousands/uL 254 312   NEUTROS PCT %  --  81*   LYMPHS PCT %  --  12* MONOS PCT %  --  6   EOS PCT %  --  1     Results from last 7 days   Lab Units 01/20/23  0442   SODIUM mmol/L 126*   POTASSIUM mmol/L 4 0   CHLORIDE mmol/L 95*   CO2 mmol/L 24   BUN mg/dL 12   CREATININE mg/dL 0 92   ANION GAP mmol/L 7   CALCIUM mg/dL 8 8   GLUCOSE RANDOM mg/dL 91         Results from last 7 days   Lab Units 01/20/23  1119 01/20/23  0753 01/19/23  2140 01/19/23  1850   POC GLUCOSE mg/dl 129 96 147* 141*               Lines/Drains:  Invasive Devices     Peripheral Intravenous Line  Duration           Peripheral IV 01/19/23 Right Antecubital 1 day          Drain  Duration           External Urinary Catheter 1 day                      Imaging: Reviewed radiology reports from this admission including: CT head    Recent Cultures (last 7 days):         Last 24 Hours Medication List:   Current Facility-Administered Medications   Medication Dose Route Frequency Provider Last Rate   • acetaminophen  650 mg Oral Q6H PRN Sanjay Marquez MD     • aspirin  81 mg Oral Daily Alyssa Causey MD     • atorvastatin  40 mg Oral Daily With Huber Garcia MD     • donepezil  10 mg Oral HS Alyssa Causey MD     • enoxaparin  40 mg Subcutaneous Daily Alyssa Causey MD     • insulin lispro  1-5 Units Subcutaneous TID AC Alyssa Causey MD     • mirtazapine  15 mg Oral HS Sole Colvin MD     • sodium chloride  100 mL/hr Intravenous Continuous Barbara Heath  mL/hr (01/20/23 0900)        Today, Patient Was Seen By: Ranjith Hubbard DO    **Please Note: This note may have been constructed using a voice recognition system  **

## 2023-01-21 LAB
ALBUMIN SERPL BCP-MCNC: 3.6 G/DL (ref 3.5–5)
ALBUMIN SERPL BCP-MCNC: 3.6 G/DL (ref 3.5–5)
ANION GAP SERPL CALCULATED.3IONS-SCNC: 8 MMOL/L (ref 4–13)
ANION GAP SERPL CALCULATED.3IONS-SCNC: 8 MMOL/L (ref 4–13)
BASOPHILS # BLD AUTO: 0.04 THOUSANDS/ÂΜL (ref 0–0.1)
BASOPHILS # BLD AUTO: 0.04 THOUSANDS/ÂΜL (ref 0–0.1)
BASOPHILS NFR BLD AUTO: 1 % (ref 0–1)
BASOPHILS NFR BLD AUTO: 1 % (ref 0–1)
BUN SERPL-MCNC: 12 MG/DL (ref 5–25)
BUN SERPL-MCNC: 12 MG/DL (ref 5–25)
C DIFF TOX GENS STL QL NAA+PROBE: NEGATIVE
C DIFF TOX GENS STL QL NAA+PROBE: NEGATIVE
CA-I BLD-SCNC: 1.13 MMOL/L (ref 1.12–1.32)
CA-I BLD-SCNC: 1.13 MMOL/L (ref 1.12–1.32)
CALCIUM SERPL-MCNC: 8.8 MG/DL (ref 8.4–10.2)
CALCIUM SERPL-MCNC: 8.8 MG/DL (ref 8.4–10.2)
CHLORIDE SERPL-SCNC: 98 MMOL/L (ref 96–108)
CHLORIDE SERPL-SCNC: 98 MMOL/L (ref 96–108)
CO2 SERPL-SCNC: 24 MMOL/L (ref 21–32)
CO2 SERPL-SCNC: 24 MMOL/L (ref 21–32)
CORTIS AM PEAK SERPL-MCNC: 12.1 UG/DL (ref 4.2–22.4)
CORTIS AM PEAK SERPL-MCNC: 12.1 UG/DL (ref 4.2–22.4)
CREAT SERPL-MCNC: 0.71 MG/DL (ref 0.6–1.3)
CREAT SERPL-MCNC: 0.71 MG/DL (ref 0.6–1.3)
EOSINOPHIL # BLD AUTO: 0.23 THOUSAND/ÂΜL (ref 0–0.61)
EOSINOPHIL # BLD AUTO: 0.23 THOUSAND/ÂΜL (ref 0–0.61)
EOSINOPHIL NFR BLD AUTO: 3 % (ref 0–6)
EOSINOPHIL NFR BLD AUTO: 3 % (ref 0–6)
ERYTHROCYTE [DISTWIDTH] IN BLOOD BY AUTOMATED COUNT: 12.6 % (ref 11.6–15.1)
ERYTHROCYTE [DISTWIDTH] IN BLOOD BY AUTOMATED COUNT: 12.6 % (ref 11.6–15.1)
FSH SERPL-ACNC: 43.8 MIU/ML
FSH SERPL-ACNC: 43.8 MIU/ML
GFR SERPL CREATININE-BSD FRML MDRD: 84 ML/MIN/1.73SQ M
GFR SERPL CREATININE-BSD FRML MDRD: 84 ML/MIN/1.73SQ M
GLUCOSE SERPL-MCNC: 105 MG/DL (ref 65–140)
GLUCOSE SERPL-MCNC: 105 MG/DL (ref 65–140)
GLUCOSE SERPL-MCNC: 117 MG/DL (ref 65–140)
GLUCOSE SERPL-MCNC: 117 MG/DL (ref 65–140)
GLUCOSE SERPL-MCNC: 119 MG/DL (ref 65–140)
GLUCOSE SERPL-MCNC: 119 MG/DL (ref 65–140)
GLUCOSE SERPL-MCNC: 121 MG/DL (ref 65–140)
GLUCOSE SERPL-MCNC: 121 MG/DL (ref 65–140)
GLUCOSE SERPL-MCNC: 140 MG/DL (ref 65–140)
GLUCOSE SERPL-MCNC: 140 MG/DL (ref 65–140)
HCT VFR BLD AUTO: 29.4 % (ref 34.8–46.1)
HCT VFR BLD AUTO: 29.4 % (ref 34.8–46.1)
HGB BLD-MCNC: 9.9 G/DL (ref 11.5–15.4)
HGB BLD-MCNC: 9.9 G/DL (ref 11.5–15.4)
IMM GRANULOCYTES # BLD AUTO: 0.03 THOUSAND/UL (ref 0–0.2)
IMM GRANULOCYTES # BLD AUTO: 0.03 THOUSAND/UL (ref 0–0.2)
IMM GRANULOCYTES NFR BLD AUTO: 0 % (ref 0–2)
IMM GRANULOCYTES NFR BLD AUTO: 0 % (ref 0–2)
LH SERPL-ACNC: 15.9 MIU/ML
LH SERPL-ACNC: 15.9 MIU/ML
LYMPHOCYTES # BLD AUTO: 1.99 THOUSANDS/ÂΜL (ref 0.6–4.47)
LYMPHOCYTES # BLD AUTO: 1.99 THOUSANDS/ÂΜL (ref 0.6–4.47)
LYMPHOCYTES NFR BLD AUTO: 25 % (ref 14–44)
LYMPHOCYTES NFR BLD AUTO: 25 % (ref 14–44)
MCH RBC QN AUTO: 31 PG (ref 26.8–34.3)
MCH RBC QN AUTO: 31 PG (ref 26.8–34.3)
MCHC RBC AUTO-ENTMCNC: 33.7 G/DL (ref 31.4–37.4)
MCHC RBC AUTO-ENTMCNC: 33.7 G/DL (ref 31.4–37.4)
MCV RBC AUTO: 92 FL (ref 82–98)
MCV RBC AUTO: 92 FL (ref 82–98)
MONOCYTES # BLD AUTO: 0.76 THOUSAND/ÂΜL (ref 0.17–1.22)
MONOCYTES # BLD AUTO: 0.76 THOUSAND/ÂΜL (ref 0.17–1.22)
MONOCYTES NFR BLD AUTO: 9 % (ref 4–12)
MONOCYTES NFR BLD AUTO: 9 % (ref 4–12)
NEUTROPHILS # BLD AUTO: 5.04 THOUSANDS/ÂΜL (ref 1.85–7.62)
NEUTROPHILS # BLD AUTO: 5.04 THOUSANDS/ÂΜL (ref 1.85–7.62)
NEUTS SEG NFR BLD AUTO: 62 % (ref 43–75)
NEUTS SEG NFR BLD AUTO: 62 % (ref 43–75)
NRBC BLD AUTO-RTO: 0 /100 WBCS
NRBC BLD AUTO-RTO: 0 /100 WBCS
PHOSPHATE SERPL-MCNC: 3.2 MG/DL (ref 2.3–4.1)
PHOSPHATE SERPL-MCNC: 3.2 MG/DL (ref 2.3–4.1)
PLATELET # BLD AUTO: 261 THOUSANDS/UL (ref 149–390)
PLATELET # BLD AUTO: 261 THOUSANDS/UL (ref 149–390)
PMV BLD AUTO: 8.6 FL (ref 8.9–12.7)
PMV BLD AUTO: 8.6 FL (ref 8.9–12.7)
POTASSIUM SERPL-SCNC: 3.8 MMOL/L (ref 3.5–5.3)
POTASSIUM SERPL-SCNC: 3.8 MMOL/L (ref 3.5–5.3)
PROLACTIN SERPL-MCNC: 14.2 NG/ML
PROLACTIN SERPL-MCNC: 14.2 NG/ML
RBC # BLD AUTO: 3.19 MILLION/UL (ref 3.81–5.12)
RBC # BLD AUTO: 3.19 MILLION/UL (ref 3.81–5.12)
SODIUM SERPL-SCNC: 130 MMOL/L (ref 135–147)
SODIUM SERPL-SCNC: 130 MMOL/L (ref 135–147)
T4 FREE SERPL-MCNC: 0.91 NG/DL (ref 0.76–1.46)
T4 FREE SERPL-MCNC: 0.91 NG/DL (ref 0.76–1.46)
TSH SERPL DL<=0.05 MIU/L-ACNC: 3.42 UIU/ML (ref 0.45–4.5)
TSH SERPL DL<=0.05 MIU/L-ACNC: 3.42 UIU/ML (ref 0.45–4.5)
WBC # BLD AUTO: 8.09 THOUSAND/UL (ref 4.31–10.16)
WBC # BLD AUTO: 8.09 THOUSAND/UL (ref 4.31–10.16)

## 2023-01-21 RX ORDER — ALBUMIN (HUMAN) 12.5 G/50ML
25 SOLUTION INTRAVENOUS ONCE
Status: COMPLETED | OUTPATIENT
Start: 2023-01-21 | End: 2023-01-21

## 2023-01-21 RX ORDER — LOPERAMIDE HYDROCHLORIDE 2 MG/1
2 CAPSULE ORAL 3 TIMES DAILY PRN
Status: DISCONTINUED | OUTPATIENT
Start: 2023-01-21 | End: 2023-01-22

## 2023-01-21 RX ADMIN — ENOXAPARIN SODIUM 40 MG: 40 INJECTION SUBCUTANEOUS at 08:18

## 2023-01-21 RX ADMIN — DONEPEZIL HYDROCHLORIDE 10 MG: 5 TABLET ORAL at 20:34

## 2023-01-21 RX ADMIN — ALBUMIN (HUMAN) 25 G: 0.25 INJECTION, SOLUTION INTRAVENOUS at 17:25

## 2023-01-21 RX ADMIN — ATORVASTATIN CALCIUM 40 MG: 40 TABLET, FILM COATED ORAL at 17:25

## 2023-01-21 RX ADMIN — ASPIRIN 81 MG: 81 TABLET, CHEWABLE ORAL at 08:18

## 2023-01-21 RX ADMIN — ACETAMINOPHEN 650 MG: 325 TABLET, FILM COATED ORAL at 20:34

## 2023-01-21 RX ADMIN — LOPERAMIDE HYDROCHLORIDE 2 MG: 2 CAPSULE ORAL at 22:03

## 2023-01-21 RX ADMIN — MIRTAZAPINE 15 MG: 15 TABLET, FILM COATED ORAL at 20:34

## 2023-01-21 NOTE — ASSESSMENT & PLAN NOTE
Patient brought to the emergency department by her  after being noticeably dizzy in the kitchen this morning  Her symptoms have been occurring for months according to her  however the patient seems indifferent  She has reported poor oral intake with her diet consisting mostly of tea, toast and diet soda  Patient has no prior history of coronary artery disease but did have TIA in 2021  Hemodynamically stable on arrival to the emergency department  No troponins or EKG available however bedside monitor shows her to be in normal sinus rhythm and saturating well on room air  Patient appears dry on physical exam   Orthostatics are positive  No focal neurological deficits identified  Trauma work-up negative  CT head negative for intracranial pathology  CXR negative  Given patient's history, her symptoms are most likely vasovagal in nature secondary to poor oral intake  will continue to work-up for orthostatic hypotension versus cardiogenic syncope      -EKG unremarkable, troponin WNL  - TSH WNL    Plan:  · Orthostatics qshift   ·  ml/hr  · Midodrine 2 5 TID started 1/22  · Thigh high compression stocking  · F/U adrenal insufficieny labs     Dc to [facility]

## 2023-01-21 NOTE — QUICK NOTE
Received notification from nurse that patient's family would like to speak with physician  Discussed with nurse that family would be able to be spoken to following patient care that was occurring at the time  When room was visited patient said that her family had already left  Patient instructed that when her family tell her they will be in next, she may notify the nurse tomorrow when she expects them, who will communicate with the physician      Maninder Plaza MD  Psychiatry  Resident

## 2023-01-21 NOTE — PROGRESS NOTES
[facility]  Progress Note - Aravind Stout 1950, 67 y o  female MRN: [id number]  Unit/Bed#: S -01 Encounter: [id number]  Primary Care Provider: Anel Sawyer MD   Date and time admitted to hospital: 1/19/2023  2:05 PM    * Postural dizziness with presyncope  Assessment & Plan  Patient brought to the emergency department by her  after being noticeably dizzy in the kitchen this morning  Her symptoms have been occurring for months according to her  however the patient seems indifferent  She has reported poor oral intake with her diet consisting mostly of tea, toast and diet soda  Patient has no prior history of coronary artery disease but did have TIA in 2021  Hemodynamically stable on arrival to the emergency department  No troponins or EKG available however bedside monitor shows her to be in normal sinus rhythm and saturating well on room air  Patient appears dry on physical exam   Orthostatics are positive  No focal neurological deficits identified  Trauma work-up negative  CT head negative for intracranial pathology  CXR negative  Given patient's history, her symptoms are most likely vasovagal in nature secondary to poor oral intake  will continue to work-up for orthostatic hypotension versus cardiogenic syncope  -EKG unremarkable, troponin WNL  - TSH WNL    Plan:  Repeat orthostatics  IV hydration      Hyponatremia  Assessment & Plan  Patient presents with a sodium of 126   reports her diet consist mostly of tea, toast and diet soda  She has been unsteady on her feet for several months due to dizziness  Patient reports drinking only a couple glasses of water per day  Found to be orthostatic positive in the emergency department  CXR negative for cardiopulmonary pathology  CT negative for intracranial abnormalities  Serum osmolality depressed at 270    Patient appears dry on physical exam   Given the patient's history, her low sodium is most likely in the setting of poor solute intake  Serum sodium today:130    Urine sodium: 23  Urine OSM: 268  Serum osm: 270    Plan: We will start on gentle IV normal saline in the meantime, goal is to correct sodium by 6 to 8 mEq within the first 24 hours  Trend BMP  Endocrine lab work up pending            Ambulatory dysfunction  Assessment & Plan  Patient is reportedly very unsteady on her feet  Uses a walker to ambulate in her two-story home  Has a history of multiple prior falls  She has been complaining of dizziness for several months  Plan:  PT eval and treat -recommendations are appreciated  Case management consultation-recommendations are appreciated  Fall precautions  Up with assistance        VTE Pharmacologic Prophylaxis: VTE Score: 3 Moderate Risk (Score 3-4) - Pharmacological DVT Prophylaxis Ordered: enoxaparin (Lovenox)  Patient Centered Rounds: I performed bedside rounds with nursing staff today  Discussions with Specialists or Other Care Team Provider: PT    Education and Discussions with Family / Patient: Updated  (daughter) via phone  Current Length of Stay: 2 day(s)  Current Patient Status: Inpatient   Discharge Plan: Anticipate discharge in 24-48 hrs to home with home services  Code Status: Level 1 - Full Code    Subjective:   Patient seen at bedside this morning  Patient had 2 bouts of diarrhea last night  Patient resting comfortably in the bed  Patient denies any pain or shortness of breath     Objective:     Vitals:   Temp (24hrs), Av 1 °F (36 7 °C), Min:97 7 °F (36 5 °C), Max:98 3 °F (36 8 °C)    Temp:  [97 7 °F (36 5 °C)-98 3 °F (36 8 °C)] 98 3 °F (36 8 °C)  HR:  [69-99] 69  Resp:  [17] 17  BP: ()/(67-79) 126/69  SpO2:  [96 %-100 %] 99 %  Body mass index is 23 09 kg/m²  Input and Output Summary (last 24 hours):   No intake or output data in the 24 hours ending 23 0811    Physical Exam:   Physical Exam  Vitals and nursing note reviewed  Constitutional:       General: She is not in acute distress  Appearance: She is well-developed  She is not ill-appearing  HENT:      Head: Normocephalic and atraumatic  Nose: No congestion or rhinorrhea  Mouth/Throat:      Mouth: Mucous membranes are moist       Pharynx: Oropharynx is clear  Eyes:      Conjunctiva/sclera: Conjunctivae normal    Cardiovascular:      Rate and Rhythm: Normal rate and regular rhythm  Heart sounds: No murmur heard  Pulmonary:      Effort: Pulmonary effort is normal  No respiratory distress  Breath sounds: Normal breath sounds  Abdominal:      General: Bowel sounds are normal       Palpations: Abdomen is soft  Tenderness: There is no abdominal tenderness  Musculoskeletal:         General: No swelling  Cervical back: Neck supple  Skin:     General: Skin is warm and dry  Capillary Refill: Capillary refill takes less than 2 seconds  Neurological:      General: No focal deficit present  Mental Status: She is alert and oriented to person, place, and time     Psychiatric:         Mood and Affect: Mood normal          Behavior: Behavior normal           Additional Data:     Labs:  Results from last 7 days   Lab Units 01/21/23  0649   WBC Thousand/uL 8 09   HEMOGLOBIN g/dL 9 9*   HEMATOCRIT % 29 4*   PLATELETS Thousands/uL 261   NEUTROS PCT % 62   LYMPHS PCT % 25   MONOS PCT % 9   EOS PCT % 3     Results from last 7 days   Lab Units 01/21/23  0649   SODIUM mmol/L 130*   POTASSIUM mmol/L 3 8   CHLORIDE mmol/L 98   CO2 mmol/L 24   BUN mg/dL 12   CREATININE mg/dL 0 71   ANION GAP mmol/L 8   CALCIUM mg/dL 8 8   ALBUMIN g/dL 3 6   GLUCOSE RANDOM mg/dL 105         Results from last 7 days   Lab Units 01/20/23  2200 01/20/23  1625 01/20/23  1119 01/20/23  0753 01/19/23  2140 01/19/23  1850   POC GLUCOSE mg/dl 132 166* 129 96 147* 141*               Lines/Drains:  Invasive Devices     Peripheral Intravenous Line  Duration           Peripheral IV 01/21/23 Left;Ventral (anterior) Forearm <1 day          Drain  Duration           External Urinary Catheter 1 day                      Imaging: Reviewed radiology reports from this admission including: CT head    Recent Cultures (last 7 days):         Last 24 Hours Medication List:   Current Facility-Administered Medications   Medication Dose Route Frequency Provider Last Rate   • acetaminophen  650 mg Oral Q6H PRN Orion Christianson MD     • aspirin  81 mg Oral Daily Emma Torres MD     • atorvastatin  40 mg Oral Daily With Nicole Mejia MD     • donepezil  10 mg Oral HS Emma Torres MD     • enoxaparin  40 mg Subcutaneous Daily Emma Torres MD     • insulin lispro  1-5 Units Subcutaneous TID AC Emma Torres MD     • mirtazapine  15 mg Oral HS Brent Burgos MD     • sodium chloride  100 mL/hr Intravenous Continuous Macrina Kruse  mL/hr (01/20/23 0900)        Today, Patient Was Seen By: Juan José Abad DO    **Please Note: This note may have been constructed using a voice recognition system  **

## 2023-01-21 NOTE — PLAN OF CARE
Problem: Potential for Falls  Goal: Patient will remain free of falls  Description: INTERVENTIONS:  - Educate patient/family on patient safety including physical limitations  - Instruct patient to call for assistance with activity   - Consult OT/PT to assist with strengthening/mobility   - Keep Call bell within reach  - Keep bed low and locked with side rails adjusted as appropriate  - Keep care items and personal belongings within reach  - Initiate and maintain comfort rounds  - Make Fall Risk Sign visible to staff  - Offer Toileting every 2 Hours, in advance of need  - Initiate/Maintain bed alarm  - Obtain necessary fall risk management equipment: bed alarm  - Apply yellow socks and bracelet for high fall risk patients  - Consider moving patient to room near nurses station  Outcome: Progressing     Problem: MOBILITY - ADULT  Goal: Maintain or return to baseline ADL function  Description: INTERVENTIONS:  -  Assess patient's ability to carry out ADLs; assess patient's baseline for ADL function and identify physical deficits which impact ability to perform ADLs (bathing, care of mouth/teeth, toileting, grooming, dressing, etc )  - Assess/evaluate cause of self-care deficits   - Assess range of motion  - Assess patient's mobility; develop plan if impaired  - Assess patient's need for assistive devices and provide as appropriate  - Encourage maximum independence but intervene and supervise when necessary  - Involve family in performance of ADLs  - Assess for home care needs following discharge   - Consider OT consult to assist with ADL evaluation and planning for discharge  - Provide patient education as appropriate  Outcome: Progressing  Goal: Maintains/Returns to pre admission functional level  Description: INTERVENTIONS:  - Perform BMAT or MOVE assessment daily    - Set and communicate daily mobility goal to care team and patient/family/caregiver     - Collaborate with rehabilitation services on mobility goals if consulted  - Perform Range of Motion 3 times a day  - Reposition patient every 2 hours    - Dangle patient 3 times a day  - Stand patient 2 times a day  - Ambulate patient 2 times a day  - Out of bed to chair 3 times a day   - Out of bed for meals 3 times a day  - Out of bed for toileting  - Record patient progress and toleration of activity level   Outcome: Progressing         Bed Management:  -Have minimal linens on bed & keep smooth, unwrinkled  -Change linens as needed when moist or perspiring  -Avoid sitting or lying in one position for more than 2 hours while in bed  -Keep HOB at 30 degrees     Toileting:  -Offer bedside commode  -Assess for incontinence every 2 hours   -Use incontinent care products after each incontinent episode such as incontinent foam cleanser    Activity:  -Mobilize patient 3 times a day  -Encourage activity and walks on unit  -Encourage or provide ROM exercises   -Turn and reposition patient every 2 Hours  -Use appropriate equipment to lift or move patient in bed  -Instruct/ Assist with weight shifting every 2 when out of bed in chair  -Consider limitation of chair time 1 hour intervals    Skin Care:  -Avoid use of baby powder, tape, friction and shearing, hot water or constrictive clothing  -Relieve pressure over bony prominences using waffle cushion and foam wedges  -Do not massage red bony areas    Outcome: Progressing  Goal: Incision(s), wounds(s) or drain site(s) healing without S/S of infection  Description: INTERVENTIONS  - Assess and document dressing, incision, wound bed, drain sites and surrounding tissue  - Provide patient and family education  - Perform skin care/dressing changes every shift  Outcome: Progressing     Problem: MUSCULOSKELETAL - ADULT  Goal: Maintain or return mobility to safest level of function  Description: INTERVENTIONS:  - Assess patient's ability to carry out ADLs; assess patient's baseline for ADL function and identify physical deficits which impact ability to perform ADLs (bathing, care of mouth/teeth, toileting, grooming, dressing, etc )  - Assess/evaluate cause of self-care deficits   - Assess range of motion  - Assess patient's mobility  - Assess patient's need for assistive devices and provide as appropriate  - Encourage maximum independence but intervene and supervise when necessary  - Involve family in performance of ADLs  - Assess for home care needs following discharge   - Consider OT consult to assist with ADL evaluation and planning for discharge  - Provide patient education as appropriate  Outcome: Progressing  Goal: Maintain proper alignment of affected body part  Description: INTERVENTIONS:  - Support, maintain and protect limb and body alignment  - Provide patient/ family with appropriate education  Outcome: Progressing     Problem: SAFETY ADULT  Goal: Patient will remain free of falls  Description: INTERVENTIONS:  - Educate patient/family on patient safety including physical limitations  - Instruct patient to call for assistance with activity   - Consult OT/PT to assist with strengthening/mobility   - Keep Call bell within reach  - Keep bed low and locked with side rails adjusted as appropriate  - Keep care items and personal belongings within reach  - Initiate and maintain comfort rounds  - Make Fall Risk Sign visible to staff  - Offer Toileting every 2 Hours, in advance of need  - Initiate/Maintain bed alarm  - Obtain necessary fall risk management equipment: bed alarm  - Apply yellow socks and bracelet for high fall risk patients  - Consider moving patient to room near nurses station  Outcome: Progressing  Goal: Maintain or return to baseline ADL function  Description: INTERVENTIONS:  -  Assess patient's ability to carry out ADLs; assess patient's baseline for ADL function and identify physical deficits which impact ability to perform ADLs (bathing, care of mouth/teeth, toileting, grooming, dressing, etc )  - Assess/evaluate cause of self-care deficits   - Assess range of motion  - Assess patient's mobility; develop plan if impaired  - Assess patient's need for assistive devices and provide as appropriate  - Encourage maximum independence but intervene and supervise when necessary  - Involve family in performance of ADLs  - Assess for home care needs following discharge   - Consider OT consult to assist with ADL evaluation and planning for discharge  - Provide patient education as appropriate  Outcome: Progressing  Goal: Maintains/Returns to pre admission functional level  Description: INTERVENTIONS:  - Perform BMAT or MOVE assessment daily    - Set and communicate daily mobility goal to care team and patient/family/caregiver  - Collaborate with rehabilitation services on mobility goals if consulted  - Perform Range of Motion 3 times a day  - Reposition patient every 2 hours    - Dangle patient 3 times a day  - Stand patient 3 times a day  - Ambulate patient 3 times a day  - Out of bed to chair 3 times a day   - Out of bed for meals 3 times a day  - Out of bed for toileting  - Record patient progress and toleration of activity level   Outcome: Progressing

## 2023-01-21 NOTE — PLAN OF CARE
Problem: Potential for Falls  Goal: Patient will remain free of falls  Description: INTERVENTIONS:  - Educate patient/family on patient safety including physical limitations  - Instruct patient to call for assistance with activity   - Consult OT/PT to assist with strengthening/mobility   - Keep Call bell within reach  - Keep bed low and locked with side rails adjusted as appropriate  - Keep care items and personal belongings within reach  - Initiate and maintain comfort rounds  - Make Fall Risk Sign visible to staff  - Offer Toileting every  Hours, in advance of need  - Initiate/Maintain larm  - Obtain necessary fall risk management equipment: - Apply yellow socks and bracelet for high fall risk patients  - Consider moving patient to room near nurses station  Outcome: Progressing     Problem: MOBILITY - ADULT  Goal: Maintain or return to baseline ADL function  Description: INTERVENTIONS:  -  Assess patient's ability to carry out ADLs; assess patient's baseline for ADL function and identify physical deficits which impact ability to perform ADLs (bathing, care of mouth/teeth, toileting, grooming, dressing, etc )  - Assess/evaluate cause of self-care deficits   - Assess range of motion  - Assess patient's mobility; develop plan if impaired  - Assess patient's need for assistive devices and provide as appropriate  - Encourage maximum independence but intervene and supervise when necessary  - Involve family in performance of ADLs  - Assess for home care needs following discharge   - Consider OT consult to assist with ADL evaluation and planning for discharge  - Provide patient education as appropriate  Outcome: Progressing  Goal: Maintains/Returns to pre admission functional level  Description: INTERVENTIONS:  - Perform BMAT or MOVE assessment daily    - Set and communicate daily mobility goal to care team and patient/family/caregiver     - Collaborate with rehabilitation services on mobility goals if consulted  - Perform Range of Motion times a day  - Reposition patient every hours    - Dangle patient times a day  - Stand patient times a day  - Ambulate patient  times a day  - Out of bed to chair times a day   - Out of bed for meals  times a day  - Out of bed for toileting  - Record patient progress and toleration of activity level   Outcome: Progressing     Problem: SKIN/TISSUE INTEGRITY - ADULT  Goal: Skin Integrity remains intact(Skin Breakdown Prevention)  Description: Assess:  -Perform Shad assessment every   -Clean and moisturize skin every   -Inspect skin when repositioning, toileting, and assisting with ADLS  -Assess under medical devices such as  every   -Assess extremities for adequate circulation and sensation     Bed Management:  -Have minimal linens on bed & keep smooth, unwrinkled  -Change linens as needed when moist or perspiring  -Avoid sitting or lying in one position for more than hours while in bed  -Keep HOB at degrees     Toileting:  -Offer bedside commode  -Assess for incontinence every -Use incontinent care products after each incontinent episode such as     Activity:  -Mobilize patient  times a day  -Encourage activity and walks on unit  -Encourage or provide ROM exercises   -Turn and reposition patient every Hours  -Use appropriate equipment to lift or move patient in bed  -Instruct/ Assist with weight shifting every  when out of bed in chair  -Consider limitation of chair time hour intervals    Skin Care:  -Avoid use of baby powder, tape, friction and shearing, hot water or constrictive clothing  -Relieve pressure over bony prominences using   -Do not massage red bony areas    Next Steps:  -Teach patient strategies to minimize risks such as    -Consider consults to  interdisciplinary teams such as   Outcome: Progressing  Goal: Incision(s), wounds(s) or drain site(s) healing without S/S of infection  Description: INTERVENTIONS  - Assess and document dressing, incision, wound bed, drain sites and surrounding tissue  - Provide patient and family education  - Perform skin care/dressing changes every Outcome: Progressing     Problem: MUSCULOSKELETAL - ADULT  Goal: Maintain or return mobility to safest level of function  Description: INTERVENTIONS:  - Assess patient's ability to carry out ADLs; assess patient's baseline for ADL function and identify physical deficits which impact ability to perform ADLs (bathing, care of mouth/teeth, toileting, grooming, dressing, etc )  - Assess/evaluate cause of self-care deficits   - Assess range of motion  - Assess patient's mobility  - Assess patient's need for assistive devices and provide as appropriate  - Encourage maximum independence but intervene and supervise when necessary  - Involve family in performance of ADLs  - Assess for home care needs following discharge   - Consider OT consult to assist with ADL evaluation and planning for discharge  - Provide patient education as appropriate  Outcome: Progressing  Goal: Maintain proper alignment of affected body part  Description: INTERVENTIONS:  - Support, maintain and protect limb and body alignment  - Provide patient/ family with appropriate education  Outcome: Progressing     Problem: SAFETY ADULT  Goal: Patient will remain free of falls  Description: INTERVENTIONS:  - Educate patient/family on patient safety including physical limitations  - Instruct patient to call for assistance with activity   - Consult OT/PT to assist with strengthening/mobility   - Keep Call bell within reach  - Keep bed low and locked with side rails adjusted as appropriate  - Keep care items and personal belongings within reach  - Initiate and maintain comfort rounds  - Make Fall Risk Sign visible to staff  - Offer Toileting every Hours, in advance of need  - Initiate/Maintain larm  - Obtain necessary fall risk management equipment:   - Apply yellow socks and bracelet for high fall risk patients  - Consider moving patient to room near nurses station  Outcome: Progressing  Goal: Maintain or return to baseline ADL function  Description: INTERVENTIONS:  -  Assess patient's ability to carry out ADLs; assess patient's baseline for ADL function and identify physical deficits which impact ability to perform ADLs (bathing, care of mouth/teeth, toileting, grooming, dressing, etc )  - Assess/evaluate cause of self-care deficits   - Assess range of motion  - Assess patient's mobility; develop plan if impaired  - Assess patient's need for assistive devices and provide as appropriate  - Encourage maximum independence but intervene and supervise when necessary  - Involve family in performance of ADLs  - Assess for home care needs following discharge   - Consider OT consult to assist with ADL evaluation and planning for discharge  - Provide patient education as appropriate  Outcome: Progressing  Goal: Maintains/Returns to pre admission functional level  Description: INTERVENTIONS:  - Perform BMAT or MOVE assessment daily    - Set and communicate daily mobility goal to care team and patient/family/caregiver  - Collaborate with rehabilitation services on mobility goals if consulted  - Perform Range of Motion  times a day  - Reposition patient every  hours    - Dangle patient  times a day  - Stand patient times a day  - Ambulate patient  times a day  - Out of bed to chair  times a day   - Out of bed for meals es a day  - Out of bed for toileting  - Record patient progress and toleration of activity level   Outcome: Progressing     Problem: DISCHARGE PLANNING  Goal: Discharge to home or other facility with appropriate resources  Description: INTERVENTIONS:  - Identify barriers to discharge w/patient and caregiver  - Arrange for needed discharge resources and transportation as appropriate  - Identify discharge learning needs (meds, wound care, etc )  - Arrange for interpretive services to assist at discharge as needed  - Refer to Case Management Department for coordinating discharge planning if the patient needs post-hospital services based on physician/advanced practitioner order or complex needs related to functional status, cognitive ability, or social support system  Outcome: Progressing     Problem: Knowledge Deficit  Goal: Patient/family/caregiver demonstrates understanding of disease process, treatment plan, medications, and discharge instructions  Description: Complete learning assessment and assess knowledge base  Interventions:  - Provide teaching at level of understanding  - Provide teaching via preferred learning methods  Outcome: Progressing     Problem: Prexisting or High Potential for Compromised Skin Integrity  Goal: Skin integrity is maintained or improved  Description: INTERVENTIONS:  - Identify patients at risk for skin breakdown  - Assess and monitor skin integrity  - Assess and monitor nutrition and hydration status  - Monitor labs   - Assess for incontinence   - Turn and reposition patient  - Assist with mobility/ambulation  - Relieve pressure over bony prominences  - Avoid friction and shearing  - Provide appropriate hygiene as needed including keeping skin clean and dry  - Evaluate need for skin moisturizer/barrier cream  - Collaborate with interdisciplinary team   - Patient/family teaching  - Consider wound care consult   Outcome: Progressing     Problem: Nutrition/Hydration-ADULT  Goal: Nutrient/Hydration intake appropriate for improving, restoring or maintaining nutritional needs  Description: Monitor and assess patient's nutrition/hydration status for malnutrition  Collaborate with interdisciplinary team and initiate plan and interventions as ordered  Monitor patient's weight and dietary intake as ordered or per policy  Utilize nutrition screening tool and intervene as necessary  Determine patient's food preferences and provide high-protein, high-caloric foods as appropriate       INTERVENTIONS:  - Monitor oral intake, urinary output, labs, and treatment plans  - Assess nutrition and hydration status and recommend course of action  - Evaluate amount of meals eaten  - Assist patient with eating if necessary   - Allow adequate time for meals  - Recommend/ encourage appropriate diets, oral nutritional supplements, and vitamin/mineral supplements  - Order, calculate, and assess calorie counts as needed  - Recommend, monitor, and adjust tube feedings and TPN/PPN based on assessed needs  - Assess need for intravenous fluids  - Provide specific nutrition/hydration education as appropriate  - Include patient/family/caregiver in decisions related to nutrition  Outcome: Progressing

## 2023-01-21 NOTE — ASSESSMENT & PLAN NOTE
Patient is reportedly very unsteady on her feet  Uses a walker to ambulate in her two-story home  Has a history of multiple prior falls  She has been complaining of dizziness for several months      Plan:  · PT eval and treat -[address]  · Case management consultation-recommendations are appreciated  · Fall precautions  · Up with assistance

## 2023-01-21 NOTE — ASSESSMENT & PLAN NOTE
Patient presents with a sodium of 126   reports her diet consist mostly of tea, toast and diet soda  She has been unsteady on her feet for several months due to dizziness  Patient reports drinking only a couple glasses of water per day  Found to be orthostatic positive in the emergency department  CXR negative for cardiopulmonary pathology  CT negative for intracranial abnormalities  Serum osmolality depressed at 270  Patient appears dry on physical exam   Given the patient's history, her low sodium is most likely in the setting of poor solute intake   Serum sodium today:130    Urine sodium: 23  Urine OSM: 268  Serum osm: 270    Plan:  gentle IV normal saline in the meantime  Trend BMP  Endocrine lab work up pending

## 2023-01-22 PROBLEM — E11.9 DIABETES (HCC): Status: ACTIVE | Noted: 2023-01-19

## 2023-01-22 LAB
BACTERIA UR CULT: ABNORMAL
BACTERIA UR CULT: ABNORMAL
CAMPYLOBACTER DNA SPEC NAA+PROBE: NORMAL
CAMPYLOBACTER DNA SPEC NAA+PROBE: NORMAL
GLUCOSE SERPL-MCNC: 109 MG/DL (ref 65–140)
GLUCOSE SERPL-MCNC: 109 MG/DL (ref 65–140)
GLUCOSE SERPL-MCNC: 122 MG/DL (ref 65–140)
GLUCOSE SERPL-MCNC: 122 MG/DL (ref 65–140)
GLUCOSE SERPL-MCNC: 151 MG/DL (ref 65–140)
GLUCOSE SERPL-MCNC: 151 MG/DL (ref 65–140)
GLUCOSE SERPL-MCNC: 178 MG/DL (ref 65–140)
GLUCOSE SERPL-MCNC: 178 MG/DL (ref 65–140)
SALMONELLA DNA SPEC QL NAA+PROBE: NORMAL
SALMONELLA DNA SPEC QL NAA+PROBE: NORMAL
SHIGA TOXIN STX GENE SPEC NAA+PROBE: NORMAL
SHIGA TOXIN STX GENE SPEC NAA+PROBE: NORMAL
SHIGELLA DNA SPEC QL NAA+PROBE: NORMAL
SHIGELLA DNA SPEC QL NAA+PROBE: NORMAL

## 2023-01-22 RX ORDER — LOPERAMIDE HYDROCHLORIDE 2 MG/1
2 CAPSULE ORAL 3 TIMES DAILY PRN
Status: DISCONTINUED | OUTPATIENT
Start: 2023-01-22 | End: 2023-01-24 | Stop reason: HOSPADM

## 2023-01-22 RX ORDER — MIDODRINE HYDROCHLORIDE 2.5 MG/1
2.5 TABLET ORAL
Status: DISCONTINUED | OUTPATIENT
Start: 2023-01-22 | End: 2023-01-23

## 2023-01-22 RX ADMIN — MIDODRINE HYDROCHLORIDE 2.5 MG: 2.5 TABLET ORAL at 18:01

## 2023-01-22 RX ADMIN — Medication 1 PACKET: at 11:52

## 2023-01-22 RX ADMIN — MIRTAZAPINE 15 MG: 15 TABLET, FILM COATED ORAL at 21:11

## 2023-01-22 RX ADMIN — DONEPEZIL HYDROCHLORIDE 10 MG: 5 TABLET ORAL at 21:11

## 2023-01-22 RX ADMIN — ENOXAPARIN SODIUM 40 MG: 40 INJECTION SUBCUTANEOUS at 08:44

## 2023-01-22 RX ADMIN — MIDODRINE HYDROCHLORIDE 2.5 MG: 2.5 TABLET ORAL at 13:31

## 2023-01-22 RX ADMIN — INSULIN LISPRO 1 UNITS: 100 INJECTION, SOLUTION INTRAVENOUS; SUBCUTANEOUS at 18:01

## 2023-01-22 RX ADMIN — ACETAMINOPHEN 650 MG: 325 TABLET, FILM COATED ORAL at 21:36

## 2023-01-22 RX ADMIN — ATORVASTATIN CALCIUM 40 MG: 40 TABLET, FILM COATED ORAL at 18:01

## 2023-01-22 RX ADMIN — ASPIRIN 81 MG: 81 TABLET, CHEWABLE ORAL at 08:45

## 2023-01-22 NOTE — PLAN OF CARE
Problem: Potential for Falls  Goal: Patient will remain free of falls  Description: INTERVENTIONS:  - Educate patient/family on patient safety including physical limitations  - Instruct patient to call for assistance with activity   - Consult OT/PT to assist with strengthening/mobility   - Keep Call bell within reach  - Keep bed low and locked with side rails adjusted as appropriate  - Keep care items and personal belongings within reach  - Initiate and maintain comfort rounds  - Make Fall Risk Sign visible to staff  - Offer Toileting every 2 Hours, in advance of need  - Initiate/Maintain bed alarm  - Obtain necessary fall risk management equipment: bed alarm  - Apply yellow socks and bracelet for high fall risk patients  - Consider moving patient to room near nurses station  Outcome: Progressing     Problem: MOBILITY - ADULT  Goal: Maintain or return to baseline ADL function  Description: INTERVENTIONS:  -  Assess patient's ability to carry out ADLs; assess patient's baseline for ADL function and identify physical deficits which impact ability to perform ADLs (bathing, care of mouth/teeth, toileting, grooming, dressing, etc )  - Assess/evaluate cause of self-care deficits   - Assess range of motion  - Assess patient's mobility; develop plan if impaired  - Assess patient's need for assistive devices and provide as appropriate  - Encourage maximum independence but intervene and supervise when necessary  - Involve family in performance of ADLs  - Assess for home care needs following discharge   - Consider OT consult to assist with ADL evaluation and planning for discharge  - Provide patient education as appropriate  Outcome: Progressing  Goal: Maintains/Returns to pre admission functional level  Description: INTERVENTIONS:  - Perform BMAT or MOVE assessment daily    - Set and communicate daily mobility goal to care team and patient/family/caregiver     - Collaborate with rehabilitation services on mobility goals if consulted  - Perform Range of Motion 3 times a day  - Reposition patient every 2 hours    - Dangle patient 3 times a day  - Stand patient 3 times a day  - Ambulate patient 3 times a day  - Out of bed to chair 3 times a day   - Out of bed for meals 3 times a day  - Out of bed for toileting  - Record patient progress and toleration of activity level   Outcome: Progressing     Problem: MUSCULOSKELETAL - ADULT  Goal: Maintain or return mobility to safest level of function  Description: INTERVENTIONS:  - Assess patient's ability to carry out ADLs; assess patient's baseline for ADL function and identify physical deficits which impact ability to perform ADLs (bathing, care of mouth/teeth, toileting, grooming, dressing, etc )  - Assess/evaluate cause of self-care deficits   - Assess range of motion  - Assess patient's mobility  - Assess patient's need for assistive devices and provide as appropriate  - Encourage maximum independence but intervene and supervise when necessary  - Involve family in performance of ADLs  - Assess for home care needs following discharge   - Consider OT consult to assist with ADL evaluation and planning for discharge  - Provide patient education as appropriate  Outcome: Progressing  Goal: Maintain proper alignment of affected body part  Description: INTERVENTIONS:  - Support, maintain and protect limb and body alignment  - Provide patient/ family with appropriate education  Outcome: Progressing     Problem: SAFETY ADULT  Goal: Patient will remain free of falls  Description: INTERVENTIONS:  - Educate patient/family on patient safety including physical limitations  - Instruct patient to call for assistance with activity   - Consult OT/PT to assist with strengthening/mobility   - Keep Call bell within reach  - Keep bed low and locked with side rails adjusted as appropriate  - Keep care items and personal belongings within reach  - Initiate and maintain comfort rounds  - Make Fall Risk Sign visible to staff  - Offer Toileting every 2 Hours, in advance of need  - Initiate/Maintain bed alarm  - Obtain necessary fall risk management equipment: bed alarm  - Apply yellow socks and bracelet for high fall risk patients  - Consider moving patient to room near nurses station  Outcome: Progressing  Goal: Maintain or return to baseline ADL function  Description: INTERVENTIONS:  -  Assess patient's ability to carry out ADLs; assess patient's baseline for ADL function and identify physical deficits which impact ability to perform ADLs (bathing, care of mouth/teeth, toileting, grooming, dressing, etc )  - Assess/evaluate cause of self-care deficits   - Assess range of motion  - Assess patient's mobility; develop plan if impaired  - Assess patient's need for assistive devices and provide as appropriate  - Encourage maximum independence but intervene and supervise when necessary  - Involve family in performance of ADLs  - Assess for home care needs following discharge   - Consider OT consult to assist with ADL evaluation and planning for discharge  - Provide patient education as appropriate  Outcome: Progressing  Goal: Maintains/Returns to pre admission functional level  Description: INTERVENTIONS:  - Perform BMAT or MOVE assessment daily    - Set and communicate daily mobility goal to care team and patient/family/caregiver  - Collaborate with rehabilitation services on mobility goals if consulted  - Perform Range of Motion 3 times a day  - Reposition patient every 2 hours    - Dangle patient 3 times a day  - Stand patient 3 times a day  - Ambulate patient 3 times a day  - Out of bed to chair 3 times a day   - Out of bed for meals 3 times a day  - Out of bed for toileting  - Record patient progress and toleration of activity level   Outcome: Progressing

## 2023-01-22 NOTE — PROGRESS NOTES
[facility]  Progress Note - [facility] 1950, 67 y o  female MRN: [id number]  Unit/Bed#: S -01 Encounter: [id number]  Primary Care Provider: Bre Dubon MD   Date and time admitted to hospital: 1/19/2023  2:05 PM    * Postural dizziness with presyncope  Assessment & Plan  Patient brought to the emergency department by her  after being noticeably dizzy in the kitchen this morning  Her symptoms have been occurring for months according to her  however the patient seems indifferent  She has reported poor oral intake with her diet consisting mostly of tea, toast and diet soda  Patient has no prior history of coronary artery disease but did have TIA in 2021  Hemodynamically stable on arrival to the emergency department  No troponins or EKG available however bedside monitor shows her to be in normal sinus rhythm and saturating well on room air  Patient appears dry on physical exam   Orthostatics are positive  No focal neurological deficits identified  Trauma work-up negative  CT head negative for intracranial pathology  CXR negative  Given patient's history, her symptoms are most likely vasovagal in nature secondary to poor oral intake  will continue to work-up for orthostatic hypotension versus cardiogenic syncope  -EKG unremarkable, troponin WNL  - TSH WNL    Plan:  · Orthostatics qshift   ·  ml/hr  · Midodrine 2 5 TID started 1/22  · Thigh high compression stocking  · F/U adrenal insufficieny labs     Dc to [facility]    Hyponatremia  Assessment & Plan  Patient presents with a sodium of 126   reports her diet consist mostly of tea, toast and diet soda  She has been unsteady on her feet for several months due to dizziness  Patient reports drinking only a couple glasses of water per day  Found to be orthostatic positive in the emergency department  CXR negative for cardiopulmonary pathology  CT negative for intracranial abnormalities  Serum osmolality depressed at 270  Patient appears dry on physical exam   Given the patient's history, her low sodium is most likely in the setting of poor solute intake  Serum sodium today:130    Urine sodium: 23  Urine OSM: 268  Serum osm: 270    Plan:  gentle IV normal saline in the meantime  Trend BMP  Endocrine lab work up pending            Hypertension  Assessment & Plan  Blood pressure stable on this admission  Typically maintained on lisinopril 5 mg  Orthostatics positive  Plan: Will hold antihypertensives for this admission  Will need to reconsider if patient truly needs them on discharge  Diabetes [facility])  Assessment & Plan  Mealtime sliding scale insulin  Hypoglycemia protocol  Diabetic diet  Hba1c for am natasha    Ambulatory dysfunction  Assessment & Plan  Patient is reportedly very unsteady on her feet  Uses a walker to ambulate in her two-story home  Has a history of multiple prior falls  She has been complaining of dizziness for several months  Plan:  · PT eval and treat -[facility]  · Case management consultation-recommendations are appreciated  · Fall precautions  · Up with assistance        VTE Pharmacologic Prophylaxis: VTE Score: 3 Moderate Risk (Score 3-4) - Pharmacological DVT Prophylaxis Ordered: enoxaparin (Lovenox)  Patient Centered Rounds: I performed bedside rounds with nursing staff today  Discussions with Specialists or Other Care Team Provider: None    Education and Discussions with Family / Patient: Updated  (daughter) via phone  Current Length of Stay: 3 day(s)  Current Patient Status: Inpatient   Discharge Plan: Anticipate discharge in 48-72 hrs to home with home services  Code Status: Level 1 - Full Code    Subjective:   No complaints such as suprapubic pain       Objective:     Vitals:   Temp (24hrs), Av 1 °F (36 7 °C), Min:97 7 °F (36 5 °C), Max:98 4 °F (36 9 °C)    Temp:  [97 7 °F (36 5 °C)-98 4 °F (36 9 °C)] 98 4 °F (36 9 °C)  HR:  [62-92] 77  BP: ()/(38-73) 84/43  SpO2:  [97 %-100 %] 98 %  Body mass index is 23 09 kg/m²  Input and Output Summary (last 24 hours): Intake/Output Summary (Last 24 hours) at 1/22/2023 1458  Last data filed at 1/21/2023 1901  Gross per 24 hour   Intake --   Output 850 ml   Net -850 ml       Physical Exam:   Physical Exam  Vitals and nursing note reviewed  Constitutional:       General: She is not in acute distress  Appearance: She is well-developed  She is not ill-appearing, toxic-appearing or diaphoretic  HENT:      Head: Normocephalic and atraumatic  Eyes:      Extraocular Movements: Extraocular movements intact  Conjunctiva/sclera: Conjunctivae normal    Cardiovascular:      Rate and Rhythm: Normal rate and regular rhythm  Heart sounds: No murmur heard  Pulmonary:      Effort: Pulmonary effort is normal  No respiratory distress  Breath sounds: Normal breath sounds  No wheezing or rales  Abdominal:      General: There is no distension  Palpations: Abdomen is soft  Tenderness: There is no abdominal tenderness  There is no guarding  Musculoskeletal:         General: No swelling  Cervical back: Neck supple  Right lower leg: No edema  Left lower leg: No edema  Skin:     General: Skin is warm and dry  Capillary Refill: Capillary refill takes less than 2 seconds  Neurological:      Mental Status: She is alert     Psychiatric:         Mood and Affect: Mood normal           Additional Data:     Labs:  Results from last 7 days   Lab Units 01/21/23  0649   WBC Thousand/uL 8 09   HEMOGLOBIN g/dL 9 9*   HEMATOCRIT % 29 4*   PLATELETS Thousands/uL 261   NEUTROS PCT % 62   LYMPHS PCT % 25   MONOS PCT % 9   EOS PCT % 3     Results from last 7 days   Lab Units 01/21/23  0649   SODIUM mmol/L 130*   POTASSIUM mmol/L 3 8   CHLORIDE mmol/L 98   CO2 mmol/L 24   BUN mg/dL 12   CREATININE mg/dL 0 71   ANION GAP mmol/L 8   CALCIUM mg/dL 8 8   ALBUMIN g/dL 3 6   GLUCOSE RANDOM mg/dL 105         Results from last 7 days   Lab Units 01/22/23  1106 01/22/23  0812 01/21/23  2135 01/21/23  1541 01/21/23  1125 01/21/23  0810 01/20/23  2200 01/20/23  1625 01/20/23  1119 01/20/23  0753 01/19/23  2140 01/19/23  1850   POC GLUCOSE mg/dl 122 109 121 140 117 119 132 166* 129 96 147* 141*               Lines/Drains:  Invasive Devices     Drain  Duration           External Urinary Catheter 3 days                      Imaging: No pertinent imaging reviewed  Recent Cultures (last 7 days):   Results from last 7 days   Lab Units 01/21/23  0041 01/20/23  1443   URINE CULTURE   --  >100,000 cfu/ml Escherichia coli*   C DIFF TOXIN B BY PCR  Negative  --        Last 24 Hours Medication List:   Current Facility-Administered Medications   Medication Dose Route Frequency Provider Last Rate   • acetaminophen  650 mg Oral Q6H PRN Gurpreet Weathers MD     • aspirin  81 mg Oral Daily Garland Chavira MD     • atorvastatin  40 mg Oral Daily With Kourtney Vasquez MD     • donepezil  10 mg Oral HS Garland Chavira MD     • enoxaparin  40 mg Subcutaneous Daily Garland Chavira MD     • insulin lispro  1-5 Units Subcutaneous TID AC Garland Chavira MD     • loperamide  2 mg Oral TID PRN Bindu Gravely, DO     • midodrine  2 5 mg Oral TID AC Cristina Leblanc MD     • mirtazapine  15 mg Oral HS Cristina Leblanc MD     • psyllium  1 packet Oral Daily Bindu Gravely, DO     • sodium chloride  100 mL/hr Intravenous Continuous Bindu Gravely, DO Stopped (01/22/23 1426)        Today, Patient Was Seen By: Cristina Leblanc MD    **Please Note: This note may have been constructed using a voice recognition system  **

## 2023-01-23 LAB
ANION GAP SERPL CALCULATED.3IONS-SCNC: 9 MMOL/L (ref 4–13)
ANION GAP SERPL CALCULATED.3IONS-SCNC: 9 MMOL/L (ref 4–13)
BUN SERPL-MCNC: 9 MG/DL (ref 5–25)
BUN SERPL-MCNC: 9 MG/DL (ref 5–25)
CALCIUM SERPL-MCNC: 8 MG/DL (ref 8.4–10.2)
CALCIUM SERPL-MCNC: 8 MG/DL (ref 8.4–10.2)
CHLORIDE SERPL-SCNC: 106 MMOL/L (ref 96–108)
CHLORIDE SERPL-SCNC: 106 MMOL/L (ref 96–108)
CO2 SERPL-SCNC: 21 MMOL/L (ref 21–32)
CO2 SERPL-SCNC: 21 MMOL/L (ref 21–32)
CREAT SERPL-MCNC: 0.75 MG/DL (ref 0.6–1.3)
CREAT SERPL-MCNC: 0.75 MG/DL (ref 0.6–1.3)
EST. AVERAGE GLUCOSE BLD GHB EST-MCNC: 108 MG/DL
EST. AVERAGE GLUCOSE BLD GHB EST-MCNC: 108 MG/DL
GFR SERPL CREATININE-BSD FRML MDRD: 79 ML/MIN/1.73SQ M
GFR SERPL CREATININE-BSD FRML MDRD: 79 ML/MIN/1.73SQ M
GH SERPL-MCNC: 0.8 NG/ML (ref 0–10)
GH SERPL-MCNC: 0.8 NG/ML (ref 0–10)
GLUCOSE SERPL-MCNC: 147 MG/DL (ref 65–140)
GLUCOSE SERPL-MCNC: 147 MG/DL (ref 65–140)
GLUCOSE SERPL-MCNC: 171 MG/DL (ref 65–140)
GLUCOSE SERPL-MCNC: 171 MG/DL (ref 65–140)
GLUCOSE SERPL-MCNC: 191 MG/DL (ref 65–140)
GLUCOSE SERPL-MCNC: 191 MG/DL (ref 65–140)
GLUCOSE SERPL-MCNC: 87 MG/DL (ref 65–140)
GLUCOSE SERPL-MCNC: 87 MG/DL (ref 65–140)
GLUCOSE SERPL-MCNC: 95 MG/DL (ref 65–140)
GLUCOSE SERPL-MCNC: 95 MG/DL (ref 65–140)
HBA1C MFR BLD: 5.4 %
HBA1C MFR BLD: 5.4 %
IGF-I SERPL-MCNC: 73 NG/ML (ref 48–191)
IGF-I SERPL-MCNC: 73 NG/ML (ref 48–191)
POTASSIUM SERPL-SCNC: 3.6 MMOL/L (ref 3.5–5.3)
POTASSIUM SERPL-SCNC: 3.6 MMOL/L (ref 3.5–5.3)
SODIUM SERPL-SCNC: 136 MMOL/L (ref 135–147)
SODIUM SERPL-SCNC: 136 MMOL/L (ref 135–147)
TESTOST FREE SERPL-MCNC: 0.5 PG/ML (ref 0–4.2)
TESTOST FREE SERPL-MCNC: 0.5 PG/ML (ref 0–4.2)
TESTOST SERPL-MCNC: <3 NG/DL (ref 3–67)
TESTOST SERPL-MCNC: <3 NG/DL (ref 3–67)

## 2023-01-23 RX ORDER — MIDODRINE HYDROCHLORIDE 5 MG/1
5 TABLET ORAL
Status: DISCONTINUED | OUTPATIENT
Start: 2023-01-23 | End: 2023-01-24 | Stop reason: HOSPADM

## 2023-01-23 RX ADMIN — DONEPEZIL HYDROCHLORIDE 10 MG: 5 TABLET ORAL at 21:34

## 2023-01-23 RX ADMIN — SODIUM CHLORIDE 100 ML/HR: 0.9 INJECTION, SOLUTION INTRAVENOUS at 00:47

## 2023-01-23 RX ADMIN — ASPIRIN 81 MG: 81 TABLET, CHEWABLE ORAL at 08:53

## 2023-01-23 RX ADMIN — Medication 1 PACKET: at 08:53

## 2023-01-23 RX ADMIN — INSULIN LISPRO 1 UNITS: 100 INJECTION, SOLUTION INTRAVENOUS; SUBCUTANEOUS at 12:24

## 2023-01-23 RX ADMIN — MIRTAZAPINE 15 MG: 15 TABLET, FILM COATED ORAL at 21:34

## 2023-01-23 RX ADMIN — MIDODRINE HYDROCHLORIDE 5 MG: 5 TABLET ORAL at 12:23

## 2023-01-23 RX ADMIN — ATORVASTATIN CALCIUM 40 MG: 40 TABLET, FILM COATED ORAL at 15:31

## 2023-01-23 RX ADMIN — MIDODRINE HYDROCHLORIDE 5 MG: 5 TABLET ORAL at 15:31

## 2023-01-23 RX ADMIN — ENOXAPARIN SODIUM 40 MG: 40 INJECTION SUBCUTANEOUS at 08:53

## 2023-01-23 RX ADMIN — MIDODRINE HYDROCHLORIDE 2.5 MG: 2.5 TABLET ORAL at 08:53

## 2023-01-23 NOTE — PLAN OF CARE
Problem: Potential for Falls  Goal: Patient will remain free of falls  Description: INTERVENTIONS:  - Educate patient/family on patient safety including physical limitations  - Instruct patient to call for assistance with activity   - Consult OT/PT to assist with strengthening/mobility   - Keep Call bell within reach  - Keep bed low and locked with side rails adjusted as appropriate  - Keep care items and personal belongings within reach  - Initiate and maintain comfort rounds  - Make Fall Risk Sign visible to staff  - Offer Toileting every 2 Hours, in advance of need  - Initiate/Maintain bed alarm  - Apply yellow socks and bracelet for high fall risk patients  - Consider moving patient to room near nurses station  Outcome: Progressing     Problem: MOBILITY - ADULT  Goal: Maintain or return to baseline ADL function  Description: INTERVENTIONS:  -  Assess patient's ability to carry out ADLs; assess patient's baseline for ADL function and identify physical deficits which impact ability to perform ADLs (bathing, care of mouth/teeth, toileting, grooming, dressing, etc )  - Assess/evaluate cause of self-care deficits   - Assess range of motion  - Assess patient's mobility; develop plan if impaired  - Assess patient's need for assistive devices and provide as appropriate  - Encourage maximum independence but intervene and supervise when necessary  - Involve family in performance of ADLs  - Assess for home care needs following discharge   - Consider OT consult to assist with ADL evaluation and planning for discharge  - Provide patient education as appropriate  Outcome: Progressing  Goal: Maintains/Returns to pre admission functional level  Description: INTERVENTIONS:  - Perform BMAT or MOVE assessment daily    - Set and communicate daily mobility goal to care team and patient/family/caregiver  - Collaborate with rehabilitation services on mobility goals if consulted  - Perform Range of Motion 3 times a day    - Reposition patient every 2 hours    - Dangle patient 3 times a day  - Stand patient 3 times a day  - Ambulate patient 3 times a day  - Out of bed to chair 3 times a day   - Out of bed for meals 3 times a day  - Out of bed for toileting  - Record patient progress and toleration of activity level   Outcome: Progressing       Bed Management:  -Have minimal linens on bed & keep smooth, unwrinkled  -Change linens as needed when moist or perspiring  -Avoid sitting or lying in one position for more than 2 hours while in bed  -Keep HOB at 30degrees   Activity:  -Mobilize patient 3 times a day  -Encourage activity and walks on unit  -Encourage or provide ROM exercises   -Turn and reposition patient every 2 Hours  -Use appropriate equipment to lift or move patient in bed  -Instruct/ Assist with weight shifting every 2 when out of bed in chair  -Consider limitation of chair time 2 hour intervals    Skin Care:  -Avoid use of baby powder, tape, friction and shearing, hot water or constrictive clothing  -Relieve pressure over bony prominences using allevyn  -Do not massage red bony areas    Goal: Incision(s), wounds(s) or drain site(s) healing without S/S of infection  Description: INTERVENTIONS  - Assess and document dressing, incision, wound bed, drain sites and surrounding tissue  - Provide patient and family education  - Perform skin care/dressing changes every shift  Outcome: Progressing     Problem: MUSCULOSKELETAL - ADULT  Goal: Maintain or return mobility to safest level of function  Description: INTERVENTIONS:  - Assess patient's ability to carry out ADLs; assess patient's baseline for ADL function and identify physical deficits which impact ability to perform ADLs (bathing, care of mouth/teeth, toileting, grooming, dressing, etc )  - Assess/evaluate cause of self-care deficits   - Assess range of motion  - Assess patient's mobility  - Assess patient's need for assistive devices and provide as appropriate  - Encourage maximum independence but intervene and supervise when necessary  - Involve family in performance of ADLs  - Assess for home care needs following discharge   - Consider OT consult to assist with ADL evaluation and planning for discharge  - Provide patient education as appropriate  Outcome: Progressing  Goal: Maintain proper alignment of affected body part  Description: INTERVENTIONS:  - Support, maintain and protect limb and body alignment  - Provide patient/ family with appropriate education  Outcome: Progressing     Problem: SAFETY ADULT  Goal: Patient will remain free of falls  Description: INTERVENTIONS:  - Educate patient/family on patient safety including physical limitations  - Instruct patient to call for assistance with activity   - Consult OT/PT to assist with strengthening/mobility   - Keep Call bell within reach  - Keep bed low and locked with side rails adjusted as appropriate  - Keep care items and personal belongings within reach  - Initiate and maintain comfort rounds  - Make Fall Risk Sign visible to staff  - Offer Toileting every 2 Hours, in advance of need  - Initiate/Maintain bed alarm  - Apply yellow socks and bracelet for high fall risk patients  - Consider moving patient to room near nurses station  Outcome: Progressing  Goal: Maintain or return to baseline ADL function  Description: INTERVENTIONS:  -  Assess patient's ability to carry out ADLs; assess patient's baseline for ADL function and identify physical deficits which impact ability to perform ADLs (bathing, care of mouth/teeth, toileting, grooming, dressing, etc )  - Assess/evaluate cause of self-care deficits   - Assess range of motion  - Assess patient's mobility; develop plan if impaired  - Assess patient's need for assistive devices and provide as appropriate  - Encourage maximum independence but intervene and supervise when necessary  - Involve family in performance of ADLs  - Assess for home care needs following discharge   - Consider OT consult to assist with ADL evaluation and planning for discharge  - Provide patient education as appropriate  Outcome: Progressing  Goal: Maintains/Returns to pre admission functional level  Description: INTERVENTIONS:  - Perform BMAT or MOVE assessment daily    - Set and communicate daily mobility goal to care team and patient/family/caregiver  - Collaborate with rehabilitation services on mobility goals if consulted  - Perform Range of Motion 3 times a day  - Reposition patient every 2 hours  - Dangle patient 3 times a day  - Stand patient 3 times a day  - Ambulate patient 3 times a day  - Out of bed to chair 3 times a day   - Out of bed for meals 3 times a day  - Out of bed for toileting  - Record patient progress and toleration of activity level   Outcome: Progressing     Problem: DISCHARGE PLANNING  Goal: Discharge to home or other facility with appropriate resources  Description: INTERVENTIONS:  - Identify barriers to discharge w/patient and caregiver  - Arrange for needed discharge resources and transportation as appropriate  - Identify discharge learning needs (meds, wound care, etc )  - Arrange for interpretive services to assist at discharge as needed  - Refer to Case Management Department for coordinating discharge planning if the patient needs post-hospital services based on physician/advanced practitioner order or complex needs related to functional status, cognitive ability, or social support system  Outcome: Progressing     Problem: Knowledge Deficit  Goal: Patient/family/caregiver demonstrates understanding of disease process, treatment plan, medications, and discharge instructions  Description: Complete learning assessment and assess knowledge base    Interventions:  - Provide teaching at level of understanding  - Provide teaching via preferred learning methods  Outcome: Progressing     Problem: Prexisting or High Potential for Compromised Skin Integrity  Goal: Skin integrity is maintained or improved  Description: INTERVENTIONS:  - Identify patients at risk for skin breakdown  - Assess and monitor skin integrity  - Assess and monitor nutrition and hydration status  - Monitor labs   - Assess for incontinence   - Turn and reposition patient  - Assist with mobility/ambulation  - Relieve pressure over bony prominences  - Avoid friction and shearing  - Provide appropriate hygiene as needed including keeping skin clean and dry  - Evaluate need for skin moisturizer/barrier cream  - Collaborate with interdisciplinary team   - Patient/family teaching  - Consider wound care consult   Outcome: Progressing     Problem: Nutrition/Hydration-ADULT  Goal: Nutrient/Hydration intake appropriate for improving, restoring or maintaining nutritional needs  Description: Monitor and assess patient's nutrition/hydration status for malnutrition  Collaborate with interdisciplinary team and initiate plan and interventions as ordered  Monitor patient's weight and dietary intake as ordered or per policy  Utilize nutrition screening tool and intervene as necessary  Determine patient's food preferences and provide high-protein, high-caloric foods as appropriate       INTERVENTIONS:  - Monitor oral intake, urinary output, labs, and treatment plans  - Assess nutrition and hydration status and recommend course of action  - Evaluate amount of meals eaten  - Assist patient with eating if necessary   - Allow adequate time for meals  - Recommend/ encourage appropriate diets, oral nutritional supplements, and vitamin/mineral supplements  - Order, calculate, and assess calorie counts as needed  - Recommend, monitor, and adjust tube feedings and TPN/PPN based on assessed needs  - Assess need for intravenous fluids  - Provide specific nutrition/hydration education as appropriate  - Include patient/family/caregiver in decisions related to nutrition  Outcome: Progressing

## 2023-01-23 NOTE — PROGRESS NOTES
[facility]  Progress Note - Sarah Peralta 1950, 67 y o  female MRN: [id number]  Unit/Bed#: S -01 Encounter: [id number]  Primary Care Provider: Norberto Leslie MD   Date and time admitted to hospital: 1/19/2023  2:05 PM    * Postural dizziness with presyncope  Assessment & Plan  Patient brought to the emergency department by her  after being noticeably dizzy in the kitchen this morning  Her symptoms have been occurring for months according to her  however the patient seems indifferent  She has reported poor oral intake with her diet consisting mostly of tea, toast and diet soda  Patient has no prior history of coronary artery disease but did have TIA in 2021  Hemodynamically stable on arrival to the emergency department  No troponins or EKG available however bedside monitor shows her to be in normal sinus rhythm and saturating well on room air  Patient appears dry on physical exam   Orthostatics are positive  No focal neurological deficits identified  Trauma work-up negative  CT head negative for intracranial pathology  CXR negative  Given patient's history, her symptoms are most likely vasovagal in nature secondary to poor oral intake  will continue to work-up for orthostatic hypotension versus cardiogenic syncope  -EKG unremarkable, troponin WNL  - TSH WNL    Continued positive orthostatics after IV hydration and Na correction  Plan:  · Orthostatics qshift   · DC IVF  · Increase in midodrine to 5mg TID started   · Thigh high compression stocking  · F/U adrenal insufficieny labs     Hypertension  Assessment & Plan  Blood pressure stable on this admission  Typically maintained on lisinopril 5 mg  Orthostatics positive  Plan: Will hold antihypertensives for this admission  Will need to reconsider if patient truly needs them on discharge    Consider amlodipine due to high pressures     Diabetes [facility])  Assessment & Plan  Mealtime sliding scale insulin  Hypoglycemia protocol  Diabetic diet  Hba1c for am natasha    Hyponatremia  Assessment & Plan  Patient presents with a sodium of 126   reports her diet consist mostly of tea, toast and diet soda  She has been unsteady on her feet for several months due to dizziness  Patient reports drinking only a couple glasses of water per day  Found to be orthostatic positive in the emergency department  CXR negative for cardiopulmonary pathology  CT negative for intracranial abnormalities  Serum osmolality depressed at 270  Patient appears dry on physical exam   Given the patient's history, her low sodium is most likely in the setting of poor solute intake  Serum sodium today:130    Urine sodium: 23  Urine OSM: 268  Serum osm: 270    Based on serum and urine studies suspicion for SIADH low at this time  More likely low volume status and decreased PO intake  Plan:  Trend BMP  Endocrine lab work up pending            Ambulatory dysfunction  Assessment & Plan  Patient is reportedly very unsteady on her feet  Uses a walker to ambulate in her two-story home  Has a history of multiple prior falls  She has been complaining of dizziness for several months  Plan:  · PT eval and treat   · Case management consultation-recommendations are appreciated  · Fall precautions  · Up with assistance        VTE Pharmacologic Prophylaxis: VTE Score: 3 Moderate Risk (Score 3-4) - Pharmacological DVT Prophylaxis Ordered: enoxaparin (Lovenox)  Patient Centered Rounds: I performed bedside rounds with nursing staff today  Discussions with Specialists or Other Care Team Provider: PT    Education and Discussions with Family / Patient: Updated  (daughter) via phone  Current Length of Stay: 4 day(s)  Current Patient Status: Inpatient   Discharge Plan: Anticipate discharge tomorrow to home  Code Status: Level 1 - Full Code    Subjective:   No acute events overnight    Patient resting comfortably in bed upon examination  She says her diarrhea has been better, the imodium helped the other day as well as restarting her home metamucil  She is not having any dizziness or pain with urination  She endorses drinking 3-4 of the hospital cups of water a day  She denies fever/chills, headache, blurry vision, chest pain, SOB, N/V/D  Objective:     Vitals:   Temp (24hrs), Av 4 °F (36 9 °C), Min:98 3 °F (36 8 °C), Max:98 4 °F (36 9 °C)    Temp:  [98 3 °F (36 8 °C)-98 4 °F (36 9 °C)] 98 3 °F (36 8 °C)  HR:  [60-82] 72  Resp:  [16] 16  BP: (103-172)/(46-71) 103/46  SpO2:  [97 %-100 %] 99 %  Body mass index is 23 09 kg/m²  Input and Output Summary (last 24 hours): Intake/Output Summary (Last 24 hours) at 2023 1342  Last data filed at 2023 2112  Gross per 24 hour   Intake --   Output 400 ml   Net -400 ml       Physical Exam:   Physical Exam  Vitals and nursing note reviewed  Constitutional:       General: She is not in acute distress  Appearance: She is well-developed  She is not ill-appearing  HENT:      Head: Normocephalic and atraumatic  Nose: No congestion or rhinorrhea  Mouth/Throat:      Mouth: Mucous membranes are moist       Pharynx: Oropharynx is clear  Eyes:      Conjunctiva/sclera: Conjunctivae normal    Cardiovascular:      Rate and Rhythm: Normal rate and regular rhythm  Heart sounds: No murmur heard  Pulmonary:      Effort: Pulmonary effort is normal  No respiratory distress  Breath sounds: Normal breath sounds  Abdominal:      General: Bowel sounds are normal       Palpations: Abdomen is soft  Tenderness: There is no abdominal tenderness  Musculoskeletal:         General: No swelling  Cervical back: Neck supple  Skin:     General: Skin is warm and dry  Capillary Refill: Capillary refill takes less than 2 seconds  Neurological:      General: No focal deficit present        Mental Status: She is alert and oriented to person, place, and time  Psychiatric:         Mood and Affect: Mood normal          Behavior: Behavior normal           Additional Data:     Labs:  Results from last 7 days   Lab Units 01/21/23  0649   WBC Thousand/uL 8 09   HEMOGLOBIN g/dL 9 9*   HEMATOCRIT % 29 4*   PLATELETS Thousands/uL 261   NEUTROS PCT % 62   LYMPHS PCT % 25   MONOS PCT % 9   EOS PCT % 3     Results from last 7 days   Lab Units 01/23/23  0535 01/21/23  0649   SODIUM mmol/L 136 130*   POTASSIUM mmol/L 3 6 3 8   CHLORIDE mmol/L 106 98   CO2 mmol/L 21 24   BUN mg/dL 9 12   CREATININE mg/dL 0 75 0 71   ANION GAP mmol/L 9 8   CALCIUM mg/dL 8 0* 8 8   ALBUMIN g/dL  --  3 6   GLUCOSE RANDOM mg/dL 87 105         Results from last 7 days   Lab Units 01/23/23  1059 01/23/23  0733 01/22/23  2152 01/22/23  1613 01/22/23  1106 01/22/23  0812 01/21/23  2135 01/21/23  1541 01/21/23  1125 01/21/23  0810 01/20/23  2200 01/20/23  1625   POC GLUCOSE mg/dl 171* 95 178* 151* 122 109 121 140 117 119 132 166*     Results from last 7 days   Lab Units 01/23/23  0535   HEMOGLOBIN A1C % 5 4           Lines/Drains:  Invasive Devices     Peripheral Intravenous Line  Duration           Peripheral IV 01/22/23 Distal;Dorsal (posterior); Left Forearm <1 day          Drain  Duration           External Urinary Catheter 3 days                Imaging: Reviewed radiology reports from this admission including: CT head    Recent Cultures (last 7 days):   Results from last 7 days   Lab Units 01/21/23  0041 01/20/23  1443   URINE CULTURE   --  >100,000 cfu/ml Escherichia coli*   C DIFF TOXIN B BY PCR  Negative  --        Last 24 Hours Medication List:   Current Facility-Administered Medications   Medication Dose Route Frequency Provider Last Rate   • acetaminophen  650 mg Oral Q6H PRN Megan Baker MD     • aspirin  81 mg Oral Daily Doug Mccain MD     • atorvastatin  40 mg Oral Daily With Laurence Ramos MD     • donepezil  10 mg Oral HS Doug Mccain MD     • enoxaparin  40 mg Subcutaneous Daily Dylan Nazario MD     • insulin lispro  1-5 Units Subcutaneous TID AC Dylan Nazario MD     • loperamide  2 mg Oral TID PRN Robin Barlow DO     • midodrine  5 mg Oral TID AC Leonidas Marte [place] LakesDO     • mirtazapine  15 mg Oral HS Katherine Odell MD     • psyllium  1 packet Oral Daily Cam DO Cain          Today, Patient Was Seen By: Juan José Abad DO    **Please Note: This note may have been constructed using a voice recognition system  **

## 2023-01-23 NOTE — ASSESSMENT & PLAN NOTE
Patient is reportedly very unsteady on her feet  Uses a walker to ambulate in her two-story home  Has a history of multiple prior falls  She has been complaining of dizziness for several months      PT eval - home health w/ rehabilitation    Plan:  · Case management consultation-recommendations are appreciated  · Fall precautions  · Up with assistance

## 2023-01-23 NOTE — ASSESSMENT & PLAN NOTE
She continues to have positive orthostatics after IV hydration, Na correction, compression stockings, and increase in midodrine  However, patient is no longer experiencing any symptoms related to changes in positioning      -EKG unremarkable, troponin WNL  - TSH WNL  - Trauma workup negative  -Na status improved with fluids  Plan:  · Orthostatics qshift   · Midodrine to 5mg TID started   · Thigh high compression stocking  · Hold BP medications  · Measure BP readings at home (sitting, not lying down)  · Midodrine side effect is supine HTN

## 2023-01-23 NOTE — PLAN OF CARE
Problem: Potential for Falls  Goal: Patient will remain free of falls  Description: INTERVENTIONS:  - Educate patient/family on patient safety including physical limitations  - Instruct patient to call for assistance with activity   - Consult OT/PT to assist with strengthening/mobility   - Keep Call bell within reach  - Keep bed low and locked with side rails adjusted as appropriate  - Keep care items and personal belongings within reach  - Initiate and maintain comfort rounds  - Make Fall Risk Sign visible to staff  - Offer Toileting every  Hours, in advance of need  - Initiate/Maintain alarm  - Obtain necessary fall risk management equipment:   - Apply yellow socks and bracelet for high fall risk patients  - Consider moving patient to room near nurses station  Outcome: Progressing     Problem: MOBILITY - ADULT  Goal: Maintain or return to baseline ADL function  Description: INTERVENTIONS:  - Educate patient/family on patient safety including physical limitations  - Instruct patient to call for assistance with activity   - Consult OT/PT to assist with strengthening/mobility   - Keep Call bell within reach  - Keep bed low and locked with side rails adjusted as appropriate  - Keep care items and personal belongings within reach  - Initiate and maintain comfort rounds  - Make Fall Risk Sign visible to staff  - Offer Toileting every  Hours, in advance of need  - Initiate/Maintain alarm  - Obtain necessary fall risk management equipment:   - Apply yellow socks and bracelet for high fall risk patients  - Consider moving patient to room near nurses station  Outcome: Progressing  Goal: Maintains/Returns to pre admission functional level  Description: INTERVENTIONS:  -  Assess patient's ability to carry out ADLs; assess patient's baseline for ADL function and identify physical deficits which impact ability to perform ADLs (bathing, care of mouth/teeth, toileting, grooming, dressing, etc )  - Assess/evaluate cause of self-care deficits   - Assess range of motion  - Assess patient's mobility; develop plan if impaired  - Assess patient's need for assistive devices and provide as appropriate  - Encourage maximum independence but intervene and supervise when necessary  - Involve family in performance of ADLs  - Assess for home care needs following discharge   - Consider OT consult to assist with ADL evaluation and planning for discharge  - Provide patient education as appropriate  Outcome: Progressing     Problem: MOBILITY - ADULT  Goal: Maintains/Returns to pre admission functional level  Description: INTERVENTIONS:  -  Assess patient's ability to carry out ADLs; assess patient's baseline for ADL function and identify physical deficits which impact ability to perform ADLs (bathing, care of mouth/teeth, toileting, grooming, dressing, etc )  - Assess/evaluate cause of self-care deficits   - Assess range of motion  - Assess patient's mobility; develop plan if impaired  - Assess patient's need for assistive devices and provide as appropriate  - Encourage maximum independence but intervene and supervise when necessary  - Involve family in performance of ADLs  - Assess for home care needs following discharge   - Consider OT consult to assist with ADL evaluation and planning for discharge  - Provide patient education as appropriate  Outcome: Progressing     Problem: SKIN/TISSUE INTEGRITY - ADULT  Goal: Skin Integrity remains intact(Skin Breakdown Prevention)  Description: Assess:  -Perform Shad assessment every   -Clean and moisturize skin every   -Inspect skin when repositioning, toileting, and assisting with ADLS  -Assess under medical devices such as every   -Assess extremities for adequate circulation and sensation     Bed Management:  -Have minimal linens on bed & keep smooth, unwrinkled  -Change linens as needed when moist or perspiring  -Avoid sitting or lying in one position for more than  hours while in bed  -Keep HOB at degrees     Toileting:  -Offer bedside commode  -Assess for incontinence every   -Use incontinent care products after each incontinent episode such as     Activity:  -Mobilize patient  times a day  -Encourage activity and walks on unit  -Encourage or provide ROM exercises   -Turn and reposition patient every Hours  -Use appropriate equipment to lift or move patient in bed  -Instruct/ Assist with weight shifting every when out of bed in chair  -Consider limitation of chair time  hour intervals    Skin Care:  -Avoid use of baby powder, tape, friction and shearing, hot water or constrictive clothing  -Relieve pressure over bony prominences using   -Do not massage red bony areas    Next Steps:  -Teach patient strategies to minimize risks such as    -Consider consults to  interdisciplinary teams such as   Outcome: Progressing  Goal: Incision(s), wounds(s) or drain site(s) healing without S/S of infection  Description: INTERVENTIONS  - Assess and document dressing, incision, wound bed, drain sites and surrounding tissue  - Provide patient and family education  - Perform skin care/dressing changes every   Outcome: Progressing     Problem: MUSCULOSKELETAL - ADULT  Goal: Maintain or return mobility to safest level of function  Description: INTERVENTIONS:  - Assess patient's ability to carry out ADLs; assess patient's baseline for ADL function and identify physical deficits which impact ability to perform ADLs (bathing, care of mouth/teeth, toileting, grooming, dressing, etc )  - Assess/evaluate cause of self-care deficits   - Assess range of motion  - Assess patient's mobility  - Assess patient's need for assistive devices and provide as appropriate  - Encourage maximum independence but intervene and supervise when necessary  - Involve family in performance of ADLs  - Assess for home care needs following discharge   - Consider OT consult to assist with ADL evaluation and planning for discharge  - Provide patient education as appropriate  Outcome: Progressing  Goal: Maintain proper alignment of affected body part  Description: INTERVENTIONS:  - Support, maintain and protect limb and body alignment  - Provide patient/ family with appropriate education  Outcome: Progressing

## 2023-01-23 NOTE — ASSESSMENT & PLAN NOTE
Blood pressure stable on this admission  Typically maintained on lisinopril 5 mg  Orthostatics positive but patient asymptomatic  Plan: Will hold antihypertensives for this admission  Will need to reconsider if patient truly needs them on discharge    Will hold antihypertensives and Dc with midodrine  Monitor home Bps when sitting up, follow up with PCP

## 2023-01-23 NOTE — ASSESSMENT & PLAN NOTE
Patient presents with a sodium of 126   reports her diet consist mostly of tea, toast and diet soda  She has been unsteady on her feet for several months due to dizziness  Patient reports drinking only a couple glasses of water per day  Found to be orthostatic positive in the emergency department  CXR negative for cardiopulmonary pathology  CT negative for intracranial abnormalities  Serum osmolality depressed at 270  Patient appears dry on physical exam   Given the patient's history, her low sodium is most likely in the setting of poor solute intake  Serum sodium today:130    Urine sodium: 23  Urine OSM: 268  Serum osm: 270    Sodium status resolved with fluids and increased PO intake        Plan:  Trend BMP

## 2023-01-23 NOTE — CASE MANAGEMENT
Case Management Assessment & Discharge Planning Note    Patient name Wally Haley  Location S /S -06 MRN [id number]  : 1950 Date 2023       Current Admission Date: 2023  Current Admission Diagnosis:Postural dizziness with presyncope   Patient Active Problem List    Diagnosis Date Noted   • Postural dizziness with presyncope 2023   • Ambulatory dysfunction 2023   • Hyponatremia 2023   • Diabetes ([address] ) 2023   • Hypertension 2023      LOS (days): 4  Geometric Mean LOS (GMLOS) (days): 2 30  Days to GMLOS:-1 6     OBJECTIVE:    Risk of Unplanned Readmission Score: 13 7         Current admission status: Inpatient       Preferred Pharmacy:   [address] [place] [address]  No address on file      [id number] [address] [facility]  [address] [address]  Phone: [phone] Fax: [phone]    Primary Care Provider: Rosalind Ng MD    Primary Insurance: [facility]  Secondary Insurance:     ASSESSMENT:  Joelle 26 Proxies    There are no active Health Care Proxies on file  Patient Information  Admitted from[de-identified] Home  Mental Status: Alert  During Assessment patient was accompanied by: Not accompanied during assessment  Assessment information provided by[de-identified] Patient  Primary Caregiver: Self  Support Systems: Family members  [place] of Residence: [address] [place] [address] do you live in?: [facility] entry access options   Select all that apply : Stairs  Number of steps to enter home : 2  Type of Current Residence: 2 story home  Upon entering residence, is there a bedroom on the main floor (no further steps)?: No  A bedroom is located on the following floor levels of residence (select all that apply):: 2nd Floor  Upon entering residence, is there a bathroom on the main floor (no further steps)?: No  Indicate which floors of current residence have a bathroom (select all the apply):: 2nd Floor  Number of steps to 2nd floor from main floor: One Flight  In the last 12 months, was there a time when you were not able to pay the mortgage or rent on time?: No  In the last 12 months, how many places have you lived?: 1  In the last 12 months, was there a time when you did not have a steady place to sleep or slept in a shelter (including now)?: No  Homeless/housing insecurity resource given?: N/A  Living Arrangements: Lives w/ Spouse/significant other  Is patient a ?: No    Activities of Daily Living Prior to Admission  Functional Status: Independent  Completes ADLs independently?: Yes  Ambulates independently?: Yes  Does patient use assisted devices?: No  Does patient currently own DME?: No  Does patient have a history of Outpatient Therapy (PT/OT)?: No  Does the patient have a history of Short-Term Rehab?: No  Does patient have a history of HHC?: Yes ([facility])  Does patient currently have [address]?: No    Patient Information Continued  Does patient have prescription coverage?: Yes  Within the past 12 months, you worried that your food would run out before you got the money to buy more : Never true  Within the past 12 months, the food you bought just didn't last and you didn't have money to get more : Never true  Food insecurity resource given?: N/A  Does patient receive dialysis treatments?: No  Does patient have a history of substance abuse?: No  Does patient have a history of Mental Health Diagnosis?: No    Means of Transportation  Means of Transport to Appts[de-identified] Drives Self  In the past 12 months, has lack of transportation kept you from medical appointments or from getting medications?: No  In the past 12 months, has lack of transportation kept you from meetings, work, or from getting things needed for daily living?: No  Was application for public transport provided?: N/A    DISCHARGE DETAILS:    Discharge planning discussed with[de-identified] Patient  Freedom of Choice: Yes  Comments - Freedom of Choice: CM discussed therapy recs for [facility], however pt does not meet homebound criteria at this time  Pt agrees and does not wish to pursue therapy post hospitalization at this time     Were Treatment Team discharge recommendations reviewed with patient/caregiver?: Yes  Did patient/caregiver verbalize understanding of patient care needs?: Yes  Were patient/caregiver advised of the risks associated with not following Treatment Team discharge recommendations?: Yes    Contacts  Patient Contacts: Patient  Contact Method:  In Person  Reason/Outcome: Continuity of Care, Discharge [address]         Is the patient interested in [facility] at discharge?: No    Treatment Team Recommendation: Home with [facility]  Discharge Destination Plan[de-identified] Home     IMM Given (Date):: 01/19/23

## 2023-01-24 VITALS
BODY MASS INDEX: 22.73 KG/M2 | HEART RATE: 96 BPM | WEIGHT: 128.31 LBS | SYSTOLIC BLOOD PRESSURE: 108 MMHG | RESPIRATION RATE: 12 BRPM | OXYGEN SATURATION: 100 % | HEIGHT: 63 IN | TEMPERATURE: 98 F | DIASTOLIC BLOOD PRESSURE: 53 MMHG

## 2023-01-24 PROBLEM — F32.A DEPRESSION: Status: ACTIVE | Noted: 2023-01-24

## 2023-01-24 PROBLEM — R82.90 ABNORMAL URINALYSIS: Status: ACTIVE | Noted: 2023-01-24

## 2023-01-24 LAB
ANION GAP SERPL CALCULATED.3IONS-SCNC: 7 MMOL/L (ref 4–13)
ANION GAP SERPL CALCULATED.3IONS-SCNC: 7 MMOL/L (ref 4–13)
BUN SERPL-MCNC: 10 MG/DL (ref 5–25)
BUN SERPL-MCNC: 10 MG/DL (ref 5–25)
CALCIUM SERPL-MCNC: 8.2 MG/DL (ref 8.4–10.2)
CALCIUM SERPL-MCNC: 8.2 MG/DL (ref 8.4–10.2)
CHLORIDE SERPL-SCNC: 105 MMOL/L (ref 96–108)
CHLORIDE SERPL-SCNC: 105 MMOL/L (ref 96–108)
CO2 SERPL-SCNC: 24 MMOL/L (ref 21–32)
CO2 SERPL-SCNC: 24 MMOL/L (ref 21–32)
CREAT SERPL-MCNC: 0.67 MG/DL (ref 0.6–1.3)
CREAT SERPL-MCNC: 0.67 MG/DL (ref 0.6–1.3)
GFR SERPL CREATININE-BSD FRML MDRD: 87 ML/MIN/1.73SQ M
GFR SERPL CREATININE-BSD FRML MDRD: 87 ML/MIN/1.73SQ M
GLUCOSE SERPL-MCNC: 160 MG/DL (ref 65–140)
GLUCOSE SERPL-MCNC: 160 MG/DL (ref 65–140)
GLUCOSE SERPL-MCNC: 89 MG/DL (ref 65–140)
GLUCOSE SERPL-MCNC: 89 MG/DL (ref 65–140)
GLUCOSE SERPL-MCNC: 93 MG/DL (ref 65–140)
GLUCOSE SERPL-MCNC: 93 MG/DL (ref 65–140)
POTASSIUM SERPL-SCNC: 3.6 MMOL/L (ref 3.5–5.3)
POTASSIUM SERPL-SCNC: 3.6 MMOL/L (ref 3.5–5.3)
SODIUM SERPL-SCNC: 136 MMOL/L (ref 135–147)
SODIUM SERPL-SCNC: 136 MMOL/L (ref 135–147)

## 2023-01-24 RX ORDER — MIRTAZAPINE 15 MG/1
15 TABLET, FILM COATED ORAL
Qty: 30 TABLET | Refills: 0 | Status: SHIPPED | OUTPATIENT
Start: 2023-01-24

## 2023-01-24 RX ORDER — MIDODRINE HYDROCHLORIDE 5 MG/1
5 TABLET ORAL
Qty: 60 TABLET | Refills: 0 | Status: SHIPPED | OUTPATIENT
Start: 2023-01-24 | End: 2023-02-02

## 2023-01-24 RX ADMIN — ACETAMINOPHEN 650 MG: 325 TABLET, FILM COATED ORAL at 09:02

## 2023-01-24 RX ADMIN — ENOXAPARIN SODIUM 40 MG: 40 INJECTION SUBCUTANEOUS at 08:58

## 2023-01-24 RX ADMIN — INSULIN LISPRO 1 UNITS: 100 INJECTION, SOLUTION INTRAVENOUS; SUBCUTANEOUS at 12:18

## 2023-01-24 RX ADMIN — ASPIRIN 81 MG: 81 TABLET, CHEWABLE ORAL at 08:58

## 2023-01-24 RX ADMIN — MIDODRINE HYDROCHLORIDE 5 MG: 5 TABLET ORAL at 08:58

## 2023-01-24 RX ADMIN — MIDODRINE HYDROCHLORIDE 5 MG: 5 TABLET ORAL at 12:18

## 2023-01-24 RX ADMIN — Medication 1 PACKET: at 08:58

## 2023-01-24 NOTE — DISCHARGE INSTR - AVS FIRST PAGE
Dear Chapin Jay,     It was our pleasure to care for you here at [facility]  It is our hope that we were always able to exceed the expected standards for your care during your stay  You were hospitalized due to dizziness  You were cared for on the third floor by Jaden Faria DO under the service of Luis Cheatham MD with the [facility] who covers for your primary care physician (PCP), Kyle Ramesh MD, while you were hospitalized  If you have any questions or concerns related to this hospitalization, you may contact us at [phone]  For follow up as well as any medication refills, we recommend that you follow up with your primary care physician  A registered nurse will reach out to you by phone within a few days after your discharge to answer any additional questions that you may have after going home  However, at this time we provide for you here, the most important instructions / recommendations at discharge:     Notable Medication Adjustments -   Stop lisinopril  Start midodrine 5mg three times daily  Start mirtazapine  Testing Required after Discharge -   none  Important follow up information -   Please take your BP every day  Write these down, and take them to your primary care physician to reassess need for BP medications and discontinuance of midodrine  Make sure you are eating three meals, with vegetables and fruits, and whole grains  Other Instructions -   Please do not hesitate to return to the emergency department  Please review this entire after visit summary as additional general instructions including medication list, appointments, activity, diet, any pertinent wound care, and other additional recommendations from your care team that may be provided for you        Sincerely,     Jaden Faria DO

## 2023-01-24 NOTE — PLAN OF CARE
Problem: Potential for Falls  Goal: Patient will remain free of falls  Description: INTERVENTIONS:  - Educate patient/family on patient safety including physical limitations  - Instruct patient to call for assistance with activity   - Consult OT/PT to assist with strengthening/mobility   - Keep Call bell within reach  - Keep bed low and locked with side rails adjusted as appropriate  - Keep care items and personal belongings within reach  - Initiate and maintain comfort rounds  - Make Fall Risk Sign visible to staff  - Offer Toileting every 2 Hours, in advance of need  - Initiate/Maintain bed alarm  - Apply yellow socks and bracelet for high fall risk patients  - Consider moving patient to room near nurses station  Outcome: Progressing     Problem: MOBILITY - ADULT  Goal: Maintain or return to baseline ADL function  Description: INTERVENTIONS:  -  Assess patient's ability to carry out ADLs; assess patient's baseline for ADL function and identify physical deficits which impact ability to perform ADLs (bathing, care of mouth/teeth, toileting, grooming, dressing, etc )  - Assess/evaluate cause of self-care deficits   - Assess range of motion  - Assess patient's mobility; develop plan if impaired  - Assess patient's need for assistive devices and provide as appropriate  - Encourage maximum independence but intervene and supervise when necessary  - Involve family in performance of ADLs  - Assess for home care needs following discharge   - Consider OT consult to assist with ADL evaluation and planning for discharge  - Provide patient education as appropriate  Outcome: Progressing  Goal: Maintains/Returns to pre admission functional level  Description: INTERVENTIONS:  - Perform BMAT or MOVE assessment daily    - Set and communicate daily mobility goal to care team and patient/family/caregiver  - Collaborate with rehabilitation services on mobility goals if consulted  - Perform Range of Motion 3 times a day    - Reposition patient every 2 hours    - Dangle patient 3 times a day  - Stand patient 3 times a day  - Ambulate patient 3 times a day  - Out of bed to chair 3 times a day   - Out of bed for meals 3 times a day  - Out of bed for toileting  - Record patient progress and toleration of activity level   Outcome: Progressing     Problem: SKIN/TISSUE INTEGRITY - ADULT  Goal: Skin Integrity remains intact(Skin Breakdown Prevention)  Description: Assess:  -Perform Shad assessment every shift  -Clean and moisturize skin every shift  -Inspect skin when repositioning, toileting, and assisting with ADLS  -Assess extremities for adequate circulation and sensation     Bed Management:  -Have minimal linens on bed & keep smooth, unwrinkled  -Change linens as needed when moist or perspiring  -Avoid sitting or lying in one position for more than 2 hours while in bed  -Keep HOB at 30 degrees     Activity:  -Mobilize patient 3 times a day  -Encourage activity and walks on unit  -Encourage or provide ROM exercises   -Turn and reposition patient every 2 Hours  -Use appropriate equipment to lift or move patient in bed    Skin Care:  -Avoid use of baby powder, tape, friction and shearing, hot water or constrictive clothing  -Relieve pressure over bony prominences using allevyn  -Do not massage red bony areas    Goal: Incision(s), wounds(s) or drain site(s) healing without S/S of infection  Description: INTERVENTIONS  - Assess and document dressing, incision, wound bed, drain sites and surrounding tissue  - Provide patient and family education  Outcome: Progressing     Problem: MUSCULOSKELETAL - ADULT  Goal: Maintain or return mobility to safest level of function  Description: INTERVENTIONS:  - Assess patient's ability to carry out ADLs; assess patient's baseline for ADL function and identify physical deficits which impact ability to perform ADLs (bathing, care of mouth/teeth, toileting, grooming, dressing, etc )  - Assess/evaluate cause of self-care deficits   - Assess range of motion  - Assess patient's mobility  - Assess patient's need for assistive devices and provide as appropriate  - Encourage maximum independence but intervene and supervise when necessary  - Involve family in performance of ADLs  - Assess for home care needs following discharge   - Consider OT consult to assist with ADL evaluation and planning for discharge  - Provide patient education as appropriate  Outcome: Progressing  Goal: Maintain proper alignment of affected body part  Description: INTERVENTIONS:  - Support, maintain and protect limb and body alignment  - Provide patient/ family with appropriate education  Outcome: Progressing     Problem: SAFETY ADULT  Goal: Patient will remain free of falls  Description: INTERVENTIONS:  - Educate patient/family on patient safety including physical limitations  - Instruct patient to call for assistance with activity   - Consult OT/PT to assist with strengthening/mobility   - Keep Call bell within reach  - Keep bed low and locked with side rails adjusted as appropriate  - Keep care items and personal belongings within reach  - Initiate and maintain comfort rounds  - Make Fall Risk Sign visible to staff  - Offer Toileting every 2 Hours, in advance of need  - Initiate/Maintain bed alarm  - Apply yellow socks and bracelet for high fall risk patients  - Consider moving patient to room near nurses station  Outcome: Progressing  Goal: Maintain or return to baseline ADL function  Description: INTERVENTIONS:  -  Assess patient's ability to carry out ADLs; assess patient's baseline for ADL function and identify physical deficits which impact ability to perform ADLs (bathing, care of mouth/teeth, toileting, grooming, dressing, etc )  - Assess/evaluate cause of self-care deficits   - Assess range of motion  - Assess patient's mobility; develop plan if impaired  - Assess patient's need for assistive devices and provide as appropriate  - Encourage maximum independence but intervene and supervise when necessary  - Involve family in performance of ADLs  - Assess for home care needs following discharge   - Consider OT consult to assist with ADL evaluation and planning for discharge  - Provide patient education as appropriate  Outcome: Progressing  Goal: Maintains/Returns to pre admission functional level  Description: INTERVENTIONS:  - Perform BMAT or MOVE assessment daily    - Set and communicate daily mobility goal to care team and patient/family/caregiver  - Collaborate with rehabilitation services on mobility goals if consulted  - Perform Range of Motion 3 times a day  - Reposition patient every 2 hours  - Dangle patient 3 times a day  - Stand patient 3 times a day  - Ambulate patient 3 times a day  - Out of bed to chair 3 times a day   - Out of bed for meals 3 times a day  - Out of bed for toileting  - Record patient progress and toleration of activity level   Outcome: Progressing     Problem: DISCHARGE PLANNING  Goal: Discharge to home or other facility with appropriate resources  Description: INTERVENTIONS:  - Identify barriers to discharge w/patient and caregiver  - Arrange for needed discharge resources and transportation as appropriate  - Identify discharge learning needs (meds, wound care, etc )  - Arrange for interpretive services to assist at discharge as needed  - Refer to Case Management Department for coordinating discharge planning if the patient needs post-hospital services based on physician/advanced practitioner order or complex needs related to functional status, cognitive ability, or social support system  Outcome: Progressing     Problem: Knowledge Deficit  Goal: Patient/family/caregiver demonstrates understanding of disease process, treatment plan, medications, and discharge instructions  Description: Complete learning assessment and assess knowledge base    Interventions:  - Provide teaching at level of understanding  - Provide teaching via preferred learning methods  Outcome: Progressing     Problem: Prexisting or High Potential for Compromised Skin Integrity  Goal: Skin integrity is maintained or improved  Description: INTERVENTIONS:  - Identify patients at risk for skin breakdown  - Assess and monitor skin integrity  - Assess and monitor nutrition and hydration status  - Monitor labs   - Assess for incontinence   - Turn and reposition patient  - Assist with mobility/ambulation  - Relieve pressure over bony prominences  - Avoid friction and shearing  - Provide appropriate hygiene as needed including keeping skin clean and dry  - Evaluate need for skin moisturizer/barrier cream  - Collaborate with interdisciplinary team   - Patient/family teaching  - Consider wound care consult   Outcome: Progressing     Problem: Nutrition/Hydration-ADULT  Goal: Nutrient/Hydration intake appropriate for improving, restoring or maintaining nutritional needs  Description: Monitor and assess patient's nutrition/hydration status for malnutrition  Collaborate with interdisciplinary team and initiate plan and interventions as ordered  Monitor patient's weight and dietary intake as ordered or per policy  Utilize nutrition screening tool and intervene as necessary  Determine patient's food preferences and provide high-protein, high-caloric foods as appropriate       INTERVENTIONS:  - Monitor oral intake, urinary output, labs, and treatment plans  - Assess nutrition and hydration status and recommend course of action  - Evaluate amount of meals eaten  - Assist patient with eating if necessary   - Allow adequate time for meals  - Recommend/ encourage appropriate diets, oral nutritional supplements, and vitamin/mineral supplements  - Order, calculate, and assess calorie counts as needed  - Recommend, monitor, and adjust tube feedings and TPN/PPN based on assessed needs  - Assess need for intravenous fluids  - Provide specific nutrition/hydration education as appropriate  - Include patient/family/caregiver in decisions related to nutrition  Outcome: Progressing

## 2023-01-24 NOTE — ASSESSMENT & PLAN NOTE
Patient has no urinary sxs    No white count, fever, etc   Maintain off antibiotics and monitor for symptom development

## 2023-01-25 ENCOUNTER — TELEPHONE (OUTPATIENT)
Dept: OTHER | Facility: OTHER | Age: 73
End: 2023-01-25

## 2023-01-25 ENCOUNTER — TRANSITIONAL CARE MANAGEMENT (OUTPATIENT)
Dept: FAMILY MEDICINE CLINIC | Facility: OTHER | Age: 73
End: 2023-01-25

## 2023-01-25 NOTE — TELEPHONE ENCOUNTER
Patient was recently d/c (see duplicate chart) for low blood pressure and dehydration  She has an appointment on 02/02 that was scheduled to follow  an appointment with the geriatric doctor per Dr Cristiana Kern   The patient's daughter is calling to inquire if she needs to come in for a f/u sooner than 02/02 due to the hospitalization

## 2023-01-25 NOTE — UTILIZATION REVIEW
NOTIFICATION OF ADMISSION DISCHARGE   This is a Notification of Discharge from [address]  Please be advised that this patient has been discharge from our facility  Below you will find the admission and discharge date and time including the patient’s disposition  UTILIZATION REVIEW CONTACT:  Les Bahena MA  Utilization   Network Utilization Review Department  Phone: [phone] x carefully listen to the prompts  All voicemails are confidential   Email: [email or web address]     ADMISSION INFORMATION  PRESENTATION DATE: 1/19/2023  2:05 PM  OBERVATION ADMISSION DATE:   INPATIENT ADMISSION DATE: 1/19/23  5:21 PM   DISCHARGE DATE: 1/24/2023  3:39 PM   DISPOSITION:Home/Self Care    IMPORTANT INFORMATION:  Send all requests for admission clinical reviews, approved or denied determinations and any other requests to dedicated fax number below belonging to the campus where the patient is receiving treatment   List of dedicated fax numbers:  [address] DENIALS (Administrative/Medical Necessity) [phone]   [address] (Maternity/NICU/Pediatrics) [phone]   [facility] [phone]   [facility] [phone]   [address] [phone]   220 [facility] [phone]   90 [facility] [phone]   [address] [place] [address] [phone]   [facility]  [phone]   [address] [facility] [phone]   412 [facility] [address] [facility] [phone]

## 2023-01-26 ENCOUNTER — RA CDI HCC (OUTPATIENT)
Dept: OTHER | Facility: HOSPITAL | Age: 73
End: 2023-01-26

## 2023-01-26 LAB
DME PARACHUTE DELIVERY DATE ACTUAL: NORMAL
DME PARACHUTE DELIVERY DATE ACTUAL: NORMAL
DME PARACHUTE DELIVERY DATE REQUESTED: NORMAL
DME PARACHUTE DELIVERY DATE REQUESTED: NORMAL
DME PARACHUTE DELIVERY NOTE: NORMAL
DME PARACHUTE DELIVERY NOTE: NORMAL
DME PARACHUTE ITEM DESCRIPTION: NORMAL
DME PARACHUTE ITEM DESCRIPTION: NORMAL
DME PARACHUTE ORDER STATUS: NORMAL
DME PARACHUTE ORDER STATUS: NORMAL
DME PARACHUTE SUPPLIER NAME: NORMAL
DME PARACHUTE SUPPLIER NAME: NORMAL
DME PARACHUTE SUPPLIER PHONE: NORMAL
DME PARACHUTE SUPPLIER PHONE: NORMAL

## 2023-01-26 NOTE — PROGRESS NOTES
Patient is scheduled for 2/2/2023 with Dr Anna Whyte as a TCM   Please call daughter and go over post hospital questions and episode   Thank you

## 2023-01-26 NOTE — PROGRESS NOTES
E11 22 for 2023  [facility] 75  coding opportunities          Chart Reviewed number of suggestions sent to Provider: 1     Patients Insurance     Medicare Insurance: Joelle Wyman

## 2023-02-01 LAB
OSMOLALITY SERPL: 269 MOSMOL/KG (ref 280–301)
VASOPRESSIN SERPL-MCNC: <0.8 PG/ML (ref 0–4.7)

## 2023-02-02 ENCOUNTER — OFFICE VISIT (OUTPATIENT)
Dept: FAMILY MEDICINE CLINIC | Facility: OTHER | Age: 73
End: 2023-02-02

## 2023-02-02 ENCOUNTER — OFFICE VISIT (OUTPATIENT)
Age: 73
End: 2023-02-02

## 2023-02-02 VITALS
BODY MASS INDEX: 23.11 KG/M2 | WEIGHT: 125.6 LBS | TEMPERATURE: 97.8 F | RESPIRATION RATE: 18 BRPM | HEIGHT: 62 IN | HEART RATE: 82 BPM | DIASTOLIC BLOOD PRESSURE: 78 MMHG | OXYGEN SATURATION: 98 % | SYSTOLIC BLOOD PRESSURE: 116 MMHG

## 2023-02-02 VITALS
DIASTOLIC BLOOD PRESSURE: 62 MMHG | HEART RATE: 61 BPM | SYSTOLIC BLOOD PRESSURE: 118 MMHG | BODY MASS INDEX: 23.45 KG/M2 | WEIGHT: 127.4 LBS | RESPIRATION RATE: 18 BRPM | OXYGEN SATURATION: 99 % | HEIGHT: 62 IN | TEMPERATURE: 97.4 F

## 2023-02-02 DIAGNOSIS — I95.9 HYPOTENSION, UNSPECIFIED HYPOTENSION TYPE: ICD-10-CM

## 2023-02-02 DIAGNOSIS — E11.9 ENCOUNTER FOR DIABETIC FOOT EXAM (HCC): ICD-10-CM

## 2023-02-02 DIAGNOSIS — R26.2 AMBULATORY DYSFUNCTION: ICD-10-CM

## 2023-02-02 DIAGNOSIS — Z76.89 ENCOUNTER FOR SUPPORT AND COORDINATION OF TRANSITION OF CARE: Primary | ICD-10-CM

## 2023-02-02 DIAGNOSIS — I10 PRIMARY HYPERTENSION: ICD-10-CM

## 2023-02-02 DIAGNOSIS — F03.A0 MILD DEMENTIA WITHOUT BEHAVIORAL DISTURBANCE, PSYCHOTIC DISTURBANCE, MOOD DISTURBANCE, OR ANXIETY, UNSPECIFIED DEMENTIA TYPE: Primary | ICD-10-CM

## 2023-02-02 DIAGNOSIS — E87.1 HYPONATREMIA: ICD-10-CM

## 2023-02-02 DIAGNOSIS — R42 POSTURAL DIZZINESS WITH PRESYNCOPE: ICD-10-CM

## 2023-02-02 DIAGNOSIS — M25.552 HIP PAIN, LEFT: ICD-10-CM

## 2023-02-02 DIAGNOSIS — F32.A DEPRESSION, UNSPECIFIED DEPRESSION TYPE: ICD-10-CM

## 2023-02-02 DIAGNOSIS — Z23 ENCOUNTER FOR ADMINISTRATION OF VACCINE: ICD-10-CM

## 2023-02-02 DIAGNOSIS — R55 POSTURAL DIZZINESS WITH PRESYNCOPE: ICD-10-CM

## 2023-02-02 DIAGNOSIS — R19.7 DIARRHEA, UNSPECIFIED TYPE: ICD-10-CM

## 2023-02-02 DIAGNOSIS — H91.93 BILATERAL HEARING LOSS, UNSPECIFIED HEARING LOSS TYPE: ICD-10-CM

## 2023-02-02 DIAGNOSIS — M54.50 LOW BACK PAIN, UNSPECIFIED BACK PAIN LATERALITY, UNSPECIFIED CHRONICITY, UNSPECIFIED WHETHER SCIATICA PRESENT: ICD-10-CM

## 2023-02-02 RX ORDER — LIDOCAINE 50 MG/G
1 PATCH TOPICAL DAILY
Qty: 15 PATCH | Refills: 0 | Status: SHIPPED | OUTPATIENT
Start: 2023-02-02

## 2023-02-02 RX ORDER — MIDODRINE HYDROCHLORIDE 5 MG/1
2.5 TABLET ORAL
Qty: 60 TABLET | Refills: 0 | Status: SHIPPED | OUTPATIENT
Start: 2023-02-02

## 2023-02-02 NOTE — PROGRESS NOTES
Diabetic Foot Exam    Patient's shoes and socks removed  Right Foot/Ankle   Right Foot Inspection  Skin Exam: skin normal, skin intact, dry skin and erythema  No warmth, no callus, no maceration, no abnormal color, no pre-ulcer, no ulcer and no callus  Toe Exam: ROM and strength within normal limits and right toe deformity (thickened toenails)  Sensory   Vibration: diminished  Monofilament testing: intact    Left Foot/Ankle  Left Foot Inspection  Skin Exam: skin normal, skin intact, dry skin and erythema  No warmth, no maceration, normal color, no pre-ulcer, no ulcer and no callus  Toe Exam: ROM and strength within normal limits and left toe deformity (thickened toe nails)       Sensory   Vibration: diminished  Monofilament testing: intact    Assign Risk Category  Deformity present  No loss of protective sensation  No weak pulses  Risk: 0

## 2023-02-02 NOTE — PROGRESS NOTES
Name: Roberta Mail      : 1950      MRN: [id number]  Encounter Provider: Edel An MD  Encounter Date: 2023   Encounter department: [address] [place] [address]      1  Encounter for support and coordination of transition of care    2  Diarrhea, unspecified type  -     Ambulatory Referral to Gastroenterology; Future    3  Hyponatremia    4  Encounter for administration of vaccine  -     Pneumococcal Conjugate Vaccine 20-valent (Pcv20)    5  Low back pain, unspecified back pain laterality, unspecified chronicity, unspecified whether sciatica present  -     lidocaine (Lidoderm) 5 %; Apply 1 patch topically over 12 hours daily Remove & Discard patch within 12 hours or as directed by MD  -     XR hip/pelv 2-3 vws right if performed; Future; Expected date: 2023    6  Hip pain, left  -     XR hip/pelv 2-3 vws right if performed; Future; Expected date: 2023    7  Postural dizziness with presyncope  -     XR hip/pelv 2-3 vws right if performed; Future; Expected date: 2023  -     midodrine (PROAMATINE) 5 mg tablet; Take 0 5 tablets (2 5 mg total) by mouth 3 (three) times a day before meals    8  Ambulatory dysfunction  -     XR hip/pelv 2-3 vws right if performed; Future; Expected date: 2023    9  Encounter for diabetic foot exam ([facility] [address] )    10  Hypotension, unspecified hypotension type         Diarrhea - instructions to patient on AVS  "infectious work up negative , stop cough drops , go to GI doc, maintain adequate intake of food and water!"  New referral to GI provided for diarrhea though did establish care with GI n  for colonoscopy    Cough drops can contribute to diarrhea especially if they contain xylitol , very important to stop eating cough drops  In addition recommended xylimelts  and other dry mouth rinses to help with her dry mouth symptoms, though these do contain xylitol, the amt is significantly less and less frequent use to alleviate symptoms  Hyponatremia - Maintain adequate intake, see nutritional status below; repeat BMP due prior to nephrology visit in 2 weeks, will follow closely  Hypotension - Vitals reviewed; we will permanently D/C lisinopril at this time decrease Midodrine to 2 5 mg three times daily w/ meals ; Please continue to take blood pressure daily and monitor bp closely, will call and check in on BP next week to possibly adjust midodrine dose; was + for orthostatics in hospital and bp helped with hydration  Daughter reports she will call to check on her mom and have her report her daily BP measurements  Nutritional status - discussed patient's nutritional status in great detail and reviewed home dietary journal   Expressed that patient's nutritional status extremely important regarding both symptoms of hypotension, hyponatremia  Also discussed that laboratory studies and work-up in hospital revealed that her poor nutrition overall contributing to her symptoms  Again emphasized how important it is to have balanced nutritional meals  Patient expresses understanding and states she will be mindful of this at home  Daughter also reports she will check on her mom frequently and help to keep dietary log  R hip pain/ Low back pain - of note patient did experience fall which is initial reason for hospital presentation  Denies direct impact to that area however daughter states details of the fall tend to be minimized by pt  Trauma work-up on admission thus far has been negative but no imaging of lumbar spine obtained  Will order x-rays first as pt w/ recent fall/injury - can consider future imaging with ultrasound to characterize deformity (see PE)  Lidoderm patches sent to pharmacy  Type 2 diabetes - we will check patient's A1c at next visit  If A1c remains at goal, will discuss discontinuing glipizide 2 5  Diabetic foot exam completed at today's visit       Strict ED precautions discussed with both patient and daughter  Will f/u in office in about 4 weeks, labs to be obtained for upcoming nephro visit, will follow closely  Anticipatory guidance given  Pt and daughter instructed to call back should any new acute or worsening s/sx occur; they expressed understanding, all questions answered  Subjective     Lelo Bassett presents today along with her daughter Dustin Hidalgo for a transition of care visit to review her recent hospitalization from 1/19/23-1/24/23  Summary of hospital course/dc summary below:   "Olya Louis is a 67 y o  female patient with a PMHx of prediabetes, HTN, and TIA who originally presented to the hospital on 1/19/2023 due to a syncopal episode, presyncopal symptoms, and poor diet reported by her   In the ED, trauma workup was negative  The patient was found to be dry on exam and had strongly positive orthostatic vitals  Additionally, she was found to be hyponatremic  She was started on IVF, home medications were restarted except for her hypertensive medications, which were held, and she was admitted for observation  Her sodium status began to resolve with improved PO intake and fluids  Once she achieved proper volume and hydration status fluids were stopped, however she continued to have positive orthostatic readings so midodrine was started at 2 5mg and eventually titrated up to 5mg  Her symptoms resolved towards the end of her hospital stay, however she continued to have positive orthostatic vital readings  She was deemed stable for discharge with plans for home blood pressure monitoring and pcp followup  "    Today patient and daughter report that she continues to have diarrhea frequently throughout the day and doesn't seem to be associated with things she is eating  She has been staying with her daughter for the past week and she has made sure her mother is eating balanced nutritious meals  Despite this the diarrhea has remained   Infectious workup in hospital was negative for c  Diff and stool cultures negative  Of note patients daughter reports that patient constantly is eating the [place] lemon/mint cough drops - daughter didn't realize just how much her mom had been eating until 3 days ago they bought a bag of 140 of them and as of today the bag is almost gone  When patient asked why she is eating so many cough drops she states that its to keep her mouth busy and avoid dry mouth  Guarding patient's nutritional intake - Daughter has kept a log over the past few days while patient staying with her and including well-balanced nutritional meals with nutritional snacks  Daughter states she fears when patient goes home, she will no longer be able to maintain adequate diet  States that if someone is not watching her mom she will likely return to the tea and toast type of eating  Today they also had visit with geriatrician regarding current meds/dizziness/ambulatory dysfunction  She will be doing PT/OT and continuing to follow with geriatrics  In addition, it was discussed that geriatric visit that side effect of Aricept is dizziness  Can discuss dosage adjustment should all other interventions prove ineffective in alleviating symptoms  TCM Call     Date and time call was made  1/26/2023  9:59 AM    Hospital care reviewed  Records reviewed    Patient was hospitialized at  [address] [place] [address]    Date of Admission  01/19/23    Date of discharge  01/24/23    Diagnosis  Postural dizziness with presyncope    Disposition  Home    Were the patients medications reviewed and updated  Yes    Current Symptoms  None      TCM Call     Post hospital issues  None    Should patient be enrolled in anticoag monitoring? No    Scheduled for follow up?   Yes    Did you obtain your prescribed medications  Yes    Do you need help managing your prescriptions or medications  No    Is transportation to your appointment needed  No    I have advised the patient to call PCP with any new or worsening symptoms  Cy Ros MA 1/26/23    Living Arrangements  Spouse or Significiant other    Are you recieving any outpatient services  No    Are you recieving home care services  Yes    Types of home care services  Nurse visit    Counseling topics  Risk factor reduction; Home health agency benefits; patient and family education; Importance of RX compliance; Activities of daily living        Review of Systems   Constitutional: Negative for fatigue and fever  HENT: Positive for dental problem (dry mouth)  Respiratory: Negative for shortness of breath  Cardiovascular: Negative for chest pain and palpitations  Gastrointestinal: Positive for diarrhea  Negative for abdominal pain, blood in stool and vomiting  Genitourinary: Negative for difficulty urinating  Musculoskeletal: Positive for arthralgias and gait problem (unsteady on her feet)  Neurological: Negative for syncope, facial asymmetry and headaches  All other systems reviewed and are negative  Past Medical History:   Diagnosis Date   • MARTINA (acute kidney injury) ([facility] [address] ) 11/21/2021   • Diabetes mellitus ([facility] )    • Hypertension      History reviewed  No pertinent surgical history    Family History   Problem Relation Age of Onset   • Breast cancer Mother         unknown age   • No Known Problems Father    • No Known Problems Daughter    • No Known Problems Maternal Grandmother    • No Known Problems Maternal Grandfather    • No Known Problems Paternal Grandmother    • No Known Problems Paternal Grandfather    • No Known Problems Son    • No Known Problems Son    • No Known Problems Maternal Aunt    • No Known Problems Maternal Aunt    • No Known Problems Maternal Aunt    • No Known Problems Maternal Aunt    • No Known Problems Paternal Aunt    • No Known Problems Paternal Aunt    • No Known Problems Paternal Aunt      Social History     Socioeconomic History   • Marital status: /Civil Union     Spouse name: None   • Number of children: None   • Years of education: None   • Highest education level: None   Occupational History   • None   Tobacco Use   • Smoking status: Never   • Smokeless tobacco: Never   Vaping Use   • Vaping Use: Never used   Substance and Sexual Activity   • Alcohol use: No   • Drug use: Never   • Sexual activity: Not Currently   Other Topics Concern   • None   Social History Narrative    ** Merged History Encounter **         ** Merged History Encounter **          Social Determinants of Health     Financial Resource Strain: High Risk   • Difficulty of Paying Living Expenses: Very hard   Food Insecurity: No Food Insecurity   • Worried About Running Out of Food in the Last Year: Never true   • Ran Out of Food in the Last Year: Never true   Transportation Needs: No Transportation Needs   • Lack of Transportation (Medical): No   • Lack of Transportation (Non-Medical): No   Physical Activity: Not on file   Stress: Not on file   Social Connections: Not on file   Intimate Partner Violence: Not on file   Housing Stability: Low Risk    • Unable to Pay for Housing in the Last Year: No   • Number of Places Lived in the Last Year: 1   • Unstable Housing in the Last Year: No     Current Outpatient Medications on File Prior to Visit   Medication Sig   • aspirin (ECOTRIN LOW STRENGTH) 81 mg EC tablet Take 81 mg by mouth daily   • aspirin 81 mg chewable tablet Chew 81 mg daily     • atorvastatin (LIPITOR) 40 mg tablet Take 1 tablet (40 mg total) by mouth every evening   • cholecalciferol (VITAMIN D3) 1,000 units tablet Take 1,000 Units by mouth daily   • donepezil (ARICEPT) 10 mg tablet TAKE 1 TABLET BY MOUTH DAILY AT BEDTIME   • glipiZIDE (GLUCOTROL) 5 mg tablet Take 0 5 tablets (2 5 mg total) by mouth daily   • mirtazapine (REMERON) 15 mg tablet Take 1 tablet (15 mg total) by mouth daily at bedtime   • Sodium Fluoride 5000 PPM 1 1 % PSTE BRUSH TWICE A DAY  SPIT OUT  DO NOT DRINK OR EAT FOR 1 HOUR     • vitamin B-12 (CYANOCOBALAMIN) 250 MCG tablet Take 250 mcg by mouth daily     No Known Allergies  Immunization History   Administered Date(s) Administered   • COVID-19 MODERNA VACC 0 5 ML IM 04/21/2021, 05/19/2021   • Pneumococcal Conjugate Vaccine 20-valent (Pcv20), Polysace 02/02/2023   • Tdap 10/13/2017       Objective     /78   Pulse 82   Temp 97 8 °F (36 6 °C)   Resp 18   Ht 5' 2" (1 575 m)   Wt 57 kg (125 lb 9 6 oz)   SpO2 98%   BMI 22 97 kg/m²     Physical Exam  Vitals reviewed  Constitutional:       General: She is not in acute distress  Appearance: She is not ill-appearing, toxic-appearing or diaphoretic  Comments: Pt in NAD   Daughter present at bedside    HENT:      Head: Normocephalic and atraumatic  Mouth/Throat:      Mouth: Mucous membranes are moist    Eyes:      General: No scleral icterus  Cardiovascular:      Rate and Rhythm: Normal rate and regular rhythm  Heart sounds: Normal heart sounds  No murmur heard  Pulmonary:      Effort: Pulmonary effort is normal       Breath sounds: Normal breath sounds  No wheezing  Abdominal:      General: Abdomen is flat  Bowel sounds are normal  There is no distension  Palpations: Abdomen is soft  Tenderness: There is no abdominal tenderness  There is no guarding  Musculoskeletal:      Right lower leg: No edema  Left lower leg: No edema  Comments: Tenderness to palpation over SI joint on right possible overlying lipoma versus bony abnormality present   Skin:     General: Skin is warm and dry  Coloration: Skin is not jaundiced  Neurological:      Mental Status: She is alert and oriented to person, place, and time     Psychiatric:         Mood and Affect: Mood normal          Behavior: Behavior normal        Dony Garza MD

## 2023-02-02 NOTE — ASSESSMENT & PLAN NOTE
· Mild hard of hearing, does have hearing aids but does not wear  · Continues to speak clearly and face toward patient when communicating    Decrease outside noise distractions  · Assist as needed for ADLs

## 2023-02-02 NOTE — PROGRESS NOTES
Assessment & Plan:   1  Mild dementia without behavioral disturbance, psychotic disturbance, mood disturbance, or anxiety, unspecified dementia type  Assessment & Plan:  · [facility] 14/30 - deficits in all domains other than naming  · Patient is independent with ADLs, some assist with IADLs  STM fairly stable  · Pt continues with aricept  · Did discuss with pt and dgt that SE of aricept is dizziness- unsure if this contributed to fall at all, but would consider decreasing dose if any further complaints  · Continue to stay active - cognitive, physical, socially  Per dgt/pt, pt not doing much  · Dgt looking for ways to improve cognition:  Will refer to cognitive therapy  · Recommend keeping social, continue with memory games, regular physical activity  · Monitor for changes in mood, sleep, pain level  · Ensure safe environment  · See SW note-patient's daughter interested in [facility] and possibly [facility]  · Encourage patient to keep cell phone on her in case of falls  · Daughter requesting OT  check - pt agreeable  Currently not driving as she doesn't go out much in cold  · Referral placed  · Continue to optimize all acute and chronic conditions  · Check with pharmacist/provider before starting any new medications (OTC or prescription) for potential cognitive side effects  · Keep hydrated  Encourage good nutrition    Orders:  -     Ambulatory Referral to OT  Adaptability Evaluation; Future  -     Ambulatory Referral to Occupational Therapy; Future    2  Bilateral hearing loss, unspecified hearing loss type  Assessment & Plan:  · Mild hard of hearing, does have hearing aids but does not wear  · Continues to speak clearly and face toward patient when communicating  Decrease outside noise distractions  · Assist as needed for ADLs      3  Ambulatory dysfunction  Assessment & Plan:  · Had recent fall, states its from slippers- dgt unsure  · PT OT to start - may benefit from AD use  · Fall precautions      4  Depression, unspecified depression type  Assessment & Plan:  · Stable at present  · Continue Remeron  · Continue emotional support, relaxation techniques      5  Primary hypertension  Assessment & Plan:  · Stable today without complaint of headache or dizziness  · Lisinopril was stopped at recent hospitalization, midodrine started  · Continue dietary and lifestyle interventions      HPI:  We had the pleasure of evaluating Randy Briones who is a 67 y o  female in Geriatric follow up today  Previous MOCA:  19/30  Comorbidities include htn, ckd, mild dementia  She lives with   Ms Pennie Vegas is in the office with her daughter  At fall, step dad heard and pt didn't want to go to doc  Did send to hospital   Unclear mechanism of fall  Denies dizziness  Holding on to things  Little shaky  Pt staying with dgt this week  Lowered BP medication in December because of falls  Wont go to senior center, but is bored at home  Doing crossword puzzles, sometimes= gets bored at home, does more when she's with dgt  Eats well with dgt, not eating well at home  Memory improvement when with daughter, down when with   Takes care of bills with mom- does good with assistance  Don't go out much   is getting more confused  Goal is to stay in home as long as possible  Limited assistance as families are working full time  Doing ok  Some STM loss, but not too bad  Daughter and  help with meds    Pt cooks  Daughter does bills with here  Doing word searches, read, watch TV  Able to follow plots  Doing home chores  Likes to spend time with the family  Had recent fall - thinks from tripping over slippers  Keeps cell phone on at all times, uses life alert  PT will start working with pt since fall  Daughter checks BS- doing ok  BP doing ok        She has difficulty finding the right word while speaking: No  Patient requires repeat information or ask the same question repeatedly: Yes  Do you drive: Yes       Have you had any recent accidents, citations or getting lost in familiar places :No  Do you handle your own financial affairs such as balancing your checkbook, paying bills, investments: Yes  Do have any difficulties with handling your financial affairs: No  Have you or your family noted any change in your mood or personality:No - does take remeron  Are you currently or have you been treated in the past for depression or anxiety: Yes  Have you noticed any gait or balance disorder: Yes- recent fall  Uses :no AD  Any hallucination or delusion: No  Sleep Issues: No  Urinary/Stool Incontinence: No  Hearing and vision issue: Yes- has HAs (doesn't wear)  Reading glasses  ADL/IADL:  Independent/assist   Appetite/swallow:  Weight stable, no swallow issues  Pain:  No chronic pain  A little sore from sitting    Do you have POA:Yes - daughter  Do you have a Living will Yes    Past Medical, surgical, social, medication and allergy history and patients previous records reviewed  Family Review of Behavior [facility]:    pacing  No    agressive/combative behavior  Yes - verbally - better (mirtazapine helping)   agitated  Yes - more frustrated  wandering  No   resistance to care  No   withdrawn Yes   misplacing/losing objects Yes  personal hygiene problems  Yes  forgetfulness of actions Yes    ROS: Review of Systems   Constitutional: Negative for activity change, appetite change, chills and fatigue  HENT: Negative for congestion, hearing loss and trouble swallowing  Eyes: Negative for visual disturbance  Respiratory: Negative for cough and shortness of breath  Cardiovascular: Negative for chest pain  Gastrointestinal: Negative for abdominal pain, constipation, diarrhea, nausea and vomiting  Genitourinary: Negative for difficulty urinating  Musculoskeletal: Positive for gait problem  Negative for arthralgias and back pain  Neurological: Negative for dizziness and light-headedness  Psychiatric/Behavioral: Positive for decreased concentration (forgetful)  Negative for dysphoric mood and sleep disturbance  The patient is not nervous/anxious  Allergies:   No Known Allergies    Medications:      Current Outpatient Medications:   •  aspirin (ECOTRIN LOW STRENGTH) 81 mg EC tablet, Take 81 mg by mouth daily, Disp: , Rfl:   •  aspirin 81 mg chewable tablet, Chew 81 mg daily  , Disp: , Rfl:   •  atorvastatin (LIPITOR) 40 mg tablet, Take 1 tablet (40 mg total) by mouth every evening, Disp: 90 tablet, Rfl: 3  •  atorvastatin (LIPITOR) 40 mg tablet, Take 40 mg by mouth daily, Disp: , Rfl:   •  cholecalciferol (VITAMIN D3) 1,000 units tablet, Take 1,000 Units by mouth daily, Disp: , Rfl:   •  donepezil (ARICEPT) 10 mg tablet, TAKE 1 TABLET BY MOUTH DAILY AT BEDTIME, Disp: 90 tablet, Rfl: 1  •  donepezil (ARICEPT) 5 mg tablet, Take 10 mg by mouth daily at bedtime, Disp: , Rfl:   •  glipiZIDE (GLUCOTROL) 5 mg tablet, Take 0 5 tablets (2 5 mg total) by mouth daily, Disp: 90 tablet, Rfl: 0  •  glipiZIDE (GLUCOTROL) 5 mg tablet, Take 5 mg by mouth daily at bedtime, Disp: , Rfl:   •  lisinopril (ZESTRIL) 5 mg tablet, TAKE 3 TABLETS (15 MG TOTAL) BY MOUTH DAILY  , Disp: 270 tablet, Rfl: 1  •  midodrine (PROAMATINE) 5 mg tablet, Take 1 tablet (5 mg total) by mouth 3 (three) times a day before meals, Disp: 60 tablet, Rfl: 0  •  mirtazapine (REMERON) 15 mg tablet, Take 1 tablet (15 mg total) by mouth daily at bedtime, Disp: 30 tablet, Rfl: 0  •  Sodium Fluoride 5000 PPM 1 1 % PSTE, BRUSH TWICE A DAY  SPIT OUT   DO NOT DRINK OR EAT FOR 1 HOUR , Disp: , Rfl:   •  vitamin B-12 (CYANOCOBALAMIN) 250 MCG tablet, Take 250 mcg by mouth daily, Disp: , Rfl:     Vitals:  Vitals:    02/02/23 1200   BP: 118/62   Pulse: 61   Resp: 18   Temp: (!) 97 4 °F (36 3 °C)   SpO2: 99%       History:  Past Medical History:   Diagnosis Date   • [facility]21/2021   • Diabetes mellitus ([email or web address] 75 )    • Hypertension History reviewed  No pertinent surgical history  Family History   Problem Relation Age of Onset   • Breast cancer Mother         unknown age   • No Known Problems Father    • No Known Problems Daughter    • No Known Problems Maternal Grandmother    • No Known Problems Maternal Grandfather    • No Known Problems Paternal Grandmother    • No Known Problems Paternal Grandfather    • No Known Problems Son    • No Known Problems Son    • No Known Problems Maternal Aunt    • No Known Problems Maternal Aunt    • No Known Problems Maternal Aunt    • No Known Problems Maternal Aunt    • No Known Problems Paternal Aunt    • No Known Problems Paternal Aunt    • No Known Problems Paternal Aunt      Social History     Socioeconomic History   • Marital status: /Civil Union     Spouse name: Not on file   • Number of children: Not on file   • Years of education: Not on file   • Highest education level: Not on file   Occupational History   • Not on file   Tobacco Use   • Smoking status: Never   • Smokeless tobacco: Never   Vaping Use   • Vaping Use: Never used   Substance and Sexual Activity   • Alcohol use: No   • Drug use: Never   • Sexual activity: Not Currently   Other Topics Concern   • Not on file   Social History Narrative    ** Merged History Encounter **         ** Merged History Encounter **          Social Determinants of Health     Financial Resource Strain: High Risk   • Difficulty of Paying Living Expenses: Very hard   Food Insecurity: No Food Insecurity   • Worried About Running Out of Food in the Last Year: Never true   • Ran Out of Food in the Last Year: Never true   Transportation Needs: No Transportation Needs   • Lack of Transportation (Medical): No   • Lack of Transportation (Non-Medical):  No   Physical Activity: Not on file   Stress: Not on file   Social Connections: Not on file   Intimate Partner Violence: Not on file   Housing Stability: Low Risk    • Unable to Pay for Housing in the Last Year: No • Number of Places Lived in the Last Year: 1   • Unstable Housing in the Last Year: No     History reviewed  No pertinent surgical history  Physical Exam:  Observed ambulation:  No AD, normal speed, slightly unsteady   Physical Exam  Vitals and nursing note reviewed  Constitutional:       General: She is not in acute distress  Appearance: Normal appearance  She is well-developed  She is not diaphoretic  HENT:      Head: Normocephalic  Cardiovascular:      Rate and Rhythm: Normal rate  Heart sounds: No murmur heard  No friction rub  No gallop  Pulmonary:      Effort: Pulmonary effort is normal  No respiratory distress  Breath sounds: Normal breath sounds  No wheezing or rales  Abdominal:      General: Bowel sounds are normal  There is no distension  Palpations: Abdomen is soft  Tenderness: There is no abdominal tenderness  There is no rebound  Musculoskeletal:         General: Normal range of motion  Skin:     General: Skin is warm and dry  Neurological:      General: No focal deficit present  Mental Status: She is alert  Mental status is at baseline        Comments: Oriented to person, partial tps  Pleasant, cooperative, forgetful   Psychiatric:         Mood and Affect: Mood normal          Behavior: Behavior normal

## 2023-02-02 NOTE — PROGRESS NOTES
[facility]  [address], [address], [address]  [phone]    Social Work Follow-Up    LSW met with Geary Dancer for follow up visit  Completed Claudio Cognitive Assessment, score 14/30 (previously 18/30 in 05/03/22), and Geriatric Depression Screen, 0/15 (previously 1/15 in 05/03/22)  Rachel Cognitive Assessment (MoCA) Version 8 3  Education: High School    Points Earned POSSIBLE Points   Visuospatial/Executive   Alternating Frannie Making 0 1   Visuoconstructional skills 0 1   Visuoconstructional skills (clock) 2 3   Naming   Naming Animals 3 3   Attention   Digit Span 1 2   Vigilance (letters) 0 1   Serial 7 subtraction 2 3   Language   Sentence Repetition 0 2   Verbal fluency 0 1   Abstraction   Abstraction (word pairings) 1 2   Delayed recall   Delayed recall 0 5   Memory index score: 3/15   Orientation   Orientation 4 6   TOTAL SCORE: 14/30  (Normal ?26/30)   Additional notes:      LSW to remain available as needed

## 2023-02-02 NOTE — ASSESSMENT & PLAN NOTE
· Stable today without complaint of headache or dizziness  · Lisinopril was stopped at recent hospitalization, midodrine started  · Continue dietary and lifestyle interventions

## 2023-02-02 NOTE — ASSESSMENT & PLAN NOTE
· Had recent fall, states its from slippers- dgt unsure  · PT OT to start - may benefit from AD use  · Fall precautions

## 2023-02-02 NOTE — ASSESSMENT & PLAN NOTE
· [address] [facility] 14/30 - deficits in all domains other than naming  · Patient is independent with ADLs, some assist with IADLs  STM fairly stable  · Pt continues with aricept  · Did discuss with pt and dgt that SE of aricept is dizziness- unsure if this contributed to fall at all, but would consider decreasing dose if any further complaints  · Continue to stay active - cognitive, physical, socially  Per dgt/pt, pt not doing much  · Dgt looking for ways to improve cognition:  Will refer to cognitive therapy  · Recommend keeping social, continue with memory games, regular physical activity  · Monitor for changes in mood, sleep, pain level  · Ensure safe environment  · See SW note-patient's daughter interested in [facility] and possibly [facility]  · Encourage patient to keep cell phone on her in case of falls  · Daughter requesting OT  check - pt agreeable  Currently not driving as she doesn't go out much in cold  · Referral placed  · Continue to optimize all acute and chronic conditions  · Check with pharmacist/provider before starting any new medications (OTC or prescription) for potential cognitive side effects  · Keep hydrated    Encourage good nutrition

## 2023-02-02 NOTE — PATIENT INSTRUCTIONS
Diarrhea - infectious work up negative , stop cough drops , go to GI doc, maintain adequate intake of food and water! Hyponatremia (low salt) - Maintain adequate intake   Blood pressure - 2 5 mg midodrine three times daily ; Please continue to take blood pressure daily; if Bps are consistently over 140 call office [address]  Thursday NEXT WEEK To check in on Bps   Hip pain - Lidoderm patches and Xray   STOP cough drops and cough medicine     Chronic Diarrhea   AMBULATORY CARE:   Chronic diarrhea  lasts more than 4 weeks  You may have 3 or more episodes of diarrhea each day  Signs and symptoms that may happen with chronic diarrhea:   Abdominal tenderness     Nausea and vomiting    Urgent need to have a bowel movement, or loss of bowel control     Weight loss    Anal irritation and inflammation    Seek care immediately if:   Your skin, mouth, and tongue are dry, and you feel very thirsty  You have blood or pus in your bowel movement  You have trouble eating, drinking, or keeping food down  You have severe abdominal pain  You feel lightheaded, weak, or you faint  Your heart beats faster than normal or you have trouble breathing  You are confused or cannot think clearly  Contact your healthcare provider if:   You have a fever  You have new symptoms  Your symptoms do not improve, or they get worse  You have questions or concerns about your condition or care  Treatment for chronic diarrhea  will depend on the condition causing your chronic diarrhea  Medicines may be given to treat an infection  Medicines may also be given to stop or slow the diarrhea  Medicines that are causing diarrhea may be stopped or changed  You may need to change your diet and avoid certain foods  Manage chronic diarrhea:   Drink more liquids  to replace body fluids lost through diarrhea  You may also need to drink an oral rehydration solution (ORS)   An ORS has the right amounts of sugar, salt, and minerals in water to replace body fluids  ORS can be found at most grocery stores or pharmacies  Ask how much liquid to drink each day and which liquids are best for you  Do not drink liquids with caffeine or alcohol  These can increase your risk for dehydration  Do not drink or eat foods that may make your symptoms worse  These include milk and dairy products, greasy and fatty foods, spicy foods, caffeine, and alcohol  Keep a food diary to see if your symptoms are caused by certain foods  Bring this to your follow-up visits  Eat foods that are easy to digest   These include bananas, boiled potatoes, cooked carrots, cooked chicken, plain rice, and toast  You can also try yogurt and applesauce  Wash your hands often  Germs on your hands can get into your mouth and cause diarrhea  Use soap and water  Use an alcohol-based hand rub if soap and water are not available  Wash your hands after you use the bathroom, change a child's diaper, or sneeze  Wash your hands before you prepare or eat food  Follow up with your doctor as directed:  Write down your questions so you remember to ask them during your visits  © Copyright [employer] 2022 Information is for End User's use only and may not be sold, redistributed or otherwise used for commercial purposes  All illustrations and images included in CareNotes® are the copyrighted property of A D A M , Inc  or Clara Hernandez  The above information is an  only  It is not intended as medical advice for individual conditions or treatments  Talk to your doctor, nurse or pharmacist before following any medical regimen to see if it is safe and effective for you

## 2023-02-08 ENCOUNTER — APPOINTMENT (OUTPATIENT)
Dept: RADIOLOGY | Facility: CLINIC | Age: 73
End: 2023-02-08

## 2023-02-08 DIAGNOSIS — M54.50 LOW BACK PAIN, UNSPECIFIED BACK PAIN LATERALITY, UNSPECIFIED CHRONICITY, UNSPECIFIED WHETHER SCIATICA PRESENT: ICD-10-CM

## 2023-02-08 DIAGNOSIS — R26.2 AMBULATORY DYSFUNCTION: ICD-10-CM

## 2023-02-08 DIAGNOSIS — R42 POSTURAL DIZZINESS WITH PRESYNCOPE: ICD-10-CM

## 2023-02-08 DIAGNOSIS — M25.552 HIP PAIN, LEFT: ICD-10-CM

## 2023-02-08 DIAGNOSIS — R55 POSTURAL DIZZINESS WITH PRESYNCOPE: ICD-10-CM

## 2023-02-09 ENCOUNTER — TELEPHONE (OUTPATIENT)
Dept: FAMILY MEDICINE CLINIC | Facility: OTHER | Age: 73
End: 2023-02-09

## 2023-02-09 NOTE — TELEPHONE ENCOUNTER
Daughter called with pt's BP log and pulse over the past week  She took single readings twice a day in AM (approx 1030-11) and PM (approx 8)    BP log  2/5  10:30am  152/67  80bpm  8pm 138/106  83bpm    2/6  AM  125/53  83bpm  PM  133/59  87bpm    2/7  AM  138/62  73bpm  PM  148/55  80 bpm    2/8  AM  163/68  78bpm  PM  128/57  91bpm    2/9  AM  131/60   85bpm    Please advise  Thank you      JESUS Kulkarni

## 2023-02-09 NOTE — TELEPHONE ENCOUNTER
Spoke with pt's daughter  She had questions and I recommended the Dr's advice to stop administration of medication

## 2023-02-14 ENCOUNTER — TELEPHONE (OUTPATIENT)
Dept: FAMILY MEDICINE CLINIC | Facility: OTHER | Age: 73
End: 2023-02-14

## 2023-02-14 DIAGNOSIS — R42 POSTURAL DIZZINESS WITH PRESYNCOPE: ICD-10-CM

## 2023-02-14 DIAGNOSIS — R55 POSTURAL DIZZINESS WITH PRESYNCOPE: ICD-10-CM

## 2023-02-14 RX ORDER — MIDODRINE HYDROCHLORIDE 5 MG/1
2.5 TABLET ORAL
Qty: 135 TABLET | Refills: 1 | Status: SHIPPED | OUTPATIENT
Start: 2023-02-14 | End: 2023-02-16

## 2023-02-14 NOTE — TELEPHONE ENCOUNTER
After speaking with the patient's daughter again she clarified that she will be totally off of the medication as of Thursday  The pills were cut in half and there are other half pills in her medication box and they can not figure out  which pill they should take out  So she will take a 1/2 at dinner tonight and 1/2 at dinner tomorrow night      They will call next week with BP

## 2023-02-14 NOTE — TELEPHONE ENCOUNTER
The patient's daughter called with her BP readings over the the last 6 days  The daughter stated that she will only be in the area until 0 today    If you need to make any changes it will need to be done before she leaves    2/9 - 131/60 - pulse 85   148/60 - pulse 89    2/10 132/62 - pulse 83   130/80   112/70   162/67    2/11 169/72 - pulse 100   157/64 - pulse 93   177/74 - pulse 102    146/75 - pulse 103    2/12 178/98 - pulse 85   154/64 - pulse 95 or 7t couldn't read the number    2/13 137/73 - pulse 86   128/68 Taken by PT   162/65   140/78 Taken by PT   134/51    2/14 149/64 - pulse 79

## 2023-02-16 ENCOUNTER — TELEPHONE (OUTPATIENT)
Dept: NEPHROLOGY | Facility: CLINIC | Age: 73
End: 2023-02-16

## 2023-02-16 DIAGNOSIS — R42 POSTURAL DIZZINESS WITH PRESYNCOPE: ICD-10-CM

## 2023-02-16 DIAGNOSIS — R55 POSTURAL DIZZINESS WITH PRESYNCOPE: ICD-10-CM

## 2023-02-16 NOTE — TELEPHONE ENCOUNTER
Reschedule Appointment   Person speaking to  Child daughter   Date of original appointment 2/16/23    New appointment date 7/6/23   Location [place]    Provider Billy Schaumann    Additional Information Patient daughter called to cancel visit for today due to patient son was transporting patient to appointment and he was unavailable to bring to appointment  Visit rescheduled  recall card made for earlier visit

## 2023-02-21 DIAGNOSIS — F32.A DEPRESSION: ICD-10-CM

## 2023-02-21 RX ORDER — MIRTAZAPINE 15 MG/1
TABLET, FILM COATED ORAL
Qty: 30 TABLET | Refills: 0 | OUTPATIENT
Start: 2023-02-21

## 2023-02-22 ENCOUNTER — TELEPHONE (OUTPATIENT)
Dept: FAMILY MEDICINE CLINIC | Facility: OTHER | Age: 73
End: 2023-02-22

## 2023-02-22 DIAGNOSIS — F32.A DEPRESSION: ICD-10-CM

## 2023-02-22 RX ORDER — MIRTAZAPINE 15 MG/1
15 TABLET, FILM COATED ORAL
Qty: 30 TABLET | Refills: 1 | Status: SHIPPED | OUTPATIENT
Start: 2023-02-22 | End: 2023-04-23

## 2023-02-22 NOTE — TELEPHONE ENCOUNTER
Pt's mother called in with new BP log  Please advise  Thank you      Feb 14th-20th    14th  AM - 149/64  pulse 79  PM - 135/85  Pulse 88    15th  AM - 151/66  Pulse 77  12 PM - 132/60  PM - 192/78  Pulse 91    16th  AM - 146/62  Pulse 78  PM - 142/66 Pulse 91    17th  Unknown time - 144/69  Pulse 83    18th  /70  Pulse 86  PM  167/77  Pulse 83    19th  /70  Pulse 91    20th  /56  Pulse 96

## 2023-02-23 ENCOUNTER — TELEPHONE (OUTPATIENT)
Dept: VASCULAR SURGERY | Facility: CLINIC | Age: 73
End: 2023-02-23

## 2023-02-23 NOTE — TELEPHONE ENCOUNTER
Spoke with patient  Patient expressed they would not like to schedule the appointment(s) listed below  Per patient, they would like us to call back in a couple of days  Patient's appointment(s) are past due, expected ASAP  Dopplers  [] Abdominal Aorta Iliac (AOIL)  [x] Carotid (CV) needs to be rescheduled  [] Celiac and/or Mesenteric  [] Endovascular Aortic Repair (EVAR)   [] Hemodialysis Access (HD)   [] Lower Limb Arterial (SHUBHAM)  [] Lower Limb Venous Duplex with Reflux (LEVDR)  [] Renal Artery  [] Upper Limb Arterial (UEA)    [] Upper Limb Venous (UEV)              [] MARVIN and Waveform analysis     Advanced Imaging   [] CTA head/neck    [] CTA abdomen    [] CTA abdomen & pelvis    [] CT abdomen with/ without contrast  [] CT abdomen with contrast  [] CT abdomen without contrast    [] CT abdomen & pelvis with/ without contrast  [] CT abdomen & pelvis with contrast  [] CT abdomen & pelvis without contrast    Office Visit   [] New patient, patient last seen over 3 years ago  [] Risk factor modification (RFM)   [x] Follow up 1 YR  F/U PER DR SUE DOCTOR  REVIEW CV BILATERAL CAROTID ARTERY STENOSES    [] Lost to follow up (LTFU)

## 2023-02-24 ENCOUNTER — RA CDI HCC (OUTPATIENT)
Dept: OTHER | Facility: HOSPITAL | Age: 73
End: 2023-02-24

## 2023-02-24 NOTE — PROGRESS NOTES
[id number]  [facility]  coding opportunities          Chart Reviewed number of suggestions sent to Provider: 1     Patients Insurance     Medicare Insurance: Joelle Wyman

## 2023-02-28 ENCOUNTER — OFFICE VISIT (OUTPATIENT)
Dept: PODIATRY | Facility: CLINIC | Age: 73
End: 2023-02-28

## 2023-02-28 ENCOUNTER — TELEPHONE (OUTPATIENT)
Dept: FAMILY MEDICINE CLINIC | Facility: OTHER | Age: 73
End: 2023-02-28

## 2023-02-28 VITALS
BODY MASS INDEX: 22.5 KG/M2 | WEIGHT: 122.3 LBS | HEART RATE: 81 BPM | DIASTOLIC BLOOD PRESSURE: 75 MMHG | HEIGHT: 62 IN | SYSTOLIC BLOOD PRESSURE: 148 MMHG

## 2023-02-28 DIAGNOSIS — F03.A0 MILD DEMENTIA: ICD-10-CM

## 2023-02-28 DIAGNOSIS — E11.9 TYPE 2 DIABETES MELLITUS WITHOUT COMPLICATION, WITHOUT LONG-TERM CURRENT USE OF INSULIN (HCC): Primary | ICD-10-CM

## 2023-02-28 DIAGNOSIS — B35.1 ONYCHOMYCOSIS: ICD-10-CM

## 2023-02-28 NOTE — PROGRESS NOTES
PATIENT:  Randy Briones  1950    ASSESSMENT/PLAN:  1  Type 2 diabetes mellitus without complication, without long-term current use of insulin ([facility] )        2  Onychomycosis  Debridement            Orders Placed This Encounter   Procedures   • Debridement       1  Reviewed medical records  Patient was counseled on the condition and diagnosis  Educated disease prevention and risks related to diabetes  2  Educated proper daily foot care and exam   Instructed proper skin care / protection and footwear  Instructed to identify any signs of infection and related foot problem  3  The recent blood work was reviewed / discussed and the last HbA1c was 5 4  Continue tight BS control  4  She has chronic onychomycosis and is not a good candidate for medical treatment  Recommended periodic foot care  5  The patient will return in 9 weeks  PROCEDURE:  All mycotic toenails were reduced and debrided in length, width, and girth using a nail nipper and dremel  Patient tolerated procedure(s) well without complications  •     HPI:  Randy Briones is a 75 y  o year old female seen for diabetic foot exam   The patient has diabetes for 10+ years  The blood glucose is under control  The patient denied any history of diabetic foot ulcer, related foot infection, or amputation  No significant numbness or paresthesia  Denied weakness or significant functional deficit  No acute pedal problems  She complained of pain associated with thick toenails  She had it for a long time  PAST MEDICAL HISTORY:  Past Medical History:   Diagnosis Date   • MARTINA (acute kidney injury) ([facility] ) 11/21/2021   • Diabetes mellitus ([facility] )    • Hypertension        PAST SURGICAL HISTORY:  History reviewed  No pertinent surgical history  ALLERGIES:  Patient has no known allergies      MEDICATIONS:  Current Outpatient Medications   Medication Sig Dispense Refill   • aspirin (ECOTRIN LOW STRENGTH) 81 mg EC tablet Take 81 mg by mouth daily     • aspirin 81 mg chewable tablet Chew 81 mg daily       • atorvastatin (LIPITOR) 40 mg tablet Take 1 tablet (40 mg total) by mouth every evening 90 tablet 3   • cholecalciferol (VITAMIN D3) 1,000 units tablet Take 1,000 Units by mouth daily     • donepezil (ARICEPT) 10 mg tablet TAKE 1 TABLET BY MOUTH DAILY AT BEDTIME 90 tablet 1   • glipiZIDE (GLUCOTROL) 5 mg tablet Take 0 5 tablets (2 5 mg total) by mouth daily 90 tablet 0   • lidocaine (Lidoderm) 5 % Apply 1 patch topically over 12 hours daily Remove & Discard patch within 12 hours or as directed by MD 15 patch 0   • mirtazapine (REMERON) 15 mg tablet Take 1 tablet (15 mg total) by mouth daily at bedtime 30 tablet 1   • Sodium Fluoride 5000 PPM 1 1 % PSTE BRUSH TWICE A DAY  SPIT OUT  DO NOT DRINK OR EAT FOR 1 HOUR  • vitamin B-12 (CYANOCOBALAMIN) 250 MCG tablet Take 250 mcg by mouth daily       No current facility-administered medications for this visit  SOCIAL HISTORY:  Social History     Socioeconomic History   • Marital status: /Civil Union     Spouse name: None   • Number of children: None   • Years of education: None   • Highest education level: None   Occupational History   • None   Tobacco Use   • Smoking status: Never   • Smokeless tobacco: Never   Vaping Use   • Vaping Use: Never used   Substance and Sexual Activity   • Alcohol use: No   • Drug use: Never   • Sexual activity: Not Currently   Other Topics Concern   • None   Social History Narrative    ** Merged History Encounter **         ** Merged History Encounter **          Social Determinants of Health     Financial Resource Strain: High Risk   • Difficulty of Paying Living Expenses: Very hard   Food Insecurity: No Food Insecurity   • Worried About Running Out of Food in the Last Year: Never true   • Ran Out of Food in the Last Year: Never true   Transportation Needs: No Transportation Needs   • Lack of Transportation (Medical):  No   • Lack of Transportation (Non-Medical): No   Physical Activity: Not on file   Stress: Not on file   Social Connections: Not on file   Intimate Partner Violence: Not on file   Housing Stability: Low Risk    • Unable to Pay for Housing in the Last Year: No   • Number of Places Lived in the Last Year: 1   • Unstable Housing in the Last Year: No        REVIEW OF SYSTEMS:  GENERAL: No fever or chills  HEART: No chest pain, or palpitation  RESPIRATORY:  No acute SOB or cough  GI: No Nausea, vomit or diarrhea  NEUROLOGIC: No syncope or acute weakness    PHYSICAL EXAM:    /75 (BP Location: Right arm, Patient Position: Sitting, Cuff Size: Standard)   Pulse 81   Ht 5' 2" (1 575 m)   Wt 55 5 kg (122 lb 4 8 oz)   BMI 22 37 kg/m²     VASCULAR EXAM  Dorsalis pedis  +1  Posterior tibial artery  absent  The patient has class findings with skin atrophy, lack of digital hair, and nail dystrophy  Diffuse ankle swelling noted  No ischemia  NEUROLOGIC EXAM  AAO X 3  Sensation is intact to light touch  Sensation is intact to 10gm monofilament  No focal neurologic deficit  DERMATOLOGIC EXAM:   No ulcer or cellulitis noted  No abscess  The patient has hypertrophic toenails with discoloration, onycholysis, and subungal debris  Skin is dry  MUSCULOSKELETAL EXAM:   No acute joint pain, edema, or redness  No acute musculoskeletal problem  No injury  ROM intact  Diabetic Foot Exam    Patient's shoes and socks removed  Right Foot/Ankle   Right Foot Inspection  Skin Exam: skin intact and dry skin  No erythema, no pre-ulcer and no ulcer  Toe Exam: right toe deformity  No swelling, no tenderness and erythema    Sensory   Vibration: diminished  Proprioception: intact  Monofilament testing: intact    Vascular  Capillary refills: < 3 seconds  The right DP pulse is 1+  The right PT pulse is 0  Left Foot/Ankle  Left Foot Inspection  Skin Exam: skin intact and dry skin  No erythema, no pre-ulcer and no ulcer       Toe Exam: left toe deformity  No swelling, no tenderness and no erythema  Sensory   Vibration: diminished  Proprioception: intact  Monofilament testing: intact    Vascular  Capillary refills: < 3 seconds  The left DP pulse is 1+  The left PT pulse is 0       Assign Risk Category  Deformity present  No loss of protective sensation  No weak pulses  Risk: 1

## 2023-02-28 NOTE — TELEPHONE ENCOUNTER
BP readings:    2/20 PM- 128/56 HR- 96    2/21 AM- 130/54 HR-53  2/21 3pm- 120/60  2/21 PM- 138/68    2/22 AM- 135/61 HR-89  2/22 PM- 138/60 HR-85    2/23 AM- 158/71 HR-79  2/23 PM- 186/72 HR- 84    2/24 AM- 156/77  HR-89  2/24 PM- 152/71 HR- 95    2/25 AM- 162/79 HR- 82  2/25 PM- 172/73 HR- 79    2/26 AM-162/56 HR-80  2/26 PM- 162/56 HR-80    2/27 /78 HR-63    2/28 AM-130/48 HR- 64

## 2023-02-28 NOTE — PATIENT INSTRUCTIONS
Foot Care for People with Diabetes   AMBULATORY CARE:   What you need to know about foot care:  Long-term high blood sugar levels can damage the blood vessels and nerves in your legs and feet  This damage makes it hard to feel pressure, pain, temperature, and touch  You may not be able to feel a cut or sore, or shoes that are too tight  Foot care is needed to prevent serious problems, such as an infection or amputation  Diabetes may cause your toes to become crooked or curved under  These changes may affect the way you walk and can lead to increased pressure on your foot  The pressure can decrease blood flow to your feet  Lack of blood flow increases your risk for a foot ulcer  Call your care team provider if:   Your feet become numb, weak, or hard to move  You have pus draining from a sore on your foot  You have a wound on your foot that gets bigger, deeper, or does not heal     You see blisters, cuts, scratches, calluses, or sores on your foot  You have a fever, and your feet become red, warm, and swollen  Your toenails become thick, curled, or yellow  You find it hard to check your feet because your vision is poor  You have questions or concerns about your condition or care  How to care for your feet:   Check your feet each day  Look at your whole foot, including the bottom, and between and under your toes  Check for wounds, corns, and calluses  Use a mirror to see the bottom of your feet  The skin on your feet may be shiny, tight, or darker than normal  Your feet may also be cold and pale  Feel your feet by running your hands along the tops, bottoms, sides, and between your toes  Redness, swelling, and warmth are signs of blood flow problems that can lead to a foot ulcer  Do not try to remove corns or calluses yourself  Do not ignore small problems, such as dry skin or small wounds  These can become life-threatening over time without proper care           Wash your feet each day with soap and warm water  Do not use hot water, because this can injure your foot  Dry your feet gently with a towel after you wash them  Dry between and under your toes  Apply lotion or a moisturizer on your dry feet  Ask your care team provider what lotions are best to use  Do not put lotion or moisturizer between your toes  Moisture between your toes could lead to skin breakdown  Cut your toenails correctly  File or cut your toenails straight across  Use a soft brush to clean around your toenails  If your toenails are very thick, you may need to have a care team provider or specialist cut them  Protect your feet  Do not walk barefoot or wear your shoes without socks  Check your shoes for rocks or other objects that can hurt your feet  Wear cotton socks to help keep your feet dry  Wear socks without toe seams, or wear them with the seams inside out  Change your socks each day  Do not wear socks that are dirty or damp  Wear shoes that fit well  Wear shoes that do not rub against any area of your feet  Your shoes should be ½ to ¾ inch (1 to 2 centimeters) longer than your feet  Your shoes should also have extra space around the widest part of your feet  Walking or athletic shoes with laces or straps that adjust are best  Ask your care team provider for help to choose shoes that fit you best  Ask your provider if you need to wear an insert, orthotic, or bandage on your feet  Go to your follow-up visits  Your care team provider will do a foot exam at least 1 time each year  You may need a foot exam more often if you have nerve damage, foot deformities, or ulcers  Your provider will check for nerve damage and how well you can feel your feet  Your provider will check your shoes to see if they fit well  Do not smoke  Smoking can damage your blood vessels and put you at increased risk for foot ulcers  Ask your care team provider for information if you currently smoke and need help to quit  E-cigarettes or smokeless tobacco still contain nicotine  Talk to your care team provider before you use these products  Follow up with your diabetes care team provider or foot specialist as directed: You will need to have your feet checked at least 1 time each year  You may need a foot exam more often if you have nerve damage, foot deformities, or ulcers  Write down your questions so you remember to ask them during your visits  © Copyright Syl Punch 2022 Information is for End User's use only and may not be sold, redistributed or otherwise used for commercial purposes  The above information is an  only  It is not intended as medical advice for individual conditions or treatments  Talk to your doctor, nurse or pharmacist before following any medical regimen to see if it is safe and effective for you

## 2023-03-01 RX ORDER — DONEPEZIL HYDROCHLORIDE 10 MG/1
TABLET, FILM COATED ORAL
Qty: 90 TABLET | Refills: 1 | Status: SHIPPED | OUTPATIENT
Start: 2023-03-01

## 2023-03-03 ENCOUNTER — OFFICE VISIT (OUTPATIENT)
Dept: FAMILY MEDICINE CLINIC | Facility: OTHER | Age: 73
End: 2023-03-03

## 2023-03-03 VITALS
SYSTOLIC BLOOD PRESSURE: 106 MMHG | BODY MASS INDEX: 22.74 KG/M2 | OXYGEN SATURATION: 98 % | WEIGHT: 123.6 LBS | HEIGHT: 62 IN | DIASTOLIC BLOOD PRESSURE: 78 MMHG | TEMPERATURE: 98 F | RESPIRATION RATE: 18 BRPM | HEART RATE: 78 BPM

## 2023-03-03 DIAGNOSIS — E87.1 HYPONATREMIA: ICD-10-CM

## 2023-03-03 DIAGNOSIS — Z12.31 SCREENING MAMMOGRAM FOR BREAST CANCER: ICD-10-CM

## 2023-03-03 DIAGNOSIS — E11.9 TYPE 2 DIABETES MELLITUS WITHOUT COMPLICATION, WITHOUT LONG-TERM CURRENT USE OF INSULIN (HCC): Primary | ICD-10-CM

## 2023-03-03 LAB — SL AMB POCT HEMOGLOBIN AIC: 5.6 (ref ?–6.5)

## 2023-03-03 RX ORDER — LISINOPRIL 20 MG/1
TABLET ORAL
COMMUNITY
Start: 2023-02-28 | End: 2023-03-03

## 2023-03-03 NOTE — PATIENT INSTRUCTIONS
DISCONTINUE GLIPIZIDE     Labs - orders reprinted     Due for DXA bone scan and mammo - orders printed     Brain puzzles     Continue blood pressure monitoring (at least 3x per week in am if able; unless symptomatic then take right away)     Avoid hard candies and constant chewing of gum/mints

## 2023-03-03 NOTE — PROGRESS NOTES
Name: Mina Cohen      : 1950      MRN: [id number]  Encounter Provider: Aicha Banegas MD  Encounter Date: 3/3/2023   Encounter department: [address] [place] [address]      1  Type 2 diabetes mellitus without complication, without long-term current use of insulin (HCC)  -     POCT hemoglobin A1c    2  Hyponatremia    3  Screening mammogram for breast cancer  -     Mammo screening bilateral w 3d & cad; Future; Expected date: 2023      Diarrhea - Resolved  Diarrhea resolved when she stopped excessive intake of cough drops  Diarrhea was likely related to the xylitol component  As discussed last visit infectious w/u negative  If patient again becomes symptomatic do encourage GI eval; referral placed at last visit  Hyponatremia - Encouraged to obtain labs  Nephrology apt in Feb was cancelled and rescheduled for  f/u on pts BMP, order reprinted  Continue to maintain adequate intake, see nutritional status below  Hypertension/Hypotension - Vitals reviewed and home log; confirmed midodrine d/c and lisinopril has been d/c'd as well  Please continue to take blood pressure daily and monitor bp closely, will continue to call in with BP logs; can consider referral to cardiology should symptoms / measurements continue to be variable despite appropriate nutrition and monitoring  At this time, secondary to symptomatic hypotension that required midodrine in the past, will hold on adding a blood pressure medication at this time  See home readings in HPI - readings variable  Instructed patient and daughter to bring blood pressure cuff to next office visit for calibration  Blood pressure in our office today  within normal limits/at goal   Discussed symptoms of both hypo and hypertension with patient and daughter, ED precautions reviewed       Nutritional status - Again discussed patient's nutritional status in great detail and reviewed home dietary journal  She overall does much better when staying with her daughter who is now her primary care giver 2/2 to martial stress w/ pts   Again discussed importance of adequate nutrition w/ regards to variable bps (hypo and hypertension) and hyponatremia  R hip pain/Low back pain - Xrays reviewed and consistent with mild age related changes  Pt w/out complaints at todays visit  Palpable lump that was appreciated on last office visit no longer present, likely related to soft tissue swelling from injury  Will investigate further with ultrasound in future if needed  PT discussed and od    Type 2 diabetes - Glipizide 2 5 mg d/c today poct hba1c 5 6  Will recheck in 3 months  Discussed avoiding excessive sweets such as the hard candies (mints and jolly ranchers) as pts daughter reports pt frequently chews on these  Mild Dementia/Cognitive decline - Continue to follow w/ geriatric care and taking Rx of Aricept and Remeron  Pt has upcoming driving test in April , she has not been driving lately - daughter and family transport patient when needed  Continuing word search puzzles that patient likes and discussed addition of other aging adult brain games and social activities to help with cognitive function  They report they will trial more  Will f/u in office in about 3 months or sooner if needed  Encouraged labs to be obtained as above  Continue home BP monitoring and sending periodic logs to office in [facility]  Pt and daughter instructed to call back should any new acute or worsening s/sx occur; they expressed understanding, all questions answered  Subjective     Rubia Simonillo presents to today's visit to follow-up on blood pressure, diarrhea, hyponatremia  Patient did not yet obtain lab work as she was unable to make it to her nephrology appointment which was rescheduled      Regarding patient's nutritional status -   Patient's daughter reports that patient is now staying with her due to marital difficulties/strain with patient's   Patient's daughter has been keeping very close eye on patient's diet and is ensuring she is eating appropriately each day  For example for breakfast she has been eating eggs and toast and or oatmeal   Patient sometimes struggles at lunch but is always at least having an Ensure  For dinner patient having well-rounded meals prepared by daughter  Regarding diarrhea -   Both patient and daughter report diarrhea has resolved with cessation of excessive cough drop intake  Regarding postural dizziness - patient denies postural dizziness and syncope or presyncopal symptoms  Daughter states that for at least as long as patient has been staying with her, she has not observed her mom to have any of the above symptoms  Regarding back pain -patient denies current back and hip pain  Daughter reports that when patient walks for any distance she notices that she needs a support  Patient does have a walker at home which daughter reports she does not use  PT/OT no longer coming to patients house as daughter reports she "passed their requirements"   Patient unable to make it to any in office PT program 2/2 to transportation limitations  Regarding BP -   Patients BP logs were called into office by patients daughter prior to this apt - the measurements are as follows  Patients  had often been taking her BP and per pts daughter it may not be the most reliable     Last evening at [facility] with daughter taking bp, the highest it was measured 140/78      2/9-2/14: Midodrine was continued despite counseling to d/c - please see telephone encounters     2/9 -     131/60 - pulse 85              148/60 - pulse 89     2/10     132/62 - pulse 83              130/80              112/70              162/67     2/11     169/72 - pulse 100              157/64 - pulse 93              177/74 - pulse 102                    146/75 - pulse 103     2/12     178/98 - pulse 85              154/64 - pulse 95 or 7t couldn't read the number     2/13     137/73 - pulse 86              128/68 Taken by PT              162/65              140/78 Taken by PT              134/51     2/14     149/64 - pulse 79    Feb 14th-20th Midodrine that has been d/c'd was taken out of med box by family so confirmed that it was not taken     14th  AM - 149/64  pulse 79  PM - 135/85  Pulse 88     15th  AM - 151/66  Pulse 77  12 PM - 132/60  PM - 192/78  Pulse 91     16th  AM - 146/62  Pulse 78  PM - 142/66 Pulse 91     17th  Unknown time - 144/69  Pulse 83     18th  /70  Pulse 86  PM  167/77  Pulse 83     19th  /70  Pulse 91     20th  /56  Pulse 96        Review of Systems   Constitutional: Negative for fatigue and fever  Respiratory: Negative for shortness of breath  Cardiovascular: Negative for chest pain and palpitations  Gastrointestinal: Negative for abdominal pain, blood in stool, diarrhea and vomiting  Genitourinary: Negative for difficulty urinating  Musculoskeletal: Positive for arthralgias and gait problem (unsteady on her feet)  Baseline arthralgias and gain instability, stable per pt and daughter  No recent falls   Neurological: Negative for seizures, syncope, facial asymmetry and headaches  Psychiatric/Behavioral: The patient is not nervous/anxious  All other systems reviewed and are negative  Past Medical History:   Diagnosis Date   • MARTINA (acute kidney injury) ([facility] ) 11/21/2021   • [facility]    • Hypertension      History reviewed  No pertinent surgical history    Family History   Problem Relation Age of Onset   • Breast cancer Mother         unknown age   • No Known Problems Father    • No Known Problems Daughter    • No Known Problems Maternal Grandmother    • No Known Problems Maternal Grandfather    • No Known Problems Paternal Grandmother    • No Known Problems Paternal Grandfather    • No Known Problems Son    • No Known Problems Son    • No Known Problems Maternal Aunt    • No Known Problems Maternal Aunt    • No Known Problems Maternal Aunt    • No Known Problems Maternal Aunt    • No Known Problems Paternal Aunt    • No Known Problems Paternal Aunt    • No Known Problems Paternal Aunt      Social History     Socioeconomic History   • Marital status: /Civil Union     Spouse name: None   • Number of children: None   • Years of education: None   • Highest education level: None   Occupational History   • None   Tobacco Use   • Smoking status: Never   • Smokeless tobacco: Never   Vaping Use   • Vaping Use: Never used   Substance and Sexual Activity   • Alcohol use: No   • Drug use: Never   • Sexual activity: Not Currently   Other Topics Concern   • None   Social History Narrative    ** Merged History Encounter **         ** Merged History Encounter **          Social Determinants of Health     Financial Resource Strain: High Risk   • Difficulty of Paying Living Expenses: Very hard   Food Insecurity: No Food Insecurity   • Worried About Running Out of Food in the Last Year: Never true   • Ran Out of Food in the Last Year: Never true   Transportation Needs: No Transportation Needs   • Lack of Transportation (Medical): No   • Lack of Transportation (Non-Medical):  No   Physical Activity: Not on file   Stress: Not on file   Social Connections: Not on file   Intimate Partner Violence: Not on file   Housing Stability: Low Risk    • Unable to Pay for Housing in the Last Year: No   • Number of Places Lived in the Last Year: 1   • Unstable Housing in the Last Year: No     Current Outpatient Medications on File Prior to Visit   Medication Sig   • aspirin (ECOTRIN LOW STRENGTH) 81 mg EC tablet Take 81 mg by mouth daily   • atorvastatin (LIPITOR) 40 mg tablet Take 1 tablet (40 mg total) by mouth every evening   • cholecalciferol (VITAMIN D3) 1,000 units tablet Take 1,000 Units by mouth daily   • donepezil (ARICEPT) 10 mg tablet TAKE 1 TABLET BY MOUTH EVERYDAY AT BEDTIME   • mirtazapine (REMERON) 15 mg tablet Take 1 tablet (15 mg total) by mouth daily at bedtime   • [DISCONTINUED] glipiZIDE (GLUCOTROL) 5 mg tablet Take 0 5 tablets (2 5 mg total) by mouth daily   • [DISCONTINUED] Sodium Fluoride 5000 PPM 1 1 % PSTE BRUSH TWICE A DAY  SPIT OUT  DO NOT DRINK OR EAT FOR 1 HOUR  • [DISCONTINUED] aspirin 81 mg chewable tablet Chew 81 mg daily     • [DISCONTINUED] lidocaine (Lidoderm) 5 % Apply 1 patch topically over 12 hours daily Remove & Discard patch within 12 hours or as directed by MD   • [DISCONTINUED] lisinopril (ZESTRIL) 20 mg tablet  (Patient not taking: Reported on 3/3/2023)   • [DISCONTINUED] vitamin B-12 (CYANOCOBALAMIN) 250 MCG tablet Take 250 mcg by mouth daily     No Known Allergies  Immunization History   Administered Date(s) Administered   • COVID-19 MODERNA VACC 0 5 ML IM 04/21/2021, 05/19/2021   • Pneumococcal Conjugate Vaccine 20-valent (Pcv20), Polysace 02/02/2023   • Tdap 10/13/2017       Objective     /78   Pulse 78   Temp 98 °F (36 7 °C)   Resp 18   Ht 5' 2" (1 575 m)   Wt 56 1 kg (123 lb 9 6 oz)   SpO2 98%   BMI 22 61 kg/m²     Physical Exam  Vitals reviewed  Constitutional:       General: She is not in acute distress  Appearance: She is not ill-appearing, toxic-appearing or diaphoretic  Comments: Pt pleasant, NAD   Daughter present at bedside    HENT:      Head: Normocephalic and atraumatic  Mouth/Throat:      Mouth: Mucous membranes are moist    Eyes:      General: No scleral icterus  Conjunctiva/sclera: Conjunctivae normal    Cardiovascular:      Rate and Rhythm: Normal rate and regular rhythm  Heart sounds: Normal heart sounds  No murmur heard  Pulmonary:      Effort: Pulmonary effort is normal       Breath sounds: Normal breath sounds  No wheezing  Abdominal:      General: Bowel sounds are normal  There is no distension  Palpations: Abdomen is soft  Musculoskeletal:      Right lower leg: No edema        Left lower leg: No edema  Comments: No TTP over SI joint no abnormality felt on palpation of R hip, finding at prev office visit no longer present  Overlying skin w/out rash or deformity    Skin:     General: Skin is warm and dry  Coloration: Skin is not jaundiced  Neurological:      Mental Status: She is alert and oriented to person, place, and time  Mental status is at baseline     Psychiatric:         Mood and Affect: Mood normal          Behavior: Behavior normal        Delia Wiggins MD

## 2023-03-07 ENCOUNTER — TELEPHONE (OUTPATIENT)
Dept: GASTROENTEROLOGY | Facility: CLINIC | Age: 73
End: 2023-03-07

## 2023-03-09 ENCOUNTER — APPOINTMENT (OUTPATIENT)
Dept: LAB | Facility: CLINIC | Age: 73
End: 2023-03-09

## 2023-03-09 DIAGNOSIS — I12.9 BENIGN HYPERTENSION WITH CKD (CHRONIC KIDNEY DISEASE) STAGE III (HCC): ICD-10-CM

## 2023-03-09 DIAGNOSIS — N18.31 STAGE 3A CHRONIC KIDNEY DISEASE (HCC): ICD-10-CM

## 2023-03-09 DIAGNOSIS — N18.30 BENIGN HYPERTENSION WITH CKD (CHRONIC KIDNEY DISEASE) STAGE III (HCC): ICD-10-CM

## 2023-03-09 DIAGNOSIS — R80.9 MICROALBUMINURIA: ICD-10-CM

## 2023-03-09 DIAGNOSIS — E87.1 HYPONATREMIA: ICD-10-CM

## 2023-03-09 LAB
ANION GAP SERPL CALCULATED.3IONS-SCNC: 7 MMOL/L (ref 4–13)
BUN SERPL-MCNC: 16 MG/DL (ref 5–25)
CALCIUM SERPL-MCNC: 9.3 MG/DL (ref 8.3–10.1)
CHLORIDE SERPL-SCNC: 99 MMOL/L (ref 96–108)
CO2 SERPL-SCNC: 29 MMOL/L (ref 21–32)
CREAT SERPL-MCNC: 1.04 MG/DL (ref 0.6–1.3)
CREAT UR-MCNC: 154 MG/DL
ERYTHROCYTE [DISTWIDTH] IN BLOOD BY AUTOMATED COUNT: 12.7 % (ref 11.6–15.1)
GFR SERPL CREATININE-BSD FRML MDRD: 53 ML/MIN/1.73SQ M
GLUCOSE SERPL-MCNC: 210 MG/DL (ref 65–140)
HCT VFR BLD AUTO: 37.8 % (ref 34.8–46.1)
HGB BLD-MCNC: 12.5 G/DL (ref 11.5–15.4)
MCH RBC QN AUTO: 31.6 PG (ref 26.8–34.3)
MCHC RBC AUTO-ENTMCNC: 33.1 G/DL (ref 31.4–37.4)
MCV RBC AUTO: 96 FL (ref 82–98)
MICROALBUMIN UR-MCNC: 287 MG/L (ref 0–20)
MICROALBUMIN/CREAT 24H UR: 186 MG/G CREATININE (ref 0–30)
PLATELET # BLD AUTO: 253 THOUSANDS/UL (ref 149–390)
PMV BLD AUTO: 9.6 FL (ref 8.9–12.7)
POTASSIUM SERPL-SCNC: 3.9 MMOL/L (ref 3.5–5.3)
RBC # BLD AUTO: 3.96 MILLION/UL (ref 3.81–5.12)
SODIUM SERPL-SCNC: 135 MMOL/L (ref 135–147)
WBC # BLD AUTO: 8.67 THOUSAND/UL (ref 4.31–10.16)

## 2023-03-13 ENCOUNTER — TELEPHONE (OUTPATIENT)
Dept: OTHER | Facility: HOSPITAL | Age: 73
End: 2023-03-13

## 2023-03-22 ENCOUNTER — TELEPHONE (OUTPATIENT)
Dept: FAMILY MEDICINE CLINIC | Facility: OTHER | Age: 73
End: 2023-03-22

## 2023-03-22 NOTE — TELEPHONE ENCOUNTER
BP Log:    3/2  650 PM    BP: 143/62  Pulse: 83    3/3  1050 AM:  111/75     80  1145 AM:                   107/93       94    3/6  137/62       83    3/7  725PM  Left arm -  157/74     95  Right arm -  183/99      97    3/8  Left arm - 186/79     93  Right arm - 167/74     89    3/9  PM  170/75   91    3/10  AM Left arm - 178/86     79  PM Right arm -  184/70      84    3/11  AM    128/62       69    3/12  Evening  188/75   76    3/13  PM  154/58     91    3/14  AM    148/59   78    3/15  AM    145/75    66    3/16  PM      166/64      82    3/17  Evening   157/69     83    3/18  Evening     162/71     83    3/19  168/60      84    3/20  Evening   163/70      77    3/21  PM   150/66     93      Pt states she's ok to go home  Please advise  Thank you

## 2023-03-24 ENCOUNTER — TELEPHONE (OUTPATIENT)
Dept: FAMILY MEDICINE CLINIC | Facility: OTHER | Age: 73
End: 2023-03-24

## 2023-03-28 ENCOUNTER — TELEPHONE (OUTPATIENT)
Dept: FAMILY MEDICINE CLINIC | Facility: OTHER | Age: 73
End: 2023-03-28

## 2023-03-28 DIAGNOSIS — R09.89 LABILE HYPERTENSION: Primary | ICD-10-CM

## 2023-03-28 NOTE — TELEPHONE ENCOUNTER
----- Message from Judy Bernardo MD sent at 3/28/2023  2:45 PM EDT -----  If patient can go home or daughter? It was my understanding patient had been staying with daughter due to home/marital difficulties? Her BP seemed to be elevated at times and low at others  I spoke with Dr Chase Kerr about her variable bp measurements and recommended cardiology evaluation I will place referral  Encourage daughter to continue BP log and to notify for any high Bps  w/ Diastolic BP above >66 (the diastolic is bottom number, the systolic which is the top number will likely vary) and to also notify if the diastolic is below <84  Please bring bp cuff to next office visit  Thank you       ----- Message -----  From: Joann Lund  Sent: 3/28/2023   2:41 PM EDT  To: Judy Bernardo MD    [facility] daughter keeps calling about the BP log if those numbers were ok and if she can go home  What should I tell her? Thank you!

## 2023-03-28 NOTE — TELEPHONE ENCOUNTER
New BP log:    3/22  150/61   95    3/23  145/62     92    3/24  N/A    3/25  160/59   83    3/26  170/71      81    3/27  530pm   178/69    76  730pm   Left  188/66     83           Right    175/72    81  905pm    162/62      81      Patient's daughter notified of cardiology referral and given number to cardiology [facility]

## 2023-04-06 ENCOUNTER — RA CDI HCC (OUTPATIENT)
Dept: OTHER | Facility: HOSPITAL | Age: 73
End: 2023-04-06

## 2023-04-06 NOTE — PROGRESS NOTES
E11 22  [facility] 75  coding opportunities          Chart Reviewed number of suggestions sent to Provider: 1     Patients Insurance     Medicare Insurance: [employer]

## 2023-05-08 NOTE — PROGRESS NOTES
"Outpatient Consultation - General Cardiology   Melissa Hicks 75 y o  female   MRN: [id number]  Encounter: [id number]      PCP: Ella Callahan MD    History of Present Illness   Physician Requesting Consult: Consults   Reason for Consult / Principal Problem: Hypertension    HPI: Melissa Hicks is a 75y o  year old female with history of DM2, CKD3, HTN presents to our office for evaluation of poorly controlled HTN  Patient was seen by her PCP where she brought in a BP log and it was as follows:    Home BP Log:                                                 3/30/23 none   3/31/23  161/71   4/1/23  177/66   4/2/23  128/50   4/3/23  159/72   4/4/23  141/63   4/5/23  161/67   4/6/23  145/71   4/7/23  142/56   4/8/2023  146/56   4/9/2023  169/77   4/10/2023  160/73   4/11/2023  154/81   4/12/2023  128/64   4/13/2023  154/62   4/14/2023  146/61     Of note, patient was previously started on Midodrine in January 2023 when she presented to [facility] after experiencing lightheadedness/dizziness  This occurred in the setting of poor PO intake and multiple bouts of diarrhea  As per chart review, orthostatics were performed and results as follow: \"We did check orthostatic vitals and when going from lying to sitting blood pressure decreased from 134/53 down to 96/45  When going from sitting to standing the blood pressure seem to increase slightly to 105/77 and then when standing for 3 minutes it dropped all the way down to 74/44  \"    As a result her home Lisinopril had been discontinue and Midodrine 2 5mg PO TID initiated  Those symptoms had resolved with hydration and upon discharge she remained briefly on Midodrine and her BP started increased likely in the setting of being on Midodrine  Patient was subsequently started on low dose Lisinopril 2 5mg PO Daily      Since then she has had labile BP again because her daughter states she inconsistently eats and drinks and the patients  endorses that he is not " able to force her to increase her PO intake  Note: Daughter states that last night (5/8/23) the patient had a mechanical fall  Patient states that she was putting on her slippers and tripped, falling forward and injuring her front tooth (which fell out)  She denies any prodromal symptoms of lightheadedness, dizziness, CP, palpitations, etc  No incontinence, tongue biting or post-ictal period  It is unclear what her BP was at this time but she affirms that she simply tripped  Daughter states that she should be ambulating with a cane or walker but refuses to do so  Denies EtOH, tobacco or illicit substance use  Review of Systems  Review of system was conducted and was negative except for as stated in the HPI  Historical Information   Past Medical History:   Diagnosis Date   • MARTINA (acute kidney injury) ([facility] ) 11/21/2021   • Diabetes mellitus ([facility] 75 )    • Hypertension      No past surgical history on file    Social History     Substance and Sexual Activity   Alcohol Use No     Social History     Substance and Sexual Activity   Drug Use Never     Social History     Tobacco Use   Smoking Status Never   Smokeless Tobacco Never     Family History:   Family History   Problem Relation Age of Onset   • Breast cancer Mother         unknown age   • No Known Problems Father    • No Known Problems Daughter    • No Known Problems Maternal Grandmother    • No Known Problems Maternal Grandfather    • No Known Problems Paternal Grandmother    • No Known Problems Paternal Grandfather    • No Known Problems Son    • No Known Problems Son    • No Known Problems Maternal Aunt    • No Known Problems Maternal Aunt    • No Known Problems Maternal Aunt    • No Known Problems Maternal Aunt    • No Known Problems Paternal Aunt    • No Known Problems Paternal Aunt    • No Known Problems Paternal Aunt        Meds/Allergies   Home Medications:   Current Outpatient Medications:   •  aspirin (ECOTRIN LOW STRENGTH) 81 mg EC tablet, Take 81 mg by mouth daily, Disp: , Rfl:   •  atorvastatin (LIPITOR) 40 mg tablet, Take 1 tablet (40 mg total) by mouth every evening, Disp: 90 tablet, Rfl: 3  •  cholecalciferol (VITAMIN D3) 1,000 units tablet, Take 1,000 Units by mouth daily, Disp: , Rfl:   •  donepezil (ARICEPT) 10 mg tablet, TAKE 1 TABLET BY MOUTH EVERYDAY AT BEDTIME, Disp: 90 tablet, Rfl: 1  •  lisinopril (ZESTRIL) 2 5 mg tablet, Take 1 tablet (2 5 mg total) by mouth every morning, Disp: 30 tablet, Rfl: 2  •  mirtazapine (REMERON) 15 mg tablet, TAKE 1 TABLET BY MOUTH DAILY AT BEDTIME, Disp: 30 tablet, Rfl: 1    No Known Allergies      Objective   Vitals: not currently breastfeeding  Physical Exam  GEN: Corrina Ahuja appears well, alert and oriented x 3, pleasant and cooperative   HEENT:  Normocephalic, atraumatic, anicteric, moist mucous membranes  NECK: No JVD or carotid bruits   HEART: reg rhythm, reg rate, normal S1 and S2, no murmurs, clicks, gallops or rubs   LUNGS: Clear to auscultation bilaterally; no wheezes, rales, or rhonchi; respiration nonlabored   ABDOMEN:  Normoactive bowel sounds, soft, no tenderness, no distention  EXTREMITIES: peripheral pulses palpable; no edema  NEURO: no gross focal findings; cranial nerves grossly intact   SKIN:  Dry, intact, warm to touch    Lab Results: I have personally reviewed pertinent lab results      Lab Results   Component Value Date    HSTNI0 5 01/19/2023    HSTNI2 5 01/19/2023    HSTNI4 8 01/20/2023     No results found for: NTBNP  Lab Results   Component Value Date    TRIG 207 (H) 12/27/2022    HDL 65 12/27/2022    LDLCALC 54 12/27/2022     Lab Results   Component Value Date    K 3 9 03/09/2023    CO2 29 03/09/2023    CL 99 03/09/2023    BUN 16 03/09/2023    CREATININE 1 04 03/09/2023    ALT 42 12/23/2021    AST 30 12/23/2021     Lab Results   Component Value Date    WBC 8 67 03/09/2023    HGB 12 5 03/09/2023    HCT 37 8 03/09/2023    MCV 96 03/09/2023     03/09/2023     Lab Results Component Value Date    INR 0 93 11/20/2021         Imaging: I have personally reviewed pertinent reports  EKG:   Date: 1/19/23  Interpretation: NSR  Normal Axis  No evidence of LVH  TWI V3-V6  ECHO:  Results for orders placed during the hospital encounter of 11/20/21    Echo complete w/ contrast if indicated    Interpretation Summary  •  Left Ventricle: Left ventricular cavity size is normal  The left ventricular ejection fraction is 60%  Systolic function is normal  Wall motion is normal  Diastolic function is mildly abnormal, consistent with grade I (abnormal) relaxation  Wall thickness is moderately increased  There is mild to moderate concentric hypertrophy  •  Atrial Septum: No diagnostic evidence of patent foramen ovale at rest by color flow Doppler  Agitated saline (bubble study) not performed  •  Tricuspid Valve: There is mild regurgitation  No diagnostic evidence of cardio-embolic source, but cannot be completely excluded on the basis of this exam  If high clinical suspicion for same, a REYNA may provide additional information  Assessment/Plan     Assessment:    1  Hypertension  --> TTE (11/21/21): LVEF 60%  G1DD  Moderate LVH  2  ?Mechanical Fall  -> Daughter states that last night (5/8/23) the patient had a mechanical fall  Patient states that she was putting on her slippers and tripped, falling forward and injuring her front tooth (which fell out)  She denies any prodromal symptoms of lightheadedness, dizziness, CP, palpitations, etc  No incontinence, tongue biting or post-ictal period  It is unclear what her BP was at this time but she affirms that she simply tripped  Daughter states that she should be ambulating with a cane or walker but refuses to do so  3  CKD3  4   HLD  5  DM2    Plan:  - Discontinue Lisinopril  --> Given patients labile blood pressures and inconsistent PO intake, there is more risk with hypotensive episodes then there are hypertensive episodes especially in the setting of ambulatory dysfunction  Patient recently had a fall as described above and it remains unclear if this was truly mechanical or an episode of hypotension  Albeit patient denies prodromal symptoms  At this time advised daughter and patient that its important to increase PO intake as tolerated  Advised to obtain compression stockings  We also discussed that her BP should be checked regularly and if the systolic BP is greater then 160 mmHg she should take her Lisinopril that day  - Will follow up in 3 month and readdress  Sooner if needed  - c/w Lipitor 40mg PO QHS    Case discussed and reviewed with Dr Aamir Kat who agrees with my assessment and plan  Thank you for involving us in the care of your patient  Stephen Hull MD  Cardiology Fellow PGY-6    ==========================================================================================    Epic/ Allscripts/Care Everywhere records reviewed:     ** Please Note: Fluency DirectDictation voice to text software may have been used in the creation of this document   **

## 2023-05-09 ENCOUNTER — OFFICE VISIT (OUTPATIENT)
Dept: CARDIOLOGY CLINIC | Facility: CLINIC | Age: 73
End: 2023-05-09

## 2023-05-09 ENCOUNTER — OFFICE VISIT (OUTPATIENT)
Dept: PODIATRY | Facility: CLINIC | Age: 73
End: 2023-05-09

## 2023-05-09 ENCOUNTER — OFFICE VISIT (OUTPATIENT)
Dept: OCCUPATIONAL THERAPY | Facility: CLINIC | Age: 73
End: 2023-05-09

## 2023-05-09 VITALS
HEIGHT: 62 IN | DIASTOLIC BLOOD PRESSURE: 70 MMHG | SYSTOLIC BLOOD PRESSURE: 124 MMHG | BODY MASS INDEX: 23.35 KG/M2 | WEIGHT: 126.9 LBS | HEART RATE: 85 BPM

## 2023-05-09 VITALS
SYSTOLIC BLOOD PRESSURE: 108 MMHG | OXYGEN SATURATION: 94 % | WEIGHT: 127 LBS | BODY MASS INDEX: 23.23 KG/M2 | HEART RATE: 66 BPM | DIASTOLIC BLOOD PRESSURE: 42 MMHG

## 2023-05-09 DIAGNOSIS — R09.89 LABILE HYPERTENSION: ICD-10-CM

## 2023-05-09 DIAGNOSIS — I10 PRIMARY HYPERTENSION: ICD-10-CM

## 2023-05-09 DIAGNOSIS — F03.A0 MILD DEMENTIA WITHOUT BEHAVIORAL DISTURBANCE, PSYCHOTIC DISTURBANCE, MOOD DISTURBANCE, OR ANXIETY, UNSPECIFIED DEMENTIA TYPE (HCC): Primary | ICD-10-CM

## 2023-05-09 DIAGNOSIS — B35.1 ONYCHOMYCOSIS: ICD-10-CM

## 2023-05-09 DIAGNOSIS — E11.9 TYPE 2 DIABETES MELLITUS WITHOUT COMPLICATION, WITHOUT LONG-TERM CURRENT USE OF INSULIN (HCC): Primary | ICD-10-CM

## 2023-05-09 RX ORDER — LISINOPRIL 2.5 MG/1
TABLET ORAL
Qty: 30 TABLET | Refills: 0 | Status: SHIPPED | OUTPATIENT
Start: 2023-05-09 | End: 2023-05-31

## 2023-05-09 RX ORDER — LISINOPRIL 2.5 MG/1
2.5 TABLET ORAL EVERY MORNING
Qty: 30 TABLET | Refills: 2 | Status: CANCELLED | OUTPATIENT
Start: 2023-05-09 | End: 2023-08-07

## 2023-05-09 NOTE — PROGRESS NOTES
This note was not shared with the patient due to privacy exception: note includes other individuals      Occupational Therapy Fit to Drive Evaluation:      Attempt #1    Today's Date: 2023  Patient Name: Willy Huerta  : 1950  MRN: [id number]  Referring Provider: Milburn Bosworth  Dx: Mild dementia without behavioral disturbance, psychotic disturbance, mood disturbance, or anxiety, unspecified dementia type ([facility] 75 ) [F03  A0]      DCAT/FITNESS TO DRIVE OUTCOMES:      PATIENT'S SCORE: 74%   DRIVING IMPLICATIONS PER DCAT: Indicates cognitive competence for driving is outside the range of normal based on having a 93% probability of failing a TERRIE due to performing hazardous or extremely dangerous maneuvers  Pt scoring cognitively at a 77% probability of creating a hazardous situation on a specialized road test  Until there is a significant change in medical condition or a change in medication use resulting in an improvement in cognitive function, driving suspension or cessation should be seriously considered  Should there be a significant positive change in medical condition, reassessment at that time would be recommended  ADDITIONAL THERAPY NEEDS: Occupational Therapy for memory therapy with treatment focused on memory care, memory strategies and patient/family education  Assessment/Plan  Occupational Therapy Skilled Analysis Assessment and Plan of Care:  Pt is a 75 y o  female referred to Occupational Therapy for Fitness to Drive Evaluation to assess pt’s cognitive, visual, and motor abilities to drive safely in community environment  Indicates cognitive competence for driving is outside the range of normal based on having a 93% probability of failing a TERRIE due to performing hazardous or extremely dangerous maneuvers   Pt scoring cognitively at a 77% probability of creating a hazardous situation on a specialized road test  Until there is a significant change in medical condition or a change in "medication use resulting in an improvement in cognitive function, driving suspension or cessation should be seriously considered  Should there be a significant positive change in medical condition, reassessment at that time would be recommended  Below average scoring was noted in the following areas:  Initialization and reactions  Track and process visual events  Ability to encode, retrieve and/or respond to stimuli  Decisions under time pressure  Guided movement control  Ability to quickly respond to complex information  Impulse control  D/C from OT caseload, D/C OT  MD to discuss results w/ pt  Active Problem List:   Patient Active Problem List   Diagnosis   • Type 2 diabetes mellitus without complication, without long-term current use of insulin ([facility])   • Atherosclerotic plaque   • Aphasia   • Benign hypertension with CKD (chronic kidney disease) stage III ([facility])   • Asymptomatic bacteriuria   • Hypokalemia   • Fall   • Hyponatremia   • Diarrhea   • History of TIA (transient ischemic attack)   • Bilateral carotid artery stenosis   • Dysarthria   • Stage 3a chronic kidney disease ([facility])   • Microalbuminuria   • Primary hypertension   • Mild dementia ([facility] )   • Other hyperlipidemia   • Hearing loss   • Complex care coordination   • Postural dizziness with presyncope   • Ambulatory dysfunction   • Hyponatremia   • Diabetes ([facility] )   • Hypertension   • Abnormal urinalysis   • Depression     Past Medical Hx:   Past Medical History:   Diagnosis Date   • MARTINA (acute kidney injury) ([facility] ) 2021   • Diabetes mellitus ([facility] )    • Hypertension      Past Surgical Hx: No past surgical history on file  Pain Levels:   Restin    With Activity:  0      TIME:   OT eval: [id number]  OT DCAT [id number]  Administration, scoring, interpretation & documentation >91 mins    Subjective: \"My  or daughter drives most of the time, I don't drive much anymore  \"    Patient Goal: \"I'd like to keep driving  \"      History of " Present Illness:  Pt is a 75 y o  female seen for OT Fitness to Drive evaluation to assess pt’s cognitive, visual, and motor abilities to drive safely in community environment  Pt referred from Luz Alfaro from Geriatrics  Pt referred to OP OT for Fitness to drive testing in February 2023 following Senior care visit in which pt received MOCA score of 14/20 indicating moderate cognitive deficits  Pt's previous score in May 2023 was 18/30  According to pt's visit, pt is not engaging in many activities at home and requires assistance with IADL  Pt reports she lives with her , and reports indep in cooking, cleaning, general household maintenance, but does get help with medication management  Pt reports no issues with driving, but does not drive often as her  or daughter drives to most appointments  Below is a summary of patients current or most recent driving history including vehicle type, roads traveled, and recent auto events  Driving History:    Communication Status: Samuel Merino    Visual History: last Eye Dr  Appointment unsure    Glasses/Contacts:   Yes, describe: reading glasses   Cataracts:  No   Glaucoma:   No   Hemianopsia:   No       License Status: active    Age of Initial Licensure: 15    Vehicle Type:   CAR     Car Transfer: indep    Driving History:   GPS Use: No   Recent Accidents:   No   Tickets:   No   DUI:   No    Last Drove: reports the other day, sometime this week    Driving Goals:   Local Roads, Highway/Major Roads and Daytime Driving-- highways only if she has to      Objective    DCAT: (see attached report for further details)   Pt scoring an 93% likelihood on failing on road   Probability of creating a hazardous situation on specialized on-road test: 77%; see attached report for further details       Recommend On Road Assessment?:   No     Recommend to [employer]?: No    Physical findings:    cervical rotation:  R WFL, L WFl   UE and LE AROM:  full  UE/LE : 4/5 MMT     INTERVENTION COMMENTS:  Diagnosis: Mild dementia without behavioral disturbance, psychotic disturbance, mood disturbance, or anxiety, unspecified dementia type ([facility] ) [F03  A0]  Precautions: fall risk, cog deficits  FOTO: N/A for FTD Evaluations  Insurance: Payor: [address] [place] [address] REP / Plan: [employer] MEDICARE PPO [facility] REP / Product Type: Medicare O /

## 2023-05-09 NOTE — TELEPHONE ENCOUNTER
The patient's daughter called stating the heart dr stopped her lisinopril  He told her to take it occasionally when her BP is over 160  They only have 2 pills left and this doesn't allow for her to take it over 160  Can you please refill?     She also stated that she has the BP log if someone would like to call her today she will give it to them

## 2023-05-09 NOTE — TELEPHONE ENCOUNTER
Cardiology discontinued the lisinopril but advised her to take it PRN if her systlic BP is over 321 (confirmed in Cardiology note, this is correct info)  Daughter states she is going to leave soon and wont be back for a couple of weeks  Shell need it sent soon  Okay to send?

## 2023-05-09 NOTE — PROGRESS NOTES
PATIENT:  Juan J Nova  1950    ASSESSMENT/PLAN:  1  Type 2 diabetes mellitus without complication, without long-term current use of insulin ([facility] 75 )        2  Onychomycosis  Debridement            Orders Placed This Encounter   Procedures   • Debridement       Educated disease prevention and risks related to diabetes  Educated proper daily foot care and exam   Instructed proper skin care / protection and footwear  The patient will return in 9 weeks  PROCEDURE:  All mycotic toenails were reduced and debrided in length, width, and girth using a nail nipper and dremel  Patient tolerated procedure(s) well without complications  •     HPI:  Juan J Nova is a 75 y  o year old female seen for diabetic foot care/ exam   She has class findings  The blood glucose is under control  No acute pedal problems  She complained of pain associated with thick toenails  PAST MEDICAL HISTORY:  Past Medical History:   Diagnosis Date   • MARTINA (acute kidney injury) ([place] [address] ) 11/21/2021   • Diabetes mellitus ([place] [address] )    • Hypertension        PAST SURGICAL HISTORY:  History reviewed  No pertinent surgical history  ALLERGIES:  Patient has no known allergies  MEDICATIONS:  Current Outpatient Medications   Medication Sig Dispense Refill   • aspirin (ECOTRIN LOW STRENGTH) 81 mg EC tablet Take 81 mg by mouth daily     • atorvastatin (LIPITOR) 40 mg tablet Take 1 tablet (40 mg total) by mouth every evening 90 tablet 3   • cholecalciferol (VITAMIN D3) 1,000 units tablet Take 1,000 Units by mouth daily     • donepezil (ARICEPT) 10 mg tablet TAKE 1 TABLET BY MOUTH EVERYDAY AT BEDTIME 90 tablet 1   • lisinopril (ZESTRIL) 2 5 mg tablet Take 1 tab po daily PRN only if Systolic BP is greater than 160 MMHg  30 tablet 0   • mirtazapine (REMERON) 15 mg tablet TAKE 1 TABLET BY MOUTH DAILY AT BEDTIME 30 tablet 1     No current facility-administered medications for this visit         SOCIAL HISTORY:  Social History "    Socioeconomic History   • Marital status: /Civil Union     Spouse name: None   • Number of children: None   • Years of education: None   • Highest education level: None   Occupational History   • None   Tobacco Use   • Smoking status: Never   • Smokeless tobacco: Never   Vaping Use   • Vaping Use: Never used   Substance and Sexual Activity   • Alcohol use: No   • Drug use: Never   • Sexual activity: Not Currently   Other Topics Concern   • None   Social History Narrative    ** Merged History Encounter **         ** Merged History Encounter **          Social Determinants of Health     Financial Resource Strain: High Risk   • Difficulty of Paying Living Expenses: Very hard   Food Insecurity: No Food Insecurity   • Worried About Running Out of Food in the Last Year: Never true   • Ran Out of Food in the Last Year: Never true   Transportation Needs: No Transportation Needs   • Lack of Transportation (Medical): No   • Lack of Transportation (Non-Medical): No   Physical Activity: Not on file   Stress: Not on file   Social Connections: Not on file   Intimate Partner Violence: Not on file   Housing Stability: Low Risk    • Unable to Pay for Housing in the Last Year: No   • Number of Places Lived in the Last Year: 1   • Unstable Housing in the Last Year: No        REVIEW OF SYSTEMS:  GENERAL: No fever or chills  HEART: No chest pain, or palpitation  RESPIRATORY:  No acute SOB or cough  GI: No Nausea, vomit or diarrhea  NEUROLOGIC: No syncope or acute weakness    PHYSICAL EXAM:    /70   Pulse 85   Ht 5' 2\" (1 575 m)   Wt 57 6 kg (126 lb 14 4 oz)   BMI 23 21 kg/m²     VASCULAR EXAM  Dorsalis pedis  +1  Posterior tibial artery  absent  The patient has class findings with skin atrophy, lack of digital hair, and nail dystrophy  Diffuse ankle swelling noted  No ischemia  NEUROLOGIC EXAM  AAO X 3  Sensation is intact to light touch  Sensation is intact to 10gm monofilament    No focal neurologic " deficit  DERMATOLOGIC EXAM:   No ulcer or cellulitis noted  No abscess  The patient has hypertrophic toenails X 10 with discoloration, onycholysis, and subungal debris  Skin is dry  MUSCULOSKELETAL EXAM:   No acute joint pain, edema, or redness  No acute musculoskeletal problem  No injury  ROM intact

## 2023-05-18 ENCOUNTER — RA CDI HCC (OUTPATIENT)
Dept: OTHER | Facility: HOSPITAL | Age: 73
End: 2023-05-18

## 2023-05-25 ENCOUNTER — TELEMEDICINE (OUTPATIENT)
Age: 73
End: 2023-05-25

## 2023-05-25 ENCOUNTER — PROCEDURE VISIT (OUTPATIENT)
Dept: FAMILY MEDICINE CLINIC | Facility: OTHER | Age: 73
End: 2023-05-25

## 2023-05-25 ENCOUNTER — TELEPHONE (OUTPATIENT)
Age: 73
End: 2023-05-25

## 2023-05-25 VITALS
OXYGEN SATURATION: 98 % | SYSTOLIC BLOOD PRESSURE: 122 MMHG | DIASTOLIC BLOOD PRESSURE: 80 MMHG | TEMPERATURE: 98 F | HEART RATE: 78 BPM | BODY MASS INDEX: 24.07 KG/M2 | WEIGHT: 130.8 LBS | HEIGHT: 62 IN | RESPIRATION RATE: 18 BRPM

## 2023-05-25 DIAGNOSIS — Z91.89 DRIVING SAFETY ISSUE: Primary | ICD-10-CM

## 2023-05-25 DIAGNOSIS — L98.9 SKIN LESION OF FACE: Primary | ICD-10-CM

## 2023-05-25 DIAGNOSIS — E11.9 TYPE 2 DIABETES MELLITUS WITHOUT COMPLICATION, WITHOUT LONG-TERM CURRENT USE OF INSULIN (HCC): ICD-10-CM

## 2023-05-25 LAB — SL AMB POCT HEMOGLOBIN AIC: 6.5 (ref ?–6.5)

## 2023-05-25 NOTE — ASSESSMENT & PLAN NOTE
Lab Results   Component Value Date    HGBA1C 5 6 03/03/2023   The patient was counseled to discontinue her diabetes medication at her last visit  Her A1C has risen from 5 6 then to 6 5 at current visit, will follow up in 3 months to reassess  If A1c rises above 7 0, will consider restarting on low dose glipizide/other agent  The patient was counseled on the importance of a  healthy diet and decreasing her current candy intake  She reports currently consuming approximately 50 small candies a day  Reducing consumption or mixing in sugar free alternatives were discussed   Switching entirely to sugar free was not recommended as excessive consumption has given her diarrhea in the past

## 2023-05-25 NOTE — TELEPHONE ENCOUNTER
"LSW emailed [employer] forms to [email or web address] per provider request  LSW also emailed the list below of transportation options to daughter as requested  [facility]:  Elliott Dangelo is a fixed route bus service and John Lozano is a xbli-bz-zkba service for those who qualify   [employer] offers free rides for seniors (age 06+) (application required)   [employer] offers $1 day pass for those with Medicare (application required)   Phone: [phone]   Website: [employer]/    [place] [employer]   Egdr-dg-delg transportation throughout the [facility] and surrounding counties   Wheelchair lifts available   Phone: [phone]    [employer] [facility]   Transportation provider for seniors and visually impaired individuals throughout the [facility]   Phone: ([phone]: [employer]/    [employer]   Assists seniors to coordinate transportation, grocery delivery, pharmacy delivery, meals, and more    Emergency contact updates and automatic  updates   Check location availability by calling or using the \"Service Availability \" on the website   Phone: [phone]   Website: [employer]    [place]:  [facility] [place] [address] [place] [address] offers on demand transportation for adults 18 years or older in partnership with transportation network companies (TNCs) like [employer] and [employer]ur[employer] throughout [place]  Phone: [phone])-[phone]  Email: Alla@[employer]  org  Website: https://[employer]  [employer]/transportation/senior-transportation/    Other Resources:  • [employer] & Programs Map (https://gis  penndot gov/transitmap/#/)  "

## 2023-05-25 NOTE — PROGRESS NOTES
"Name: Felecia Hubbard      : 1950      MRN: [id number]  Encounter Provider: Manjeet Poe MD  Encounter Date: 2023   Encounter department: [address] [place] [address] Dr Cuevas  Skin lesion of face  Assessment & Plan:  Two lesions on her left cheek observed, the raised brown lesion was consistent with seborrheic keratosis but a nearby rough scaly patch could be possible actinic keratosis  Patient was referred to derm and informed to follow up if either lesion changes in size/shape/color or develops pain/pruritus  Orders:  -     Ambulatory Referral to Dermatology; Future    2  Type 2 diabetes mellitus without complication, without long-term current use of insulin [facility])  Assessment & Plan:    Lab Results   Component Value Date    HGBA1C 5 6 2023   The patient was counseled to discontinue her diabetes medication at her last visit  Her A1C has risen from 5 6 then to 6 5 at current visit, will follow up in 3 months to reassess  If A1c rises above 7 0, will consider restarting on low dose glipizide/other agent  The patient was counseled on the importance of a  healthy diet and decreasing her current candy intake  She reports currently consuming approximately 50 small candies a day  Reducing consumption or mixing in sugar free alternatives were discussed  Switching entirely to sugar free was not recommended as excessive consumption has given her diarrhea in the past     Orders:  -     POCT hemoglobin A1c         Subjective      HPI   Kobi Gilmore presents in office for follow up on skin lesion, blood pressure, and blood sugar  Pt forgot to bring in BP log but remembers them being good  Her daughter checked the last two days and reports them being good as well but does not remember the exact numbers  Cardiology appointment \"went fine\"  They recommended compression socks and to only take BP meds when SBP was above 160   She has not recorded any pressures above that number so has no " "taken her BP meds recently  She reports not wearing the compression socks  Patient and her daughter claim they have not noticed any differences in skin lesion of left cheek since last visit  Patient denies pain, pruritus, or leakage or any associated symptoms with it  Patient reports good diet since last visit  Breakfast is bowl of cereal, toast, or english muffin  She often does not eat lunch since she eats breakfast late  Lois Palmer is described as \"light, sometimes spagethi or soup\"  Patient reports eating about fifty small candies throughout the day  She tries to drink an Ensure a daily  Patient reports knocking out front tooth in a fall about a month ago  Patients daughter has been trying to schedule with a dentist but has been unable to so far  Pt denies pain or discomfort  Patient reports compliance with lipitor, aspirin, vitamin d3, aricept, and remeron  She denies any other current issues  Review of Systems   Constitutional: Negative for fatigue and fever  HENT: Negative for congestion, sinus pain and sore throat  Respiratory: Negative for chest tightness and shortness of breath  Cardiovascular: Negative for chest pain  Gastrointestinal: Negative for abdominal pain, diarrhea, nausea and vomiting  Endocrine: Negative for polyuria  Genitourinary: Negative for difficulty urinating and dysuria  Musculoskeletal: Positive for back pain  Neurological: Negative for light-headedness, numbness and headaches  All other systems reviewed and are negative        Current Outpatient Medications on File Prior to Visit   Medication Sig   • aspirin (ECOTRIN LOW STRENGTH) 81 mg EC tablet Take 81 mg by mouth daily   • atorvastatin (LIPITOR) 40 mg tablet Take 1 tablet (40 mg total) by mouth every evening   • cholecalciferol (VITAMIN D3) 1,000 units tablet Take 1,000 Units by mouth daily   • donepezil (ARICEPT) 10 mg tablet TAKE 1 TABLET BY MOUTH EVERYDAY AT BEDTIME   • lisinopril (ZESTRIL) 2 5 " "mg tablet Take 1 tab po daily PRN only if Systolic BP is greater than 160 MMHg  • mirtazapine (REMERON) 15 mg tablet TAKE 1 TABLET BY MOUTH DAILY AT BEDTIME       Objective     /80   Pulse 78   Temp 98 °F (36 7 °C)   Resp 18   Ht 5' 2\" (1 575 m)   Wt 59 3 kg (130 lb 12 8 oz)   SpO2 98%   BMI 23 92 kg/m²     Physical Exam  Vitals reviewed  Constitutional:       General: She is not in acute distress  Appearance: She is not ill-appearing, toxic-appearing or diaphoretic  HENT:      Head: Atraumatic  Nose: Nose normal    Eyes:      General: No scleral icterus  Conjunctiva/sclera: Conjunctivae normal    Cardiovascular:      Rate and Rhythm: Normal rate and regular rhythm  Pulses: Normal pulses  Heart sounds: Normal heart sounds  Pulmonary:      Effort: Pulmonary effort is normal       Breath sounds: Normal breath sounds  Abdominal:      General: There is no distension  Palpations: Abdomen is soft  Skin:     General: Skin is warm and dry  Findings: Lesion present  Neurological:      Mental Status: She is alert and oriented to person, place, and time     Psychiatric:         Mood and Affect: Mood normal          Behavior: Behavior normal        Wilfrido Kessler MD  "

## 2023-05-25 NOTE — ASSESSMENT & PLAN NOTE
· Pt had FTD completed  · She scored 93% probability of failing TERRIE  · She scored 77% probability of creating a hazardous situation on specialized stress test  · Below average scoring areas included initialization and reactions, tracking process visual events, ability to encode, retrieve and/or respond to stimuli, decisions under time pressure, guided movement control, ability to quickly respond to complex information, impulse control  · Discussed options of surrendering license versus requesting on the road test by [employer]  Patient decided to surrender her license at this time  · Paperwork completed and faxed to [facility]  Daughter is aware if they do not hear anything from [employer] in timely fashion that they should contact our office for follow-up  · Patient is aware she is to stop driving today  She voiced understanding

## 2023-05-25 NOTE — PATIENT INSTRUCTIONS
We reviewed results of fitness to drive today  As discussed, as of today no more driving  Our office will fax paperwork to [employer] and if you do not hear from them in a timely manner please contact office for follow-up

## 2023-05-25 NOTE — PATIENT INSTRUCTIONS
[facility]     Dermatology referral ordered     Decrease pocket candy!!! :)    Seborrheic Keratosis   WHAT YOU NEED TO KNOW:   What is seborrheic keratosis (SK)? SK is a lesion that is commonly found on skin  The lesions are benign (not cancer)  Lesions are usually seen on the scalp, face, neck, and trunk  You may have lesions anywhere hair can grow on your body  There are usually many lesions  Some people have dozens of them  As you get older, they may become thicker, more elevated, and increased in number  What are the types of SK lesions? The following are some common SK lesions:  Common seborrheic keratoses  are usually on the face, neck, and trunk  They can look like warts  They can feel like velvet or wax  The lesions look like they have been stuck on your skin  Do not try to peel or scratch them off  Dermatosis papulosa nigra  are small, black, raised pimples  They appear on your face, neck, chest, and upper back  These lesions are most common in people with darker skin  They are more common in women, and there is usually a family history of these lesions  Stucco keratoses  are small gray lesions  They look rough and like warts  These lesions are usually found on the legs or forearms  There are usually a lot of these lesions  They are more common in men  Flat seborrheic keratoses  are oval, brown patches on the face, chest, or arms  These lesions increase in number as you get older  Pedunculated seborrheic keratoses  are darker lesions that look like they have a stem  These appear on the neck or armpit  How is SK diagnosed? Your healthcare provider will look at your lesions  He or she may be able to diagnose the lesion by its appearance  Your healthcare provider may look at your lesions through a medical lens called a dermatoscope  You may need a biopsy of one or more lesions  The tissue from the biopsy will be sent to the lab for tests  How is SK treated?   Treatment is not usually needed  A lesion can be removed if it is inflamed or has changed in appearance  It may also be removed for any reason  Talk to your healthcare provider if you want a lesion removed  Cryotherapy and shave removal are the most common removal methods  What else do I need to know about SK? The lesions can become irritated  Irritation can be caused by clothes rubbing against the lesions  It can also be caused by chafing of the area the lesions are in  The lesions or the skin around the lesions can become red and itchy  The lesions can also ooze drainage  When should I contact my healthcare provider? Your lesions change shape or size  Your lesions become red, itchy, and have drainage  You have questions or concerns about your condition or care  CARE AGREEMENT:   You have the right to help plan your care  Learn about your health condition and how it may be treated  Discuss treatment options with your healthcare providers to decide what care you want to receive  You always have the right to refuse treatment  The above information is an  only  It is not intended as medical advice for individual conditions or treatments  Talk to your doctor, nurse or pharmacist before following any medical regimen to see if it is safe and effective for you  © [facility] 2022 Information is for End User's use only and may not be sold, redistributed or otherwise used for commercial purposes  Diabetes and Nutrition   AMBULATORY CARE:   Nutrition plans  help keep blood sugar levels steady  They also help delay or prevent complications of diabetes, such as diabetic kidney disease    Call your local emergency number (911 in the [facility] [facility]) if:   You have any of the following signs of a heart attack:      Squeezing, pressure, or pain in your chest    You may  also have any of the following:     Discomfort or pain in your back, neck, jaw, stomach, or arm    Shortness of breath    Nausea or vomiting    Lightheadedness or a sudden cold sweat      Seek care immediately if:   You have a low blood sugar level and it does not improve with treatment  Symptoms are trouble thinking, a pounding heartbeat, and sweating  Your blood sugar level is above 240 mg/dL and does not come down within 15 minutes of treatment  You have ketones in your blood or urine  You have nausea or are vomiting and cannot keep any food or liquid down  You have blurred or double vision  Your breath has a fruity, sweet smell, or your breathing is shallow  Call your doctor or diabetes care team if:   Your blood sugar levels are higher than your target goals  You often have low blood sugar levels  You have trouble coping with diabetes, or you feel anxious or depressed  You have questions or concerns about your condition or care  A dietitian will help you create a nutrition plan  to meet your needs and your family's needs  He or she may explain a plan such as the Dietary Approaches to Stop Hypertension (DASH) eating plan or the Mediterranean diet  The goal is for you to reach or maintain healthy weight, blood sugar, blood pressure, and lipid levels  You should meet with the dietitian at least 1 time each year  You will learn the following: How food affects your blood sugar levels    How to create healthy eating habits    How to make food choices based on your activity level, weight, and glucose levels    How your favorite foods may fit into your plan    How to keep track of carbohydrates    Correct portion sizes for each food    Changes you can make to your plan if you get pregnant or are breastfeeding    What you can do before you meet with the dietitian:   Do not skip meals  The goal is to keep your blood sugar level steady  Blood sugar levels may drop too low if you have received insulin and do not eat  Eat more high-fiber foods  Examples include fresh or frozen fruits and vegetables, whole-grain breads, and beans   Fiber helps control or lower blood sugar and cholesterol levels  Choose whole fruits instead of fruit juice as much as possible  Sugar may be added to juice, and fiber may be removed  Choose heart-healthy fats  Foods high in heart-healthy fats include olive oil, nuts, avocados, and fatty fish, such as salmon and tuna  Foods high in unhealthy fats include red meat, full-fat dairy products, and soft margarine  Unhealthy fats can increase your risk for heart disease, increase bad cholesterol, and lower good cholesterol  Choose complex carbohydrates  Foods with complex carbohydrates include brown rice, whole-grain breads and cereals, and cooked beans  Foods with simple carbohydrates include white bread, white rice, most cold cereals, and snack foods  Your plan will include the amount of carbohydrate to have at one time or in a day  Your blood sugar level can get too high if you eat too much carbohydrate at one time  Blood sugar levels do not spike as high or drop as quickly with complex carbohydrates as with simple carbohydrates  Choose complex carbohydrates whenever possible  Have less sodium (salt)  The risk for high blood pressure (BP) increases with high-sodium foods  Limit high-sodium foods, such as soy sauce, potato chips, and canned soup  Do not add salt to food you cook  Limit your use of table salt  Read labels to have no more than 2,300 milligrams of sodium in one day  Limit artificial sweeteners  These may be found in food or drinks, such as diet soft drinks or other low-calorie beverages  Artificial sweeteners are low in calories  They may help you lower your overall calories and carbohydrates  It is important not to have more calories from other foods to make up for the calories saved  Artificial sweeteners do not have any nutrition  Eat whole foods and drink water as much as possible  Your plan may include beverages with artificial sweeteners for a short time   These can help you transition from high-sugar beverages to water  Use the plate method for each meal   This method can help you eat the right amount of carbohydrates and keep your blood sugar levels under control  Draw an imaginary line down the middle of a 9-inch dinner plate  On one side, draw another line to divide that section in half  Your plate will have one large section and 2 small sections  Fill the largest section with non-starchy vegetables  These include broccoli, spinach, cucumbers, peppers, cauliflower, and tomatoes  Add a starch to one of the small sections  Starches include pasta, rice, whole-grain bread, tortillas, corn, potatoes, and beans  Add meat or another source of protein to the other small section  Examples include chicken or turkey without skin, fish, lean beef or pork, low-fat cheese, tofu, and eggs  Add dairy products or fruit next to your plate if your meal plan allows  Examples of dairy include skim or 1% milk and low-fat yogurt  If you do not drink milk or eat dairy products, you may be able to add another serving of starchy food instead  Have a low-calorie or calorie-free drink with your meal   Examples include water or unsweetened tea or coffee  Reach and maintain a healthy weight:  A healthy weight can help you control your diabetes  You can maintain a healthy weight with a nutrition plan and regular physical activity  Ask your healthcare provider what a healthy weight is for you  Ask him or her to help you create a weight loss plan, if needed  Together you can set weight loss and maintenance goals  For example, your goal may be to lose at least 7% of your extra weight in the first 6 months  What you need to know if you choose to drink alcohol:   Alcohol can cause health problems  Alcohol can cause hypoglycemia (very low blood sugar level), especially if you use insulin   Alcohol can cause high blood sugar and BP levels, and weight gain if you drink too much     Hypoglycemia can happen hours after you drink alcohol  Check your blood sugar level for several hours after you drink alcohol  Have a source of fast-acting carbohydrates with you in case your level goes too low  You need immediate care if you have signs or symptoms of hypoglycemia, such as sweating, confusion, or fainting  Limit alcohol as directed  Your healthcare provider can tell you how many drinks are okay for you within 24 hours or within 1 week  Women 21 years or older and men 72 years or older should limit alcohol to 1 drink a day  Men aged 24 to 59 years should limit alcohol to 2 drinks a day  A drink of alcohol is 12 ounces of beer, 5 ounces of wine, or 1½ ounces of liquor  Always have food when you drink alcohol  Your blood sugar may fall to a low level if you drink when your stomach is empty  Follow up with your diabetes team as directed:  Write down your questions so you remember to ask them during your visits  © Copyright Rachael Fails 2022 Information is for End User's use only and may not be sold, redistributed or otherwise used for commercial purposes  The above information is an  only  It is not intended as medical advice for individual conditions or treatments  Talk to your doctor, nurse or pharmacist before following any medical regimen to see if it is safe and effective for you

## 2023-05-25 NOTE — ASSESSMENT & PLAN NOTE
Two lesions on her left cheek observed, the raised brown lesion was consistent with seborrheic keratosis but a nearby rough scaly patch could be possible actinic keratosis  Patient was referred to derm and informed to follow up if either lesion changes in size/shape/color or develops pain/pruritus

## 2023-05-25 NOTE — PROGRESS NOTES
Virtual Brief Visit    This Visit is being completed by telephone  The Patient is located at Home and in the following state in which I hold an active license [place]    The patient was identified by name and date of birth  Jared Saravia was informed that this is a telemedicine visit and that the visit is being conducted through Telephone  My office door was closed  No one else was in the room  She acknowledged consent and understanding of privacy and security of the video platform  The patient has agreed to participate and understands they can discontinue the visit at any time  Patient is aware this is a billable service  Note:  We attempted to connect virtually through video, but pt's phone blocked visit  Patient completed FTD on 5/9/2023 with OT  This appointment is to review those results  Patient states that she did okay and does not remember have any issues with eval   However, her daughter states that she did think it was very hard and stated 'that was why her friend did not pass '    Spoke on phone to patient and her daughter, Elsie Goins  Reviewed FTD results  Gave option of surrendering license or attempting on the road test with [employer]  Patient prefers to surrender her license  We will have social work mail transportation alternatives to her daughter and will fax [employer] information today  Patient verbalized understanding  She is aware that she needs to stop driving today        Assessment/Plan:    Problem List Items Addressed This Visit        Other    Driving safety issue - Primary     · Pt had FTD completed  · She scored 93% probability of failing TERRIE  · She scored 77% probability of creating a hazardous situation on specialized stress test  · Below average scoring areas included initialization and reactions, tracking process visual events, ability to encode, retrieve and/or respond to stimuli, decisions under time pressure, guided movement control, ability to quickly respond to complex information, impulse control  · Discussed options of surrendering license versus requesting on the road test by [facility]  Patient decided to surrender her license at this time  · Paperwork completed and faxed to [employer]  Daughter is aware if they do not hear anything from [employer] in timely fashion that they should contact our office for follow-up  · Patient is aware she is to stop driving today  She voiced understanding  Recent Visits  No visits were found meeting these conditions  Showing recent visits within past 7 days and meeting all other requirements  Today's Visits  Date Type Provider Dept   05/25/23 [address] [place] [address], [address] [place] [address],[address]   Showing today's visits and meeting all other requirements  Future Appointments  No visits were found meeting these conditions    Showing future appointments within next 150 days and meeting all other requirements         Visit Time  Total Visit Duration: 15 minutes

## 2023-05-31 DIAGNOSIS — I10 PRIMARY HYPERTENSION: ICD-10-CM

## 2023-05-31 RX ORDER — LISINOPRIL 2.5 MG/1
TABLET ORAL
Qty: 90 TABLET | Refills: 1 | Status: SHIPPED | OUTPATIENT
Start: 2023-05-31

## 2023-06-16 DIAGNOSIS — I12.9 BENIGN HYPERTENSION WITH CKD (CHRONIC KIDNEY DISEASE) STAGE III (HCC): Primary | ICD-10-CM

## 2023-06-16 DIAGNOSIS — E87.1 HYPONATREMIA: ICD-10-CM

## 2023-06-16 DIAGNOSIS — N18.31 STAGE 3A CHRONIC KIDNEY DISEASE (HCC): ICD-10-CM

## 2023-06-16 DIAGNOSIS — N18.30 BENIGN HYPERTENSION WITH CKD (CHRONIC KIDNEY DISEASE) STAGE III (HCC): Primary | ICD-10-CM

## 2023-06-18 DIAGNOSIS — F32.A DEPRESSION: ICD-10-CM

## 2023-06-19 RX ORDER — MIRTAZAPINE 15 MG/1
TABLET, FILM COATED ORAL
Qty: 30 TABLET | Refills: 1 | Status: SHIPPED | OUTPATIENT
Start: 2023-06-19

## 2023-06-26 ENCOUNTER — TELEPHONE (OUTPATIENT)
Dept: NEPHROLOGY | Facility: CLINIC | Age: 73
End: 2023-06-26

## 2023-06-26 NOTE — TELEPHONE ENCOUNTER
Called patient and spoke with daughter Nichole Shea to remained to please have labs done before the follow up appointment

## 2023-06-28 NOTE — TELEPHONE ENCOUNTER
Called patient and spoke with daughter Carlos Evans to remained to please have labs done before the follow up appointment  Carlos Evans said they will do labs any of the week

## 2023-06-29 ENCOUNTER — APPOINTMENT (OUTPATIENT)
Dept: LAB | Facility: CLINIC | Age: 73
End: 2023-06-29
Payer: COMMERCIAL

## 2023-06-29 LAB
ANION GAP SERPL CALCULATED.3IONS-SCNC: 10 MMOL/L
BUN SERPL-MCNC: 13 MG/DL (ref 5–25)
CALCIUM SERPL-MCNC: 9 MG/DL (ref 8.4–10.2)
CHLORIDE SERPL-SCNC: 93 MMOL/L (ref 96–108)
CO2 SERPL-SCNC: 25 MMOL/L (ref 21–32)
CREAT SERPL-MCNC: 1.02 MG/DL (ref 0.6–1.3)
ERYTHROCYTE [DISTWIDTH] IN BLOOD BY AUTOMATED COUNT: 13.6 % (ref 11.6–15.1)
GFR SERPL CREATININE-BSD FRML MDRD: 54 ML/MIN/1.73SQ M
GLUCOSE SERPL-MCNC: 187 MG/DL (ref 65–140)
HCT VFR BLD AUTO: 33 % (ref 34.8–46.1)
HGB BLD-MCNC: 11.3 G/DL (ref 11.5–15.4)
MCH RBC QN AUTO: 31.9 PG (ref 26.8–34.3)
MCHC RBC AUTO-ENTMCNC: 34.2 G/DL (ref 31.4–37.4)
MCV RBC AUTO: 93 FL (ref 82–98)
PLATELET # BLD AUTO: 280 THOUSANDS/UL (ref 149–390)
PMV BLD AUTO: 8.4 FL (ref 8.9–12.7)
POTASSIUM SERPL-SCNC: 4.1 MMOL/L (ref 3.5–5.3)
RBC # BLD AUTO: 3.54 MILLION/UL (ref 3.81–5.12)
SODIUM SERPL-SCNC: 128 MMOL/L (ref 135–147)
WBC # BLD AUTO: 8.98 THOUSAND/UL (ref 4.31–10.16)

## 2023-06-30 ENCOUNTER — APPOINTMENT (OUTPATIENT)
Dept: LAB | Facility: CLINIC | Age: 73
End: 2023-06-30
Payer: COMMERCIAL

## 2023-06-30 LAB
CREAT UR-MCNC: 97.6 MG/DL
MICROALBUMIN UR-MCNC: 94.4 MG/L (ref 0–20)
MICROALBUMIN/CREAT 24H UR: 97 MG/G CREATININE (ref 0–30)

## 2023-07-06 ENCOUNTER — OFFICE VISIT (OUTPATIENT)
Dept: NEPHROLOGY | Facility: CLINIC | Age: 73
End: 2023-07-06
Payer: COMMERCIAL

## 2023-07-06 VITALS
OXYGEN SATURATION: 96 % | HEIGHT: 62 IN | SYSTOLIC BLOOD PRESSURE: 128 MMHG | HEART RATE: 83 BPM | BODY MASS INDEX: 25.03 KG/M2 | WEIGHT: 136 LBS | DIASTOLIC BLOOD PRESSURE: 70 MMHG

## 2023-07-06 DIAGNOSIS — E87.1 HYPONATREMIA: ICD-10-CM

## 2023-07-06 DIAGNOSIS — R80.9 MICROALBUMINURIA: ICD-10-CM

## 2023-07-06 DIAGNOSIS — N18.30 BENIGN HYPERTENSION WITH CKD (CHRONIC KIDNEY DISEASE) STAGE III (HCC): ICD-10-CM

## 2023-07-06 DIAGNOSIS — I12.9 BENIGN HYPERTENSION WITH CKD (CHRONIC KIDNEY DISEASE) STAGE III (HCC): ICD-10-CM

## 2023-07-06 DIAGNOSIS — N18.31 TYPE 2 DIABETES MELLITUS WITH STAGE 3A CHRONIC KIDNEY DISEASE, WITHOUT LONG-TERM CURRENT USE OF INSULIN (HCC): ICD-10-CM

## 2023-07-06 DIAGNOSIS — N18.31 STAGE 3A CHRONIC KIDNEY DISEASE (HCC): Primary | ICD-10-CM

## 2023-07-06 DIAGNOSIS — E11.22 TYPE 2 DIABETES MELLITUS WITH STAGE 3A CHRONIC KIDNEY DISEASE, WITHOUT LONG-TERM CURRENT USE OF INSULIN (HCC): ICD-10-CM

## 2023-07-06 PROCEDURE — 99214 OFFICE O/P EST MOD 30 MIN: CPT | Performed by: PHYSICIAN ASSISTANT

## 2023-07-06 NOTE — PATIENT INSTRUCTIONS
Repeat BMP in 1 week to make sure sodium improving  Avoid all NSAIDs (ibuprofen, Motrin, Aleve, Advil, Naproxen. ..)   Call if blood pressure consistently more than 140/90 or less than 110/60s  Follow up in 1 year with repeat labs prior

## 2023-07-06 NOTE — PROGRESS NOTES
OFFICE FOLLOW UP - Nephrology   Walker Fulton 75 y.o. female MRN: [id number]       ASSESSMENT/PLAN:  1. CKD stage IIIa: Baseline creatinine 0.9-1.2. Likely due to hypertension, diabetes and age-related nephron loss. Creatinine stable at 1.02   2. Microalbuminuria: Albumin creatinine ratio 97 mg. Likely due to diabetes and hypertension. · Lisinopril discontinued by cardiology on 5/9 due to labile blood pressure and now taking if systolic blood pressure greater than 160 which she has not needed  · Discussed that if proteinuria worsens would need to resume lisinopril but given very good blood pressure and diabetes control we will hold off at this time  3. Hyponatremia: Chronic since November 2021. Suspect underlying SIADH + poor solute intake. Recent sodium dropped to 128 (closer to 130 given hyperglycemia). · Prior CT with no signs of malignancy except for thickened uterine endometrium--patient declines GYN follow-up at this time  · Daughter is going to encourage her to increase oral solute intake and will repeat BMP next week. · 1.5 L/day oral fluid restriction  4. Hypertension: Blood pressure well controlled  5. DM II: A1c 6.5. Diet controlled and off all medications    Patient and daughter requesting 1 year follow-up given that labs and blood pressure are stable and that she follows very frequently with her family doctor about every 3 months. We will repeat BMP next week to make sure sodium is acceptable and follow-up in 1 year. PATIENT INSTRUCTIONS:  Patient Instructions   • Repeat BMP in 1 week to make sure sodium improving  • Avoid all NSAIDs (ibuprofen, Motrin, Aleve, Advil, Naproxen. ..)   • Call if blood pressure consistently more than 140/90 or less than 110/60s  • Follow up in 1 year with repeat labs prior         HPI: Walker Fulton is a 75 y.o. female who is here for CKD follow-up. Overall doing well recently.   Previously was having issues with labile blood pressure and falling so cardiology discontinued lisinopril. Her blood pressure has been very good at home recently and is also controlled in the office today. She denies any chest pain, shortness of breath, nausea, vomiting or diarrhea. Per daughter her appetite is improving and she has gained some weight. ROS:   A complete 10 point review of systems was done. Pertinent positives and negatives as noted in the HPI, otherwise the review of systems is negative. Allergies: Patient has no known allergies. Medications:   Current Outpatient Medications:   •  aspirin (ECOTRIN LOW STRENGTH) 81 mg EC tablet, Take 81 mg by mouth daily, Disp: , Rfl:   •  atorvastatin (LIPITOR) 40 mg tablet, Take 1 tablet (40 mg total) by mouth every evening, Disp: 90 tablet, Rfl: 3  •  cholecalciferol (VITAMIN D3) 1,000 units tablet, Take 1,000 Units by mouth daily, Disp: , Rfl:   •  donepezil (ARICEPT) 10 mg tablet, TAKE 1 TABLET BY MOUTH EVERYDAY AT BEDTIME, Disp: 90 tablet, Rfl: 1  •  lisinopril (ZESTRIL) 2.5 mg tablet, TAKE 1 TABLET DAILY AS NEEDED ONLY IF SYSTOLIC BP IS GREATER THEN 160MMHG, Disp: 90 tablet, Rfl: 1  •  mirtazapine (REMERON) 15 mg tablet, TAKE 1 TABLET BY MOUTH EVERYDAY AT BEDTIME, Disp: 30 tablet, Rfl: 1    Past Medical History:   Diagnosis Date   • MARTINA (acute kidney injury) ([address]) 11/21/2021   • Diabetes mellitus ([address])    • Hypertension      History reviewed. No pertinent surgical history.   Family History   Problem Relation Age of Onset   • Breast cancer Mother         unknown age   • No Known Problems Father    • No Known Problems Daughter    • No Known Problems Maternal Grandmother    • No Known Problems Maternal Grandfather    • No Known Problems Paternal Grandmother    • No Known Problems Paternal Grandfather    • No Known Problems Son    • No Known Problems Son    • No Known Problems Maternal Aunt    • No Known Problems Maternal Aunt    • No Known Problems Maternal Aunt    • No Known Problems Maternal Aunt    • No Known Problems Paternal Aunt    • No Known Problems Paternal Aunt    • No Known Problems Paternal Aunt       reports that she has never smoked. She has never used smokeless tobacco. She reports that she does not drink alcohol and does not use drugs. Physical Exam:   Vitals:    07/06/23 1351   BP: 128/70   BP Location: Left arm   Patient Position: Sitting   Cuff Size: Adult   Pulse: 83   SpO2: 96%   Weight: 61.7 kg (136 lb)   Height: 5' 2" (1.575 m)     Body mass index is 24.87 kg/m². General: no acute distress   Eyes: conjunctivae pink, anicteric sclerae  ENT: mucous membranes moist  Neck: supple, no JVD  Chest: clear to auscultation bilaterally with no wheezes, rale or rhochi  CVS: regular rate and rhythm   Abdomen: soft, non-tender, non-distended  Extremities: no lower extremity edema   Skin: no rash  Neuro: awake and alert       Lab Results:  Results for orders placed or performed in visit on 57/37/96   Basic metabolic panel   Result Value Ref Range    Sodium 128 (L) 135 - 147 mmol/L    Potassium 4.1 3.5 - 5.3 mmol/L    Chloride 93 (L) 96 - 108 mmol/L    CO2 25 21 - 32 mmol/L    ANION GAP 10 mmol/L    BUN 13 5 - 25 mg/dL    Creatinine 1.02 0.60 - 1.30 mg/dL    Glucose 187 (H) 65 - 140 mg/dL    Calcium 9.0 8.4 - 10.2 mg/dL    eGFR 54 ml/min/1.73sq m   CBC   Result Value Ref Range    WBC 8.98 4.31 - 10.16 Thousand/uL    RBC 3.54 (L) 3.81 - 5.12 Million/uL    Hemoglobin 11.3 (L) 11.5 - 15.4 g/dL    Hematocrit 33.0 (L) 34.8 - 46.1 %    MCV 93 82 - 98 fL    MCH 31.9 26.8 - 34.3 pg    MCHC 34.2 31.4 - 37.4 g/dL    RDW 13.6 11.6 - 15.1 %    Platelets 404 870 - 310 Thousands/uL    MPV 8.4 (L) 8.9 - 12.7 fL   Albumin / creatinine urine ratio   Result Value Ref Range    Creatinine, Ur 97.6 mg/dL    Albumin,U,Random 94.4 (H) 0.0 - 20.0 mg/L    Albumin Creat Ratio 97 (H) 0 - 30 mg/g creatinine             Invalid input(s): "ALBUMIN"      Portions of the record may have been created with voice recognition software.  Occasional wrong word or "sound a like" substitutions may have occurred due to the inherent limitations of voice recognition software. Read the chart carefully and recognize, using context, where substitutions have occurred. If you have any questions, please contact the dictating provider.

## 2023-07-11 ENCOUNTER — APPOINTMENT (OUTPATIENT)
Dept: LAB | Facility: CLINIC | Age: 73
End: 2023-07-11
Payer: COMMERCIAL

## 2023-07-11 DIAGNOSIS — F32.A DEPRESSION: ICD-10-CM

## 2023-07-11 DIAGNOSIS — N18.31 STAGE 3A CHRONIC KIDNEY DISEASE (HCC): ICD-10-CM

## 2023-07-11 LAB
ANION GAP SERPL CALCULATED.3IONS-SCNC: 4 MMOL/L
BUN SERPL-MCNC: 23 MG/DL (ref 5–25)
CALCIUM SERPL-MCNC: 9.1 MG/DL (ref 8.3–10.1)
CHLORIDE SERPL-SCNC: 111 MMOL/L (ref 96–108)
CO2 SERPL-SCNC: 23 MMOL/L (ref 21–32)
CREAT SERPL-MCNC: 1.01 MG/DL (ref 0.6–1.3)
GFR SERPL CREATININE-BSD FRML MDRD: 54 ML/MIN/1.73SQ M
GLUCOSE SERPL-MCNC: 171 MG/DL (ref 65–140)
POTASSIUM SERPL-SCNC: 3.9 MMOL/L (ref 3.5–5.3)
SODIUM SERPL-SCNC: 138 MMOL/L (ref 135–147)

## 2023-07-11 PROCEDURE — 80048 BASIC METABOLIC PNL TOTAL CA: CPT

## 2023-07-11 PROCEDURE — 36415 COLL VENOUS BLD VENIPUNCTURE: CPT

## 2023-07-11 RX ORDER — MIRTAZAPINE 15 MG/1
TABLET, FILM COATED ORAL
Qty: 90 TABLET | Refills: 1 | Status: SHIPPED | OUTPATIENT
Start: 2023-07-11

## 2023-07-13 ENCOUNTER — TELEPHONE (OUTPATIENT)
Dept: NEPHROLOGY | Facility: CLINIC | Age: 73
End: 2023-07-13

## 2023-07-13 NOTE — TELEPHONE ENCOUNTER
----- Message from Adin Limon PA-C sent at 7/13/2023 12:44 PM EDT -----  Please inform patient/daughter that repeat BMP showed improved sodium at 138.  No changes at this time

## 2023-07-13 NOTE — TELEPHONE ENCOUNTER
Called patient and left a voicemail with the following information:  BMP showed improved sodium at 138. No changes at this time. Advised patient to please call the office to let us know she received the message and if have any questions or concerns.

## 2023-08-06 ENCOUNTER — RA CDI HCC (OUTPATIENT)
Dept: OTHER | Facility: HOSPITAL | Age: 73
End: 2023-08-06

## 2023-08-06 NOTE — PROGRESS NOTES
Previous suggestion used  [address] [facility] coding opportunities       Chart reviewed, no opportunity found: [id number]        Patients Insurance     Medicare Insurance: [employer]

## 2023-08-09 ENCOUNTER — HOSPITAL ENCOUNTER (OUTPATIENT)
Dept: RADIOLOGY | Age: 73
Discharge: HOME | End: 2023-08-09
Payer: COMMERCIAL

## 2023-08-09 DIAGNOSIS — Z78.0 POSTMENOPAUSAL: ICD-10-CM

## 2023-08-09 PROCEDURE — 77080 DXA BONE DENSITY AXIAL: CPT

## 2023-08-14 PROBLEM — Z78.0 OSTEOPENIA AFTER MENOPAUSE: Status: ACTIVE | Noted: 2023-08-14

## 2023-08-14 PROBLEM — M85.80 OSTEOPENIA AFTER MENOPAUSE: Status: ACTIVE | Noted: 2023-08-14

## 2023-08-15 ENCOUNTER — TELEPHONE (OUTPATIENT)
Dept: LAB | Facility: HOSPITAL | Age: 73
End: 2023-08-15

## 2023-08-15 ENCOUNTER — OFFICE VISIT (OUTPATIENT)
Dept: FAMILY MEDICINE CLINIC | Facility: OTHER | Age: 73
End: 2023-08-15
Payer: COMMERCIAL

## 2023-08-15 ENCOUNTER — OFFICE VISIT (OUTPATIENT)
Age: 73
End: 2023-08-15
Payer: COMMERCIAL

## 2023-08-15 VITALS
WEIGHT: 137.6 LBS | DIASTOLIC BLOOD PRESSURE: 72 MMHG | BODY MASS INDEX: 25.32 KG/M2 | SYSTOLIC BLOOD PRESSURE: 116 MMHG | TEMPERATURE: 98.4 F | HEIGHT: 62 IN | OXYGEN SATURATION: 98 % | HEART RATE: 96 BPM

## 2023-08-15 VITALS
HEIGHT: 62 IN | SYSTOLIC BLOOD PRESSURE: 114 MMHG | OXYGEN SATURATION: 99 % | HEART RATE: 85 BPM | BODY MASS INDEX: 25.4 KG/M2 | DIASTOLIC BLOOD PRESSURE: 80 MMHG | RESPIRATION RATE: 15 BRPM | TEMPERATURE: 98.4 F | WEIGHT: 138 LBS

## 2023-08-15 DIAGNOSIS — H91.93 BILATERAL HEARING LOSS, UNSPECIFIED HEARING LOSS TYPE: ICD-10-CM

## 2023-08-15 DIAGNOSIS — F03.A0 MILD DEMENTIA WITHOUT BEHAVIORAL DISTURBANCE, PSYCHOTIC DISTURBANCE, MOOD DISTURBANCE, OR ANXIETY, UNSPECIFIED DEMENTIA TYPE (HCC): Primary | ICD-10-CM

## 2023-08-15 DIAGNOSIS — E11.9 TYPE 2 DIABETES MELLITUS WITHOUT COMPLICATION, WITHOUT LONG-TERM CURRENT USE OF INSULIN (HCC): Primary | ICD-10-CM

## 2023-08-15 DIAGNOSIS — I10 PRIMARY HYPERTENSION: ICD-10-CM

## 2023-08-15 DIAGNOSIS — Z78.0 OSTEOPENIA AFTER MENOPAUSE: ICD-10-CM

## 2023-08-15 DIAGNOSIS — M85.80 OSTEOPENIA AFTER MENOPAUSE: ICD-10-CM

## 2023-08-15 DIAGNOSIS — F32.A DEPRESSION, UNSPECIFIED DEPRESSION TYPE: ICD-10-CM

## 2023-08-15 DIAGNOSIS — R26.2 AMBULATORY DYSFUNCTION: ICD-10-CM

## 2023-08-15 DIAGNOSIS — Z91.89 DRIVING SAFETY ISSUE: ICD-10-CM

## 2023-08-15 PROCEDURE — 99213 OFFICE O/P EST LOW 20 MIN: CPT

## 2023-08-15 PROCEDURE — 99214 OFFICE O/P EST MOD 30 MIN: CPT | Performed by: NURSE PRACTITIONER

## 2023-08-15 RX ORDER — PHENOL 1.4 %
1200 AEROSOL, SPRAY (ML) MUCOUS MEMBRANE DAILY
COMMUNITY
Start: 2023-08-15

## 2023-08-15 NOTE — ASSESSMENT & PLAN NOTE
· BP stable without complaint of headache or dizziness  · Patient remains on lisinopril  · Continue dietary and lifestyle interventions  · Continue regular follow-ups with PCP for chronic management

## 2023-08-15 NOTE — ASSESSMENT & PLAN NOTE
• MOCA:  22/30. Deficits in:  All domains (orienation 5/6)  o Pt's current cognitive level is consistent with mild dementia  o Pt is independent w adl/dependent w iadls. Lives with   o Remains on aricept - dgt not sure if it is helping or not, but wants to continue at this time. o Mobility:  No AD, no recent falls. • Continue to stay active. Current activity level:  decreased. Participates in some social, cognitive, physical activity. • Monitor for changes in mood, sleep, pain control. Notify provider if any concerns  • Medication review:  Seems appropriate for current conditions. o Check with pharmacist/provider before starting any new OTC/prescription medications for potential cognitive side effects  • Optimize all acute and chronic conditions. Continue to follow-up with PCP and specialist as directed for management  • Safety concerns:  No concerns per pt/dgt.   • Caregiver stress:  Frustration for daughter as pt is not motivated to try lifestyle interventions that may help slow memory loss  • Ensure adequate hydration, good nutrition  • Goal: daughter would like to her to increase her activity

## 2023-08-15 NOTE — ASSESSMENT & PLAN NOTE
· Patient with mild hearing loss, not wearing HA's today, could hear fairly well  · Speak clearly and toward patient when communicating.   Decrease outside noise distractions  · Encouraged to use hearing aids as needed

## 2023-08-15 NOTE — PROGRESS NOTES
Assessment & Plan:   1. Mild dementia without behavioral disturbance, psychotic disturbance, mood disturbance, or anxiety, unspecified dementia type [facility])  Assessment & Plan:  • MOCA:  22/30. Deficits in:  All domains (orienation 5/6)  o Pt's current cognitive level is consistent with mild dementia  o Pt is independent w adl/dependent w iadls. Lives with   o Remains on aricept - dgt not sure if it is helping or not, but wants to continue at this time. o Mobility:  No AD, no recent falls. • Continue to stay active. Current activity level:  decreased. Participates in some social, cognitive, physical activity. • Monitor for changes in mood, sleep, pain control. Notify provider if any concerns  • Medication review:  Seems appropriate for current conditions. o Check with pharmacist/provider before starting any new OTC/prescription medications for potential cognitive side effects  • Optimize all acute and chronic conditions. Continue to follow-up with PCP and specialist as directed for management  • Safety concerns:  No concerns per pt/dgt. • Caregiver stress:  Frustration for daughter as pt is not motivated to try lifestyle interventions that may help slow memory loss  • Ensure adequate hydration, good nutrition  • Goal: daughter would like to her to increase her activity      2. Bilateral hearing loss, unspecified hearing loss type  Assessment & Plan:  · Patient with mild hearing loss, not wearing HA's today, could hear fairly well  · Speak clearly and toward patient when communicating. Decrease outside noise distractions  · Encouraged to use hearing aids as needed      3. Ambulatory dysfunction  Assessment & Plan:  · No use of AD, no recent falls  · Fall precautions      4. Driving safety issue  Assessment & Plan:  · Patient no longer driving. Issue resolved.       5. Primary hypertension  Assessment & Plan:  · BP stable without complaint of headache or dizziness  · Patient remains on lisinopril  · Continue dietary and lifestyle interventions  · Continue regular follow-ups with PCP for chronic management      6. Depression, unspecified depression type  Assessment & Plan:  · Patient denies of present  · GDS 1  · Remains on mirtazapine  · Continue emotional support, relaxation techniques  · Patient is aware that there is a geriatric therapist should she need her services in the future    HPI:  We had the pleasure of evaluating Thomas Hansen who is a 75 y.o. female in Geriatric follow up today. Previous MOCA:  14/30. Comorbidities include dementia (takes aricept), htn, hearing impairment. She lives with her   Ms. Yuridia Avendano is in the office with her daughter    Memory update per patient:  Feels memory about the same. Independent adls. Appetite ok. Drinking ok (takes one ensure a day). Sleeping pretty well, does get up for BR, but can fall back to sleep. No urinary incont. Mood is stable. No chronic pain. Can hear ok without HAs. Cognitive: Word search, occ play games with the kids. Physical:  Around the house - doesn't use AD, no recent falls. Social:  Does go out regularly. She has difficulty finding the right word while speaking: No  Patient requires repeat information or ask the same question repeatedly: No - same  Do you do your own bathing, dressing, feeding yourself Yes  Can you make your own meals and do light cleaning/chores Yes -  doing a lot.   Do you take care of your own medications No -  takes care of  Do you drive: No        Do you handle your own financial affairs such as balancing your checkbook, paying bills, investments: Yes  Do have any difficulties with handling your financial affairs: Yes - sometimes forgets where cash went (per ramon)  Have you or your family noted any change in your mood or personality:No  Are you currently or have you been treated in the past for depression or anxiety: Yes  Have you noticed any gait or balance disorder: No  Uses :no recent falls, doesn't walk much  Any hallucination or delusion: No  Sleep Issues: No  Urinary/Stool Incontinence: No  Hearing and vision issue: Yes - has HAs (doesn't wear all the time). No recent eye doc appt. ADL/IADL:  Independent/dependent  Appetite/swallow:  Good/good  Pain:  back    Memory update per family:  Patient lives at home with , per dgt, not too supportive, doesn't seem to understand memory loss, may have own cognitive issues. Independent with self care, dyes own hair, gets hair done. Doesn't do a lot of cooking or cleaning. She used to be very social, now doesn't like to go out as much. She is getting angry and wont go places. Tried to do HHA, trying to do senior center - mom is very resistant to. Has anxiety and goes to BR a lot. Does some light cooking, will eat frozen meals. Does drink ensure. Does some shopping with dgt. Pt and  don't go out too much. Can't use phone too much (ok with calling). She calls dgt daily, minimal convo. Goes to bed early- watches tv a lot. Dgt assists with banking. Patient no longer drives- dgt has car. Family Review of Behavior [facility]:    pacing. No - sedentary   agressive/combative behavior. No    agitated. Yes   wandering. No   resistance to care. Yes   hoarding/hiding objects. No    withdrawn No  personal hygiene problems. No  forgetfulness of actions Yes    ROS: Review of Systems   Constitutional: Negative for activity change, appetite change, chills and fatigue. HENT: Positive for hearing loss. Negative for congestion and trouble swallowing. Eyes: Negative for visual disturbance. Respiratory: Negative for cough and shortness of breath. Cardiovascular: Negative for chest pain. Gastrointestinal: Negative for abdominal pain, constipation, diarrhea, nausea and vomiting. Genitourinary: Negative for difficulty urinating. Musculoskeletal: Negative for arthralgias, back pain and gait problem.    Neurological: Negative for dizziness and light-headedness. Psychiatric/Behavioral: Positive for decreased concentration (forgetful). Negative for dysphoric mood and sleep disturbance. The patient is not nervous/anxious. Past Medical, surgical, social, medication and allergy history and patients previous records reviewed. Allergies:   No Known Allergies    Medications:      Current Outpatient Medications:   •  aspirin (ECOTRIN LOW STRENGTH) 81 mg EC tablet, Take 81 mg by mouth daily, Disp: , Rfl:   •  atorvastatin (LIPITOR) 40 mg tablet, Take 1 tablet (40 mg total) by mouth every evening, Disp: 90 tablet, Rfl: 3  •  calcium carbonate (OS-ALYSON) 600 MG tablet, Take 2 tablets (1,200 mg total) by mouth daily, Disp: , Rfl:   •  cholecalciferol (VITAMIN D3) 1,000 units tablet, Take 1,000 Units by mouth daily, Disp: , Rfl:   •  donepezil (ARICEPT) 10 mg tablet, TAKE 1 TABLET BY MOUTH EVERYDAY AT BEDTIME, Disp: 90 tablet, Rfl: 1  •  lisinopril (ZESTRIL) 2.5 mg tablet, TAKE 1 TABLET DAILY AS NEEDED ONLY IF SYSTOLIC BP IS GREATER THEN 160MMHG, Disp: 90 tablet, Rfl: 1  •  mirtazapine (REMERON) 15 mg tablet, TAKE 1 TABLET BY MOUTH EVERYDAY AT BEDTIME, Disp: 90 tablet, Rfl: 1    Vitals:  Vitals:    08/15/23 1417   BP: 116/72   Pulse: 96   Temp: 98.4 °F (36.9 °C)   SpO2: 98%       History:  Past Medical History:   Diagnosis Date   • MARTINA (acute kidney injury) ([address]) 11/21/2021   • Diabetes mellitus ([address])    • Hypertension      History reviewed. No pertinent surgical history.   Family History   Problem Relation Age of Onset   • Breast cancer Mother         unknown age   • No Known Problems Father    • No Known Problems Daughter    • No Known Problems Maternal Grandmother    • No Known Problems Maternal Grandfather    • No Known Problems Paternal Grandmother    • No Known Problems Paternal Grandfather    • No Known Problems Son    • No Known Problems Son    • No Known Problems Maternal Aunt    • No Known Problems Maternal Aunt    • No Known Problems Maternal Aunt    • No Known Problems Maternal Aunt    • No Known Problems Paternal Aunt    • No Known Problems Paternal Aunt    • No Known Problems Paternal Aunt      Social History     Socioeconomic History   • Marital status: /Civil Union     Spouse name: Not on file   • Number of children: Not on file   • Years of education: Not on file   • Highest education level: Not on file   Occupational History   • Not on file   Tobacco Use   • Smoking status: Never   • Smokeless tobacco: Never   Vaping Use   • Vaping Use: Never used   Substance and Sexual Activity   • Alcohol use: No   • Drug use: Never   • Sexual activity: Not Currently   Other Topics Concern   • Not on file   Social History Narrative    ** Merged History Encounter **         ** Merged History Encounter **          Social Determinants of Health     Financial Resource Strain: High Risk (12/19/2022)    Overall Financial Resource Strain (CARDIA)    • Difficulty of Paying Living Expenses: Very hard   Food Insecurity: No Food Insecurity (1/23/2023)    Hunger Vital Sign    • Worried About Running Out of Food in the Last Year: Never true    • Ran Out of Food in the Last Year: Never true   Transportation Needs: No Transportation Needs (1/23/2023)    PRAPARE - Transportation    • Lack of Transportation (Medical): No    • Lack of Transportation (Non-Medical): No   Physical Activity: Not on file   Stress: Not on file   Social Connections: Not on file   Intimate Partner Violence: Not on file   Housing Stability: Low Risk  (1/23/2023)    Housing Stability Vital Sign    • Unable to Pay for Housing in the Last Year: No    • Number of Places Lived in the Last Year: 1    • Unstable Housing in the Last Year: No     History reviewed. No pertinent surgical history. Physical Exam:  Observed ambulation:  No AD, fairly steady paced. Physical Exam  Vitals and nursing note reviewed. Constitutional:       General: She is not in acute distress. Appearance: Normal appearance. She is well-developed. She is not diaphoretic. HENT:      Head: Normocephalic. Cardiovascular:      Rate and Rhythm: Normal rate. Heart sounds: No friction rub. No gallop. Pulmonary:      Effort: Pulmonary effort is normal. No respiratory distress. Breath sounds: Normal breath sounds. No wheezing or rales. Abdominal:      General: Bowel sounds are normal. There is no distension. Palpations: Abdomen is soft. Tenderness: There is no abdominal tenderness. There is no rebound. Musculoskeletal:         General: Normal range of motion. Skin:     General: Skin is warm and dry. Neurological:      General: No focal deficit present. Mental Status: She is alert. Mental status is at baseline.       Comments: Oriented to person, partial tps  Pleasant, cooperative, forgetful   Psychiatric:         Mood and Affect: Mood normal.         Behavior: Behavior normal.

## 2023-08-15 NOTE — PATIENT INSTRUCTIONS
Diabetes and Nutrition   WHAT YOU NEED TO KNOW:   Nutrition plans help keep blood sugar levels steady. They also help delay or prevent complications of diabetes, such as diabetic kidney disease. DISCHARGE INSTRUCTIONS:   Call your local emergency number (911 in the [address]) if:   You have any of the following signs of a heart attack:      Squeezing, pressure, or pain in your chest    You may  also have any of the following:     Discomfort or pain in your back, neck, jaw, stomach, or arm    Shortness of breath    Nausea or vomiting    Lightheadedness or a sudden cold sweat      Return to the emergency department if:   You have a low blood sugar level and it does not improve with treatment. Symptoms are trouble thinking, a pounding heartbeat, and sweating. Your blood sugar level is above 240 mg/dL and does not come down within 15 minutes of treatment. You have ketones in your blood or urine. You have nausea or are vomiting and cannot keep any food or liquid down. You have blurred or double vision. Your breath has a fruity, sweet smell, or your breathing is shallow. Call your doctor or diabetes care team if:   Your blood sugar levels are higher than your target goals. You often have low blood sugar levels. You have trouble coping with diabetes, or you feel anxious or depressed. You have questions or concerns about your condition or care. A dietitian will help you create a nutrition plan  to meet your needs and your family's needs. He or she may explain a plan such as the Dietary Approaches to Stop Hypertension (DASH) eating plan or the Mediterranean diet. The goal is for you to reach or maintain healthy weight, blood sugar, blood pressure, and lipid levels. You should meet with the dietitian at least 1 time each year. You will learn the following:   How food affects your blood sugar levels    How to create healthy eating habits    How to make food choices based on your activity level, weight, and glucose levels    How your favorite foods may fit into your plan    How to keep track of carbohydrates    Correct portion sizes for each food    Changes you can make to your plan if you get pregnant or are breastfeeding    What you can do before you meet with the dietitian:   Do not skip meals. The goal is to keep your blood sugar level steady. Blood sugar levels may drop too low if you have received insulin and do not eat. Eat more high-fiber foods. Examples include fresh or frozen fruits and vegetables, whole-grain breads, and beans. Fiber helps control or lower blood sugar and cholesterol levels. Choose whole fruits instead of fruit juice as much as possible. Sugar may be added to juice, and fiber may be removed. Choose heart-healthy fats. Foods high in heart-healthy fats include olive oil, nuts, avocados, and fatty fish, such as salmon and tuna. Foods high in unhealthy fats include red meat, full-fat dairy products, and soft margarine. Unhealthy fats can increase your risk for heart disease, increase bad cholesterol, and lower good cholesterol. Choose complex carbohydrates. Foods with complex carbohydrates include brown rice, whole-grain breads and cereals, and cooked beans. Foods with simple carbohydrates include white bread, white rice, most cold cereals, and snack foods. Your plan will include the amount of carbohydrate to have at one time or in a day. Your blood sugar level can get too high if you eat too much carbohydrate at one time. Blood sugar levels do not spike as high or drop as quickly with complex carbohydrates as with simple carbohydrates. Choose complex carbohydrates whenever possible. Have less sodium (salt). The risk for high blood pressure (BP) increases with high-sodium foods. Limit high-sodium foods, such as soy sauce, potato chips, and canned soup. Do not add salt to food you cook. Limit your use of table salt.  Read labels to have no more than 2,300 milligrams of sodium in one day. Limit artificial sweeteners. These may be found in food or drinks, such as diet soft drinks or other low-calorie beverages. Artificial sweeteners are low in calories. They may help you lower your overall calories and carbohydrates. It is important not to have more calories from other foods to make up for the calories saved. Artificial sweeteners do not have any nutrition. Eat whole foods and drink water as much as possible. Your plan may include beverages with artificial sweeteners for a short time. These can help you transition from high-sugar beverages to water. Use the plate method for each meal.  This method can help you eat the right amount of carbohydrates and keep your blood sugar levels under control. Draw an imaginary line down the middle of a 9-inch dinner plate. On one side, draw another line to divide that section in half. Your plate will have one large section and 2 small sections. Fill the largest section with non-starchy vegetables. These include broccoli, spinach, cucumbers, peppers, cauliflower, and tomatoes. Add a starch to one of the small sections. Starches include pasta, rice, whole-grain bread, tortillas, corn, potatoes, and beans. Add meat or another source of protein to the other small section. Examples include chicken or turkey without skin, fish, lean beef or pork, low-fat cheese, tofu, and eggs. Add dairy products or fruit next to your plate if your meal plan allows. Examples of dairy include skim or 1% milk and low-fat yogurt. If you do not drink milk or eat dairy products, you may be able to add another serving of starchy food instead. Have a low-calorie or calorie-free drink with your meal.  Examples include water or unsweetened tea or coffee. Reach and maintain a healthy weight:  A healthy weight can help you control your diabetes.  You can maintain a healthy weight with a nutrition plan and regular physical activity. Ask your healthcare provider what a healthy weight is for you. Ask him or her to help you create a weight loss plan, if needed. Together you can set weight loss and maintenance goals. For example, your goal may be to lose at least 7% of your extra weight in the first 6 months. What you need to know if you choose to drink alcohol:   Alcohol can cause health problems. Alcohol can cause hypoglycemia (very low blood sugar level), especially if you use insulin. Alcohol can cause high blood sugar and BP levels, and weight gain if you drink too much. Hypoglycemia can happen hours after you drink alcohol. Check your blood sugar level for several hours after you drink alcohol. Have a source of fast-acting carbohydrates with you in case your level goes too low. You need immediate care if you have signs or symptoms of hypoglycemia, such as sweating, confusion, or fainting. Limit alcohol as directed. Your healthcare provider can tell you how many drinks are okay for you within 24 hours or within 1 week. Women 21 years or older and men 72 years or older should limit alcohol to 1 drink a day. Men aged 24 to 59 years should limit alcohol to 2 drinks a day. A drink of alcohol is 12 ounces of beer, 5 ounces of wine, or 1½ ounces of liquor. Always have food when you drink alcohol. Your blood sugar may fall to a low level if you drink when your stomach is empty. Follow up with your doctor or diabetes team as directed:  Write down your questions so you remember to ask them during your visits. © Copyright Paul Los 2022 Information is for End User's use only and may not be sold, redistributed or otherwise used for commercial purposes. The above information is an  only. It is not intended as medical advice for individual conditions or treatments. Talk to your doctor, nurse or pharmacist before following any medical regimen to see if it is safe and effective for you.

## 2023-08-15 NOTE — PROGRESS NOTES
Name: Madelin Claros      : 1950      MRN: [id number]  Encounter Provider: Dollie Primrose, MD  Encounter Date: 8/15/2023   Encounter department: [address]   Patient is a 76-year-old female with PMH of mild cognitive disturbance, type II DM, hypertension, osteopenia, who presents today for recheck of blood sugar and hypertension. Blood pressure logs reviewed, pressures appropriate on current medication regimen, no changes at this time. Last HbA1c reviewed with patient and daughter, patient not due for repeat HbA1c until November, reordered and patient's daughter advised to call insurance carrier to see if they will cover recheck. Patient will likely need to be reinitiated on glipizide d/t worsening glycemic control and poor dietary control. Patient advised to consider sugar-free hard candies, but stated she preferred sugared hard candies and would not switch. Pt has upcoming appointment with geriatrics, pt to return in December for next AWV as well as re-check HgBA1c.    1. Type 2 diabetes mellitus without complication, without long-term current use of insulin [facility])  Assessment & Plan:    Lab Results   Component Value Date    HGBA1C 6.5 2023   Daughter of patient stated she had previously had well-controlled HbA1c's and had had her diabetic medication discontinued by endocrinology. Daughter additionally states that patient has been eating frequent hard candies due to dry mouth and that her most recent HbA1c had climbed to 6.5 from 5.6. Pt not due for repeat HgBa1c until , re-ordered and advised daughter of patient to call insurance carrier and see if will be covered prior to filling script. Pt may need to be re-initiated in glipizide if glucose continues to be poorly controlled       Orders:  -     HEMOGLOBIN A1C W/ EAG ESTIMATION; Future; Expected date: 2023    2.  Primary hypertension  Assessment & Plan:  BP at current visit acceptable at 114/80. Blood pressure logs reviewed, blood pressure fluctuates between 110-140/70-90 with most pressures in well-controlled ranges   Encouraged pt to c/w home BP monitoring and current medication regimen as readings WNL at this visit       3. Osteopenia after menopause  -     calcium carbonate (OS-ALYSON) 600 MG tablet; Take 2 tablets (1,200 mg total) by mouth daily         Subjective     Subjective:    Junie Yoo is here for follow-up of her hypertension. Her high blood pressure was first noted several years ago. Home blood pressure readings: range 110/140 to 70/90. Salt intake and diet: salt not added to cooking and diet: general. Usual weight: 49547 lbs. Associated signs and symptoms: none. Patient denies: blurred vision, dyspnea, palpitations and pulsating in the ears. Use of agents associated with hypertension: none. Medication compliance: taking as prescribed. Review of Systems   Constitutional: Negative for fatigue and fever. HENT: Negative for congestion, sinus pain and sore throat. Respiratory: Negative for chest tightness and shortness of breath. Cardiovascular: Negative for chest pain. Gastrointestinal: Negative for abdominal pain, diarrhea, nausea and vomiting. Endocrine: Negative for polyuria. Genitourinary: Negative for difficulty urinating and dysuria. Musculoskeletal: Positive for back pain. Negative for arthralgias and myalgias. Skin: Negative for pallor and rash. Neurological: Negative for light-headedness, numbness and headaches. Hematological: Negative for adenopathy. Psychiatric/Behavioral: Negative for agitation. All other systems reviewed and are negative. Past Medical History:   Diagnosis Date   • MARTINA (acute kidney injury) ([address]) 11/21/2021   • Diabetes mellitus ([facility])    • Hypertension      History reviewed. No pertinent surgical history.   Family History   Problem Relation Age of Onset   • Breast cancer Mother         unknown age   • No Known Problems Father    • No Known Problems Daughter    • No Known Problems Maternal Grandmother    • No Known Problems Maternal Grandfather    • No Known Problems Paternal Grandmother    • No Known Problems Paternal Grandfather    • No Known Problems Son    • No Known Problems Son    • No Known Problems Maternal Aunt    • No Known Problems Maternal Aunt    • No Known Problems Maternal Aunt    • No Known Problems Maternal Aunt    • No Known Problems Paternal Aunt    • No Known Problems Paternal Aunt    • No Known Problems Paternal Aunt      Social History     Socioeconomic History   • Marital status: /Civil Union     Spouse name: None   • Number of children: None   • Years of education: None   • Highest education level: None   Occupational History   • None   Tobacco Use   • Smoking status: Never   • Smokeless tobacco: Never   Vaping Use   • Vaping Use: Never used   Substance and Sexual Activity   • Alcohol use: No   • Drug use: Never   • Sexual activity: Not Currently   Other Topics Concern   • None   Social History Narrative    ** Merged History Encounter **         ** Merged History Encounter **          Social Determinants of Health     Financial Resource Strain: High Risk (12/19/2022)    Overall Financial Resource Strain (CARDIA)    • Difficulty of Paying Living Expenses: Very hard   Food Insecurity: No Food Insecurity (1/23/2023)    Hunger Vital Sign    • Worried About Running Out of Food in the Last Year: Never true    • Ran Out of Food in the Last Year: Never true   Transportation Needs: No Transportation Needs (1/23/2023)    PRAPARE - Transportation    • Lack of Transportation (Medical): No    • Lack of Transportation (Non-Medical):  No   Physical Activity: Not on file   Stress: Not on file   Social Connections: Not on file   Intimate Partner Violence: Not on file   Housing Stability: Low Risk  (1/23/2023)    Housing Stability Vital Sign    • Unable to Pay for Housing in the Last Year: No    • Number of Places Lived in the Last Year: 1    • Unstable Housing in the Last Year: No     Current Outpatient Medications on File Prior to Visit   Medication Sig   • aspirin (ECOTRIN LOW STRENGTH) 81 mg EC tablet Take 81 mg by mouth daily   • atorvastatin (LIPITOR) 40 mg tablet Take 1 tablet (40 mg total) by mouth every evening   • cholecalciferol (VITAMIN D3) 1,000 units tablet Take 1,000 Units by mouth daily   • donepezil (ARICEPT) 10 mg tablet TAKE 1 TABLET BY MOUTH EVERYDAY AT BEDTIME   • lisinopril (ZESTRIL) 2.5 mg tablet TAKE 1 TABLET DAILY AS NEEDED ONLY IF SYSTOLIC BP IS GREATER THEN 160MMHG   • mirtazapine (REMERON) 15 mg tablet TAKE 1 TABLET BY MOUTH EVERYDAY AT BEDTIME     No Known Allergies  Immunization History   Administered Date(s) Administered   • COVID-19 MODERNA VACC 0.5 ML IM 04/21/2021, 05/19/2021   • Pneumococcal Conjugate Vaccine 20-valent (Pcv20), Polysace 02/02/2023   • Tdap 10/13/2017       Objective     /80   Pulse 85   Temp 98.4 °F (36.9 °C)   Resp 15   Ht 5' 2" (1.575 m)   Wt 62.6 kg (138 lb)   SpO2 99%   BMI 25.24 kg/m²     Physical Exam  Power Saleh MD

## 2023-08-15 NOTE — PROGRESS NOTES
Aravind Dorado [address], [address] [facility], [facility] [address]  [phone]    Social Work Follow-Up    LSW met with Corrine Lee for follow up visit. Completed Claudio Cognitive Assessment, score 22/30 (previously 14/30 in 2/2/23), and Geriatric Depression Screen, 1/15 (previously 1/15 in 2/2/23). Claudio Cognitive Assessment (MoCA) Version 8.1  Education: HS    Points Earned POSSIBLE Points   Visuospatial/Executive   Alternating [place] Making 0 1   Visuoconstructional skills 1 1   Visuoconstructional skills (clock) 3 3   Naming   Naming Animals 2 3   Attention   Digit Span 1 2   Vigilance (letters) 1 1   Serial 7 subtraction 3 3   Language   Sentence Repetition 1 2   Verbal fluency 0 1   Abstraction   Abstraction (word pairings) 2 2   Delayed recall   Delayed recall 2 5   Memory index score: 11/15   Orientation   Orientation 5 6   TOTAL SCORE: 22/30  (Normal ?26/30)   Additional notes:      LSW also met with pt's daughter Dontrell Nielson. LSW provided the following resources in office:     [facility]    Other  - Participating in [address] [facility] - More [address] and Games: Tips from the [employer] on [facility] brochure  - [address] to remain available as needed.

## 2023-08-15 NOTE — ASSESSMENT & PLAN NOTE
· Patient denies of present  · GDS 1  · Remains on mirtazapine  · Continue emotional support, relaxation techniques  · Patient is aware that there is a geriatric therapist should she need her services in the future

## 2023-08-25 NOTE — ASSESSMENT & PLAN NOTE
Lab Results   Component Value Date    HGBA1C 6.5 05/25/2023   Daughter of patient stated she had previously had well-controlled HbA1c's and had had her diabetic medication discontinued by endocrinology. Daughter additionally states that patient has been eating frequent hard candies due to dry mouth and that her most recent HbA1c had climbed to 6.5 from 5.6. Pt not due for repeat HgBa1c until 11/25, re-ordered and advised daughter of patient to call insurance carrier and see if will be covered prior to filling script.    Pt may need to be re-initiated in glipizide if glucose continues to be poorly controlled

## 2023-08-25 NOTE — ASSESSMENT & PLAN NOTE
BP at current visit acceptable at 114/80.   Blood pressure logs reviewed, blood pressure fluctuates between 110-140/70-90 with most pressures in well-controlled ranges   Encouraged pt to c/w home BP monitoring and current medication regimen as readings WNL at this visit

## 2023-08-29 ENCOUNTER — APPOINTMENT (OUTPATIENT)
Dept: LAB | Facility: HOSPITAL | Age: 73
End: 2023-08-29
Payer: COMMERCIAL

## 2023-08-29 DIAGNOSIS — E11.9 TYPE 2 DIABETES MELLITUS WITHOUT COMPLICATION, WITHOUT LONG-TERM CURRENT USE OF INSULIN (HCC): ICD-10-CM

## 2023-08-29 LAB
EST. AVERAGE GLUCOSE BLD GHB EST-MCNC: 134 MG/DL
HBA1C MFR BLD: 6.3 %

## 2023-08-29 PROCEDURE — 83036 HEMOGLOBIN GLYCOSYLATED A1C: CPT

## 2023-08-29 PROCEDURE — 36415 COLL VENOUS BLD VENIPUNCTURE: CPT

## 2023-10-16 DIAGNOSIS — I10 PRIMARY HYPERTENSION: ICD-10-CM

## 2023-10-16 DIAGNOSIS — F32.A DEPRESSION: ICD-10-CM

## 2023-10-16 DIAGNOSIS — I65.23 BILATERAL CAROTID ARTERY STENOSIS: ICD-10-CM

## 2023-10-16 DIAGNOSIS — F03.A0 MILD DEMENTIA (HCC): ICD-10-CM

## 2023-10-16 RX ORDER — LISINOPRIL 2.5 MG/1
TABLET ORAL
Qty: 90 TABLET | Refills: 1 | Status: SHIPPED | OUTPATIENT
Start: 2023-10-16

## 2023-10-16 RX ORDER — ATORVASTATIN CALCIUM 40 MG/1
40 TABLET, FILM COATED ORAL EVERY EVENING
Qty: 90 TABLET | Refills: 1 | Status: SHIPPED | OUTPATIENT
Start: 2023-10-16

## 2023-10-16 RX ORDER — DONEPEZIL HYDROCHLORIDE 10 MG/1
TABLET, FILM COATED ORAL
Qty: 90 TABLET | Refills: 1 | Status: SHIPPED | OUTPATIENT
Start: 2023-10-16

## 2023-10-16 RX ORDER — MIRTAZAPINE 15 MG/1
15 TABLET, FILM COATED ORAL
Qty: 30 TABLET | Refills: 1 | Status: SHIPPED | OUTPATIENT
Start: 2023-10-16

## 2023-10-18 ENCOUNTER — TELEPHONE (OUTPATIENT)
Dept: INTERNAL MEDICINE CLINIC | Facility: CLINIC | Age: 73
End: 2023-10-18

## 2023-10-18 NOTE — TELEPHONE ENCOUNTER
Hi, my name is Rogelio Patel. I'm calling for my mom, Alecia Myles. I just. I'm calling to find out what I need to schedule a gastrinologist appointment for. I'm not quite sure why I'm scheduling it. So if somebody could call me back, my number is [phone]. Thank you.  Bymario   I didn't see recent gi referral?

## 2023-11-07 LAB
LEFT EYE DIABETIC RETINOPATHY: NORMAL
RIGHT EYE DIABETIC RETINOPATHY: NORMAL

## 2023-11-15 ENCOUNTER — TELEPHONE (OUTPATIENT)
Dept: FAMILY MEDICINE CLINIC | Facility: OTHER | Age: 73
End: 2023-11-15

## 2023-11-15 NOTE — TELEPHONE ENCOUNTER
Hi, my name is Cj Lane. I'm Noel Riley. Daughter I'm calling on behalf of my mother. I would like a phone call back. My phone number is [phone]. My mom's birthday is February 22nd, 1950. I Isaias Saleh speak to the doctor or somebody about my mother's eating. She's no longer eating at all. Maybe a banana and drinking ensure and eating pudding. It's been going on for at least the last month. Every now and then she'll eat a meal or something good for her. But she thinks the ensure is enough. So I was wondering if we could up her medicine at night time to see if that would help her eat. So again, my number is [phone]. Thank you.

## 2023-11-28 ENCOUNTER — OFFICE VISIT (OUTPATIENT)
Dept: FAMILY MEDICINE CLINIC | Facility: OTHER | Age: 73
End: 2023-11-28
Payer: COMMERCIAL

## 2023-11-28 VITALS
DIASTOLIC BLOOD PRESSURE: 62 MMHG | SYSTOLIC BLOOD PRESSURE: 102 MMHG | RESPIRATION RATE: 14 BRPM | TEMPERATURE: 98.2 F | HEIGHT: 62 IN | HEART RATE: 72 BPM | WEIGHT: 145 LBS | OXYGEN SATURATION: 99 % | BODY MASS INDEX: 26.68 KG/M2

## 2023-11-28 DIAGNOSIS — F03.A4 MILD DEMENTIA WITH ANXIETY, UNSPECIFIED DEMENTIA TYPE (HCC): ICD-10-CM

## 2023-11-28 DIAGNOSIS — F32.A DEPRESSION: ICD-10-CM

## 2023-11-28 DIAGNOSIS — R63.0 POOR APPETITE: Primary | ICD-10-CM

## 2023-11-28 PROCEDURE — 99213 OFFICE O/P EST LOW 20 MIN: CPT

## 2023-11-28 RX ORDER — MIRTAZAPINE 15 MG/1
15 TABLET, FILM COATED ORAL
Qty: 90 TABLET | Refills: 1 | Status: SHIPPED | OUTPATIENT
Start: 2023-11-28 | End: 2024-05-26

## 2023-11-28 NOTE — ASSESSMENT & PLAN NOTE
As per daughter and patient, mood currently stable with no negative ideation or overt disturbances in mentation/sleep cycle    Plan:  C/w Remeron therapy  Encouraged pt to continue to follow with Gerontology

## 2023-11-28 NOTE — PATIENT INSTRUCTIONS
Dementia   WHAT YOU NEED TO KNOW:   Dementia is a condition that causes loss of memory, thought control, and judgment. Alzheimer disease is the most common cause of dementia. Other common causes are loss of blood flow or nerve damage in the brain, and long-term alcohol or drug use. Dementia cannot be cured or prevented, but treatment may slow or reduce your symptoms. DISCHARGE INSTRUCTIONS:   Return to the emergency department if  you or someone close to you notices: You have signs of delirium, such as extreme confusion, and seeing or hearing things that are not there. You become angry or violent, and cannot be calmed down. You faint and cannot be woken. Call your doctor or have someone else call if:   You have a fever. You have increased confusion, behavior, or mood changes. You have questions or concerns about your condition or care. Medicines: You may need any of the following:  Antianxiety medicine  may be used to help reduce anxiety and keep you calm. Antipsychotics  may be used to help control any anger or violence. Antidepressants  may be used to help improve your mood and reduce your symptoms of depression. Take your medicine as directed. Contact your healthcare provider if you think your medicine is not helping or if you have side effects. Tell your provider if you are allergic to any medicine. Keep a list of the medicines, vitamins, and herbs you take. Include the amounts, and when and why you take them. Bring the list or the pill bottles to follow-up visits. Carry your medicine list with you in case of an emergency. Manage dementia:  You may begin to need an in-home aide to help you remember your daily tasks. Ask your healthcare provider for a list of organizations that can help. It is best to arrange for help while you are thinking clearly. The following may also help you manage dementia:  Keep your mind and body active.   Do activities that you love, such as art, gardening, or listening to music. Call or visit people often. This will keep your social skills sharp, and may help reduce depression. Take all of your medicines as directed. This will help control medical conditions, such as high blood pressure, high cholesterol, and diabetes. Write daily schedules and routines. Record medical appointments, times to take your medicines, meal times, or any other things to remember. Write down reminders to use the bathroom if you have trouble remembering. You may need to ask someone to write things down for you. Place clocks and calendars where you can see them. This will help you remember appointments and tasks. Do not smoke. Nicotine and other chemicals in cigarettes and cigars can cause blood vessel damage. Ask your healthcare provider for information if you currently smoke and need help to quit. E-cigarettes or smokeless tobacco still contain nicotine. Talk to your healthcare provider before you use these products. Eat healthy foods. Examples are fruits, vegetables, whole-grain breads, low-fat dairy products, beans, lean meats, and fish. Ask if you need to be on a special diet. Follow up with your healthcare provider as directed:  Write down your questions so you remember to ask them during your visits. Ask someone to go in with you if you have trouble understanding or remembering what your healthcare provider tells you. The person can take notes for you during the visit and go over the notes with you later. © Copyright Josefina Mar 2023 Information is for End User's use only and may not be sold, redistributed or otherwise used for commercial purposes. The above information is an  only. It is not intended as medical advice for individual conditions or treatments. Talk to your doctor, nurse or pharmacist before following any medical regimen to see if it is safe and effective for you.   Mirtazapine (By mouth)   Mirtazapine ([id number]-LIV-[id number]-peen)  Treats depression. Brand Name(s): Remeron, Remeron Soltab   There may be other brand names for this medicine. When This Medicine Should Not Be Used: This medicine is not right for everyone. Do not use it if you had an allergic reaction to mirtazapine. How to Use This Medicine:   Tablet, Dissolving Tablet  Take your medicine as directed. Your dose may need to be changed several times to find what works best for you. Your doctor may tell you to take this medicine at bedtime, because it can make you sleepy. You may need to take this medicine for several weeks before you begin to feel better. Make sure your hands are dry before you handle the disintegrating tablet. Peel back the foil from the blister pack, then remove the tablet. Do not push the tablet through the foil. Place the tablet in your mouth. After it has melted, swallow or take a drink of water. Do not crush, split, or break the tablet. This medicine should come with a Medication Guide. Ask your pharmacist for a copy if you do not have one. Missed dose: Take a dose as soon as you remember. If it is almost time for your next dose, wait until then and take a regular dose. Do not take extra medicine to make up for a missed dose. Store the medicine in a closed container at room temperature, away from heat, moisture, and direct light. Keep the orally disintegrating tablet in the original package until you are ready to take it. Drugs and Foods to Avoid:   Ask your doctor or pharmacist before using any other medicine, including over-the-counter medicines, vitamins, and herbal products. Do not use this medicine and an MAO inhibitor within 14 days of each other. Some medicines can affect how mirtazapine works.  Tell your doctor if you are using any of the following:  Alprazolam. buspirone, carbamazepine, cimetidine, diazepam, fentanyl, itraconazole, lithium, nefazodone, phenytoin, rifampicin, ritonavir, Lan's wort, tramadol, tryptophan  Blood pressure medicine  Blood thinner (including warfarin)  Medicine that may cause heart rhythm problems  Medicine to treat an infection  Other medicine to treat depression (including SNRIs, SSRIs, TCAs)  Triptan medicine to treat migraine headaches  Do not drink alcohol while you are using this medicine. Warnings While Using This Medicine:   Tell your doctor if you are pregnant or breastfeeding, or if you have kidney disease, liver disease, glaucoma, high cholesterol, heart or blood vessel disease, heart rhythm problems (including QT prolongation), or a history of seizures, heart attack, or stroke. Tell your doctor if you have phenylketonuria. For some children, teenagers, and young adults, this medicine may increase mental or emotional problems. This may lead to thoughts of suicide and violence. Talk with your doctor right away if you have any thoughts or behavior changes that concern you. Tell your doctor if you or anyone in your family has a history of bipolar disorder or suicide attempts. This medicine may cause the following problems:  Serotonin syndrome (may be life-threatening when used with certain other medicines)  Serious skin reaction, including drug reaction with eosinophilia and systemic symptoms (DRESS)  This medicine lowers the number of white blood cells. This weakens your immune system, so you may get infections more easily. Wash your hands often. Avoid people who are sick. Do not stop using this medicine suddenly. Your doctor will need to slowly decrease your dose before you stop it completely. This medicine may make you dizzy or drowsy. Do not drive or do anything else that could be dangerous until you know how this medicine affects you. Stand or sit up slowly if you feel lightheaded or dizzy. Your doctor will do lab tests at regular visits to check on the effects of this medicine. Keep all appointments. Keep all medicine out of the reach of children. Never share your medicine with anyone.   Possible Side Effects While Using This Medicine:   Call your doctor right away if you notice any of these side effects: Allergic reaction: Itching or hives, swelling in your face or hands, swelling or tingling in your mouth or throat, chest tightness, trouble breathing  Anxiety, restlessness, fast heartbeat, fever, sweating, muscle spasms, nausea, vomiting, diarrhea, seeing or hearing things that are not there  Blistering, peeling, red skin rash  Eye pain, vision changes, seeing halos around lights  Confusion, weakness, muscle twitching  Feeling more excited or energetic than usual  Fever, chills, cough, sore throat, body aches  Thoughts of hurting yourself or others, worsening depression, unusual behaviors  If you notice these less serious side effects, talk with your doctor:   Dry mouth, constipation  Increased appetite or weight gain  Sleepiness, tiredness  If you notice other side effects that you think are caused by this medicine, tell your doctor. Call your doctor for medical advice about side effects. You may report side effects to FDA at 5-782-PQP-4405  © Copyright Tracy Carr 2023 Information is for End User's use only and may not be sold, redistributed or otherwise used for commercial purposes. The above information is an  only. It is not intended as medical advice for individual conditions or treatments. Talk to your doctor, nurse or pharmacist before following any medical regimen to see if it is safe and effective for you.

## 2023-11-28 NOTE — PROGRESS NOTES
Name: Jean Marie Silver      : 1950      MRN: [id number]  Encounter Provider: Love Mcmullen MD  Encounter Date: 2023   Encounter department: [address]   Pt is a 75 YOF with PMH of mild dementia, poor sleep, poor appetite, HTN, CKD, who presents at request of daughter for concerns over poor PO intake/failure to thrive. As per daughter, patient has been eating poorly at home with sole intake in a 24 hour period consisting of tea, pudding cup, ensure, +/- piece of fruit. Daughter additionally states patient eats very well when observed/in social setting. As per patient, her  records her intake and he is a known poor historian. Exam in office indicates well appearing and well nourished patient, with 4kg weight gain since last visit in August. Daughter advised that appetite decreases with age, and that for the geriatric population eating is a social activity with consumption often being driven by communal aspect and not by intrinsic appetite. Daughter advised that pt was well-appearing, and that occasional communal meals would likely assist in maintaining adequate PO intake. Rest of care as below, pt to return on 3/1/2023 for AWV/diabetic foot exam.    1. Poor appetite  Wt Readings from Last 3 Encounters:   23 65.8 kg (145 lb)   08/15/23 62.4 kg (137 lb 9.6 oz)   08/15/23 62.6 kg (138 lb)   Weight increased from previous encounter, BMI acceptable without signs of failure to thrive    Plan:  C/w Remeron at current dosage  Encouraged communal/social meals  Encouraged daily weights in lieu of meal recording for continued monitoring d/t ease of recording and increased accuracy over food diary. -     mirtazapine (REMERON) 15 mg tablet; Take 1 tablet (15 mg total) by mouth daily at bedtime    2.  Mild dementia with anxiety, unspecified dementia type [facility])  Assessment & Plan:  Pt has history of mild dementia, last [facility] 2023 demonstrating score of 22/30. Pt independent currently, lives with  and requires no assistance with ADLs. Per daughter, some concerns about PO intake when pt is left to her own devices, daughter unsure if intake is adequate     Plan:  C/w Aricept 10mg for mentation  C/w Remeron 15mg for sleep and appetite stimulation   Encourage physical activity, socialization, appropriate sleep/wake cycles   Encouraged avoidance of OTC meds containing antihistamines d/t episode of confusion and agitation after daughter gave pt dose of tylenol PM  Advised daughter on utility of PRN melatonin as adjunctive sleep aid       3. Depression  Assessment & Plan:  As per daughter and patient, mood currently stable with no negative ideation or overt disturbances in mentation/sleep cycle    Plan:  C/w Remeron therapy  Encouraged pt to continue to follow with Gerontology     Orders:  -     mirtazapine (REMERON) 15 mg tablet; Take 1 tablet (15 mg total) by mouth daily at bedtime           Subjective     Subjective    Sebas Gore is a 75 y.o. female here for discussion regarding daughters concerns over poor PO intake at home. Onset was several months ago. Patient has gained approximately 4kg in last 5 months. She feels ideal weight is her current weight. History of eating disorders: none. Patient is exercising minimally every day. Factors associated: poor appetite. Patient denies: abdominal pain, constipation, depressive symptoms, diarrhea, and unexplained fatigue. Review of Systems   Constitutional:  Negative for fatigue and fever. HENT:  Negative for congestion, sinus pain and sore throat. Respiratory:  Negative for chest tightness and shortness of breath. Cardiovascular:  Negative for chest pain. Gastrointestinal:  Negative for abdominal pain, diarrhea, nausea and vomiting. Endocrine: Negative for polyuria. Genitourinary:  Negative for difficulty urinating and dysuria. Musculoskeletal:  Negative for arthralgias and myalgias.    Skin: Negative for pallor and rash. Neurological:  Negative for light-headedness, numbness and headaches. Hematological:  Negative for adenopathy. Psychiatric/Behavioral:  Negative for agitation. All other systems reviewed and are negative. Past Medical History:   Diagnosis Date   • MARTINA (acute kidney injury) ([address]) 11/21/2021   • Diabetes mellitus ([address])    • Hypertension      History reviewed. No pertinent surgical history.   Family History   Problem Relation Age of Onset   • Breast cancer Mother         unknown age   • No Known Problems Father    • No Known Problems Daughter    • No Known Problems Maternal Grandmother    • No Known Problems Maternal Grandfather    • No Known Problems Paternal Grandmother    • No Known Problems Paternal Grandfather    • No Known Problems Son    • No Known Problems Son    • No Known Problems Maternal Aunt    • No Known Problems Maternal Aunt    • No Known Problems Maternal Aunt    • No Known Problems Maternal Aunt    • No Known Problems Paternal Aunt    • No Known Problems Paternal Aunt    • No Known Problems Paternal Aunt      Social History     Socioeconomic History   • Marital status: /Civil Union     Spouse name: None   • Number of children: None   • Years of education: None   • Highest education level: None   Occupational History   • None   Tobacco Use   • Smoking status: Never   • Smokeless tobacco: Never   Vaping Use   • Vaping Use: Never used   Substance and Sexual Activity   • Alcohol use: No   • Drug use: Never   • Sexual activity: Not Currently   Other Topics Concern   • None   Social History Narrative    ** Merged History Encounter **         ** Merged History Encounter **          Social Determinants of Health     Financial Resource Strain: High Risk (12/19/2022)    Overall Financial Resource Strain (CARDIA)    • Difficulty of Paying Living Expenses: Very hard   Food Insecurity: No Food Insecurity (1/23/2023)    Hunger Vital Sign    • Worried About Running Out of Food in the Last Year: Never true    • Ran Out of Food in the Last Year: Never true   Transportation Needs: No Transportation Needs (1/23/2023)    PRAPARE - Transportation    • Lack of Transportation (Medical): No    • Lack of Transportation (Non-Medical): No   Physical Activity: Not on file   Stress: Not on file   Social Connections: Not on file   Intimate Partner Violence: Not on file   Housing Stability: Low Risk  (1/23/2023)    Housing Stability Vital Sign    • Unable to Pay for Housing in the Last Year: No    • Number of Places Lived in the Last Year: 1    • Unstable Housing in the Last Year: No     Current Outpatient Medications on File Prior to Visit   Medication Sig   • aspirin (ECOTRIN LOW STRENGTH) 81 mg EC tablet Take 81 mg by mouth daily   • atorvastatin (LIPITOR) 40 mg tablet TAKE 1 TABLET BY MOUTH EVERY DAY IN THE EVENING   • calcium carbonate (OS-ALYSON) 600 MG tablet Take 2 tablets (1,200 mg total) by mouth daily   • cholecalciferol (VITAMIN D3) 1,000 units tablet Take 1,000 Units by mouth daily   • donepezil (ARICEPT) 10 mg tablet TAKE 1 TABLET BY MOUTH EVERYDAY AT BEDTIME   • lisinopril (ZESTRIL) 2.5 mg tablet TAKE 1 TABLET DAILY AS NEEDED ONLY IF SYSTOLIC BP IS GREATER THEN 160MMHG   • [DISCONTINUED] mirtazapine (REMERON) 15 mg tablet TAKE 1 TABLET BY MOUTH DAILY AT BEDTIME     No Known Allergies  Immunization History   Administered Date(s) Administered   • COVID-19 MODERNA VACC 0.5 ML IM 04/21/2021, 05/19/2021   • Pneumococcal Conjugate Vaccine 20-valent (Pcv20), Polysace 02/02/2023   • Tdap 10/13/2017       Objective     /62   Pulse 72   Temp 98.2 °F (36.8 °C)   Resp 14   Ht 5' 1.93" (1.573 m)   Wt 65.8 kg (145 lb)   SpO2 99%   BMI 26.58 kg/m²     Physical Exam  Vitals reviewed. Exam conducted with a chaperone present (Daughter of patient). Constitutional:       General: She is not in acute distress. Appearance: Normal appearance. She is normal weight.  She is not ill-appearing. HENT:      Head: Normocephalic and atraumatic. Right Ear: External ear normal.      Left Ear: External ear normal.      Mouth/Throat:      Mouth: Mucous membranes are moist.      Pharynx: Oropharynx is clear. Eyes:      General: No scleral icterus. Extraocular Movements: Extraocular movements intact. Conjunctiva/sclera: Conjunctivae normal.   Cardiovascular:      Rate and Rhythm: Normal rate and regular rhythm. Pulses: Normal pulses. Heart sounds: Normal heart sounds. No murmur heard. No friction rub. No gallop. Pulmonary:      Effort: Pulmonary effort is normal.      Breath sounds: Normal breath sounds. No wheezing, rhonchi or rales. Abdominal:      General: Abdomen is flat. Palpations: Abdomen is soft. There is no mass. Tenderness: There is no abdominal tenderness. There is no guarding. Musculoskeletal:      Cervical back: Normal range of motion. Right lower leg: No edema. Left lower leg: No edema. Lymphadenopathy:      Cervical: No cervical adenopathy. Skin:     General: Skin is warm and dry. Capillary Refill: Capillary refill takes less than 2 seconds. Neurological:      Mental Status: She is alert. Mental status is at baseline.    Psychiatric:         Mood and Affect: Mood normal.   Beulah Murphy MD

## 2023-11-28 NOTE — ASSESSMENT & PLAN NOTE
Pt has history of mild dementia, last [facility] 08/2023 demonstrating score of 22/30. Pt independent currently, lives with  and requires no assistance with ADLs.  Per daughter, some concerns about PO intake when pt is left to her own devices, daughter unsure if intake is adequate     Plan:  C/w Aricept 10mg for mentation  C/w Remeron 15mg for sleep and appetite stimulation   Encourage physical activity, socialization, appropriate sleep/wake cycles   Encouraged avoidance of OTC meds containing antihistamines d/t episode of confusion and agitation after daughter gave pt dose of tylenol PM  Advised daughter on utility of PRN melatonin as adjunctive sleep aid

## 2024-02-16 ENCOUNTER — RA CDI HCC (OUTPATIENT)
Dept: OTHER | Facility: HOSPITAL | Age: 74
End: 2024-02-16

## 2024-02-16 PROBLEM — E11.22 TYPE 2 DIABETES MELLITUS WITH DIABETIC CHRONIC KIDNEY DISEASE (HCC): Status: ACTIVE | Noted: 2021-11-30

## 2024-02-22 ENCOUNTER — OFFICE VISIT (OUTPATIENT)
Dept: FAMILY MEDICINE CLINIC | Facility: OTHER | Age: 74
End: 2024-02-22
Payer: COMMERCIAL

## 2024-02-22 VITALS
TEMPERATURE: 98.2 F | OXYGEN SATURATION: 98 % | RESPIRATION RATE: 16 BRPM | WEIGHT: 149.6 LBS | BODY MASS INDEX: 27.53 KG/M2 | DIASTOLIC BLOOD PRESSURE: 64 MMHG | HEART RATE: 89 BPM | HEIGHT: 62 IN | SYSTOLIC BLOOD PRESSURE: 128 MMHG

## 2024-02-22 DIAGNOSIS — R53.81 PHYSICAL DECONDITIONING: ICD-10-CM

## 2024-02-22 DIAGNOSIS — E11.22 TYPE 2 DIABETES MELLITUS WITH DIABETIC CHRONIC KIDNEY DISEASE, UNSPECIFIED CKD STAGE, UNSPECIFIED WHETHER LONG TERM INSULIN USE (HCC): ICD-10-CM

## 2024-02-22 DIAGNOSIS — N18.32 CHRONIC KIDNEY DISEASE, STAGE 3B (HCC): ICD-10-CM

## 2024-02-22 DIAGNOSIS — G89.29 OTHER CHRONIC PAIN: ICD-10-CM

## 2024-02-22 DIAGNOSIS — Z00.00 MEDICARE ANNUAL WELLNESS VISIT, SUBSEQUENT: Primary | ICD-10-CM

## 2024-02-22 DIAGNOSIS — Z12.31 ENCOUNTER FOR SCREENING MAMMOGRAM FOR MALIGNANT NEOPLASM OF BREAST: ICD-10-CM

## 2024-02-22 DIAGNOSIS — M85.852 OSTEOPENIA OF NECK OF LEFT FEMUR: ICD-10-CM

## 2024-02-22 DIAGNOSIS — D64.9 ANEMIA, UNSPECIFIED TYPE: ICD-10-CM

## 2024-02-22 DIAGNOSIS — E11.65 UNCONTROLLED TYPE 2 DIABETES MELLITUS WITH HYPERGLYCEMIA (HCC): ICD-10-CM

## 2024-02-22 DIAGNOSIS — E78.1 HYPERTRIGLYCERIDEMIA: ICD-10-CM

## 2024-02-22 LAB — SL AMB POCT HEMOGLOBIN AIC: 8.7 (ref ?–6.5)

## 2024-02-22 PROCEDURE — 83036 HEMOGLOBIN GLYCOSYLATED A1C: CPT

## 2024-02-22 PROCEDURE — G0439 PPPS, SUBSEQ VISIT: HCPCS

## 2024-02-22 RX ORDER — GLIPIZIDE 2.5 MG/1
2.5 TABLET, EXTENDED RELEASE ORAL DAILY
Qty: 90 TABLET | Refills: 1 | Status: ON HOLD | OUTPATIENT
Start: 2024-02-22 | End: 2024-08-20

## 2024-02-22 NOTE — PROGRESS NOTES
Assessment and Plan:     Problem List Items Addressed This Visit          Endocrine    Type 2 diabetes mellitus with diabetic chronic kidney disease (HCC)       Lab Results   Component Value Date    HGBA1C 8.7 (A) 02/22/2024     Worsening glycemic control (A1c 8.7% > 6.3%) likely secondary to increased carb intake over the Christmas/New Year's holiday  Previously well-controlled with diet  Was taking glipizide in the past, tolerated well  Metformin contraindicated due to CKD   Will restart glipizide 2.5 mg daily  Diabetic foot exam completed today with risk score: 0  Addressed DM eye exam         Relevant Medications    glipiZIDE (GLUCOTROL XL) 2.5 mg 24 hr tablet    Other Relevant Orders    POCT hemoglobin A1c (Completed)    Comprehensive metabolic panel     Other Visit Diagnoses       Medicare annual wellness visit, subsequent    -  Primary    Hypertriglyceridemia         (12/2022), all other cholesterol levels within normal limits  Repeat lipid panel  Continue atorvastatin 40 mg daily    Relevant Orders    Lipid panel    Anemia, unspecified type        Relevant Orders    CBC and differential    Iron Panel (Includes Ferritin, Iron Sat%, Iron, and TIBC)    Osteopenia of neck of left femur        Continue vitamin D and calcium supplementation, weightbearing exercise and avoidance of alcohol/smoking  Last DEXA 8/2023    Relevant Orders    Vitamin D 25 hydroxy    Encounter for screening mammogram for malignant neoplasm of breast        Other chronic pain        Relevant Orders    Ambulatory Referral to Physical Therapy    Physical deconditioning        Relevant Orders    Ambulatory Referral to Physical Therapy    Uncontrolled type 2 diabetes mellitus with hyperglycemia (HCC)        Relevant Medications    glipiZIDE (GLUCOTROL XL) 2.5 mg 24 hr tablet    Chronic kidney disease, stage 3b (HCC)        Relevant Orders    Vitamin D 25 hydroxy          Depression Screening and Follow-up Plan: Patient's depression  screening was positive with a PHQ-9 score of 8. Patient assessed for underlying major depression. Brief counseling provided and recommend additional follow-up/re-evaluation next office visit. Depression likely due to other medical condition. Will treat underlying condition.     Nutrition Assessment and Intervention:     Nutrition patient handout reviewed with patient      Physical Activity Assessment and Intervention:    Physical Activity patient handout reviewed with patient      Emotional and Mental Well-being, Sleep, Connectedness Assessment and Intervention:    PHQ-9 or GAD7 performed for initial evaluation or follow-up      Preventive health issues were discussed with patient, and age appropriate screening tests were ordered as noted in patient's After Visit Summary.  Personalized health advice and appropriate referrals for health education or preventive services given if needed, as noted in patient's After Visit Summary.     History of Present Illness:     Patient presents for a Medicare Wellness Visit    HPI   Patient is a 74yoF with a PMHx significant for mild dementia, TIA, bilateral carotid artery stenosis 2/2 atherosclerotic plaque, hypertension, T2DM, osteopenia and CKD who presents for an Annual Wellness Visit, accompanied by her daughter.  Patients has been previously well controlled with diet but recent labs show worsening glucose control with an elevated hbA1c 8.7%. Patient was previously taking glipizide, which was well-tolerated.  Per daughter, diet consists of oatmeal, cereal, soups and Ensure.  Recent struggle with poor appetite and less oral intake daily, however the patient enjoys ensures but often substitutes actual meals for the Ensures.  Ensures have been diabetic friendly.   Patient offers no additional concerns or complaints at this time.  She denies any fever/chills, chest pain, shortness of breath, abdominal pain, nausea/vomiting, constipation/diarrhea, tingling/numbness of extremities,  fatigue or weakness.    Patient Care Team:  Wiliam Baker MD as PCP - General (Family Medicine)  Dima Esteves MD as PCP - PCP-[facility] ([facility])  Dima Esteves MD (Internal Medicine)  CHERELLE Washington as  Care Manager (Care Coordination)  Sulema Muhammad MD (Family Medicine)  Haroon Reagan MD (Nephrology)     Review of Systems:     Review of Systems   Constitutional:  Negative for chills and fever.   HENT:  Negative for ear pain and sore throat.    Eyes:  Negative for pain and visual disturbance.   Respiratory:  Negative for cough and shortness of breath.    Cardiovascular:  Negative for chest pain and palpitations.   Gastrointestinal:  Negative for abdominal pain and vomiting.   Genitourinary:  Negative for dysuria, hematuria and urgency.   Musculoskeletal:  Positive for arthralgias.   Skin:  Negative for color change and rash.   Neurological:  Negative for dizziness, syncope and light-headedness.   Hematological:  Negative for adenopathy.   Psychiatric/Behavioral:  Negative for agitation.    All other systems reviewed and are negative.       Problem List:     Patient Active Problem List   Diagnosis    Type 2 diabetes mellitus with diabetic chronic kidney disease (HCC)    Atherosclerotic plaque    Aphasia    Benign hypertension with CKD (chronic kidney disease) stage III (HCC)    Asymptomatic bacteriuria    Hypokalemia    Fall    Hyponatremia    Diarrhea    History of TIA (transient ischemic attack)    Bilateral carotid artery stenosis    Dysarthria    Stage 3a chronic kidney disease (HCC)    Microalbuminuria    Primary hypertension    Mild dementia (HCC)    Other hyperlipidemia    Hearing loss    Complex care coordination    Postural dizziness with presyncope    Ambulatory dysfunction    Hyponatremia    Hypertension    Abnormal urinalysis    Depression    Skin lesion of face    Osteopenia after menopause      Past Medical and Surgical History:     Past Medical History:    Diagnosis Date    MARTINA (acute kidney injury) (HCC) 11/21/2021    Diabetes mellitus (HCC)     Hypertension      History reviewed. No pertinent surgical history.   Family History:     Family History   Problem Relation Age of Onset    Breast cancer Mother         unknown age    No Known Problems Father     No Known Problems Daughter     No Known Problems Maternal Grandmother     No Known Problems Maternal Grandfather     No Known Problems Paternal Grandmother     No Known Problems Paternal Grandfather     No Known Problems Son     No Known Problems Son     No Known Problems Maternal Aunt     No Known Problems Maternal Aunt     No Known Problems Maternal Aunt     No Known Problems Maternal Aunt     No Known Problems Paternal Aunt     No Known Problems Paternal Aunt     No Known Problems Paternal Aunt       Social History:     Social History     Socioeconomic History    Marital status: /Civil Union     Spouse name: None    Number of children: None    Years of education: None    Highest education level: None   Occupational History    None   Tobacco Use    Smoking status: Never    Smokeless tobacco: Never   Vaping Use    Vaping status: Never Used   Substance and Sexual Activity    Alcohol use: No    Drug use: Never    Sexual activity: Not Currently   Other Topics Concern    None   Social History Narrative    ** Merged History Encounter **         ** Merged History Encounter **          Social Determinants of Health     Financial Resource Strain: Low Risk  (2/22/2024)    Overall Financial Resource Strain (CARDIA)     Difficulty of Paying Living Expenses: Not very hard   Food Insecurity: No Food Insecurity (1/23/2023)    Hunger Vital Sign     Worried About Running Out of Food in the Last Year: Never true     Ran Out of Food in the Last Year: Never true   Transportation Needs: No Transportation Needs (2/22/2024)    PRAPARE - Transportation     Lack of Transportation (Medical): No     Lack of Transportation  (Non-Medical): No   Physical Activity: Not on file   Stress: Not on file   Social Connections: Not on file   Intimate Partner Violence: Not on file   Housing Stability: Low Risk  (1/23/2023)    Housing Stability Vital Sign     Unable to Pay for Housing in the Last Year: No     Number of Places Lived in the Last Year: 1     Unstable Housing in the Last Year: No      Medications and Allergies:     Current Outpatient Medications   Medication Sig Dispense Refill    aspirin (ECOTRIN LOW STRENGTH) 81 mg EC tablet Take 81 mg by mouth daily      atorvastatin (LIPITOR) 40 mg tablet TAKE 1 TABLET BY MOUTH EVERY DAY IN THE EVENING 90 tablet 1    calcium carbonate (OS-ALYSON) 600 MG tablet Take 2 tablets (1,200 mg total) by mouth daily      cholecalciferol (VITAMIN D3) 1,000 units tablet Take 1,000 Units by mouth daily      donepezil (ARICEPT) 10 mg tablet TAKE 1 TABLET BY MOUTH EVERYDAY AT BEDTIME 90 tablet 1    glipiZIDE (GLUCOTROL XL) 2.5 mg 24 hr tablet Take 1 tablet (2.5 mg total) by mouth daily 90 tablet 1    lisinopril (ZESTRIL) 2.5 mg tablet TAKE 1 TABLET DAILY AS NEEDED ONLY IF SYSTOLIC BP IS GREATER THEN 160MMHG 90 tablet 1    mirtazapine (REMERON) 15 mg tablet Take 1 tablet (15 mg total) by mouth daily at bedtime 90 tablet 1     No current facility-administered medications for this visit.     No Known Allergies   Immunizations:     Immunization History   Administered Date(s) Administered    COVID-19 MODERNA VACC 0.5 ML IM 04/21/2021, 05/19/2021, 12/30/2021    Pneumococcal Conjugate Vaccine 20-valent (Pcv20), Polysace 02/02/2023    Tdap 10/13/2017      Health Maintenance:         Topic Date Due    Hepatitis C Screening  Completed    Colorectal Cancer Screening  Discontinued         Topic Date Due    Influenza Vaccine (1) Never done    COVID-19 Vaccine (4 - 2023-24 season) 09/01/2023      Medicare Screening Tests and Risk Assessments:     Gayathri is here for her Subsequent Wellness visit. Last Medicare Wellness visit  information reviewed, patient interviewed and updates made to the record today.      Health Risk Assessment:   Patient rates overall health as fair. Patient feels that their physical health rating is slightly worse. Patient is satisfied with their life. Eyesight was rated as same. Hearing was rated as same. Patient feels that their emotional and mental health rating is same. Patients states they are never, rarely angry. Patient states they are sometimes unusually tired/fatigued. Pain experienced in the last 7 days has been some. Patient's pain rating has been 4/10. Patient states that she has experienced weight loss or gain in last 6 months.     Depression Screening:   PHQ-9 Score: 8      Fall Risk Screening:   In the past year, patient has experienced: no history of falling in past year      Urinary Incontinence Screening:   Patient has not leaked urine accidently in the last six months.     Home Safety:  Patient has trouble with stairs inside or outside of their home. Patient has working smoke alarms and has working carbon monoxide detector. Home safety hazards include: none.     Nutrition:   Current diet is Unhealthy and Frequent junk food.     Medications:   Patient is currently taking over-the-counter supplements. OTC medications include: see medication list. Patient is not able to manage medications.     Activities of Daily Living (ADLs)/Instrumental Activities of Daily Living (IADLs):   Walk and transfer into and out of bed and chair?: Yes  Dress and groom yourself?: Yes    Bathe or shower yourself?: Yes    Feed yourself? Yes  Do your laundry/housekeeping?: Yes  Manage your money, pay your bills and track your expenses?: No  Make your own meals?: No    Do your own shopping?: No    Previous Hospitalizations:   Any hospitalizations or ED visits within the last 12 months?: No      Advance Care Planning:   Living will: Yes    Durable POA for healthcare: Yes    Advanced directive: No    Advanced directive  "counseling given: Yes    ACP document given: Yes      PREVENTIVE SCREENINGS      Cardiovascular Screening:    General: History Lipid Disorder and Risks and Benefits Discussed    Due for: Lipid Panel      Diabetes Screening:     General: History Diabetes and Risks and Benefits Discussed    Due for: Blood Glucose      Colorectal Cancer Screening:     General: Screening Current      Breast Cancer Screening:     General: Patient Declines      Cervical Cancer Screening:    General: Screening Not Indicated      Osteoporosis Screening:    General: Screening Current      Abdominal Aortic Aneurysm (AAA) Screening:        General: Screening Not Indicated      Lung Cancer Screening:     General: Screening Not Indicated      Hepatitis C Screening:    General: Screening Current    Screening, Brief Intervention, and Referral to Treatment (SBIRT)    Screening  Typical number of drinks in a day: 0  Typical number of drinks in a week: 0  Interpretation: Low risk drinking behavior.    AUDIT-C Screenin) How often did you have a drink containing alcohol in the past year? never  2) How many drinks did you have on a typical day when you were drinking in the past year? 0  3) How often did you have 6 or more drinks on one occasion in the past year? never    AUDIT-C Score: 0  Interpretation: Score 0-2 (female): Negative screen for alcohol misuse    Single Item Drug Screening:  How often have you used an illegal drug (including marijuana) or a prescription medication for non-medical reasons in the past year? never    Single Item Drug Screen Score: 0  Interpretation: Negative screen for possible drug use disorder    Other Counseling Topics:   Car/seat belt/driving safety and calcium and vitamin D intake and regular weightbearing exercise.     No results found.     Physical Exam:     /64   Pulse 89   Temp 98.2 °F (36.8 °C)   Resp 16   Ht 5' 1.93\" (1.573 m)   Wt 67.9 kg (149 lb 9.6 oz)   SpO2 98%   BMI 27.42 kg/m²     Physical " Exam  Vitals and nursing note reviewed.   Constitutional:       General: She is not in acute distress.     Appearance: She is well-developed. She is not ill-appearing, toxic-appearing or diaphoretic.      Comments: Daughter present at bedside  Pt in NAD   HENT:      Head: Normocephalic and atraumatic.      Right Ear: External ear normal.      Left Ear: External ear normal.      Nose: No congestion.      Mouth/Throat:      Mouth: Mucous membranes are moist.      Pharynx: No oropharyngeal exudate or posterior oropharyngeal erythema.   Eyes:      General: No scleral icterus.     Conjunctiva/sclera: Conjunctivae normal.   Cardiovascular:      Rate and Rhythm: Normal rate and regular rhythm.      Pulses: no weak pulses.           Dorsalis pedis pulses are 2+ on the right side and 2+ on the left side.        Posterior tibial pulses are 2+ on the right side and 2+ on the left side.      Heart sounds: Normal heart sounds. No murmur heard.  Pulmonary:      Effort: Pulmonary effort is normal. No respiratory distress.      Breath sounds: Normal breath sounds. No wheezing.   Abdominal:      General: Bowel sounds are normal. There is no distension.      Palpations: Abdomen is soft.      Tenderness: There is no abdominal tenderness. There is no guarding.   Musculoskeletal:      Right lower leg: No edema.      Left lower leg: No edema.   Feet:      Right foot:      Skin integrity: No ulcer, skin breakdown, erythema, warmth, callus or dry skin.      Left foot:      Skin integrity: No ulcer, skin breakdown, erythema, warmth, callus or dry skin.   Skin:     General: Skin is warm.      Coloration: Skin is not jaundiced.   Neurological:      Mental Status: She is alert and oriented to person, place, and time.      Motor: No tremor or seizure activity.      Comments: Oriented to person and place   Psychiatric:         Attention and Perception: Attention normal.         Mood and Affect: Mood normal.         Behavior: Behavior normal.         Diabetic Foot Exam    Patient's shoes and socks removed.    Right Foot/Ankle   Right Foot Inspection  Skin Exam: skin normal and skin intact. No dry skin, no warmth, no callus, no erythema, no maceration, no abnormal color, no pre-ulcer, no ulcer and no callus.     Toe Exam: ROM and strength within normal limits.     Sensory   Vibration: intact  Proprioception: intact  Monofilament testing: intact    Vascular  The right DP pulse is 2+. The right PT pulse is 2+.     Left Foot/Ankle  Left Foot Inspection  Skin Exam: skin normal and skin intact. No dry skin, no warmth, no erythema, no maceration, normal color, no pre-ulcer, no ulcer and no callus.     Toe Exam: ROM and strength within normal limits.     Sensory   Vibration: intact  Proprioception: intact  Monofilament testing: intact    Vascular  The left DP pulse is 2+. The left PT pulse is 2+.     Assign Risk Category  No deformity present  No loss of protective sensation  No weak pulses  Risk: 0    Fabby Angulo MD

## 2024-02-22 NOTE — PATIENT INSTRUCTIONS
Medicare Preventive Visit Patient Instructions  Thank you for completing your Welcome to Medicare Visit or Medicare Annual Wellness Visit today. Your next wellness visit will be due in one year (2/22/2025).  The screening/preventive services that you may require over the next 5-10 years are detailed below. Some tests may not apply to you based off risk factors and/or age. Screening tests ordered at today's visit but not completed yet may show as past due. Also, please note that scanned in results may not display below.  Preventive Screenings:  Service Recommendations Previous Testing/Comments   Colorectal Cancer Screening  * Colonoscopy    * Fecal Occult Blood Test (FOBT)/Fecal Immunochemical Test (FIT)  * Fecal DNA/Cologuard Test  * Flexible Sigmoidoscopy Age: 45-75 years old   Colonoscopy: every 10 years (may be performed more frequently if at higher risk)  OR  FOBT/FIT: every 1 year  OR  Cologuard: every 3 years  OR  Sigmoidoscopy: every 5 years  Screening may be recommended earlier than age 45 if at higher risk for colorectal cancer. Also, an individualized decision between you and your healthcare provider will decide whether screening between the ages of 76-85 would be appropriate. Colonoscopy: 03/01/2022  FOBT/FIT: 03/01/2022  Cologuard: 03/01/2022  Sigmoidoscopy: 03/01/2022    Screening Current     Breast Cancer Screening Age: 40+ years old  Frequency: every 1-2 years  Not required if history of left and right mastectomy Mammogram: 02/22/2022    Screening Current   Cervical Cancer Screening Between the ages of 21-29, pap smear recommended once every 3 years.   Between the ages of 30-65, can perform pap smear with HPV co-testing every 5 years.   Recommendations may differ for women with a history of total hysterectomy, cervical cancer, or abnormal pap smears in past. Pap Smear: Not on file    Screening Not Indicated   Hepatitis C Screening Once for adults born between 1945 and 1965  More frequently in patients  at high risk for Hepatitis C Hep C Antibody: 10/13/2017    Screening Current   Diabetes Screening 1-2 times per year if you're at risk for diabetes or have pre-diabetes Fasting glucose: 147 mg/dL (1/31/2022)  A1C: 6.3 % (8/29/2023)  Screening Not Indicated  History Diabetes   Cholesterol Screening Once every 5 years if you don't have a lipid disorder. May order more often based on risk factors. Lipid panel: 12/27/2022    Screening Not Indicated  History Lipid Disorder     Other Preventive Screenings Covered by Medicare:  Abdominal Aortic Aneurysm (AAA) Screening: covered once if your at risk. You're considered to be at risk if you have a family history of AAA.  Lung Cancer Screening: covers low dose CT scan once per year if you meet all of the following conditions: (1) Age 55-77; (2) No signs or symptoms of lung cancer; (3) Current smoker or have quit smoking within the last 15 years; (4) You have a tobacco smoking history of at least 20 pack years (packs per day multiplied by number of years you smoked); (5) You get a written order from a healthcare provider.  Glaucoma Screening: covered annually if you're considered high risk: (1) You have diabetes OR (2) Family history of glaucoma OR (3)  aged 50 and older OR (4)  American aged 65 and older  Osteoporosis Screening: covered every 2 years if you meet one of the following conditions: (1) You're estrogen deficient and at risk for osteoporosis based off medical history and other findings; (2) Have a vertebral abnormality; (3) On glucocorticoid therapy for more than 3 months; (4) Have primary hyperparathyroidism; (5) On osteoporosis medications and need to assess response to drug therapy.   Last bone density test (DXA Scan): 08/09/2023.  HIV Screening: covered annually if you're between the age of 15-65. Also covered annually if you are younger than 15 and older than 65 with risk factors for HIV infection. For pregnant patients, it is covered up  to 3 times per pregnancy.    Immunizations:  Immunization Recommendations   Influenza Vaccine Annual influenza vaccination during flu season is recommended for all persons aged >= 6 months who do not have contraindications   Pneumococcal Vaccine   * Pneumococcal conjugate vaccine = PCV13 (Prevnar 13), PCV15 (Vaxneuvance), PCV20 (Prevnar 20)  * Pneumococcal polysaccharide vaccine = PPSV23 (Pneumovax) Adults 19-65 yo with certain risk factors or if 65+ yo  If never received any pneumonia vaccine: recommend Prevnar 20 (PCV20)  Give PCV20 if previously received 1 dose of PCV13 or PPSV23   Hepatitis B Vaccine 3 dose series if at intermediate or high risk (ex: diabetes, end stage renal disease, liver disease)   Respiratory syncytial virus (RSV) Vaccine - COVERED BY MEDICARE PART D  * RSVPreF3 (Arexvy) CDC recommends that adults 60 years of age and older may receive a single dose of RSV vaccine using shared clinical decision-making (SCDM)   Tetanus (Td) Vaccine - COST NOT COVERED BY MEDICARE PART B Following completion of primary series, a booster dose should be given every 10 years to maintain immunity against tetanus. Td may also be given as tetanus wound prophylaxis.   Tdap Vaccine - COST NOT COVERED BY MEDICARE PART B Recommended at least once for all adults. For pregnant patients, recommended with each pregnancy.   Shingles Vaccine (Shingrix) - COST NOT COVERED BY MEDICARE PART B  2 shot series recommended in those 19 years and older who have or will have weakened immune systems or those 50 years and older     Health Maintenance Due:      Topic Date Due   • Hepatitis C Screening  Completed   • Colorectal Cancer Screening  Discontinued     Immunizations Due:      Topic Date Due   • Influenza Vaccine (1) Never done   • COVID-19 Vaccine (4 - 2023-24 season) 09/01/2023     Advance Directives   What are advance directives?  Advance directives are legal documents that state your wishes and plans for medical care. These  plans are made ahead of time in case you lose your ability to make decisions for yourself. Advance directives can apply to any medical decision, such as the treatments you want, and if you want to donate organs.   What are the types of advance directives?  There are many types of advance directives, and each state has rules about how to use them. You may choose a combination of any of the following:  Living will:  This is a written record of the treatment you want. You can also choose which treatments you do not want, which to limit, and which to stop at a certain time. This includes surgery, medicine, IV fluid, and tube feedings.   Durable power of  for healthcare (DPAHC):  This is a written record that states who you want to make healthcare choices for you when you are unable to make them for yourself. This person, called a proxy, is usually a family member or a friend. You may choose more than 1 proxy.  Do not resuscitate (DNR) order:  A DNR order is used in case your heart stops beating or you stop breathing. It is a request not to have certain forms of treatment, such as CPR. A DNR order may be included in other types of advance directives.  Medical directive:  This covers the care that you want if you are in a coma, near death, or unable to make decisions for yourself. You can list the treatments you want for each condition. Treatment may include pain medicine, surgery, blood transfusions, dialysis, IV or tube feedings, and a ventilator (breathing machine).  Values history:  This document has questions about your views, beliefs, and how you feel and think about life. This information can help others choose the care that you would choose.  Why are advance directives important?  An advance directive helps you control your care. Although spoken wishes may be used, it is better to have your wishes written down. Spoken wishes can be misunderstood, or not followed. Treatments may be given even if you do not  want them. An advance directive may make it easier for your family to make difficult choices about your care.   Weight Management   Why it is important to manage your weight:  Being overweight increases your risk of health conditions such as heart disease, high blood pressure, type 2 diabetes, and certain types of cancer. It can also increase your risk for osteoarthritis, sleep apnea, and other respiratory problems. Aim for a slow, steady weight loss. Even a small amount of weight loss can lower your risk of health problems.  How to lose weight safely:  A safe and healthy way to lose weight is to eat fewer calories and get regular exercise. You can lose up about 1 pound a week by decreasing the number of calories you eat by 500 calories each day.   Healthy meal plan for weight management:  A healthy meal plan includes a variety of foods, contains fewer calories, and helps you stay healthy. A healthy meal plan includes the following:  Eat whole-grain foods more often.  A healthy meal plan should contain fiber. Fiber is the part of grains, fruits, and vegetables that is not broken down by your body. Whole-grain foods are healthy and provide extra fiber in your diet. Some examples of whole-grain foods are whole-wheat breads and pastas, oatmeal, brown rice, and bulgur.  Eat a variety of vegetables every day.  Include dark, leafy greens such as spinach, kale, karthikeyan greens, and mustard greens. Eat yellow and orange vegetables such as carrots, sweet potatoes, and winter squash.   Eat a variety of fruits every day.  Choose fresh or canned fruit (canned in its own juice or light syrup) instead of juice. Fruit juice has very little or no fiber.  Eat low-fat dairy foods.  Drink fat-free (skim) milk or 1% milk. Eat fat-free yogurt and low-fat cottage cheese. Try low-fat cheeses such as mozzarella and other reduced-fat cheeses.  Choose meat and other protein foods that are low in fat.  Choose beans or other legumes such as  split peas or lentils. Choose fish, skinless poultry (chicken or turkey), or lean cuts of red meat (beef or pork). Before you cook meat or poultry, cut off any visible fat.   Use less fat and oil.  Try baking foods instead of frying them. Add less fat, such as margarine, sour cream, regular salad dressing and mayonnaise to foods. Eat fewer high-fat foods. Some examples of high-fat foods include french fries, doughnuts, ice cream, and cakes.  Eat fewer sweets.  Limit foods and drinks that are high in sugar. This includes candy, cookies, regular soda, and sweetened drinks.  Exercise:  Exercise at least 30 minutes per day on most days of the week. Some examples of exercise include walking, biking, dancing, and swimming. You can also fit in more physical activity by taking the stairs instead of the elevator or parking farther away from stores. Ask your healthcare provider about the best exercise plan for you.      © Copyright [employer] 2018 Information is for End User's use only and may not be sold, redistributed or otherwise used for commercial purposes. All illustrations and images included in CareNotes® are the copyrighted property of [employer] or [employer]

## 2024-02-28 ENCOUNTER — TELEPHONE (OUTPATIENT)
Dept: LAB | Facility: HOSPITAL | Age: 74
End: 2024-02-28

## 2024-03-14 ENCOUNTER — OFFICE VISIT (OUTPATIENT)
Dept: PODIATRY | Facility: CLINIC | Age: 74
End: 2024-03-14
Payer: COMMERCIAL

## 2024-03-14 VITALS — SYSTOLIC BLOOD PRESSURE: 118 MMHG | HEART RATE: 106 BPM | DIASTOLIC BLOOD PRESSURE: 72 MMHG

## 2024-03-14 DIAGNOSIS — B35.1 ONYCHOMYCOSIS: ICD-10-CM

## 2024-03-14 DIAGNOSIS — E11.9 TYPE 2 DIABETES MELLITUS WITHOUT COMPLICATION, WITHOUT LONG-TERM CURRENT USE OF INSULIN (HCC): Primary | ICD-10-CM

## 2024-03-14 PROCEDURE — 11721 DEBRIDE NAIL 6 OR MORE: CPT | Performed by: PODIATRIST

## 2024-03-14 NOTE — PROGRESS NOTES
Gayathri Lopes  1950  AT RISK FOOT CARE    1. Type 2 diabetes mellitus without complication, without long-term current use of insulin ([facility])        2. Onychomycosis            Patient presents for at-risk foot care.  Patient has no acute concerns today.  Patient has significant lower extremity risk due to diminished pulses in the feet and trophic skin changes to the lower extremity including thick toenail, atrophic skin, and decreased hair growth.      On exam patient has thickened, hypertrophic, discolored, brittle toenails with subungual debris and tenderness x10   Callus: none  Patient has diminished pedal pulses and decreased perfusion to the lower extremities  Patient has significant trophic changes to the skin including thick toenails, decreased pedal hair and atrophic skin.     Today's treatment includes:  Debridement of toenails. Using nail nipper, silvana, and curette, nails were sharply debrided, reduced in thickness and length. Devitalized nail tissue and fungal debris excised and removed. Patient tolerated well.      Discussed proper shoe gear, daily inspections of feet, and general foot health with patient. Patient has Q8  findings and is recommended for at risk foot care every 9-10 weeks.    Diabetic Foot Exam    Patient's shoes and socks removed.    Right Foot/Ankle   Right Foot Inspection  Skin Exam: dry skin. No ulcer.     Toe Exam: swelling and right toe deformity.     Sensory   Vibration: diminished  Monofilament testing: intact    Vascular  Capillary refills: < 3 seconds  The right DP pulse is 1+. The right PT pulse is 1+.     Left Foot/Ankle  Left Foot Inspection  Skin Exam: dry skin. No ulcer.     Toe Exam: swelling and left toe deformity.     Sensory   Vibration: diminished  Monofilament testing: intact    Vascular  Capillary refills: < 3 seconds  The left DP pulse is 1+. The left PT pulse is 1+.     Assign Risk Category  No deformity present  No loss of protective sensation  Weak  pulses  Risk: 1

## 2024-03-22 ENCOUNTER — APPOINTMENT (EMERGENCY)
Dept: CT IMAGING | Facility: HOSPITAL | Age: 74
End: 2024-03-22
Payer: COMMERCIAL

## 2024-03-22 ENCOUNTER — HOSPITAL ENCOUNTER (EMERGENCY)
Facility: HOSPITAL | Age: 74
Discharge: HOME | End: 2024-03-22
Attending: EMERGENCY MEDICINE
Payer: COMMERCIAL

## 2024-03-22 VITALS
OXYGEN SATURATION: 97 % | HEART RATE: 90 BPM | TEMPERATURE: 98.3 F | SYSTOLIC BLOOD PRESSURE: 161 MMHG | WEIGHT: 154.32 LBS | BODY MASS INDEX: 28.4 KG/M2 | HEIGHT: 62 IN | DIASTOLIC BLOOD PRESSURE: 67 MMHG | RESPIRATION RATE: 22 BRPM

## 2024-03-22 DIAGNOSIS — S50.811A ABRASION OF RIGHT FOREARM, INITIAL ENCOUNTER: ICD-10-CM
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
## 2024-03-22 DIAGNOSIS — S02.2XXA CLOSED FRACTURE OF NASAL BONE, INITIAL ENCOUNTER: ICD-10-CM

## 2024-03-22 DIAGNOSIS — S00.31XA ABRASION OF NOSE, INITIAL ENCOUNTER: ICD-10-CM

## 2024-03-22 PROCEDURE — 72125 CT NECK SPINE W/O DYE: CPT

## 2024-03-22 PROCEDURE — 99284 EMERGENCY DEPT VISIT MOD MDM: CPT

## 2024-03-22 PROCEDURE — 70486 CT MAXILLOFACIAL W/O DYE: CPT

## 2024-03-22 PROCEDURE — 99284 EMERGENCY DEPT VISIT MOD MDM: CPT | Performed by: EMERGENCY MEDICINE

## 2024-03-22 PROCEDURE — 70450 CT HEAD/BRAIN W/O DYE: CPT

## 2024-03-22 RX ORDER — GINSENG 100 MG
1 CAPSULE ORAL ONCE
Status: COMPLETED | OUTPATIENT
Start: 2024-03-22 | End: 2024-03-22

## 2024-03-22 RX ADMIN — BACITRACIN 1 SMALL APPLICATION: 500 OINTMENT TOPICAL at 14:38

## 2024-03-22 NOTE — ASSESSMENT & PLAN NOTE
Lab Results   Component Value Date    HGBA1C 8.7 (A) 02/22/2024     Worsening glycemic control (A1c 8.7% > 6.3%) likely secondary to increased carb intake over the Christmas/New Year's holiday  Previously well-controlled with diet  Was taking glipizide in the past, tolerated well  Metformin contraindicated due to CKD   Will restart glipizide 2.5 mg daily  Diabetic foot exam completed today with risk score: 0  Addressed DM eye exam

## 2024-03-22 NOTE — DISCHARGE INSTRUCTIONS
Apply ice to help with pain and swelling of your nose.  Take acetaminophen (Tylenol) as needed for pain, as per the instructions.  Clean the area gently with soap and water, and pat it dry.  Cover with topical antibiotic ointment and a bandage when you are doing anything where it might get dirty.  Follow-up with ENT as needed if you are not developing difficulties breathing through her nose, increased morning at night, or for any other concerns.

## 2024-03-22 NOTE — ED PROVIDER NOTES
History  Chief Complaint   Patient presents with    Fall     PT arrives VIA EMS from [facility]. Slipped and fell today in bathroom, hit face on wall. Presents with nose abrasion and deformity along with swelling to upper lip. -LOC, -BT. Denies pain. A&O x4.       Fall      Prior to Admission Medications   Prescriptions Last Dose Informant Patient Reported? Taking?   aspirin (ECOTRIN LOW STRENGTH) 81 mg EC tablet  Self, Mother Yes No   Sig: Take 81 mg by mouth daily   atorvastatin (LIPITOR) 40 mg tablet  Self, Mother No No   Sig: TAKE 1 TABLET BY MOUTH EVERY DAY IN THE EVENING   calcium carbonate (OS-ALYSON) 600 MG tablet  Self, Mother No No   Sig: Take 2 tablets (1,200 mg total) by mouth daily   cholecalciferol (VITAMIN D3) 1,000 units tablet  Self, Mother Yes No   Sig: Take 1,000 Units by mouth daily   donepezil (ARICEPT) 10 mg tablet  Self, Mother No No   Sig: TAKE 1 TABLET BY MOUTH EVERYDAY AT BEDTIME   glipiZIDE (GLUCOTROL XL) 2.5 mg 24 hr tablet   No No   Sig: Take 1 tablet (2.5 mg total) by mouth daily   lisinopril (ZESTRIL) 2.5 mg tablet  Self, Mother No No   Sig: TAKE 1 TABLET DAILY AS NEEDED ONLY IF SYSTOLIC BP IS GREATER THEN 160MMHG   mirtazapine (REMERON) 15 mg tablet  Self, Mother No No   Sig: Take 1 tablet (15 mg total) by mouth daily at bedtime      Facility-Administered Medications: None       Past Medical History:   Diagnosis Date    MARTINA (acute kidney injury) (HCC) 11/21/2021    Diabetes mellitus (HCC)     Hypertension        History reviewed. No pertinent surgical history.    Family History   Problem Relation Age of Onset    Breast cancer Mother         unknown age    No Known Problems Father     No Known Problems Daughter     No Known Problems Maternal Grandmother     No Known Problems Maternal Grandfather     No Known Problems Paternal Grandmother     No Known Problems Paternal Grandfather     No Known Problems Son     No Known Problems Son     No Known Problems Maternal Aunt     No Known Problems  Maternal Aunt     No Known Problems Maternal Aunt     No Known Problems Maternal Aunt     No Known Problems Paternal Aunt     No Known Problems Paternal Aunt     No Known Problems Paternal Aunt      I have reviewed and agree with the history as documented.    E-Cigarette/Vaping    E-Cigarette Use Never User      E-Cigarette/Vaping Substances    Nicotine No     THC No     CBD No     Flavoring No     Other No     Unknown No      Social History     Tobacco Use    Smoking status: Never    Smokeless tobacco: Never   Vaping Use    Vaping status: Never Used   Substance Use Topics    Alcohol use: No    Drug use: Never       Review of Systems    Physical Exam  Physical Exam  Vitals and nursing note reviewed.   Constitutional:       General: She is not in acute distress.     Appearance: She is well-developed.   HENT:      Head: Normocephalic and atraumatic. No raccoon eyes, Momin's sign, abrasion, contusion or laceration.      Jaw: No tenderness or pain on movement.      Nose: Nasal deformity, signs of injury (abrasion) and nasal tenderness (mild, over abrasion) present.      Right Nostril: No septal hematoma.      Left Nostril: No septal hematoma.        Mouth/Throat:      Comments: Several teeth missing (rotted down to gumline), no trauma or tenderness to remaining teeth. Abrasion and contusion to upper inner lip.  Eyes:      Extraocular Movements: Extraocular movements intact.      Conjunctiva/sclera: Conjunctivae normal.      Pupils: Pupils are equal, round, and reactive to light.   Neck:      Trachea: No tracheal deviation.   Cardiovascular:      Rate and Rhythm: Normal rate and regular rhythm.      Pulses: Normal pulses.      Heart sounds: Normal heart sounds.   Pulmonary:      Effort: Pulmonary effort is normal. No respiratory distress or retractions.      Breath sounds: Normal breath sounds.   Chest:      Chest wall: No deformity, tenderness or crepitus.   Abdominal:      General: There is no distension.       Palpations: Abdomen is soft.      Tenderness: There is no abdominal tenderness.   Musculoskeletal:         General: No tenderness or deformity. Normal range of motion.      Cervical back: Full passive range of motion without pain, normal range of motion and neck supple. No spinous process tenderness or muscular tenderness.   Skin:     General: Skin is warm and dry.      Findings: No abrasion, bruising, ecchymosis or laceration.   Neurological:      Mental Status: She is alert and oriented to person, place, and time.      GCS: GCS eye subscore is 4. GCS verbal subscore is 5. GCS motor subscore is 6.      Sensory: No sensory deficit.      Motor: No weakness.   Psychiatric:         Mood and Affect: Mood and affect normal.         Behavior: Behavior normal.         Vital Signs  ED Triage Vitals [03/22/24 1226]   Temperature Pulse Respirations Blood Pressure SpO2   98.3 °F (36.8 °C) (!) 107 16 134/63 96 %      Temp Source Heart Rate Source Patient Position - Orthostatic VS BP Location FiO2 (%)   Oral Monitor Lying Right arm --      Pain Score       --           Vitals:    03/22/24 1226 03/22/24 1440   BP: 134/63 161/67   Pulse: (!) 107 90   Patient Position - Orthostatic VS: Lying Sitting         Visual Acuity  Visual Acuity      Flowsheet Row Most Recent Value   L Pupil Size (mm) 3   R Pupil Size (mm) 3            ED Medications  Medications   bacitracin topical ointment 1 small application (1 small application Topical Given 3/22/24 1438)       Diagnostic Studies  Results Reviewed       None                   CT head without contrast   Final Result by Christian Lane MD (03/22 1943)      No acute intracranial abnormality.      Chronic microangiopathic ischemic changes.                  Workstation performed: [id number]         CT facial bones without contrast   Final Result by Christian Lane MD (03/22 2680)      Bilateral distal nasal bone fractures.      The study was marked in EPIC for immediate  "notification.               Workstation performed: [id number]         CT cervical spine without contrast   Final Result by Christian Lane MD (03/22 1411)      No cervical spine fracture or traumatic malalignment.                  Workstation performed: [id number]                    Procedures  Procedures         ED Course                               SBIRT 20yo+      Flowsheet Row Most Recent Value   Initial Alcohol Screen: US AUDIT-C     1. How often do you have a drink containing alcohol? 0 Filed at: 03/22/2024 1231   2. How many drinks containing alcohol do you have on a typical day you are drinking?  0 Filed at: 03/22/2024 1231   3b. FEMALE Any Age, or MALE 65+: How often do you have 4 or more drinks on one occassion? 0 Filed at: 03/22/2024 1231   Audit-C Score 0 Filed at: 03/22/2024 1231   ALINA: How many times in the past year have you...    Used an illegal drug or used a prescription medication for non-medical reasons? Never Filed at: 03/22/2024 1231                      Medical Decision Making  This is a 74-year-old female who presents here today for evaluation of injuries after a fall.  She said she was walking to the bathroom at [facility] and the door \"got in front of me\" and she fell forward.  She thinks she just hit the wall.  She is uncertain of loss of consciousness.  She denies any pain or injuries at this time.  She takes a baby aspirin but denies other blood thinning medications.  She does not know if her nose has been crooked before.  She has no other complaints or injuries.    Review of systems: otherwise negative unless stated as above    She is well-appearing, no acute distress.  She has deformity of the nose with abrasion to the bridge but no obvious laceration.  There is dried blood around the area.  She has superficial abrasion to the upper inner lip, with contusion and swelling.  She has chronically missing teeth but no acute dental trauma.  Exam is otherwise unremarkable.  We will get " imaging to evaluate for underlying injuries with concern for possible intracranial hemorrhage including head, cervical spine, and facial fractures.  She is not having any symptoms or findings on exam to raise concern for intrathoracic injury, for which I feel that she needs imaging.    She has a distal nasal bone fracture which is mildly displaced.  She has no other acute injuries on imaging.  I discussed with patient and family findings, management at home, follow-up for further evaluation as needed, and indications for return, and they expressed understanding with this plan.  The patient was able to ambulate with a steady gait.     Problems Addressed:  Abrasion of nose, initial encounter: acute illness or injury  Abrasion of right forearm, initial encounter: acute illness or injury  Closed fracture of nasal bone, initial encounter: acute illness or injury  Fall, initial encounter: acute illness or injury    Amount and/or Complexity of Data Reviewed  Radiology: ordered and independent interpretation performed. Decision-making details documented in ED Course.    Risk  OTC drugs.             Disposition  Final diagnoses:   Fall, initial encounter   Abrasion of nose, initial encounter   Closed fracture of nasal bone, initial encounter   Abrasion of right forearm, initial encounter     Time reflects when diagnosis was documented in both MDM as applicable and the Disposition within this note       Time User Action Codes Description Comment    3/22/2024  2:34 PM Ashely Washington Add [W19.XXXA] Fall, initial encounter     3/22/2024  2:34 PM Ashely Washington Add [S00.31XA] Abrasion of nose, initial encounter     3/22/2024  2:35 PM Ashely Washington Add [S02.2XXA] Closed fracture of nasal bone, initial encounter     3/22/2024  2:36 PM Ashely Washington Add [S50.811A] Abrasion of right forearm, initial encounter           ED Disposition       ED Disposition   Discharge     Condition   Good    Date/Time   Fri Mar 22, 2024 1434    Comment   Gayathri Lopes discharge to home/self care.             Follow-up Information       Follow up With Specialties Details Why Contact Info Additional Information    [facility] Schedule an appointment as soon as possible for a visit  As needed, to follow up [address]  [facility] [address] [phone] [facility], [address], [place], [place], [address] [phone]    [facility] Schedule an appointment as soon as possible for a visit  As needed [address]  [address]  [facility] [address] [phone] [facility], [address], [place] [address]            Patient's Medications   Discharge Prescriptions    No medications on file       No discharge procedures on file.    PDMP Review       None            ED Provider  Electronically Signed by             Ashely Washington MD  03/24/24 2908

## 2024-03-27 ENCOUNTER — HOSPITAL ENCOUNTER (INPATIENT)
Facility: HOSPITAL | Age: 74
LOS: 5 days | Discharge: NURSING, REHAB OR LONG-TERM CARE | DRG: 064 | End: 2024-04-01
Attending: EMERGENCY MEDICINE | Admitting: FAMILY MEDICINE
Payer: COMMERCIAL

## 2024-03-27 ENCOUNTER — TELEPHONE (OUTPATIENT)
Dept: FAMILY MEDICINE CLINIC | Facility: OTHER | Age: 74
End: 2024-03-27

## 2024-03-27 ENCOUNTER — APPOINTMENT (EMERGENCY)
Dept: CT IMAGING | Facility: HOSPITAL | Age: 74
DRG: 064 | End: 2024-03-27
Payer: COMMERCIAL

## 2024-03-27 DIAGNOSIS — H53.9 VISUAL CHANGES: Primary | ICD-10-CM

## 2024-03-27 DIAGNOSIS — R29.90 STROKE-LIKE SYMPTOM: ICD-10-CM

## 2024-03-27 DIAGNOSIS — I12.9 BENIGN HYPERTENSION WITH CKD (CHRONIC KIDNEY DISEASE) STAGE III (HCC): ICD-10-CM

## 2024-03-27 DIAGNOSIS — N18.30 BENIGN HYPERTENSION WITH CKD (CHRONIC KIDNEY DISEASE) STAGE III (HCC): ICD-10-CM

## 2024-03-27 DIAGNOSIS — I65.29 CAROTID STENOSIS: ICD-10-CM

## 2024-03-27 DIAGNOSIS — R29.90 STROKE-LIKE EPISODE: ICD-10-CM

## 2024-03-27 PROBLEM — G93.41 ACUTE METABOLIC ENCEPHALOPATHY: Status: ACTIVE | Noted: 2024-03-27

## 2024-03-27 LAB
2HR DELTA HS TROPONIN: -3 NG/L
4HR DELTA HS TROPONIN: 4 NG/L
ANION GAP SERPL CALCULATED.3IONS-SCNC: 10 MMOL/L (ref 4–13)
APTT PPP: 25 SECONDS (ref 23–37)
ATRIAL RATE: 110 BPM
BUN SERPL-MCNC: 3 MG/DL (ref 5–25)
CALCIUM SERPL-MCNC: 7 MG/DL (ref 8.4–10.2)
CARDIAC TROPONIN I PNL SERPL HS: 12 NG/L
CARDIAC TROPONIN I PNL SERPL HS: 16 NG/L
CARDIAC TROPONIN I PNL SERPL HS: 9 NG/L
CHLORIDE SERPL-SCNC: 93 MMOL/L (ref 96–108)
CO2 SERPL-SCNC: 29 MMOL/L (ref 21–32)
CREAT SERPL-MCNC: 0.81 MG/DL (ref 0.6–1.3)
ERYTHROCYTE [DISTWIDTH] IN BLOOD BY AUTOMATED COUNT: 12.8 % (ref 11.6–15.1)
FLUAV RNA RESP QL NAA+PROBE: NEGATIVE
FLUBV RNA RESP QL NAA+PROBE: NEGATIVE
GFR SERPL CREATININE-BSD FRML MDRD: 71 ML/MIN/1.73SQ M
GLUCOSE SERPL-MCNC: 190 MG/DL (ref 65–140)
GLUCOSE SERPL-MCNC: 221 MG/DL (ref 65–140)
GLUCOSE SERPL-MCNC: 248 MG/DL (ref 65–140)
HCT VFR BLD AUTO: 31.7 % (ref 34.8–46.1)
HGB BLD-MCNC: 11.1 G/DL (ref 11.5–15.4)
INR PPP: 1.19 (ref 0.84–1.19)
MCH RBC QN AUTO: 31.1 PG (ref 26.8–34.3)
MCHC RBC AUTO-ENTMCNC: 35 G/DL (ref 31.4–37.4)
MCV RBC AUTO: 89 FL (ref 82–98)
P AXIS: 7 DEGREES
PLATELET # BLD AUTO: 258 THOUSANDS/UL (ref 149–390)
PLATELET # BLD AUTO: 286 THOUSANDS/UL (ref 149–390)
PMV BLD AUTO: 8.6 FL (ref 8.9–12.7)
PMV BLD AUTO: 8.7 FL (ref 8.9–12.7)
POTASSIUM SERPL-SCNC: 3.5 MMOL/L (ref 3.5–5.3)
PR INTERVAL: 140 MS
PROTHROMBIN TIME: 15.8 SECONDS (ref 11.6–14.5)
QRS AXIS: 7 DEGREES
QRSD INTERVAL: 80 MS
QT INTERVAL: 328 MS
QTC INTERVAL: 443 MS
RBC # BLD AUTO: 3.57 MILLION/UL (ref 3.81–5.12)
RSV RNA RESP QL NAA+PROBE: NEGATIVE
SARS-COV-2 RNA RESP QL NAA+PROBE: NEGATIVE
SODIUM SERPL-SCNC: 132 MMOL/L (ref 135–147)
T WAVE AXIS: 55 DEGREES
VENTRICULAR RATE: 110 BPM
WBC # BLD AUTO: 11.82 THOUSAND/UL (ref 4.31–10.16)

## 2024-03-27 PROCEDURE — 70498 CT ANGIOGRAPHY NECK: CPT

## 2024-03-27 PROCEDURE — 99285 EMERGENCY DEPT VISIT HI MDM: CPT

## 2024-03-27 PROCEDURE — 82948 REAGENT STRIP/BLOOD GLUCOSE: CPT

## 2024-03-27 PROCEDURE — 93010 ELECTROCARDIOGRAM REPORT: CPT | Performed by: INTERNAL MEDICINE

## 2024-03-27 PROCEDURE — 36415 COLL VENOUS BLD VENIPUNCTURE: CPT | Performed by: EMERGENCY MEDICINE

## 2024-03-27 PROCEDURE — 87040 BLOOD CULTURE FOR BACTERIA: CPT | Performed by: FAMILY MEDICINE

## 2024-03-27 PROCEDURE — 70496 CT ANGIOGRAPHY HEAD: CPT

## 2024-03-27 PROCEDURE — 99291 CRITICAL CARE FIRST HOUR: CPT | Performed by: PHYSICIAN ASSISTANT

## 2024-03-27 PROCEDURE — 85730 THROMBOPLASTIN TIME PARTIAL: CPT | Performed by: EMERGENCY MEDICINE

## 2024-03-27 PROCEDURE — 80048 BASIC METABOLIC PNL TOTAL CA: CPT | Performed by: EMERGENCY MEDICINE

## 2024-03-27 PROCEDURE — 93005 ELECTROCARDIOGRAM TRACING: CPT

## 2024-03-27 PROCEDURE — 85027 COMPLETE CBC AUTOMATED: CPT | Performed by: EMERGENCY MEDICINE

## 2024-03-27 PROCEDURE — 99223 1ST HOSP IP/OBS HIGH 75: CPT | Performed by: FAMILY MEDICINE

## 2024-03-27 PROCEDURE — 85610 PROTHROMBIN TIME: CPT | Performed by: EMERGENCY MEDICINE

## 2024-03-27 PROCEDURE — 0241U HB NFCT DS VIR RESP RNA 4 TRGT: CPT | Performed by: EMERGENCY MEDICINE

## 2024-03-27 PROCEDURE — 99285 EMERGENCY DEPT VISIT HI MDM: CPT | Performed by: EMERGENCY MEDICINE

## 2024-03-27 PROCEDURE — 85049 AUTOMATED PLATELET COUNT: CPT | Performed by: FAMILY MEDICINE

## 2024-03-27 PROCEDURE — 84484 ASSAY OF TROPONIN QUANT: CPT | Performed by: EMERGENCY MEDICINE

## 2024-03-27 RX ORDER — ATORVASTATIN CALCIUM 40 MG/1
40 TABLET, FILM COATED ORAL EVERY EVENING
Status: CANCELLED | OUTPATIENT
Start: 2024-03-27

## 2024-03-27 RX ORDER — ATORVASTATIN CALCIUM 40 MG/1
40 TABLET, FILM COATED ORAL EVERY EVENING
Status: DISCONTINUED | OUTPATIENT
Start: 2024-03-27 | End: 2024-04-01 | Stop reason: HOSPADM

## 2024-03-27 RX ORDER — DONEPEZIL HYDROCHLORIDE 5 MG/1
10 TABLET, FILM COATED ORAL
Status: DISCONTINUED | OUTPATIENT
Start: 2024-03-27 | End: 2024-04-01 | Stop reason: HOSPADM

## 2024-03-27 RX ORDER — CLOPIDOGREL BISULFATE 75 MG/1
300 TABLET ORAL ONCE
Status: COMPLETED | OUTPATIENT
Start: 2024-03-27 | End: 2024-03-27

## 2024-03-27 RX ORDER — INSULIN LISPRO 100 [IU]/ML
1-5 INJECTION, SOLUTION INTRAVENOUS; SUBCUTANEOUS
Status: DISCONTINUED | OUTPATIENT
Start: 2024-03-27 | End: 2024-04-01 | Stop reason: HOSPADM

## 2024-03-27 RX ORDER — ASPIRIN 81 MG/1
81 TABLET, CHEWABLE ORAL DAILY
Status: DISCONTINUED | OUTPATIENT
Start: 2024-03-27 | End: 2024-04-01 | Stop reason: HOSPADM

## 2024-03-27 RX ORDER — HEPARIN SODIUM 5000 [USP'U]/ML
5000 INJECTION, SOLUTION INTRAVENOUS; SUBCUTANEOUS EVERY 8 HOURS SCHEDULED
Status: DISCONTINUED | OUTPATIENT
Start: 2024-03-27 | End: 2024-04-01 | Stop reason: HOSPADM

## 2024-03-27 RX ORDER — INSULIN LISPRO 100 [IU]/ML
1-5 INJECTION, SOLUTION INTRAVENOUS; SUBCUTANEOUS
Status: DISCONTINUED | OUTPATIENT
Start: 2024-03-28 | End: 2024-04-01 | Stop reason: HOSPADM

## 2024-03-27 RX ORDER — ASPIRIN 81 MG/1
81 TABLET, CHEWABLE ORAL DAILY
Status: CANCELLED | OUTPATIENT
Start: 2024-03-28

## 2024-03-27 RX ORDER — SODIUM CHLORIDE 9 MG/ML
75 INJECTION, SOLUTION INTRAVENOUS CONTINUOUS
Status: DISCONTINUED | OUTPATIENT
Start: 2024-03-27 | End: 2024-04-01 | Stop reason: HOSPADM

## 2024-03-27 RX ADMIN — SODIUM CHLORIDE 75 ML/HR: 0.9 INJECTION, SOLUTION INTRAVENOUS at 20:45

## 2024-03-27 RX ADMIN — INSULIN LISPRO 1 UNITS: 100 INJECTION, SOLUTION INTRAVENOUS; SUBCUTANEOUS at 23:00

## 2024-03-27 RX ADMIN — CLOPIDOGREL 300 MG: 75 TABLET ORAL at 14:18

## 2024-03-27 RX ADMIN — ATORVASTATIN CALCIUM 40 MG: 40 TABLET, FILM COATED ORAL at 18:30

## 2024-03-27 RX ADMIN — HEPARIN SODIUM 5000 UNITS: 5000 INJECTION INTRAVENOUS; SUBCUTANEOUS at 18:00

## 2024-03-27 RX ADMIN — DONEPEZIL HYDROCHLORIDE 10 MG: 5 TABLET ORAL at 23:00

## 2024-03-27 RX ADMIN — ASPIRIN 81 MG: 81 TABLET, CHEWABLE ORAL at 15:02

## 2024-03-27 RX ADMIN — HEPARIN SODIUM 5000 UNITS: 5000 INJECTION INTRAVENOUS; SUBCUTANEOUS at 23:00

## 2024-03-27 NOTE — TELEPHONE ENCOUNTER
Pt daughter called and said they are taking her to ER by squad unsure if she was or had a stroke

## 2024-03-27 NOTE — CONSULTS
Consultation - Stroke   Gayathri Lopes 74 y.o. female MRN: [id number]  Unit/Bed#: FT 04 Encounter: [id number]      Assessment/Plan   Stroke-like symptoms  Assessment & Plan  74-year-old female with history of prior admission for TIA/hypertensive urgency in late 2021, known significant carotid and L MCA stenosis, diabetes, hypertension, and prior workup for dementia/MCI.    Presents as stroke alert on 3/27 after family noted altered mentation throughout today; recent evaluation at [facility] following a fall last Friday, 3/22. Per family, periods of staring off this AM, confusion, could not see objects in her sight of vision, and L sided weakness noted throughout morning    Personal NIHSS of 5.  Neuro exam with slight left upper extremity weakness, diminished sensation of the left upper extremity, questionable left visual and tactile neglect and left upper quadrant visual field deficit.     Out of time window for TNK.  From vascular standpoint with no LVO.  Will undergo acute cerebrovascular workup (especially in the setting of her notable athero/stenosis on CTA).    Thrombolytic Decision: Patient not a candidate. Unclear time of onset outside appropriate time window.    Neuroimaging:  -CT head: radiology read pending,  -CTA head/neck (delayed as IV access infiltrated during scans): No LVO per discussion with attending (radiology report pending); does note previously seen carotid and L MCA stenosis    Plan:  -Admit under acute ischemic stroke pathway:  -MRI brain without contrast  -2D echocardiogram  -Recommend vascular surgery input with respect to carotid stenosis  -Load with Plavix 300 mg x 1  -Continue DAPT starting tomorrow with aspirin 81 mg daily/Plavix 75 mg daily (give dose of aspirin 81 mg daily today, as patient did not take any home medications this morning)  -Lipitor 40 mg daily  -Check hemoglobin A1c and lipid panel  -Permissive hypertension for next 24 to 48 hours, treat SBP greater than  "220  -Neurochecks  -Telemetry monitoring  -Provide stroke education  -Therapy evaluations      Discussed plan of care with attending neurologist throughout duration of stroke alert.     Recommendations for outpatient neurological follow up have yet to be determined.    History of Present Illness     Reason for Consult / Principal Problem: Stroke alert, altered mental status, vision disturbance    Patient last known well: Approximately 10 PM on 3/26 per family  Stroke alert called: 12:40 PM  Neurology time of arrival: 12:42 PM  HPI: Gayathri Lopes is a 74 y.o. female with history as mentioned above in assessment who neurology is asked to evaluate a stroke alert this afternoon for the above.    Family is at bedside to corroborate history.  Was seen in normal state of health last night around 10 PM before going to bed. Awoke around 7, 7:30 AM this morning and was noted by family to be altered: staring off at times, confused/disoriented, noted vision disturbance (was having difficulty seeing objects in L field of vision even yesterday afternoon). Given this acute change, was brought to ED where stroke alert was activated on arrival.    See neuroimaging above and NIHSS as mentioned below: not a candidate for TNK/endovascular intervention. To be admitted under stroke pathway. Family notes baby aspirin use at home PTA, but did not take any home medications this morning given the change in mentation.     In reviewing notes/prior neurology encounters.   November 2021: seen for aphasia/fall: MRI brain negative for CVA, vessels with L M1 stenosis, bilateral cervical ICA stenosis (80%). Treated as TIA vs hypertensive encephalopathy (DAPT for 3 weeks, then single AP therapy).     In discussing with family, was also being worked up for \"Alzheimer's\"; did have MRI brain with [facility] in June 2022 which did not note any hippocampal/other volume loss.     Consult to Neurology  Consult performed by: Ralph Prater PA-C  Consult " ordered by: Nola Love DO          Review of Systems   Unable to perform ROS: Acuity of condition       Historical Information   Past Medical History:   Diagnosis Date    MARTINA (acute kidney injury) (HCC) 11/21/2021    Diabetes mellitus (HCC)     Hypertension      History reviewed. No pertinent surgical history.  Social History   Social History     Substance and Sexual Activity   Alcohol Use No     Social History     Substance and Sexual Activity   Drug Use Never     E-Cigarette/Vaping    E-Cigarette Use Never User      E-Cigarette/Vaping Substances    Nicotine No     THC No     CBD No     Flavoring No     Other No     Unknown No      Social History     Tobacco Use   Smoking Status Never   Smokeless Tobacco Never     Family History:   Family History   Problem Relation Age of Onset    Breast cancer Mother         unknown age    No Known Problems Father     No Known Problems Daughter     No Known Problems Maternal Grandmother     No Known Problems Maternal Grandfather     No Known Problems Paternal Grandmother     No Known Problems Paternal Grandfather     No Known Problems Son     No Known Problems Son     No Known Problems Maternal Aunt     No Known Problems Maternal Aunt     No Known Problems Maternal Aunt     No Known Problems Maternal Aunt     No Known Problems Paternal Aunt     No Known Problems Paternal Aunt     No Known Problems Paternal Aunt        Review of previous medical records was completed.    Meds/Allergies   current meds:   Current Facility-Administered Medications   Medication Dose Route Frequency    iohexol (OMNIPAQUE) 350 MG/ML injection (MULTI-DOSE) 85 mL  85 mL Intravenous Once in imaging    and PTA meds:   Prior to Admission Medications   Prescriptions Last Dose Informant Patient Reported? Taking?   aspirin (ECOTRIN LOW STRENGTH) 81 mg EC tablet  Self, Mother Yes No   Sig: Take 81 mg by mouth daily   atorvastatin (LIPITOR) 40 mg tablet  Self, Mother No No   Sig: TAKE 1 TABLET BY MOUTH EVERY  DAY IN THE EVENING   calcium carbonate (OS-ALYSON) 600 MG tablet  Self, Mother No No   Sig: Take 2 tablets (1,200 mg total) by mouth daily   cholecalciferol (VITAMIN D3) 1,000 units tablet  Self, Mother Yes No   Sig: Take 1,000 Units by mouth daily   donepezil (ARICEPT) 10 mg tablet  Self, Mother No No   Sig: TAKE 1 TABLET BY MOUTH EVERYDAY AT BEDTIME   glipiZIDE (GLUCOTROL XL) 2.5 mg 24 hr tablet   No No   Sig: Take 1 tablet (2.5 mg total) by mouth daily   lisinopril (ZESTRIL) 2.5 mg tablet  Self, Mother No No   Sig: TAKE 1 TABLET DAILY AS NEEDED ONLY IF SYSTOLIC BP IS GREATER THEN 160MMHG   mirtazapine (REMERON) 15 mg tablet  Self, Mother No No   Sig: Take 1 tablet (15 mg total) by mouth daily at bedtime      Facility-Administered Medications: None       No Known Allergies    Objective   Vitals:Blood pressure (!) 193/109, pulse (!) 109, temperature 99.3 °F (37.4 °C), temperature source Oral, resp. rate 20, weight 72.2 kg (159 lb 2.8 oz), SpO2 94%, not currently breastfeeding.,Body mass index is 29.11 kg/m².  No intake or output data in the 24 hours ending 03/27/24 1302    Invasive Devices:   Invasive Devices       Drain  Duration             External Urinary Catheter 432 days                    Physical Exam  HENT:      Head:      Comments: Facial/keenan-orbital/nasal bruising noted from recent fall/injury.   Eyes:      Extraocular Movements: EOM normal.      Pupils: Pupils are equal, round, and reactive to light.   Cardiovascular:      Rate and Rhythm: Normal rate.   Pulmonary:      Effort: Pulmonary effort is normal.   Musculoskeletal:      Cervical back: Normal range of motion and neck supple.   Skin:     General: Skin is warm and dry.   Neurological:      Mental Status: She is alert.      Coordination: Finger-Nose-Finger Test normal.       Neurologic Exam     Mental Status   Awake, confused, inattentive at times/distracted. Oriented to self, birthday, location, thinks month is either January or March. Names current  president. Performs calculations ok (# of quarters in 1.50). No aphasia, can repeat words, converse and name objects without error. No clear dysarthria. Does follow simple commands, difficulty with multi-step due to inattention.     Cranial Nerves     CN III, IV, VI   Pupils are equal, round, and reactive to light.  Extraocular motions are normal.     CN V   Facial sensation intact.     CN VII   Facial expression full, symmetric.     CN VIII   CN VIII normal.     CN IX, X   CN IX normal.   CN X normal.     CN XI   CN XI normal.     CN XII   CN XII normal.     Has degree of R gaze preference, but full EOM's, L upper quadrant visual field loss with finger wiggling/counting. Query degree of L visual neglect.      Motor Exam   Muscle bulk: normal  Overall muscle tone: normal  Full strength throughout R UE; just minimal degree of giveaway weakness in L UE when compared to R (4+/5-).    Difficulty with bilateral hip flexion when attempting to maintain antigravity.      Sensory Exam     Diminished light touch and pinprick to the L UE compared to R; LE symmetric.     L tactile neglect/extinction with double simultaneous tactile stim application.      Gait, Coordination, and Reflexes     Coordination   Finger to nose coordination: normal  Gait deferred.        NIHSS:  1a.Level of Consciousness: 0 = Alert   1b. LOC Questions: 0 = Answers both correctly   1c. LOC Commands: 0 = Obeys both correctly   2. Best Gaze: 0 = Normal   3. Visual: 1 = Partial hemianopia   4. Facial Palsy: 0=Normal symmetric movement   5a. Motor Right Arm: 0=No drift, limb holds 90 (or 45) degrees for full 10 seconds   5b. Motor Left Arm: 0=No drift, limb holds 90 (or 45) degrees for full 10 seconds   6a. Motor Right Le=Drift, limb holds 90 (or 45) degrees but drifts down before full 10 seconds: does not hit bed   6b. Motor Left Le=Drift, limb holds 90 (or 45) degrees but drifts down before full 10 seconds: does not hit bed   7. Limb Ataxia:   0=Absent   8. Sensory: 1=Mild to moderate sensory loss; patient feels pinprick is less sharp or is dull on the affected side; there is a loss of superficial pain with pinprick but patient is aware She is being touched   9. Best Language:  0=No aphasia, normal   10. Dysarthria: 0=Normal articulation   11. Extinction and Inattention (formerly Neglect): 1=Visual, tactile, auditory, spatial or personal inattention or extinction to bilateral simultaneous stimulation in one of the sensory modalities   Total Score: 5     Time NIHSS was completed: 12:50 PM    Modified Ana Maria Score:  Unable to determine currently, will gather additional data    Lab Results: CBC:   Results from last 7 days   Lab Units 03/27/24  1313   WBC Thousand/uL 11.82*   RBC Million/uL 3.57*   HEMOGLOBIN g/dL 11.1*   HEMATOCRIT % 31.7*   MCV fL 89   PLATELETS Thousands/uL 258     Imaging Studies: I have personally reviewed pertinent films in PACS  CT stroke alert brain    (Results Pending)   CTA stroke alert (head/neck)    (Results Pending)       EKG, Pathology, and Other Studies: I have personally reviewed pertinent reports.      VTE Prophylaxis: None yet, in ED    Code Status: Prior    Total Critical Care time spent 60 minutes. Discussed plan of care with patient/family and ED provider: no TNK/LVO for endovascular intervention, admit under CVA pathway given focal exam and notable stenosis on CTA. DAPT/statin for now, neuro checks, vascular surgery input, telemetry, therapies.     Please note dictation software was used in the formulation of this note. Please keep that in mind in light of any grammatical errors.

## 2024-03-27 NOTE — ASSESSMENT & PLAN NOTE
3/29/2024: Seen today by same neurology team is right-hand-dominant 74-year-old female with prior neuro history of MCI/dementia, TIA/hypertensive urgency in late 2021, known significant carotid and L MCA stenosis, diabetes, hypertension.  Patient is maintained on daily low-dose aspirin as well as Aricept.  She was staying at her daughter's house for a week [place] by the daughters report she seemed to be confused in the house not recognizing the bathroom and a small chalet type house.  Presents as stroke alert on 3/27 for this confusion and altered mentation throughout morning; Per family, periods of staring off yesterday AM, confusion, could not see objects in her sight of vision, and L sided weakness noted throughout morning.   Initial NIHSS of 5.  Neuro exam with slight left upper extremity weakness, diminished sensation of the left upper extremity, questionable left visual and tactile neglect and left upper quadrant visual field deficit.   Yesterday she was out of time window for TNK.  From vascular standpoint with no LVO.  Neuroimaging:  -CT head: no acute intracranial pathology  -CTA head/neck: Chronic mild right and severe left M1 segment stenosis.    -chronic bilateral ICA origin/carotid bulb high-grade stenosis (80-90%)  - MRI has not been read yet by radiology.  On our review we note a small ischemic appearing region in the posterior parietal or the occipital region on the right.  Her flares had minimal small vessel disease remarkably given her poor CTA findings.  Plan:  -Acute ischemic stroke pathway ongoing:  -2D echocardiogram the read is pending  -Appreciate vascular surgery input with respect to carotid stenosis, carotid dopplers pending  -Continue DAPT for now, this patient has been on low-dose aspirin longstanding according to the daughter for decades.  She did have a period of time at her last event where she was on aspirin and Plavix together, the Plavix was stopped and she was  put back on aspirin alone.  We are going to continue her on dual antiplatelet therapy for 3 months.  We asked that she try and have a P2 Y12 assay done at either the [facility] or the [facility] lab as an outpatient.  -Lipitor 40 mg daily, lipid panel with LDL of 88  -Hemoglobin A1c of 8.1, she should be following up with her PCP.  -I spoke with her daughter today she agrees that this patient is a good candidate for rehab as she has become much more sedentary over the last 6 months.  -Neurochecks  -Telemetry monitoring  -Provide stroke education   [see HPI] : see HPI [Feeling Tired] : feeling tired [Eyesight Problems] : eyesight problems [Sore Throat] : sore throat [Earache] : earache [Shortness Of Breath] : shortness of breath [Wheezing] : wheezing [Cough] : cough [Constipation] : constipation [Abdominal Pain] : abdominal pain [Vomiting] : vomiting [Heartburn] : heartburn [Diarrhea] : diarrhea [Wake up at night to urinate  How many times?  ___] : wakes up to urinate [unfilled] times during the night [Itching] : itching [Negative] : Musculoskeletal [Chills] : chills [Fever] : fever

## 2024-03-27 NOTE — ED PROVIDER NOTES
"Pt Name: Gayathri Lopes  MRN: [id number]  Birthdate 1950  Age/Sex: 74 y.o. female  Date of evaluation: 3/27/2024  PCP: Wiliam Baker MD    CHIEF COMPLAINT    Chief Complaint   Patient presents with    Altered Mental Status     Pt arrived via ems from home. As per ems pt is confused at baseline worse today. LWK 9pm. Pt family noticed slurred speech and difficulty ambulating this morning. Pt was last seen at [facility] for fall on Friday. Hx stroke         HPI and MDM    74 y.o. female presenting with concern for stroke.  Daughter is present with patient.  Last known normal was 10 PM last night, patient woke up around 7 AM to 7:30 AM this morning.  She asked daughter for pillow, daughter told her to get the pillow from the couch, however patient was unable to see the couch until daughter pointed it out to her.  Daughter states patient has not been acting like herself all morning.  She does have a history of dementia but is not normal for her.  Then she stated she wanted to go the bathroom but then walked past the bathroom.  Later around 10:30 AM she had difficulty walking, and then a little later than that she had left arm weakness.  Daughter states she had a help her to walk.  On 3/22/2024, patient was seen in the ER for a fall with head strike.  States patient also complaining of right-sided headache.  Currently on my examination, patient does not have any complaint of pain.  She does not know her name and where she is, as for the year she said \"either 23 or 24\".      Differential diagnosis considered includes but not limited to CVA/TIA, electrolyte abnormalities, intracranial hemorrhage.    NIHSS 2 for me. Stroke alert was called.    Please see neurology note separately.    CTA results as below.    Discussed with internal medicine for hospitalization.            Medications   iohexol (OMNIPAQUE) 350 MG/ML injection (MULTI-DOSE) 85 mL (has no administration in time range)   aspirin chewable tablet 81 mg " (81 mg Oral Given 3/27/24 1502)   atorvastatin (LIPITOR) tablet 40 mg (40 mg Oral Given 3/27/24 1830)   donepezil (ARICEPT) tablet 10 mg (has no administration in time range)   heparin (porcine) subcutaneous injection 5,000 Units (5,000 Units Subcutaneous Given 3/27/24 1800)   clopidogrel (PLAVIX) tablet 300 mg (300 mg Oral Given 3/27/24 1418)         Past Medical and Surgical History    Past Medical History:   Diagnosis Date    MARTINA (acute kidney injury) (HCC) 11/21/2021    Diabetes mellitus (HCC)     Hypertension        History reviewed. No pertinent surgical history.    Family History   Problem Relation Age of Onset    Breast cancer Mother         unknown age    No Known Problems Father     No Known Problems Daughter     No Known Problems Maternal Grandmother     No Known Problems Maternal Grandfather     No Known Problems Paternal Grandmother     No Known Problems Paternal Grandfather     No Known Problems Son     No Known Problems Son     No Known Problems Maternal Aunt     No Known Problems Maternal Aunt     No Known Problems Maternal Aunt     No Known Problems Maternal Aunt     No Known Problems Paternal Aunt     No Known Problems Paternal Aunt     No Known Problems Paternal Aunt        Social History     Tobacco Use    Smoking status: Never    Smokeless tobacco: Never   Vaping Use    Vaping status: Never Used   Substance Use Topics    Alcohol use: No    Drug use: Never           Allergies    No Known Allergies    Home Medications    Prior to Admission medications    Medication Sig Start Date End Date Taking? Authorizing Provider   aspirin (ECOTRIN LOW STRENGTH) 81 mg EC tablet Take 81 mg by mouth daily    Historical Provider, MD   atorvastatin (LIPITOR) 40 mg tablet TAKE 1 TABLET BY MOUTH EVERY DAY IN THE EVENING 10/16/23   Wiliam Baker MD   calcium carbonate (OS-ALYSON) 600 MG tablet Take 2 tablets (1,200 mg total) by mouth daily 8/15/23   Wiliam Baker MD   cholecalciferol (VITAMIN D3) 1,000 units tablet Take  1,000 Units by mouth daily    Historical Provider, MD   donepezil (ARICEPT) 10 mg tablet TAKE 1 TABLET BY MOUTH EVERYDAY AT BEDTIME 10/16/23   DEMETRA George   glipiZIDE (GLUCOTROL XL) 2.5 mg 24 hr tablet Take 1 tablet (2.5 mg total) by mouth daily 2/22/24 8/20/24  Fabby Angulo MD   lisinopril (ZESTRIL) 2.5 mg tablet TAKE 1 TABLET DAILY AS NEEDED ONLY IF SYSTOLIC BP IS GREATER THEN 160MMHG 10/16/23   Wiliam Baker MD   mirtazapine (REMERON) 15 mg tablet Take 1 tablet (15 mg total) by mouth daily at bedtime 11/28/23 5/26/24  Wiliam Baker MD           Physical Exam      ED Triage Vitals   Temperature Pulse Respirations Blood Pressure SpO2   03/27/24 1219 03/27/24 1218 03/27/24 1218 03/27/24 1218 03/27/24 1218   99.3 °F (37.4 °C) (!) 109 20 (!) 193/109 94 %      Temp Source Heart Rate Source Patient Position - Orthostatic VS BP Location FiO2 (%)   03/27/24 1219 03/27/24 1218 03/27/24 1218 03/27/24 1218 --   Oral Monitor Sitting Left arm       Pain Score       --                      Physical Exam  Constitutional:       General: She is not in acute distress.  HENT:      Head: Normocephalic and atraumatic.      Nose: Nose normal.      Mouth/Throat:      Mouth: Mucous membranes are moist.   Eyes:      Extraocular Movements: Extraocular movements intact.      Conjunctiva/sclera: Conjunctivae normal.      Pupils: Pupils are equal, round, and reactive to light.   Cardiovascular:      Rate and Rhythm: Regular rhythm. Tachycardia present.   Pulmonary:      Effort: Pulmonary effort is normal. No respiratory distress.   Abdominal:      General: There is no distension.      Tenderness: There is no abdominal tenderness.   Musculoskeletal:         General: No swelling or deformity.      Cervical back: Normal range of motion and neck supple.   Skin:     General: Skin is warm.      Findings: No erythema.   Neurological:      Mental Status: She is alert.      Comments: No focal weakness or numbness in extremities.  Although  patient is not very cooperative with visual examination, there is concern for possible bilateral left-sided hemianopsia.  No facial droop.              Diagnostic Results      Labs:    Results Reviewed       Procedure Component Value Units Date/Time    Platelet count [[id number]]  (Abnormal) Collected: 03/27/24 1835    Lab Status: Final result Specimen: Blood from Hand, Left Updated: 03/27/24 1844     Platelets 286 Thousands/uL      MPV 8.6 fL     Blood culture [[id number]] Collected: 03/27/24 1835    Lab Status: In process Specimen: Blood from Hand, Left Updated: 03/27/24 1843    Blood culture [[id number]] Collected: 03/27/24 1828    Lab Status: In process Specimen: Blood from Hand, Right Updated: 03/27/24 1843    HS Troponin I 4hr [[id number]] Collected: 03/27/24 1835    Lab Status: In process Specimen: Blood from Hand, Left Updated: 03/27/24 1842    HS Troponin I 2hr [[id number]]  (Normal) Collected: 03/27/24 1510    Lab Status: Final result Specimen: Blood from Arm, Left Updated: 03/27/24 1540     hs TnI 2hr 9 ng/L      Delta 2hr hsTnI -3 ng/L     FLU/RSV/COVID - if FLU/RSV clinically relevant [[id number]]  (Normal) Collected: 03/27/24 1313    Lab Status: Final result Specimen: Nares from Nose Updated: 03/27/24 1403     SARS-CoV-2 Negative     INFLUENZA A PCR Negative     INFLUENZA B PCR Negative     RSV PCR Negative    Narrative:      FOR PEDIATRIC PATIENTS - copy/paste COVID Guidelines URL to browser: [email or web address]    SARS-CoV-2 assay is a Nucleic Acid Amplification assay intended for the  qualitative detection of nucleic acid from SARS-CoV-2 in nasopharyngeal  swabs. Results are for the presumptive identification of SARS-CoV-2 RNA.    Positive results are indicative of infection with SARS-CoV-2, the virus  causing COVID-19, but do not rule out bacterial infection or co-infection  with other viruses. Laboratories within the [place] and  its  territories are required to report all positive results to the appropriate  public health authorities. Negative results do not preclude SARS-CoV-2  infection and should not be used as the sole basis for treatment or other  patient management decisions. Negative results must be combined with  clinical observations, patient history, and epidemiological information.  This test has not been FDA cleared or approved.    This test has been authorized by FDA under an Emergency Use Authorization  (EUA). This test is only authorized for the duration of time the  declaration that circumstances exist justifying the authorization of the  emergency use of an in vitro diagnostic tests for detection of SARS-CoV-2  virus and/or diagnosis of COVID-19 infection under section 564(b)(1) of  the Act, 21 U.S.C. 360bbb-3(b)(1), unless the authorization is terminated  or revoked sooner. The test has been validated but independent review by FDA  and CLIA is pending.    Test performed using [facility] GeneXpert: This RT-PCR assay targets N2,  a region unique to SARS-CoV-2. A conserved region in the E-gene was chosen  for pan-Sarbecovirus detection which includes SARS-CoV-2.    According to CMS-2020-01-R, this platform meets the definition of high-throughput technology.    HS Troponin 0hr (reflex protocol) [[id number]]  (Normal) Collected: 03/27/24 1313    Lab Status: Final result Specimen: Blood from Arm, Left Updated: 03/27/24 1347     hs TnI 0hr 12 ng/L     Basic metabolic panel [[id number]]  (Abnormal) Collected: 03/27/24 1313    Lab Status: Final result Specimen: Blood from Arm, Left Updated: 03/27/24 1344     Sodium 132 mmol/L      Potassium 3.5 mmol/L      Chloride 93 mmol/L      CO2 29 mmol/L      ANION GAP 10 mmol/L      BUN 3 mg/dL      Creatinine 0.81 mg/dL      Glucose 221 mg/dL      Calcium 7.0 mg/dL      eGFR 71 ml/min/1.73sq m     Narrative:      National Kidney Disease Foundation guidelines for Chronic Kidney Disease (CKD):      Stage 1 with normal or high GFR (GFR > 90 mL/min/1.73 square meters)    Stage 2 Mild CKD (GFR = 60-89 mL/min/1.73 square meters)    Stage 3A Moderate CKD (GFR = 45-59 mL/min/1.73 square meters)    Stage 3B Moderate CKD (GFR = 30-44 mL/min/1.73 square meters)    Stage 4 Severe CKD (GFR = 15-29 mL/min/1.73 square meters)    Stage 5 End Stage CKD (GFR <15 mL/min/1.73 square meters)  Note: GFR calculation is accurate only with a steady state creatinine    Protime-INR [[id number]]  (Abnormal) Collected: 03/27/24 1313    Lab Status: Final result Specimen: Blood from Arm, Left Updated: 03/27/24 1339     Protime 15.8 seconds      INR 1.19    APTT [[id number]]  (Normal) Collected: 03/27/24 1313    Lab Status: Final result Specimen: Blood from Arm, Left Updated: 03/27/24 1339     PTT 25 seconds     CBC and Platelet [[id number]]  (Abnormal) Collected: 03/27/24 1313    Lab Status: Final result Specimen: Blood from Arm, Left Updated: 03/27/24 1326     WBC 11.82 Thousand/uL      RBC 3.57 Million/uL      Hemoglobin 11.1 g/dL      Hematocrit 31.7 %      MCV 89 fL      MCH 31.1 pg      MCHC 35.0 g/dL      RDW 12.8 %      Platelets 258 Thousands/uL      MPV 8.7 fL     Fingerstick Glucose (POCT) [[id number]]  (Abnormal) Collected: 03/27/24 1216    Lab Status: Final result Specimen: Blood Updated: 03/27/24 1217     POC Glucose 248 mg/dl             All labs reviewed and utilized in the medical decision making process    Radiology:    CTA stroke alert (head/neck)   Final Result      1. No evidence for acute large vessel intracranial occlusion. Chronic mild right and severe left M1 segment stenosis.      2. Chronic bilateral ICA origin/carotid bulb high-grade stenosis measuring approximately 80 to 90%.      And findings were directly discussed with Monroe Clifton at 3/27/2024 at 1:25 p.m.                           Resident: MELBA Funk I, the attending radiologist, have reviewed the images and agree with the final report above.       Workstation performed: [id number]         CT stroke alert brain   Final Result      No acute intracranial abnormality.      Stable chronic microangiopathic changes within the brain.      Chronic right otomastoiditis without osseous erosion.      Findings were directly discussed with Monroe Clifton at approximately 3/27/2024 at 1:25 p.m.            Resident: MELBA Funk I, the attending radiologist, have reviewed the images and agree with the final report above.      Workstation performed: [id number]         MRI Inpatient Order    (Results Pending)       All radiology studies independently viewed by me and interpreted by the radiologist.    Procedure    Procedures        FINAL IMPRESSION    Final diagnoses:   Visual changes         DISPOSITION    Time reflects when diagnosis was documented in both MDM as applicable and the Disposition within this note       Time User Action Codes Description Comment    3/27/2024 12:40 PM Ivan, Bilal Add [R94.112] Visually evoked potential abnormality     3/27/2024 12:40 PM Ivan, Bilal Add [H53.9] Visual changes     3/27/2024 12:40 PM Ivan, Bilal Modify [H53.9] Visual changes     3/27/2024 12:40 PM Ivan, Bilal Remove [R94.112] Visually evoked potential abnormality     3/27/2024  4:49 PM Kishor Omalley Add [R29.90] Stroke-like episode     3/27/2024  4:49 PM Kishor Omalley Add [R29.90] Stroke-like symptom     3/27/2024  4:52 PM Kishor Omalley Add [I65.29] Carotid stenosis           ED Disposition       ED Disposition   Admit    Condition   Stable    Date/Time   Wed Mar 27, 2024  3:47 PM    Comment   Case was discussed with Dr. Kishor Omalley and the patient's admission status was agreed to be Admission Status: observation status to the service of Dr. Kishor Omalley.               Follow-up Information    None           PATIENT REFERRED TO:    No follow-up provider specified.    DISCHARGE MEDICATIONS:    Patient's Medications   Discharge Prescriptions    No medications on file       No  discharge procedures on file.         Nola Love DO        This note was partially completed using voice recognition technology, and was scanned for gross errors; however some errors may still exist. Please contact the author with any questions or requests for clarification.      Nola Love DO  03/27/24 [id number]

## 2024-03-28 ENCOUNTER — APPOINTMENT (INPATIENT)
Dept: VASCULAR ULTRASOUND | Facility: HOSPITAL | Age: 74
DRG: 064 | End: 2024-03-28
Payer: COMMERCIAL

## 2024-03-28 ENCOUNTER — APPOINTMENT (INPATIENT)
Dept: NON INVASIVE DIAGNOSTICS | Facility: HOSPITAL | Age: 74
DRG: 064 | End: 2024-03-28
Payer: COMMERCIAL

## 2024-03-28 LAB
ATRIAL RATE: 100 BPM
BACTERIA UR QL AUTO: NORMAL /HPF
BILIRUB UR QL STRIP: NEGATIVE
CHOLEST SERPL-MCNC: 170 MG/DL
CLARITY UR: CLEAR
COLOR UR: YELLOW
EST. AVERAGE GLUCOSE BLD GHB EST-MCNC: 186 MG/DL
GLUCOSE SERPL-MCNC: 175 MG/DL (ref 65–140)
GLUCOSE SERPL-MCNC: 175 MG/DL (ref 65–140)
GLUCOSE SERPL-MCNC: 216 MG/DL (ref 65–140)
GLUCOSE SERPL-MCNC: 242 MG/DL (ref 65–140)
GLUCOSE UR STRIP-MCNC: NEGATIVE MG/DL
HBA1C MFR BLD: 8.1 %
HDLC SERPL-MCNC: 41 MG/DL
HGB UR QL STRIP.AUTO: ABNORMAL
KETONES UR STRIP-MCNC: NEGATIVE MG/DL
LDLC SERPL CALC-MCNC: 88 MG/DL (ref 0–100)
LEUKOCYTE ESTERASE UR QL STRIP: ABNORMAL
NITRITE UR QL STRIP: NEGATIVE
NON-SQ EPI CELLS URNS QL MICRO: NORMAL /HPF
OSMOLALITY UR: 412 MMOL/KG
P AXIS: 6 DEGREES
PH UR STRIP.AUTO: 5.5 [PH]
PR INTERVAL: 146 MS
PROT UR STRIP-MCNC: ABNORMAL MG/DL
QRS AXIS: 4 DEGREES
QRSD INTERVAL: 76 MS
QT INTERVAL: 362 MS
QTC INTERVAL: 466 MS
RBC #/AREA URNS AUTO: NORMAL /HPF
SODIUM 24H UR-SCNC: 33 MOL/L
SP GR UR STRIP.AUTO: >=1.05 (ref 1–1.03)
T WAVE AXIS: 85 DEGREES
TRIGL SERPL-MCNC: 203 MG/DL
UROBILINOGEN UR STRIP-ACNC: <2 MG/DL
VENTRICULAR RATE: 100 BPM
WBC #/AREA URNS AUTO: NORMAL /HPF

## 2024-03-28 PROCEDURE — 93880 EXTRACRANIAL BILAT STUDY: CPT | Performed by: SURGERY

## 2024-03-28 PROCEDURE — 81001 URINALYSIS AUTO W/SCOPE: CPT | Performed by: FAMILY MEDICINE

## 2024-03-28 PROCEDURE — 84300 ASSAY OF URINE SODIUM: CPT | Performed by: FAMILY MEDICINE

## 2024-03-28 PROCEDURE — 97163 PT EVAL HIGH COMPLEX 45 MIN: CPT

## 2024-03-28 PROCEDURE — 83935 ASSAY OF URINE OSMOLALITY: CPT | Performed by: FAMILY MEDICINE

## 2024-03-28 PROCEDURE — NC001 PR NO CHARGE: Performed by: STUDENT IN AN ORGANIZED HEALTH CARE EDUCATION/TRAINING PROGRAM

## 2024-03-28 PROCEDURE — 99223 1ST HOSP IP/OBS HIGH 75: CPT | Performed by: NURSE PRACTITIONER

## 2024-03-28 PROCEDURE — 83036 HEMOGLOBIN GLYCOSYLATED A1C: CPT | Performed by: FAMILY MEDICINE

## 2024-03-28 PROCEDURE — 99232 SBSQ HOSP IP/OBS MODERATE 35: CPT | Performed by: STUDENT IN AN ORGANIZED HEALTH CARE EDUCATION/TRAINING PROGRAM

## 2024-03-28 PROCEDURE — 99232 SBSQ HOSP IP/OBS MODERATE 35: CPT | Performed by: NURSE PRACTITIONER

## 2024-03-28 PROCEDURE — 82948 REAGENT STRIP/BLOOD GLUCOSE: CPT

## 2024-03-28 PROCEDURE — 93010 ELECTROCARDIOGRAM REPORT: CPT | Performed by: INTERNAL MEDICINE

## 2024-03-28 PROCEDURE — 93880 EXTRACRANIAL BILAT STUDY: CPT

## 2024-03-28 PROCEDURE — 36415 COLL VENOUS BLD VENIPUNCTURE: CPT | Performed by: FAMILY MEDICINE

## 2024-03-28 PROCEDURE — 80061 LIPID PANEL: CPT | Performed by: FAMILY MEDICINE

## 2024-03-28 PROCEDURE — 97167 OT EVAL HIGH COMPLEX 60 MIN: CPT

## 2024-03-28 PROCEDURE — 3052F HG A1C>EQUAL 8.0%<EQUAL 9.0%: CPT

## 2024-03-28 RX ORDER — CLOPIDOGREL BISULFATE 75 MG/1
75 TABLET ORAL DAILY
Status: DISCONTINUED | OUTPATIENT
Start: 2024-03-28 | End: 2024-03-29

## 2024-03-28 RX ORDER — LORAZEPAM 2 MG/ML
1 INJECTION INTRAMUSCULAR ONCE
Status: DISCONTINUED | OUTPATIENT
Start: 2024-03-28 | End: 2024-04-01 | Stop reason: HOSPADM

## 2024-03-28 RX ADMIN — HEPARIN SODIUM 5000 UNITS: 5000 INJECTION INTRAVENOUS; SUBCUTANEOUS at 15:40

## 2024-03-28 RX ADMIN — INSULIN LISPRO 1 UNITS: 100 INJECTION, SOLUTION INTRAVENOUS; SUBCUTANEOUS at 21:25

## 2024-03-28 RX ADMIN — DONEPEZIL HYDROCHLORIDE 10 MG: 5 TABLET ORAL at 21:21

## 2024-03-28 RX ADMIN — INSULIN LISPRO 1 UNITS: 100 INJECTION, SOLUTION INTRAVENOUS; SUBCUTANEOUS at 13:29

## 2024-03-28 RX ADMIN — INSULIN LISPRO 2 UNITS: 100 INJECTION, SOLUTION INTRAVENOUS; SUBCUTANEOUS at 10:18

## 2024-03-28 RX ADMIN — ASPIRIN 81 MG: 81 TABLET, CHEWABLE ORAL at 10:17

## 2024-03-28 RX ADMIN — CLOPIDOGREL 75 MG: 75 TABLET ORAL at 15:41

## 2024-03-28 RX ADMIN — HEPARIN SODIUM 5000 UNITS: 5000 INJECTION INTRAVENOUS; SUBCUTANEOUS at 21:22

## 2024-03-28 RX ADMIN — INSULIN LISPRO 1 UNITS: 100 INJECTION, SOLUTION INTRAVENOUS; SUBCUTANEOUS at 15:51

## 2024-03-28 RX ADMIN — SODIUM CHLORIDE 75 ML/HR: 0.9 INJECTION, SOLUTION INTRAVENOUS at 16:27

## 2024-03-28 RX ADMIN — ATORVASTATIN CALCIUM 40 MG: 40 TABLET, FILM COATED ORAL at 17:38

## 2024-03-28 NOTE — H&P
On license of [facility]  H&P  Name: Gayathri Lopes 74 y.o. female I MRN: [id number]  Unit/Bed#: FT 04 I Date of Admission: 3/27/2024   Date of Service: 3/27/2024 I Hospital Day: 0      Assessment/Plan   * Stroke-like symptoms  Assessment & Plan  Patient was found to have at home by patient's daughter.  Confusion seems to have resolved now  Patient had left arm weakness and and what was presented as left-sided neglect and visual changes.  On my exam this seems to have resolved  Patient will be admitted under stroke pathway  CTA shows: No evidence of acute large vessel intracranial occlusion.  Chronic bilateral ICA origin/carotid bulb high-grade stenosis approximately 80 to 90%  Patient will need further MRI, PT OT consultation, echocardiogram, A1c, cholesterol levels  Has been loaded with aspirin and Plavix.  Also given statin  Continue aspirin 81 mg p.o. daily, continue statin  Allow for permissive hypertension up to systolic 220 and or diastolic 110 mmHg  Neurology consultation-Notes reviewed    Bilateral carotid artery stenosis  Assessment & Plan  Severe bilateral carotid artery stenosis 80 to 90%  Vascular surgery consulted  Continue aspirin and statin    Acute metabolic encephalopathy  Assessment & Plan  Seems to have resolved at this time  Confusion could be secondary to CVA/delirium  Clinically patient does not examine infectious, no SIRS criteria  Check UA  Sodium levels are low but not low enough to cause encephalopathy    Mild dementia (HCC)  Assessment & Plan  Continue Aricept  Supportive care    Stage 3a chronic kidney disease (HCC)  Assessment & Plan  Lab Results   Component Value Date    EGFR 71 03/27/2024    EGFR 54 07/11/2023    EGFR 54 06/29/2023    CREATININE 0.81 03/27/2024    CREATININE 1.01 07/11/2023    CREATININE 1.02 06/29/2023   Currently at baseline  Baseline around 0.8-1  Avoid hypotensive spells, nephrotoxic agents    Hyponatremia  Assessment & Plan  Could be from poor p.o.  intake since last couple days  Started on low-dose normal saline 75 cc/h and check BMP in a.m.  Check urine studies    Type 2 diabetes mellitus with diabetic chronic kidney disease (HCC)  Assessment & Plan  Lab Results   Component Value Date    HGBA1C 8.7 (A) 02/22/2024       Recent Labs     03/27/24  1216   POCGLU 248*       Blood Sugar Average: Last 72 hrs:  (P) 248    A1c of 8.7  Sliding scale insulin  Carb controlled diet       Incidental finding of chronic right otomastoiditis.  Discussed with the patient and patient's daughter.  No indications of antibiotics    VTE Pharmacologic Prophylaxis:    heparin  Code Status: Level 1 - Full Code   Discussion with family: Updated  (daughter) at bedside.    Anticipated Length of Stay: Patient will be admitted on an inpatient basis with an anticipated length of stay of greater than 2 midnights secondary to stroke rule out.    Total Time Spent on Date of Encounter in care of patient: 75 mins. This time was spent on one or more of the following: performing physical exam; counseling and coordination of care; obtaining or reviewing history; documenting in the medical record; reviewing/ordering tests, medications or procedures; communicating with other healthcare professionals and discussing with patient's family/caregivers.    Chief Complaint: Confusion, left-sided weakness and visual changes    History of Present Illness:  Gayathri Lopes is a 74 y.o. female with a PMH of carotid artery stenosis, hypertension, dementia, CKD stage IIIa, diabetes mellitus type 2 presents with confusion.  Patient was found confused by her daughter who said patient was so confused that she was not able to get to the couch.  She came to her room at night and asked weird questions which she has never done in the past.  She also noted that patient was having weakness on the left side and she was found staring at the TV but momentarily was found not looking at the TV staring at the  wall.  TV is not present and was not able to see the left side of the TV.  During my evaluation, patient answered all the questions appropriately.  She denies any chest pain shortness of breath nausea matting down abdominal pain cough or fever  Review of Systems:  Review of Systems   Constitutional:  Positive for activity change, appetite change and fatigue. Negative for chills and fever.   HENT:  Negative for ear pain and sore throat.    Eyes:  Negative for pain and visual disturbance.   Respiratory:  Negative for cough and shortness of breath.    Cardiovascular:  Negative for chest pain and palpitations.   Gastrointestinal:  Negative for abdominal pain and vomiting.   Genitourinary:  Negative for dysuria and hematuria.   Musculoskeletal:  Positive for gait problem. Negative for arthralgias and back pain.   Skin:  Negative for color change and rash.   Neurological:  Positive for weakness. Negative for seizures and syncope. Dizziness: .npex.  Psychiatric/Behavioral:  Positive for confusion and decreased concentration. The patient is not nervous/anxious.    All other systems reviewed and are negative.      Past Medical and Surgical History:   Past Medical History:   Diagnosis Date    MARTINA (acute kidney injury) ([facility]) 11/21/2021    Diabetes mellitus ([facility])     Hypertension        History reviewed. No pertinent surgical history.    Meds/Allergies:  Prior to Admission medications    Medication Sig Start Date End Date Taking? Authorizing Provider   aspirin (ECOTRIN LOW STRENGTH) 81 mg EC tablet Take 81 mg by mouth daily    Historical Provider, MD   atorvastatin (LIPITOR) 40 mg tablet TAKE 1 TABLET BY MOUTH EVERY DAY IN THE EVENING 10/16/23   Wiliam Baker MD   calcium carbonate (OS-ALYSON) 600 MG tablet Take 2 tablets (1,200 mg total) by mouth daily 8/15/23   Wiliam Baker MD   cholecalciferol (VITAMIN D3) 1,000 units tablet Take 1,000 Units by mouth daily    Historical Provider, MD   donepezil (ARICEPT) 10 mg tablet TAKE 1  TABLET BY MOUTH EVERYDAY AT BEDTIME 10/16/23   DEMETRA George   glipiZIDE (GLUCOTROL XL) 2.5 mg 24 hr tablet Take 1 tablet (2.5 mg total) by mouth daily 2/22/24 8/20/24  Fabby Angulo MD   lisinopril (ZESTRIL) 2.5 mg tablet TAKE 1 TABLET DAILY AS NEEDED ONLY IF SYSTOLIC BP IS GREATER THEN 160MMHG 10/16/23   Wiliam Baker MD   mirtazapine (REMERON) 15 mg tablet Take 1 tablet (15 mg total) by mouth daily at bedtime 11/28/23 5/26/24  Wiliam Baker MD     I have reviewed home medications with patient family member.    Allergies: No Known Allergies    Social History:  Marital Status: /Civil Union     Substance Use History:   Social History     Substance and Sexual Activity   Alcohol Use No     Social History     Tobacco Use   Smoking Status Never   Smokeless Tobacco Never     Social History     Substance and Sexual Activity   Drug Use Never       Family History:  Family History   Problem Relation Age of Onset    Breast cancer Mother         unknown age    No Known Problems Father     No Known Problems Daughter     No Known Problems Maternal Grandmother     No Known Problems Maternal Grandfather     No Known Problems Paternal Grandmother     No Known Problems Paternal Grandfather     No Known Problems Son     No Known Problems Son     No Known Problems Maternal Aunt     No Known Problems Maternal Aunt     No Known Problems Maternal Aunt     No Known Problems Maternal Aunt     No Known Problems Paternal Aunt     No Known Problems Paternal Aunt     No Known Problems Paternal Aunt        Physical Exam:     Vitals:   Blood Pressure: 128/60 (03/27/24 1800)  Pulse: 96 (03/27/24 1800)  Temperature: 99.3 °F (37.4 °C) (03/27/24 1400)  Temp Source: Oral (03/27/24 1400)  Respirations: 20 (03/27/24 1800)  Weight - Scale: 72.2 kg (159 lb 2.8 oz) (03/27/24 1218)  SpO2: 94 % (03/27/24 1800)    Physical Exam General-appears very fatigued, keeps her eyes closed when trying to sleep, follows commands  HEENT-PERRLA,  normocephalic, atraumatic, oral mucosa-slightly dry neck- Supple, No carotid bruit, no JVD  CVS- Normal S1/ S2, Regular rate and rhythm, No murmur, No edema  Respiratory system- B/L clear breath sounds, no wheezing  Abdomen- Soft, Non distended, no tenderness, Bowel sound- present 4 quads  Genitourinary- No suprapubic tenderness, No CVA tenderness  Skin- No new bruise or rash  Musculoskeletal- No gross deformity  Psych- No acute psychosis  CNS-no facial droop, no slurred speech, hypophonic, following commands, moves all 4 extremities, strength equal bilateral upper 5/5 and lower extremities 4/5, seems to have generalized weakness in bilateral lower extremities, sensations equal bilateral upper and lower extremities,    Additional Data:     Lab Results:  Results from last 7 days   Lab Units 03/27/24  1835 03/27/24  1313   WBC Thousand/uL  --  11.82*   HEMOGLOBIN g/dL  --  11.1*   HEMATOCRIT %  --  31.7*   PLATELETS Thousands/uL 286 258     Results from last 7 days   Lab Units 03/27/24  1313   SODIUM mmol/L 132*   POTASSIUM mmol/L 3.5   CHLORIDE mmol/L 93*   CO2 mmol/L 29   BUN mg/dL 3*   CREATININE mg/dL 0.81   ANION GAP mmol/L 10   CALCIUM mg/dL 7.0*   GLUCOSE RANDOM mg/dL 221*     Results from last 7 days   Lab Units 03/27/24  1313   INR  1.19     Results from last 7 days   Lab Units 03/27/24  1216   POC GLUCOSE mg/dl 248*               Lines/Drains:  Invasive Devices       Peripheral Intravenous Line  Duration             Peripheral IV 03/27/24 Left;Proximal;Ventral (anterior) Forearm <1 day              Drain  Duration             External Urinary Catheter 433 days                        Imaging: Reviewed radiology reports from this admission including: CTA stroke alert, CT stroke alert brain  CTA stroke alert (head/neck)   Final Result by Pallav N Shah, MD (03/27 2522)      1. No evidence for acute large vessel intracranial occlusion. Chronic mild right and severe left M1 segment stenosis.      2. Chronic  bilateral ICA origin/carotid bulb high-grade stenosis measuring approximately 80 to 90%.      And findings were directly discussed with Monroe Clifton at 3/27/2024 at 1:25 p.m.                           Resident: MELBA Funk I, the attending radiologist, have reviewed the images and agree with the final report above.      Workstation performed: [id number]         CT stroke alert brain   Final Result by Pallav N Shah, MD (03/27 1454)      No acute intracranial abnormality.      Stable chronic microangiopathic changes within the brain.      Chronic right otomastoiditis without osseous erosion.      Findings were directly discussed with Monroe Clifton at approximately 3/27/2024 at 1:25 p.m.            Resident: MELBA Funk I, the attending radiologist, have reviewed the images and agree with the final report above.      Workstation performed: [id number]         MRI Inpatient Order    (Results Pending)       EKG and Other Studies Reviewed on Admission:   EKG:  Sinus tachycardia, age-indeterminate septal infarct, no acute ST elevations or depressions.    ** Please Note: This note has been constructed using a voice recognition system. **

## 2024-03-28 NOTE — ASSESSMENT & PLAN NOTE
Lab Results   Component Value Date    HGBA1C 8.7 (A) 02/22/2024       Recent Labs     03/27/24  1216   POCGLU 248*       Blood Sugar Average: Last 72 hrs:  (P) 248    A1c of 8.7  Sliding scale insulin  Carb controlled diet

## 2024-03-28 NOTE — PROGRESS NOTES
Progress Note - Neurology   Gayathri Lopes 74 y.o. female MRN: [id number]  Unit/Bed#: -01 Encounter: [id number]      Assessment/Plan     * Stroke-like symptoms  Assessment & Plan  74-year-old female with history of prior admission for TIA/hypertensive urgency in late 2021, known significant carotid and L MCA stenosis, diabetes, hypertension, and prior workup for dementia/MCI.    Presents as stroke alert on 3/27 after family noted altered mentation throughout morning; recent evaluation at [facility] following a fall last Friday, 3/22. Per family, periods of staring off yesterday AM, confusion, could not see objects in her sight of vision, and L sided weakness noted throughout morning    Personal NIHSS of 5.  Neuro exam with slight left upper extremity weakness, diminished sensation of the left upper extremity, questionable left visual and tactile neglect and left upper quadrant visual field deficit.     Out of time window for TNK.  From vascular standpoint with no LVO.  Will undergo acute cerebrovascular workup (especially in the setting of her notable athero/stenosis on CTA).    3/28: seen in ED with attending, BP soft (seen in late morning), patient somnolent but does answer all questions, oriented, following commands. No clear tactile neglect on the left side today during neuro exam.     Neuroimaging:  -CT head: no acute intracranial pathology  -CTA head/neck: Chronic mild right and severe left M1 segment stenosis.    -chronic bilateral ICA origin/carotid bulb high-grade stenosis (80-90%)    Plan:  -Acute ischemic stroke pathway ongoing:  -MRI brain without contrast pending  -2D echocardiogram pending  -Appreciate vascular surgery input with respect to carotid stenosis, carotid dopplers pending  -Continue DAPT for now  -Lipitor 40 mg daily  -Hemoglobin A1c of 8.1, lipid panel with LDL of 88  -Neurochecks  -Telemetry monitoring  -Provide stroke education  -Therapy evaluations      Will further discuss plan of  care with attending neurologist.     Recommendations for outpatient neurological follow up have yet to be determined.    Subjective:   Patient resting in bed in ED, voiced no complaints, albeit somnolent. BP hypotensive at time of evaluations (90's/50's), otherwise BP's have been adequate today.     Vitals: Blood pressure 130/63, pulse 79, temperature 98.7 °F (37.1 °C), temperature source Oral, resp. rate 19, weight 72.2 kg (159 lb 2.8 oz), SpO2 95%, not currently breastfeeding.,Body mass index is 29.11 kg/m².    Physical Exam:   Examined alongside Dr. Clifton.     Physical Exam  Constitutional:       Comments: Somnolent, but easily arouses, conversant   HENT:      Head: Normocephalic and atraumatic.   Eyes:      Extraocular Movements: Extraocular movements intact and EOM normal.      Conjunctiva/sclera: Conjunctivae normal.      Pupils: Pupils are equal, round, and reactive to light.   Cardiovascular:      Rate and Rhythm: Normal rate.   Pulmonary:      Effort: Pulmonary effort is normal.   Musculoskeletal:         General: Normal range of motion.      Cervical back: Normal range of motion and neck supple.   Skin:     General: Skin is warm and dry.       Neurologic Exam     Mental Status   Oriented to self, birthday, location, daughter's name, names objects, answers questions without aphasia/dysarthria. Following simple commands.      Cranial Nerves     CN III, IV, VI   Pupils are equal, round, and reactive to light.  Extraocular motions are normal.     CN VII   Facial expression full, symmetric.     CN VIII   CN VIII normal.     CN IX, X   CN IX normal.   CN X normal.     CN XI   CN XI normal.     CN XII   CN XII normal.     Encephalopathy clouds full CN exam today.      Motor Exam   Muscle bulk: normal  Overall muscle tone: normal  No drift in UE, symmetric .     Cannot sustain antigravity in either LE; symmetric withdrawal to plantar tickling/stimuli.      Sensory Exam   Symmetric light touch sensation today,  no joana-neglect/extinction in the lowers to double simultaneous tactile stimuli.         Lab, Imaging and other studies:   CTA stroke alert (head/neck)   Final Result by Pallav N Shah, MD (03/27 1453)      1. No evidence for acute large vessel intracranial occlusion. Chronic mild right and severe left M1 segment stenosis.      2. Chronic bilateral ICA origin/carotid bulb high-grade stenosis measuring approximately 80 to 90%.      And findings were directly discussed with Monroe Clifton at 3/27/2024 at 1:25 p.m.                           Resident: MELBA Funk I, the attending radiologist, have reviewed the images and agree with the final report above.      Workstation performed: [id number]         CT stroke alert brain   Final Result by Pallav N Shah, MD (03/27 1454)      No acute intracranial abnormality.      Stable chronic microangiopathic changes within the brain.      Chronic right otomastoiditis without osseous erosion.      Findings were directly discussed with Monroe Clifton at approximately 3/27/2024 at 1:25 p.m.            Resident: MELBA Funk I, the attending radiologist, have reviewed the images and agree with the final report above.      Workstation performed: [id number]         MRI Inpatient Order    (Results Pending)   VAS carotid complete study    (Results Pending)     CBC:   Results from last 7 days   Lab Units 03/27/24  1835 03/27/24  1313   WBC Thousand/uL  --  11.82*   RBC Million/uL  --  3.57*   HEMOGLOBIN g/dL  --  11.1*   HEMATOCRIT %  --  31.7*   MCV fL  --  89   PLATELETS Thousands/uL 286 258   , BMP/CMP:   Results from last 7 days   Lab Units 03/27/24  1313   SODIUM mmol/L 132*   POTASSIUM mmol/L 3.5   CHLORIDE mmol/L 93*   CO2 mmol/L 29   BUN mg/dL 3*   CREATININE mg/dL 0.81   CALCIUM mg/dL 7.0*   EGFR ml/min/1.73sq m 71   , Vitamin B12:   , HgBA1C:   Results from last 7 days   Lab Units 03/28/24  0535   HEMOGLOBIN A1C % 8.1*   , TSH:   , Coagulation:   Results from last 7 days   Lab  Units 03/27/24  1313   INR  1.19   , Lipid Profile:   Results from last 7 days   Lab Units 03/28/24  0535   HDL mg/dL 41*   LDL CALC mg/dL 88   TRIGLYCERIDES mg/dL 203*     VTE Prophylaxis: Sequential compression device (Venodyne)  and Heparin    Please see attending's attestation for total time spent/billing. Discussed plan of care with patient and primary team: await MRI brain, DAPT/statin.

## 2024-03-28 NOTE — OCCUPATIONAL THERAPY NOTE
"          Occupational Therapy Evaluation        Patient Name: Gayathri Lopes  Today's Date: 3/28/2024         03/28/24 0853   OT Last Visit   OT Visit Date 03/28/24   Note Type   Note type Evaluation   Pain Assessment   Pain Assessment Tool 0-10   Pain Score No Pain   Restrictions/Precautions   Weight Bearing Precautions Per Order No   Other Precautions Cognitive;Chair Alarm;Bed Alarm;Multiple lines;Telemetry;Fall Risk   Home Living   Type of Home House   Home Layout Two level;Stairs to enter with rails;Bed/bath upstairs  (\"a couple\" RAO; flight of stairs to 2nd floor)   Bathroom Shower/Tub Walk-in shower   Bathroom Toilet Standard   Bathroom Equipment Shower chair;Grab bars in shower;Grab bars around toilet   Bathroom Accessibility Accessible   Home Equipment Other (Comment)  (none)   Additional Comments Pt ambulates without an AD.   Prior Function   Level of [place] Independent with functional mobility;Independent with ADLs;Needs assistance with IADLS   Lives With Spouse   Receives Help From Family   IADLs Family/Friend/Other provides transportation;Family/Friend/Other provides meals;Family/Friend/Other provides medication management   Falls in the last 6 months 1 to 4  (pt denies; 1 fall per chart review)   Vocational Retired   Comments Pt is a questionable historian. Information regarding home setup and PLOF obtained from patient. Further clarification is required.   Lifestyle   Autonomy Pt is a questionable historian. Information regarding home setup and PLOF obtained from patient. Further clarification is required.. Patient lives with family in a 2 story house, \"a couple\" RAO; flight of stairs to 2nd floor. Patient  ambulates withoput AD,  assists with IADLs.   Reciprocal Relationships Supportive Family   General   Family/Caregiver Present No   ADL   Where Assessed Other (Comment)  (ADL levels based on functional performance during OT eval)   Eating Assistance 5  Supervision/Setup   Grooming " Assistance 5  Supervision/Setup   UB Bathing Assistance 4  Minimal Assistance   LB Bathing Assistance 2  Maximal Assistance   UB Dressing Assistance 4  Minimal Assistance   LB Dressing Assistance 2  Maximal Assistance   Toileting Assistance  3  Moderate Assistance   Functional Assistance 4  Minimal Assistance   Bed Mobility   Supine to Sit 4  Minimal assistance   Additional items Assist x 2;Increased time required;Verbal cues;LE management   Sit to Supine 4  Minimal assistance   Additional items Assist x 2;Increased time required;Verbal cues;LE management   Transfers   Sit to Stand 4  Minimal assistance   Additional items Assist x 2;Increased time required;Verbal cues   Stand to Sit 4  Minimal assistance   Additional items Assist x 2;Increased time required;Verbal cues   Toilet transfer 4  Minimal assistance   Additional items Assist x 1;Commode   Functional Mobility   Functional Mobility 4  Minimal assistance   Additional Comments assist of 1/  4-5 steps bedsided   Additional items Rolling walker   Balance   Static Sitting Fair +   Dynamic Sitting Fair   Static Standing Fair -   Dynamic Standing Poor +   Activity Tolerance   Activity Tolerance Patient limited by fatigue   Medical Staff Made Aware Pt seen as a co-eval with PT due to the patient's co-morbidities, clinically unstable presentation, and present impairments which are a regression from the patient's baseline.   RUE Assessment   RUE Assessment X   RUE Strength   RUE Overall Strength Deficits  (3/5)   LUE Assessment   LUE Assessment X   LUE Strength   LUE Overall Strength Deficits  (3/5)   Hand Function   Gross Motor Coordination Impaired   Fine Motor Coordination Impaired   Sensation   Light Touch No apparent deficits  (BUEs)   Vision-Basic Assessment   Current Vision Wears glasses only for reading   Psychosocial   Psychosocial (WDL) WDL   Perception   Inattention/Neglect Appears intact   Cognition   Overall Cognitive Status Impaired  "  Arousal/Participation Alert;Responsive;Cooperative   Attention Within functional limits   Orientation Level Oriented to person;Oriented to place;Disoriented to situation;Oriented to time  (oriented to month and year)   Memory Decreased recall of recent events;Decreased short term memory   Following Commands Follows one step commands with increased time or repetition   Assessment   Limitation Decreased ADL status;Decreased UE ROM;Decreased UE strength;Decreased Safe judgement during ADL;Decreased endurance;Decreased self-care trans;Decreased high-level ADLs;Decreased fine motor control   Prognosis Good   Assessment Patient is a 74 y.o. female seen for OT evaluation s/p admit to [facility] on 3/27/2024 w/Stroke-like symptoms. Commorbidities affecting patient's functional performance at time of assessment include: Bilateral carotid artery stenosis, acute metabolic encephalopathy, Stage 3 CKD, hyponatremia, Type 2 DM, Patient presented to ED with left arm weakness, left-sided neglect, visual changes and confusion.   Orders placed for OT evaluation and treatment.  Performed at least two patient identifiers during session including name and wristband.  Prior to admission,  Pt is a questionable historian. Information regarding home setup and PLOF obtained from patient. Further clarification is required.. Patient lives with family in a 2 story house, \"a couple\" RAO; flight of stairs to 2nd floor. Patient ambulates withoput AD,  assists with IADLs.  Personal factors affecting patient at time of initial evaluation include: limited caregiver support, steps to enter, decreased initiation and engagement, difficulty performing ADLs, and difficulty performing IADLs. Upon evaluation, patient requires minimal  assist for UB ADLs, maximal assist for LB ADLs, transfers and functional ambulation in room and bathroom with minimal  assist, with the use of Rolling Walker.    Occupational performance is affected by " the following deficits: decreased functional use of BUEs, decreased muscle strength, degenerative arthritic joint changes, impaired gross motor coordination, impaired fine motor coordination, dynamic sit/ stand balance deficit with poor standing tolerance time for self care and functional mobility, decreased activity tolerance, impaired memory, and decreased safety awareness.  Patient to benefit from continued Occupational Therapy treatment while in the hospital to address deficits as defined above and maximize level of functional independence with ADLs and functional mobility. Occupational Performance areas to address include: eating, grooming , bathing/ shower, dressing, toilet hygiene, transfer to all surfaces, functional mobility, emergency response, health maintenance, IADLs: safety procedures, and IADLs: meal prep/ clean up. From OT standpoint, recommendation at time of d/c would be Level 2.   Plan   Treatment Interventions ADL retraining;Functional transfer training;UE strengthening/ROM;Endurance training;Cognitive reorientation;Patient/family training;Equipment evaluation/education;Compensatory technique education;Continued evaluation;Energy conservation;Activityengagement   Goal Expiration Date 04/11/24   OT Frequency 3-5x/wk   Discharge Recommendation   Rehab Resource Intensity Level, OT II (Moderate Resource Intensity)   AM-PAC Daily Activity Inpatient   Lower Body Dressing 2   Bathing 2   Toileting 2   Upper Body Dressing 3   Grooming 3   Eating 3   Daily Activity Raw Score 15   Daily Activity Standardized Score (Calc for Raw Score >=11) 34.69   AM-PAC Applied Cognition Inpatient   Following a Speech/Presentation 3   Understanding Ordinary Conversation 3   Taking Medications 1   Remembering Where Things Are Placed or Put Away 1   Remembering List of 4-5 Errands 1   Taking Care of Complicated Tasks 1   Applied Cognition Raw Score 10   Applied Cognition Standardized Score 24.98   Barthel Index   Feeding 5    Bathing 0   Grooming Score 0   Dressing Score 5   Bladder Score 5   Bowels Score 10   Toilet Use Score 5   Transfers (Bed/Chair) Score 10   Mobility (Level Surface) Score 0   Stairs Score 0   Barthel Index Score 40           1 - Patient will verbalize and demonstrate use of energy conservation/ deep breathing technique and work simplification skills during functional activity with no verbal cues.    2 - Patient will verbalize and demonstrate good body mechanics and joint protection techniques during  ADLs/ IADLs with no verbal cues.    3 - Patient will increase OOB/ sitting tolerance to 2-4 hours per day for increased participation in self care and leisure tasks with no s/s of exertion.    4 - Patient will increase standing tolerance time to 5  minutes with unilateral UE support to complete sink level ADLs@ mod I level.    5 - Patient will increase sitting tolerance at edge of bed to 20 minutes to complete UB ADLs @ set up assist level.    6 - Patient will transfer bed to Chair / toilet at Set up assist level with AD as indicated.     7 - Patient will complete UB ADLs with set up assist.     8 - Patient will complete LB ADLs with min assist with the use of adaptive equipment.     9 - Patient will complete toileting hygiene with set up assist/ supervision for thoroughness    10 - Patient/ Family  will demonstrate competency with UE Home Exercise Program.
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
## 2024-03-28 NOTE — CASE MANAGEMENT
Case Management Assessment & Discharge Planning Note    Patient name Gayathri Lopes  Location /-01 MRN [id number]  : 1950 Date 3/28/2024       Current Admission Date: 3/27/2024  Current Admission Diagnosis:Stroke-like symptoms   Patient Active Problem List    Diagnosis Date Noted    Acute metabolic encephalopathy 2024    Osteopenia after menopause 2023    Skin lesion of face 2023    Abnormal urinalysis 2023    Depression 2023    Postural dizziness with presyncope 2023    Ambulatory dysfunction 2023    Hyponatremia 2023    Hypertension 2023    Complex care coordination 2022    Other hyperlipidemia 2022    Hearing loss 2022    Mild dementia (HCC) 2022    Primary hypertension     Stage 3a chronic kidney disease (HCC) 2022    Microalbuminuria 2022    Bilateral carotid artery stenosis 2022    Dysarthria 2022    History of TIA (transient ischemic attack) 2021    Hyponatremia 2021    Diarrhea 2021    Type 2 diabetes mellitus with diabetic chronic kidney disease (HCC) 2021    Atherosclerotic plaque 2021    Stroke-like symptoms 2021    Benign hypertension with CKD (chronic kidney disease) stage III (HCC) 2021    Fall 2021    Asymptomatic bacteriuria 2021    Hypokalemia 2021      LOS (days): 1  Geometric Mean LOS (GMLOS) (days): 4.5  Days to GMLOS:3.4     OBJECTIVE:    Risk of Unplanned Readmission Score: 13.21         Current admission status: Inpatient       Preferred Pharmacy:   [id number] [place] - [address]  [address] [address]  Phone: [phone] Fax: [phone]    [id number] [place] - [address]  [address]  [place] [address]  Phone: [phone] Fax: [phone]    [id number] [place]  [address]  [address]  [address]  Phone: [phone] Fax: [phone]    Primary Care Provider: Wiliam Baker MD    Primary Insurance: [facility]  Secondary Insurance:     ASSESSMENT:  Active Health Care Proxies    There are no active Health Care Proxies on file.       Advance Directives  Does patient have Advance Directives?: Yes  Advance Directives: Living will, Power of  for health care  Primary Contact: Apple Henderson  [phone]         Readmission Root Cause  30 Day Readmission: No    Patient Information  Mental Status: Alert  During Assessment patient was accompanied by: Daughter  Assessment information provided by:: Daughter  Primary Caregiver: Family  Caregiver's Name:: Apple Saenz  Daughter  [phone]  Caregiver's Relationship to Patient:: Family Member  Caregiver's Telephone Number:: [phone]  Support Systems: Spouse/significant other, Daughter, Children  County of Residence: [place]  What city do you live in?: [place]  Home entry access options. Select all that apply.: Stairs  Number of steps to enter home.: 2  Type of Current Residence: 2 [place] home  Upon entering residence, is there a bedroom on the main floor (no further steps)?: No  A bedroom is located on the following floor levels of residence (select all that apply):: 2nd Floor  Upon entering residence, is there a bathroom on the main floor (no further steps)?: No  Indicate which floors of current residence have a bathroom (select all the apply):: 2nd Floor  Number of steps to 2nd floor from main floor: One Flight  Living Arrangements: Lives w/ Spouse/significant other    Activities of Daily Living Prior to Admission  Functional Status: Independent  Completes ADLs independently?: Yes (needs help with household tasks- has a  and dtr assists)  Ambulates independently?: Yes  Does patient use assisted devices?: No  Does patient currently own DME?: No  Does patient have a history of Outpatient Therapy (PT/OT)?: No  Does  the patient have a history of Short-Term Rehab?: No  Does patient have a history of HHC?: Yes  Does patient currently have HHC?: Yes ([facility])    Current Home Health Care  Type of Current Home Care Services: Home PT, Home OT, Nurse visit  Current Home Health Agency:: [facility]  Current Home Health Follow-Up Provider:: PCP    Patient Information Continued  Does patient have prescription coverage?: No  Does patient receive dialysis treatments?: No  Does patient have a history of substance abuse?: No  Does patient have a history of Mental Health Diagnosis?: No         Means of Transportation  Means of Transport to Appts:: Family transport      Social Determinants of Health (SDOH)      Flowsheet Row Most Recent Value   Housing Stability    In the last 12 months, was there a time when you were not able to pay the mortgage or rent on time? N   In the last 12 months, how many places have you lived? 1   In the last 12 months, was there a time when you did not have a steady place to sleep or slept in a shelter (including now)? N   Transportation Needs    In the past 12 months, has lack of transportation kept you from medical appointments or from getting medications? no   In the past 12 months, has lack of transportation kept you from meetings, work, or from getting things needed for daily living? No   Food Insecurity    Within the past 12 months, you worried that your food would run out before you got the money to buy more. Never true   Within the past 12 months, the food you bought just didn't last and you didn't have money to get more. Never true   Utilities    In the past 12 months has the electric, gas, oil, or water company threatened to shut off services in your home? No            DISCHARGE DETAILS:    Discharge planning discussed with:: Patient;s dtr-Apple  [place] of Choice: Yes  Comments - Freedom of Choice: ABDIAZIZ introduced self and explained role to cristina's dtr ,who was at bedside.  The patient was eating and did  not participatye in the conversation.  She has a hx of demetia and is not a good historian.  Offering [place] of Choice the CM discussed support resources for d/c planning based on the team recommendation-level  II.  Apple sgared that her cordelia Father who lives wiht her Mother is not a relaible caregiver due to his own physical limitations.  Apple states she and her siblings coordinate for them to have a home support plan for meals, med management and transportation.  Carolyn states he [place] does not qualify for Home Waiver due to her income.  At this time they are interested in STR.  CM explained a blanket referrral will be made in AIDIN and the CM will review accepting Providers when list available.  When they designate a chosen facility CM will submit for auth.  CM contacted family/caregiver?: Yes  Were Treatment Team discharge recommendations reviewed with patient/caregiver?: Yes  Did patient/caregiver verbalize understanding of patient care needs?: Yes       Contacts  Patient Contacts: Apple Saenz  Daughter  Relationship to Patient:: Family  Contact Method: In Person    Requested Home Health Care         Is the patient interested in HHC at discharge?: No    DME Referral Provided  Referral made for DME?: No    Other Referral/Resources/Interventions Provided:  Interventions: SNF, HHC, Short Term Rehab  Referral Comments: Dtytr reauested STR - a blanket referral was made in AIDIN- CM will review list of accepting Providers when available- will need a STR auth from B/C         Treatment Team Recommendation: Short Term Rehab  Discharge Destination Plan:: Short Term Rehab

## 2024-03-28 NOTE — SPEECH THERAPY NOTE
Speech Language/Pathology      Patient passed nursing dysphagia screen; presently tolerating oral diet.  Need for formal evaluation of swallow function is not indicated at this time.  Please re-order should status change or concerns arise.     Linda Garvey MS, CCC-SLP  Speech-Language Pathologist  [place] #[id number]  [place] #[id number]

## 2024-03-28 NOTE — ASSESSMENT & PLAN NOTE
Seems to have resolved at this time  Confusion could be secondary to CVA/delirium  Clinically patient does not examine infectious, no SIRS criteria  Check UA  Sodium levels are low but not low enough to cause encephalopathy

## 2024-03-28 NOTE — ASSESSMENT & PLAN NOTE
Lab Results   Component Value Date    HGBA1C 8.1 (H) 03/28/2024       Recent Labs     03/27/24  1216 03/27/24  2303   POCGLU 248* 190*         Blood Sugar Average: Last 72 hrs:  (P) 219    A1c of 8.7  Sliding scale insulin  Carb controlled diet

## 2024-03-28 NOTE — ASSESSMENT & PLAN NOTE
Seems to have resolved at this time  Confusion could be secondary to CVA/delirium  Clinically patient does not examine infectious, no SIRS criteria  Check UA, still awaiting collection  Sodium levels are low but not low enough to cause encephalopathy

## 2024-03-28 NOTE — ASSESSMENT & PLAN NOTE
Severe bilateral carotid artery stenosis 80 to 90%  Vascular surgery consulted  Continue aspirin and statin

## 2024-03-28 NOTE — ASSESSMENT & PLAN NOTE
75 yo female with DM Type 2, HLD, HTN, CKD, dementia, recent falls and bilateral carotid artery stenosis presented with reported left sided weakness, slurred speech, and difficulty ambulating. Stroke alert called.  CTA head and neck obtained, vascular was consulted for bilateral high grade carotid stenosis.    Diagnostics:  -Carotid Duplex -ordered  -MRI- (P)  -CTA stroke alert: No evidence for acute large vessel intracranial occlusion. Chronic mild right and severe left M1 segment stenosis. Chronic bilateral ICA origin/carotid bulb high-grade stenosis measuring approximately 80 to 90%.  -CT brain: No acute intracranial abnormality. Stable chronic microangiopathic changes within the brain.  Chronic right otomastoiditis without osseous erosion.    Labs:  sCr 0.81/ eGFR 71  WBC 11.81  Hgb 11.1  A1c 8.7    Recommendations:  - Bilateral high grade ICA stenosis  - Carotid duplex  - Continue ASA/ plavix (plavix load in ED per neurology)  - Continue statin  - ECHO (P)  - PT/OT/Speech  - neurovascular checks  - No urgent vascular intervention indicated.   - will d/w Dr Felipe, final recommendations/plan forthcoming pending review of imaging

## 2024-03-28 NOTE — PLAN OF CARE
Problem: PHYSICAL THERAPY ADULT  Goal: Performs mobility at highest level of function for planned discharge setting.  See evaluation for individualized goals.  Description: Treatment/Interventions: Functional transfer training, LE strengthening/ROM, Elevations, Therapeutic exercise, Endurance training, Cognitive reorientation, Patient/family training, Bed mobility, Gait training, OT          See flowsheet documentation for full assessment, interventions and recommendations.  Note: Prognosis: Good  Problem List: Decreased strength, Decreased endurance, Impaired balance, Decreased mobility, Decreased cognition  Assessment: Pt is 74 year old female seen for PT evaluation s/p admit to [facility] on 3/27/2024 with Stroke-like symptoms. PT consulted to assess pt's functional mobility and discharge needs. Order placed for PT evaluation and treatment, with up and out of bed as tolerated order. Comorbidities affecting pt's physical performance at time of assessment include type 2 DM with diabetic chronic kidney disease, hyponatremia, bilateral carotid artery stenosis, stage 3a CKD, mild dementia, and acute metabolic encephalopathy. Prior to hospitalization, pt was independent with all functional mobility without an AD. Pt ambulates household and community distances. Pt resides with her spouse, in a two level house with steps to enter. Pt is a questionable historian and information regarding home setup and prior level of function were obtained from patient, and further clarification is required. Personal factors affecting pt at time of initial evaluation include lives in a two story house, stairs to enter home, ambulating with an assistive device, inability to ambulate household distances, inability to ambulate community distances, inability to navigate level surfaces without external assistance, unable to perform dynamic tasks in the community, positive fall history, difficulty performing ADLs, and inability to  perform IADLs. Please find objective findings from PT assessment regarding body systems outlined above with impairments and limitations including weakness, impaired balance, decreased endurance, gait deviations, decreased activity tolerance, decreased functional mobility tolerance, fall risk, and decreased cognition. The following objective measures were performed on initial evaluation Barthel Index: 35/100, Modified [place]: 4 (moderate/severe disability), and AM-PAC 6-Clicks: 12/24. Pt's clinical presentation is currently unstable/unpredictable seen in pt's presentation of need for ongoing medical management/monitoring, pt is a fall risk, pt requires use of RW for safe ambulation, and pt requires cues and assist for safety with functional mobility. Pt to benefit from continued PT treatment to address deficits as defined above and maximize pt's level of function and independence with mobility. From a PT standpoint, recommendation at time of discharge would be level 2, moderate resource intensity in order to facilitate return to prior level of function.    Barriers to Discharge: Inaccessible home environment, Decreased caregiver support     Rehab Resource Intensity Level, PT: II (Moderate Resource Intensity)    See flowsheet documentation for full assessment.

## 2024-03-28 NOTE — ASSESSMENT & PLAN NOTE
Could be from poor p.o. intake since last couple days  Started on low-dose normal saline 75 cc/h and check BMP in a.m.  Check urine studies

## 2024-03-28 NOTE — CONSULTS
[facility]  Consult  Name: Gayathri Lopes 74 y.o. female I MRN: [id number]  Unit/Bed#: ED 15 I Date of Admission: 3/27/2024   Date of Service: 3/28/2024 I Hospital Day: 1    Inpatient consult to Vascular Surgery  Consult performed by: DEMETRA Salazar  Consult ordered by: Kishor Omalley MD        Assessment/Plan   Bilateral carotid artery stenosis  Assessment & Plan  73 yo female with DM Type 2, HLD, HTN, CKD, dementia, recent falls and bilateral carotid artery stenosis presented with reported left sided weakness, slurred speech, and difficulty ambulating. Stroke alert called.  CTA head and neck obtained, vascular was consulted for bilateral high grade carotid stenosis.    Diagnostics:  -Carotid Duplex -ordered  -MRI- (P)  -CTA stroke alert: No evidence for acute large vessel intracranial occlusion. Chronic mild right and severe left M1 segment stenosis. Chronic bilateral ICA origin/carotid bulb high-grade stenosis measuring approximately 80 to 90%.  -CT brain: No acute intracranial abnormality. Stable chronic microangiopathic changes within the brain.  Chronic right otomastoiditis without osseous erosion.    Labs:  sCr 0.81/ eGFR 71  WBC 11.81  Hgb 11.1  A1c 8.7    Recommendations:  - Bilateral high grade ICA stenosis  - Carotid duplex  - Continue ASA/ plavix (plavix load in ED per neurology)  - Continue statin  - ECHO (P)  - PT/OT/Speech  - neurovascular checks  - No urgent vascular intervention indicated.   - will d/w Dr Felipe, final recommendations/plan forthcoming pending review of imaging         Consulting Service: [facility]    Chief Complaint: stroke like symptoms    HPI: Gayathri Lopes is a 74 y.o. female with DM Type 2 (A1c 8.7), HLD, HTN, CKD, dementia, recent falls and bilateral carotid artery stenosis presented with reported left sided weakness, slurred speech, and difficulty ambulating.  CTA with evidence of b/l high grade ICA stenosis. Vascular consulted for evaluation and  recommendations.    Patient currently without complaints, denies pain, visual or speech disturbances, or unilateral weakness. All presenting symptoms appear resolved.    Patient can not answer as to why she came to hospital and denies falling.   She is alert an oriented to person and place.     In review of chart, pt with recent falls.     Last seen in vascular office 1/23/22, [facility].    Difficult gathering precipitating events and symptoms, patient remains sleepy and answers all questions with eyes closed, falling back asleep mid-conversation.;      Review of Systems:  General: positive for  - confusion  Cardiovascular: no chest pain or dyspnea on exertion  Respiratory: no cough, shortness of breath, or wheezing  Musculoskeletal ROS: negative  Neurological ROS: currently resolved      Past Medical History:  Past Medical History:   Diagnosis Date    MARTINA (acute kidney injury) (HCC) 11/21/2021    Diabetes mellitus (HCC)     Hypertension        Past Surgical History:  History reviewed. No pertinent surgical history.    Social History:  Social History     Substance and Sexual Activity   Alcohol Use No     Social History     Substance and Sexual Activity   Drug Use Never     Social History     Tobacco Use   Smoking Status Never   Smokeless Tobacco Never       Family History:  Family History   Problem Relation Age of Onset    Breast cancer Mother         unknown age    No Known Problems Father     No Known Problems Daughter     No Known Problems Maternal Grandmother     No Known Problems Maternal Grandfather     No Known Problems Paternal Grandmother     No Known Problems Paternal Grandfather     No Known Problems Son     No Known Problems Son     No Known Problems Maternal Aunt     No Known Problems Maternal Aunt     No Known Problems Maternal Aunt     No Known Problems Maternal Aunt     No Known Problems Paternal Aunt     No Known Problems Paternal Aunt     No Known Problems Paternal Aunt        Allergies:  No Known  "Allergies    Medications:  Current Facility-Administered Medications   Medication Dose Route Frequency    aspirin chewable tablet 81 mg  81 mg Oral Daily    atorvastatin (LIPITOR) tablet 40 mg  40 mg Oral QPM    donepezil (ARICEPT) tablet 10 mg  10 mg Oral HS    heparin (porcine) subcutaneous injection 5,000 Units  5,000 Units Subcutaneous Q8H LAURIE    insulin lispro (HumALOG/ADMELOG) 100 units/mL subcutaneous injection 1-5 Units  1-5 Units Subcutaneous TID AC    insulin lispro (HumALOG/ADMELOG) 100 units/mL subcutaneous injection 1-5 Units  1-5 Units Subcutaneous HS    iohexol (OMNIPAQUE) 350 MG/ML injection (MULTI-DOSE) 85 mL  85 mL Intravenous Once in imaging    sodium chloride 0.9 % infusion  75 mL/hr Intravenous Continuous       Vitals:  /58 (BP Location: Right arm)   Pulse 85   Temp 99.3 °F (37.4 °C) (Oral)   Resp 22   Wt 72.2 kg (159 lb 2.8 oz)   SpO2 95%   BMI 29.11 kg/m²     I/Os:  No intake/output data recorded.    Lab Results and Cultures:   Lab Results   Component Value Date    WBC 11.82 (H) 03/27/2024    HGB 11.1 (L) 03/27/2024    HCT 31.7 (L) 03/27/2024    MCV 89 03/27/2024     03/27/2024     Lab Results   Component Value Date    GLUCOSE 175 (H) 01/19/2023    CALCIUM 7.0 (L) 03/27/2024    K 3.5 03/27/2024    CO2 29 03/27/2024    CL 93 (L) 03/27/2024    BUN 3 (L) 03/27/2024    CREATININE 0.81 03/27/2024     Lab Results   Component Value Date    INR 1.19 03/27/2024    INR 0.93 11/20/2021    PROTIME 15.8 (H) 03/27/2024    PROTIME 12.5 11/20/2021       Lipid Panel: No results found for: \"CHOL\",     Blood Culture:   Lab Results   Component Value Date    BLOODCX Received in Microbiology Lab. Culture in Progress. 03/27/2024   ,   Urinalysis:   Lab Results   Component Value Date    COLORU Light Yellow 01/20/2023    CLARITYU Turbid 01/20/2023    SPECGRAV 1.012 01/20/2023    PHUR 5.5 01/20/2023    LEUKOCYTESUR Large (A) 01/20/2023    NITRITE Positive (A) 01/20/2023    GLUCOSEU Negative " "01/20/2023    KETONESU Negative 01/20/2023    BILIRUBINUR Negative 01/20/2023    BLOODU Negative 01/20/2023   ,   Urine Culture:   Lab Results   Component Value Date    URINECX >100,000 cfu/ml Escherichia coli (A) 01/20/2023   ,   Wound Culure: No results found for: \"WOUNDCULT\"    Imaging:  As above    Physical Exam:    General appearance: sleepy , able to arouse with prompts, oriented to person and place, falling asleep  Neurologic: A&O x 2, BUE/ BLE 5/5  Head: Normocephalic, without obvious abnormality, atraumatic  Lungs: clear to auscultation bilaterally  Chest wall: no tenderness  Heart: regular rate and rhythm, S1, S2 normal, no murmur, click, rub or gallop  Abdomen: soft, non-tender; bowel sounds normal; no masses,  no organomegaly  Extremities: extremities normal, warm and well-perfused; no cyanosis, clubbing, or edema    Wound/Incision:  N/a    Pulse exam:  Radial: Right: 2+ Left:: 2+        DEMETRA Salazar  3/28/2024  [facility], [phone]    "

## 2024-03-28 NOTE — ASSESSMENT & PLAN NOTE
CTA head and neck-Severe bilateral carotid artery stenosis 80 to 90%  Vascular surgery consulted  Continue aspirin and statin

## 2024-03-28 NOTE — ASSESSMENT & PLAN NOTE
Patient was found to have at home by patient's daughter.  Confusion seems to have resolved now  Patient had left arm weakness and and what was presented as left-sided neglect and visual changes.  On my exam this seems to have resolved  Patient will be admitted under stroke pathway  CTA shows: No evidence of acute large vessel intracranial occlusion.  Chronic bilateral ICA origin/carotid bulb high-grade stenosis approximately 80 to 90%  Patient will need further MRI, PT OT consultation, echocardiogram, A1c, cholesterol levels  Has been loaded with aspirin and Plavix.  Also given statin  Continue aspirin 81 mg p.o. daily, continue statin  Allow for permissive hypertension up to systolic 220 and or diastolic 110 mmHg  Neurology consultation-Notes reviewed

## 2024-03-28 NOTE — UTILIZATION REVIEW
Initial Clinical Review    Admission: Date/Time/Statement:   Admission Orders (From admission, onward)       Ordered        03/27/24 1549  INPATIENT ADMISSION  Once                          Orders Placed This Encounter   Procedures    INPATIENT ADMISSION     Standing Status:   Standing     Number of Occurrences:   1     Order Specific Question:   Level of Care     Answer:   Med Surg [16]     Order Specific Question:   Estimated length of stay     Answer:   More than 2 Midnights     Order Specific Question:   Certification     Answer:   I certify that inpatient services are medically necessary for this patient for a duration of greater than two midnights. See H&P and MD Progress Notes for additional information about the patient's course of treatment.     ED Arrival Information       Expected   -    Arrival   3/27/2024 12:13    Acuity   Emergent              Means of arrival   Ambulance    Escorted by   [facility]    Service   Hospitalist    Admission type   Emergency              Arrival complaint   AMS             Chief Complaint   Patient presents with    Altered Mental Status     Pt arrived via ems from home. As per ems pt is confused at baseline worse today. LWK 9pm. Pt family noticed slurred speech and difficulty ambulating this morning. Pt was [facility] for fall on Friday. Hx stroke       Initial Presentation: 74 y.o. female to ED from home w/  PMH of carotid artery stenosis, hypertension, dementia, CKD stage IIIa, diabetes mellitus type 2 presents with confusion.  Patient was found confused by her daughter who said patient was so confused that she was not able to get to the couch.  She came to her room at night and asked weird questions which she has never done in the past.  She also noted that patient was having weakness on the left side and she was found staring at the TV but momentarily was found not looking at the TV staring at the wall.  TV is not present and was not able to  see the left side of the TV. CTA shows: No evidence of acute large vessel intracranial occlusion.  Chronic bilateral ICA origin/carotid bulb high-grade stenosis approximately 80 to 90%. Admitted Ip statsu w/ stroke like sx plan for MRI , PT OT eval , echo , A1c, lipid panel , asa, plavix , statin , permissive HTN , neuro consult . Regan carotid artery stenosis vascular consult , asa, statin . Acute metabolic encephalopathy check ua , Na levels . Confusion could be sec to CVA /delirium . CKD Cr 0.81 , baseline 0.8-1 . Hyponatremia IVF , BMP in am , check urine studies. DM SSI and monitor .     PE: gait problem , weakness , confusion , dec concentration     3/27 Neuro Consult   Out of time window for TNK. From vascular standpoint with no LVO . Plan MRI brain , echo , vascular consult , load plavix , asa, plavix , lipitor , permissive HTN , neuro checks , tele .     3/28 Vascular Consult   F/u carotid duplex , MRI . Cont asa, plavix, statin , echo , PT OT ST , neurovascular checks . No urgent vascular intervention indicated .     Date: 3/28   Day 2: drowsy but arousable . Awaiting MRI . Na low but not low enough to cause encephalopathy . Allow for permissive hypertension up to systolic 220 and or diastolic 110 mmHg . Cont IVF .     3/28 Neuro Note   Oriented to self and able to answer questions . BLE weakness . Plan loaded w/ plavix , MRI brain and CDUS pending . Consider vascular sx consult if MRI brain + for ischemic stroke.     3/29 Day 3: Has surpassed a 2nd midnight with active treatments and services CR inc to 1.89 from 0.81 on 3/27 , baseline 0.8-1 . Avoid nephrotoxic agents . Na 130 was 132. Cont to monitor for improvement in renal function . Awaiting formal reading of MRI . Plan to DC 24-48 hrs to rehab .     3/29 Neuro Note   Neuro note a small ischemic appearing region in the posterior parietal or the occipital region on the right. Her flares had minimal small vessel disease remarkably given her poor CTA  findings. continue her on dual antiplatelet therapy for 3 months. Echo pending . Neuro checks , tele .       ED Triage Vitals   Temperature Pulse Respirations Blood Pressure SpO2   03/27/24 1219 03/27/24 1218 03/27/24 1218 03/27/24 1218 03/27/24 1218   99.3 °F (37.4 °C) (!) 109 20 (!) 193/109 94 %      Temp Source Heart Rate Source Patient Position - Orthostatic VS BP Location FiO2 (%)   03/27/24 1219 03/27/24 1218 03/27/24 1218 03/27/24 1218 --   Oral Monitor Sitting Left arm       Pain Score       03/28/24 0833       No Pain          Wt Readings from Last 1 Encounters:   03/27/24 72.2 kg (159 lb 2.8 oz)     Additional Vital Signs:   03/29/24 07:13:21 98.5 °F (36.9 °C) 83 18 144/64 91 96 % -- --   03/29/24 0300 98.5 °F (36.9 °C) 80 18 143/65 91 92 % None (Room air) Lying   03/28/24 2300 98.7 °F (37.1 °C) 86 18 147/62 90 94 % None (Room air) Lying   03/28/24 22:13:14 98.7 °F (37.1 °C) 86 -- 147/62 90 94 % -- --   03/28/24 1900 98.7 °F (37.1 °C) 88 18 146/64 91 -- None (Room air) Lying   03/28/24 1500 98 °F (36.7 °C) 81 18 128/62 84 95 % None (Room air) Lying   03/28/24 14:53:08 98 °F (36.7 °C) 80 -- 128/62 84 96 % -- --   03/28/24 1357 -- 79 -- 130/63 85 95 % None (Room air) Lying   03/28/24 13:52:21 98.7 °F (37.1 °C) 83 -- 130/63 85 95 % None (Room air) Lying   03/28/24 1300 -- 80 19 134/63 90 95 % None (Room air) Lyin     Date/Time Temp Pulse Resp BP MAP (mmHg) SpO2 O2 Device Patient Position - Orthostatic VS   03/28/24 1125 -- 86 23 Abnormal  84/47 Abnormal  61 Abnormal  95 % None (Room air) Lying   03/28/24 0930 -- 85 22 124/58 84 95 % None (Room air) Lying   03/28/24 0926 -- 77 20 131/57 82 95 % None (Room air) Lying   03/28/24 0515 -- 85 18 139/65 93 93 % -- --   03/28/24 0430 -- 81 28 Abnormal  164/71 102 93 % -- --   03/28/24 0200 -- 104 20 123/57 82 96 % -- --   03/28/24 0115 -- 91 20 136/62 89 95 % None (Room air) Sitting   03/28/24 0100 -- 90 16 150/65 93 95 % -- --   03/28/24 0000 -- 92 20 130/63 -- 91 %  -- --   03/27/24 2245 -- 95 20 118/58 82 95 % -- --   03/27/24 2145 -- 99 16 99/53 73 94 % -- --   03/27/24 2100 -- 99 20 106/57 77 94 % -- --   03/27/24 2000 -- 103 21 117/56 80 95 % -- --   03/27/24 1800 -- 96 20 128/60 86 94 % -- --   03/27/24 1745 -- 95 20 140/63 91 94 % -- --   03/27/24 1715 -- 100 20 135/63 91 96 % -- --   03/27/24 1700 -- 104 18 154/61 -- 96 % -- --   03/27/24 1630 -- 96 20 124/58 -- 94 % -- --   03/27/24 1600 -- 74 18 132/70 -- 96 % None (Room air) --   03/27/24 1545 -- 106 Abnormal  20 138/65 -- 94 % None (Room air) --   03/27/24 1530 -- 107 Abnormal  20 141/66 -- 93 % -- --   03/27/24 1515 -- 103 20 151/67 96 93 % -- --   03/27/24 1500 -- 107 Abnormal  14 162/58 97 94 % None (Room air) --   03/27/24 1400 99.3 °F (37.4 °C) 99 20 165/71 102 94 % None (Room air) Sitting   03/27/24 1345 99.3 °F (37.4 °C) -- 18 -- -- -- None (Room air) Sitting   03/27/24 1330 99.3 °F (37.4 °C) -- 18 -- -- -- None (Room air) Sitting   03/27/24 1315 99.3 °F (37.4 °C) -- 20 -- -- -- None (Room air) Sitting   03/27/24 1300 99.3 °F (37.4 °C) 89 20 167/72 -- -- None (Room air) Sitting   03/27/24 1222 -- -- 18 -- -- -- None (Room air) Sitting   03/27/24 1219 99.3 °F (37.4 °C) -- -- -- -- -- -- --     Pertinent Labs/Diagnostic Test Results:   3/28 VAS carotid RIGHT:  There is <50% stenosis noted in the internal carotid artery. Plaque is  heterogenous and irregular.  Moderate stenosis is noted in the external carotid artery.  Vertebral artery flow is antegrade. There is >50% stenosis noted in the  subclavian artery.   LEFT:  There is 50-69% stenosis noted in the internal carotid artery. Plaque is  heterogenous and irregular.  Vertebral artery flow is antegrade. There is no significant subclavian artery  disease.     3/29 MRI Acute to subacute posterior right temporal focus of lacunar ischemia. No associated hemorrhage.   Age-related involutional and minimal chronic microvascular ischemic change.     3/29 Echo   Left  Ventricle: Left ventricular cavity size is normal. Wall thickness is moderately increased. The left ventricular ejection fraction is 55%. Systolic function is normal. Although no diagnostic regional wall motion abnormality was identified, this possibility cannot be completely excluded on the basis of this study. Diastolic function is mildly abnormal, consistent with grade I (abnormal) relaxation.    IVS: There is sigmoid appearance of the septum.    Right Ventricle: Right ventricular cavity size is normal. Systolic function is normal.  3/27 EKG ST   CTA stroke alert (head/neck)   Final Result by Pallav N Shah, MD (03/27 1453)      1. No evidence for acute large vessel intracranial occlusion. Chronic mild right and severe left M1 segment stenosis.      2. Chronic bilateral ICA origin/carotid bulb high-grade stenosis measuring approximately 80 to 90%.      And findings were directly discussed with Monroe Clifton at 3/27/2024 at 1:25 p.m.                           Resident: MELBA Funk I, the attending radiologist, have reviewed the images and agree with the final report above.      Workstation performed: [id number]         CT stroke alert brain   Final Result by Pallav N Shah, MD (03/27 1454)      No acute intracranial abnormality.      Stable chronic microangiopathic changes within the brain.      Chronic right otomastoiditis without osseous erosion.      Findings were directly discussed with Monroe Clifton at approximately 3/27/2024 at 1:25 p.m.            Resident: MELBA Funk I, the attending radiologist, have reviewed the images and agree with the final report above.      Workstation performed: [id number]           Results from last 7 days   Lab Units 03/27/24  1313   SARS-COV-2  Negative     Results from last 7 days   Lab Units 03/29/24  0501 03/27/24  1835 03/27/24  1313   WBC Thousand/uL 10.31*  --  11.82*   HEMOGLOBIN g/dL 10.1*  --  11.1*   HEMATOCRIT % 28.6*  --  31.7*   PLATELETS Thousands/uL 229 286  258         Results from last 7 days   Lab Units 03/29/24  0501 03/27/24  1313   SODIUM mmol/L 130* 132*   POTASSIUM mmol/L 3.7 3.5   CHLORIDE mmol/L 94* 93*   CO2 mmol/L 26 29   ANION GAP mmol/L 10 10   BUN mg/dL 13 3*   CREATININE mg/dL 1.89* 0.81   EGFR ml/min/1.73sq m 25 71   CALCIUM mg/dL 6.2* 7.0*   MAGNESIUM mg/dL 1.0*  --          Results from last 7 days   Lab Units 03/29/24  0728 03/28/24  2020 03/28/24  1545 03/28/24  1317 03/28/24  1009 03/27/24  2303 03/27/24  1216   POC GLUCOSE mg/dl 141* 216* 175* 175* 242* 190* 248*     Results from last 7 days   Lab Units 03/29/24  0501 03/27/24  1313   GLUCOSE RANDOM mg/dL 133 221*     Results from last 7 days   Lab Units 03/28/24  0535   HEMOGLOBIN A1C % 8.1*   EAG mg/dl 186       Results from last 7 days   Lab Units 03/27/24  1835 03/27/24  1510 03/27/24  1313   HS TNI 0HR ng/L  --   --  12   HS TNI 2HR ng/L  --  9  --    HSTNI D2 ng/L  --  -3  --    HS TNI 4HR ng/L 16  --   --    HSTNI D4 ng/L 4  --   --          Results from last 7 days   Lab Units 03/27/24  1313   PROTIME seconds 15.8*   INR  1.19   PTT seconds 25     Results from last 7 days   Lab Units 03/27/24  1313   INFLUENZA A PCR  Negative   INFLUENZA B PCR  Negative   RSV PCR  Negative       Results from last 7 days   Lab Units 03/27/24  1835 03/27/24  1828   BLOOD CULTURE  No Growth at 24 hrs. No Growth at 24 hrs.       ED Treatment:   Medication Administration from 03/27/2024 1213 to 03/28/2024 1130         Date/Time Order Dose Route Action     03/27/2024 1418 EDT clopidogrel (PLAVIX) tablet 300 mg 300 mg Oral Given     03/28/2024 1017 EDT aspirin chewable tablet 81 mg 81 mg Oral Given     03/27/2024 1502 EDT aspirin chewable tablet 81 mg 81 mg Oral Given     03/27/2024 1830 EDT atorvastatin (LIPITOR) tablet 40 mg 40 mg Oral Given     03/27/2024 2300 EDT donepezil (ARICEPT) tablet 10 mg 10 mg Oral Given     03/27/2024 2300 EDT heparin (porcine) subcutaneous injection 5,000 Units 5,000 Units  Subcutaneous Given     03/27/2024 1800 EDT heparin (porcine) subcutaneous injection 5,000 Units 5,000 Units Subcutaneous Given     03/28/2024 1018 EDT insulin lispro (HumALOG/ADMELOG) 100 units/mL subcutaneous injection 1-5 Units 2 Units Subcutaneous Given     03/27/2024 2300 EDT insulin lispro (HumALOG/ADMELOG) 100 units/mL subcutaneous injection 1-5 Units 1 Units Subcutaneous Given     03/27/2024 2045 EDT sodium chloride 0.9 % infusion 75 mL/hr Intravenous New Bag          Past Medical History:   Diagnosis Date    MARTINA (acute kidney injury) (HCC) 11/21/2021    Diabetes mellitus ([facility])     Hypertension      Present on Admission:   Stroke-like symptoms   Type 2 diabetes mellitus with diabetic chronic kidney disease (HCC)   Hyponatremia   Stage 3a chronic kidney disease ([facility])   Mild dementia ([facility])   Bilateral carotid artery stenosis   Acute metabolic encephalopathy      Admitting Diagnosis: Carotid stenosis [I65.29]  Visual changes [H53.9]  Stroke-like symptom [R29.90]  Stroke-like episode [R29.90]  AMS (altered mental status) [R41.82]  Age/Sex: 74 y.o. female  Admission Orders:  Scheduled Medications:  aspirin, 81 mg, Oral, Daily  atorvastatin, 40 mg, Oral, QPM  clopidogrel, 75 mg, Oral, Daily  donepezil, 10 mg, Oral, HS  heparin (porcine), 5,000 Units, Subcutaneous, Q8H LAURIE  insulin lispro, 1-5 Units, Subcutaneous, TID AC  insulin lispro, 1-5 Units, Subcutaneous, HS  LORazepam, 1 mg, Intravenous, Once      Continuous IV Infusions:  sodium chloride, 75 mL/hr, Intravenous, Continuous      PRN Meds:  iohexol, 85 mL, Intravenous, Once in imaging    Fingerstick ac and hs   Tele   PT OT eval   Cardiac diet   Neuro checks   Cont pulse ox     IP CONSULT TO NEUROLOGY  IP CONSULT TO PHYSICAL MEDICINE REHAB  IP CONSULT TO NEUROLOGY  IP CONSULT TO CASE MANAGEMENT  IP CONSULT TO NUTRITION SERVICES  IP CONSULT TO VASCULAR SURGERY    Network Utilization Review Department  ATTENTION: Please call with any questions or concerns to  [phone] and carefully listen to the prompts so that you are directed to the right person. All voicemails are confidential.   For Discharge needs, contact Care Management DC Support Team at [phone] opt. 2  Send all requests for admission clinical reviews, approved or denied determinations and any other requests to dedicated fax number below belonging to the campus where the patient is receiving treatment. List of dedicated fax numbers for the Facilities:  FACILITY NAME UR FAX NUMBER   ADMISSION DENIALS (Administrative/Medical Necessity) [phone]   DISCHARGE SUPPORT TEAM (NETWORK) [phone]   PARENT CHILD HEALTH (Maternity/NICU/Pediatrics) [phone]   [facility] [phone]   [facility] [phone]   [facility] [phone]   General acute hospital [phone]   [facility] [phone]   [facility] [phone]   [facility] [phone]   [facility] [phone]   [facility] [phone]   [facility] [phone]   [facility] [phone]   [facility] [phone]

## 2024-03-28 NOTE — ASSESSMENT & PLAN NOTE
Could be from poor p.o. intake last couple days  Started on low-dose normal saline 75 cc/h and check BMP in a.m.  Check urine studies     Lab Results   Component Value Date    SODIUM 132 (L) 03/27/2024    SODIUM 138 07/11/2023

## 2024-03-28 NOTE — PLAN OF CARE
"  Problem: OCCUPATIONAL THERAPY ADULT  Goal: Performs self-care activities at highest level of function for planned discharge setting.  See evaluation for individualized goals.  Description: Treatment Interventions: ADL retraining, Functional transfer training, UE strengthening/ROM, Endurance training, Cognitive reorientation, Patient/family training, Equipment evaluation/education, Compensatory technique education, Continued evaluation, Energy conservation, Activityengagement          See flowsheet documentation for full assessment, interventions and recommendations.   Note: Limitation: Decreased ADL status, Decreased UE ROM, Decreased UE strength, Decreased Safe judgement during ADL, Decreased endurance, Decreased self-care trans, Decreased high-level ADLs, Decreased fine motor control  Prognosis: Good  Assessment: Patient is a 74 y.o. female seen for OT evaluation s/p admit to [facility] on 3/27/2024 w/Stroke-like symptoms. Commorbidities affecting patient's functional performance at time of assessment include: Bilateral carotid artery stenosis, acute metabolic encephalopathy, Stage 3 CKD, hyponatremia, Type 2 DM, Patient presented to ED with left arm weakness, left-sided neglect, visual changes and confusion.   Orders placed for OT evaluation and treatment.  Performed at least two patient identifiers during session including name and wristband.  Prior to admission,  Pt is a questionable historian. Information regarding home setup and PLOF obtained from patient. Further clarification is required.. Patient lives with family in a 2 story house, \"a couple\" RAO; flight of stairs to 2nd floor. Patient ambulates withoput AD,  assists with IADLs.  Personal factors affecting patient at time of initial evaluation include: limited caregiver support, steps to enter, decreased initiation and engagement, difficulty performing ADLs, and difficulty performing IADLs. Upon evaluation, patient requires minimal  " assist for UB ADLs, maximal assist for LB ADLs, transfers and functional ambulation in room and bathroom with minimal  assist, with the use of Rolling Walker.    Occupational performance is affected by the following deficits: decreased functional use of BUEs, decreased muscle strength, degenerative arthritic joint changes, impaired gross motor coordination, impaired fine motor coordination, dynamic sit/ stand balance deficit with poor standing tolerance time for self care and functional mobility, decreased activity tolerance, impaired memory, and decreased safety awareness.  Patient to benefit from continued Occupational Therapy treatment while in the hospital to address deficits as defined above and maximize level of functional independence with ADLs and functional mobility. Occupational Performance areas to address include: eating, grooming , bathing/ shower, dressing, toilet hygiene, transfer to all surfaces, functional mobility, emergency response, health maintenance, IADLs: safety procedures, and IADLs: meal prep/ clean up. From OT standpoint, recommendation at time of d/c would be Level 2.     Rehab Resource Intensity Level, OT: II (Moderate Resource Intensity)

## 2024-03-28 NOTE — ASSESSMENT & PLAN NOTE
Lab Results   Component Value Date    EGFR 71 03/27/2024    EGFR 54 07/11/2023    EGFR 54 06/29/2023    CREATININE 0.81 03/27/2024    CREATININE 1.01 07/11/2023    CREATININE 1.02 06/29/2023   Currently at baseline  Baseline around 0.8-1  Avoid hypotensive spells, nephrotoxic agents

## 2024-03-28 NOTE — PHYSICAL THERAPY NOTE
"   Physical Therapy Evaluation     Patient's Name: Gayathri Lopes    Admitting Diagnosis  AMS (altered mental status) [R41.82]    Problem List  Patient Active Problem List   Diagnosis    Type 2 diabetes mellitus with diabetic chronic kidney disease (HCC)    Atherosclerotic plaque    Stroke-like symptoms    Benign hypertension with CKD (chronic kidney disease) stage III (HCC)    Asymptomatic bacteriuria    Hypokalemia    Fall    Hyponatremia    Diarrhea    History of TIA (transient ischemic attack)    Bilateral carotid artery stenosis    Dysarthria    Stage 3a chronic kidney disease (HCC)    Microalbuminuria    Primary hypertension    Mild dementia (HCC)    Other hyperlipidemia    Hearing loss    Complex care coordination    Postural dizziness with presyncope    Ambulatory dysfunction    Hyponatremia    Hypertension    Abnormal urinalysis    Depression    Skin lesion of face    Osteopenia after menopause    Acute metabolic encephalopathy     Past Medical History  Past Medical History:   Diagnosis Date    MARTINA (acute kidney injury) (HCC) 11/21/2021    Diabetes mellitus (HCC)     Hypertension      Past Surgical History  History reviewed. No pertinent surgical history.     03/28/24 0833   PT Last Visit   PT Visit Date 03/28/24   Note Type   Note type Evaluation   Pain Assessment   Pain Assessment Tool 0-10   Pain Score No Pain   Restrictions/Precautions   Weight Bearing Precautions Per Order No   Other Precautions Cognitive;Chair Alarm;Bed Alarm;Multiple lines;Telemetry;Fall Risk   Home Living   Type of Home House   Home Layout Two level;Stairs to enter with rails;Bed/bath upstairs  (\"a couple\" RAO; flight of stairs to 2nd floor)   Bathroom Shower/Tub Walk-in shower   Bathroom Toilet Standard   Bathroom Equipment Shower chair;Grab bars in shower;Grab bars around toilet   Bathroom Accessibility Accessible   Home Equipment Other (Comment)  (none per patient)   Additional Comments Pt ambulates without an AD.   Prior " "Function   Level of [place] Independent with functional mobility;Independent with ADLs;Needs assistance with IADLS   Lives With Spouse   Receives Help From Family   IADLs Family/Friend/Other provides transportation;Family/Friend/Other provides meals;Family/Friend/Other provides medication management   Falls in the last 6 months 1 to 4  (pt denies; 1 fall per chart review)   Vocational Retired   Comments Pt is a questionable historian. Information regarding home setup and PLOF obtained from patient. Further clarification is required.   General   Family/Caregiver Present No   Cognition   Overall Cognitive Status Impaired   Arousal/Participation Alert   Attention Within functional limits   Orientation Level Oriented to person;Oriented to place;Disoriented to situation;Oriented to time  (oriented to month and year)   Memory Decreased recall of recent events;Decreased short term memory   Following Commands Follows one step commands with increased time or repetition   Comments Pt agreeable to PT.   Subjective   Subjective \"I need to go to the bathroom.\"   RLE Assessment   RLE Assessment X   Strength RLE   R Hip Flexion 4-/5   R Knee Extension 4-/5   R Ankle Dorsiflexion 4-/5   LLE Assessment   LLE Assessment X   Strength LLE   L Hip Flexion 3/5   L Knee Extension 3+/5   L Ankle Dorsiflexion 3+/5   Light Touch   RLE Light Touch Grossly intact   LLE Light Touch Grossly intact   Bed Mobility   Rolling R 4  Minimal assistance   Additional items Assist x 2;Increased time required;Verbal cues;LE management   Rolling L 4  Minimal assistance   Additional items Assist x 2;Increased time required;Verbal cues;LE management   Supine to Sit 4  Minimal assistance   Additional items Assist x 2;HOB elevated;Increased time required;Verbal cues;LE management   Sit to Supine 4  Minimal assistance   Additional items Assist x 2;Increased time required;Verbal cues;LE management   Transfers   Sit to Stand 4  Minimal assistance   Additional " items Assist x 2;Increased time required;Verbal cues   Stand to Sit 4  Minimal assistance   Additional items Assist x 2;Increased time required;Verbal cues;Armrests   Ambulation/Elevation   Gait pattern Decreased toe off;Decreased heel strike;Decreased hip extension;Excessively slow;Step to;Short stride;Shuffling   Gait Assistance 4  Minimal assist   Additional items Assist x 1;Verbal cues   Assistive Device Rolling walker   Distance 3 side steps at EOB; 3 steps bed to commode x 2 trials   Balance   Static Sitting Fair   Dynamic Sitting Fair -   Static Standing Fair -   Dynamic Standing Poor +   Ambulatory Poor +   Endurance Deficit   Endurance Deficit Yes   Endurance Deficit Description decreased activity tolerance   Activity Tolerance   Activity Tolerance Patient limited by fatigue   Medical Staff Made Aware OT Shira  (Co-evaluation performed with OT secondary to complex medical condition of patient and regression of functional status from baseline. PT/OT goals were addressed separately.)   Assessment   Prognosis Good   Problem List Decreased strength;Decreased endurance;Impaired balance;Decreased mobility;Decreased cognition   Assessment Pt is 74 year old female seen for PT evaluation s/p admit to [facility] on 3/27/2024 with Stroke-like symptoms. PT consulted to assess pt's functional mobility and discharge needs. Order placed for PT evaluation and treatment, with up and out of bed as tolerated order. Comorbidities affecting pt's physical performance at time of assessment include type 2 DM with diabetic chronic kidney disease, hyponatremia, bilateral carotid artery stenosis, stage 3a CKD, mild dementia, and acute metabolic encephalopathy. Prior to hospitalization, pt was independent with all functional mobility without an AD. Pt ambulates household and community distances. Pt resides with her spouse, in a two level house with steps to enter. Pt is a questionable historian and information regarding home  setup and prior level of function were obtained from patient, and further clarification is required. Personal factors affecting pt at time of initial evaluation include lives in a two story house, stairs to enter home, ambulating with an assistive device, inability to ambulate household distances, inability to ambulate community distances, inability to navigate level surfaces without external assistance, unable to perform dynamic tasks in the community, positive fall history, difficulty performing ADLs, and inability to perform IADLs. Please find objective findings from PT assessment regarding body systems outlined above with impairments and limitations including weakness, impaired balance, decreased endurance, gait deviations, decreased activity tolerance, decreased functional mobility tolerance, fall risk, and decreased cognition. The following objective measures were performed on initial evaluation Barthel Index: 35/100, Modified [place]: 4 (moderate/severe disability), and AM-PAC 6-Clicks: 12/24. Pt's clinical presentation is currently unstable/unpredictable seen in pt's presentation of need for ongoing medical management/monitoring, pt is a fall risk, pt requires use of RW for safe ambulation, and pt requires cues and assist for safety with functional mobility. Pt to benefit from continued PT treatment to address deficits as defined above and maximize pt's level of function and independence with mobility. From a PT standpoint, recommendation at time of discharge would be level 2, moderate resource intensity in order to facilitate return to prior level of function.   Barriers to Discharge Inaccessible home environment;Decreased caregiver support   Goals   STG Expiration Date 04/07/24   Short Term Goal #1 In 10 days: Increase bilateral LE strength 1/2 grade to facilitate independent mobility, Perform all bed mobility tasks modified independent to decrease caregiver burden, Perform all transfers modified  independent to improve independence, Ambulate > 150 ft. with least restrictive assistive device modified independent w/o LOB and w/ normalized gait pattern 100% of the time, Navigate 5 stairs with CG assist with unilateral handrail to facilitate return to previous living environment, and Increase all balance 1/2 grade to decrease risk for falls   Plan   Treatment/Interventions Functional transfer training;LE strengthening/ROM;Elevations;Therapeutic exercise;Endurance training;Cognitive reorientation;Patient/family training;Bed mobility;Gait training;OT   PT Frequency 3-5x/wk   Discharge Recommendation   Rehab Resource Intensity Level, PT II (Moderate Resource Intensity)   AM-PAC Basic Mobility Inpatient   Turning in Flat Bed Without Bedrails 2   Lying on Back to Sitting on Edge of Flat Bed Without Bedrails 2   Moving Bed to Chair 3   Standing Up From Chair Using Arms 2   Walk in Room 2   Climb 3-5 Stairs With Railing 1   Basic Mobility Inpatient Raw Score 12   Basic Mobility Standardized Score 32.23   [facility] Highest Level Of Mobility   -[facility] Goal 4: Move to chair/commode   -[facility] Achieved 4: Move to chair/commode   Modified [place] Scale   Modified [place] Scale 4   Barthel Index   Feeding 5   Bathing 0   Grooming Score 0   Dressing Score 5   Bladder Score 5   Bowels Score 10   Toilet Use Score 5   Transfers (Bed/Chair) Score 5   Mobility (Level Surface) Score 0   Stairs Score 0   Barthel Index Score 35     PT Evaluation Time: 0833-0853  Apple Chaudhari, PT, DPT

## 2024-03-29 ENCOUNTER — APPOINTMENT (INPATIENT)
Dept: MRI IMAGING | Facility: HOSPITAL | Age: 74
DRG: 064 | End: 2024-03-29
Payer: COMMERCIAL

## 2024-03-29 ENCOUNTER — APPOINTMENT (INPATIENT)
Dept: NON INVASIVE DIAGNOSTICS | Facility: HOSPITAL | Age: 74
DRG: 064 | End: 2024-03-29
Payer: COMMERCIAL

## 2024-03-29 PROBLEM — I63.511 ACUTE ISCHEMIC RIGHT MCA STROKE (HCC): Status: ACTIVE | Noted: 2021-11-30

## 2024-03-29 PROBLEM — E83.42 HYPOMAGNESEMIA: Status: ACTIVE | Noted: 2024-03-29

## 2024-03-29 LAB
ANION GAP SERPL CALCULATED.3IONS-SCNC: 10 MMOL/L (ref 4–13)
AORTIC ROOT: 2.6 CM
APICAL FOUR CHAMBER EJECTION FRACTION: 55 %
ASCENDING AORTA: 2.9 CM
AV LVOT MEAN GRADIENT: 2 MMHG
AV LVOT PEAK GRADIENT: 3 MMHG
BSA FOR ECHO PROCEDURE: 1.73 M2
BUN SERPL-MCNC: 13 MG/DL (ref 5–25)
CALCIUM SERPL-MCNC: 6.2 MG/DL (ref 8.4–10.2)
CHLORIDE SERPL-SCNC: 94 MMOL/L (ref 96–108)
CO2 SERPL-SCNC: 26 MMOL/L (ref 21–32)
CREAT SERPL-MCNC: 1.89 MG/DL (ref 0.6–1.3)
CREAT UR-MCNC: 109.5 MG/DL
DOP CALC LVOT PEAK VEL VTI: 18.7 CM
DOP CALC LVOT PEAK VEL: 0.81 M/S
E WAVE DECELERATION TIME: 237 MS
E/A RATIO: 0.63
ERYTHROCYTE [DISTWIDTH] IN BLOOD BY AUTOMATED COUNT: 12.9 % (ref 11.6–15.1)
FRACTIONAL SHORTENING: 27 (ref 28–44)
GFR SERPL CREATININE-BSD FRML MDRD: 25 ML/MIN/1.73SQ M
GLUCOSE SERPL-MCNC: 133 MG/DL (ref 65–140)
GLUCOSE SERPL-MCNC: 141 MG/DL (ref 65–140)
GLUCOSE SERPL-MCNC: 195 MG/DL (ref 65–140)
GLUCOSE SERPL-MCNC: 218 MG/DL (ref 65–140)
GLUCOSE SERPL-MCNC: 258 MG/DL (ref 65–140)
HCT VFR BLD AUTO: 28.6 % (ref 34.8–46.1)
HGB BLD-MCNC: 10.1 G/DL (ref 11.5–15.4)
INTERVENTRICULAR SEPTUM IN DIASTOLE (PARASTERNAL SHORT AXIS VIEW): 1.1 CM
INTERVENTRICULAR SEPTUM: 1.1 CM (ref 0.6–1.1)
LA/AORTA RATIO 2D: 1.08
LAAS-AP2: 7.9 CM2
LAAS-AP4: 7.2 CM2
LEFT ATRIUM SIZE: 2.8 CM
LEFT ATRIUM VOLUME (MOD BIPLANE): 11 ML
LEFT ATRIUM VOLUME INDEX (MOD BIPLANE): 6.4 ML/M2
LEFT INTERNAL DIMENSION IN SYSTOLE: 2.4 CM (ref 2.1–4)
LEFT VENTRICULAR INTERNAL DIMENSION IN DIASTOLE: 3.3 CM (ref 3.5–6)
LEFT VENTRICULAR POSTERIOR WALL IN END DIASTOLE: 1.1 CM
LEFT VENTRICULAR STROKE VOLUME: 24 ML
LVSV (TEICH): 24 ML
MAGNESIUM SERPL-MCNC: 1 MG/DL (ref 1.9–2.7)
MCH RBC QN AUTO: 31.5 PG (ref 26.8–34.3)
MCHC RBC AUTO-ENTMCNC: 35.3 G/DL (ref 31.4–37.4)
MCV RBC AUTO: 89 FL (ref 82–98)
MV E'TISSUE VEL-SEP: 10 CM/S
MV PEAK A VEL: 0.83 M/S
MV PEAK E VEL: 52 CM/S
MV STENOSIS PRESSURE HALF TIME: 69 MS
MV VALVE AREA P 1/2 METHOD: 3.19
PLATELET # BLD AUTO: 229 THOUSANDS/UL (ref 149–390)
PMV BLD AUTO: 8.9 FL (ref 8.9–12.7)
POTASSIUM SERPL-SCNC: 3.7 MMOL/L (ref 3.5–5.3)
RBC # BLD AUTO: 3.21 MILLION/UL (ref 3.81–5.12)
RIGHT VENTRICLE ID DIMENSION: 2.6 CM
SL CV LV EF: 55
SL CV PED ECHO LEFT VENTRICLE DIASTOLIC VOLUME (MOD BIPLANE) 2D: 44 ML
SL CV PED ECHO LEFT VENTRICLE SYSTOLIC VOLUME (MOD BIPLANE) 2D: 20 ML
SODIUM 24H UR-SCNC: 22 MOL/L
SODIUM SERPL-SCNC: 130 MMOL/L (ref 135–147)
TR MAX PG: 20 MMHG
TR PEAK VELOCITY: 2.2 M/S
TRICUSPID ANNULAR PLANE SYSTOLIC EXCURSION: 2.2 CM
TRICUSPID VALVE PEAK REGURGITATION VELOCITY: 2.23 M/S
WBC # BLD AUTO: 10.31 THOUSAND/UL (ref 4.31–10.16)

## 2024-03-29 PROCEDURE — 84300 ASSAY OF URINE SODIUM: CPT | Performed by: NURSE PRACTITIONER

## 2024-03-29 PROCEDURE — 70551 MRI BRAIN STEM W/O DYE: CPT

## 2024-03-29 PROCEDURE — 99232 SBSQ HOSP IP/OBS MODERATE 35: CPT | Performed by: NURSE PRACTITIONER

## 2024-03-29 PROCEDURE — 82948 REAGENT STRIP/BLOOD GLUCOSE: CPT

## 2024-03-29 PROCEDURE — 80048 BASIC METABOLIC PNL TOTAL CA: CPT | Performed by: NURSE PRACTITIONER

## 2024-03-29 PROCEDURE — 99233 SBSQ HOSP IP/OBS HIGH 50: CPT | Performed by: STUDENT IN AN ORGANIZED HEALTH CARE EDUCATION/TRAINING PROGRAM

## 2024-03-29 PROCEDURE — 83735 ASSAY OF MAGNESIUM: CPT | Performed by: NURSE PRACTITIONER

## 2024-03-29 PROCEDURE — 82570 ASSAY OF URINE CREATININE: CPT | Performed by: NURSE PRACTITIONER

## 2024-03-29 PROCEDURE — 85027 COMPLETE CBC AUTOMATED: CPT | Performed by: NURSE PRACTITIONER

## 2024-03-29 PROCEDURE — 93306 TTE W/DOPPLER COMPLETE: CPT

## 2024-03-29 PROCEDURE — 93306 TTE W/DOPPLER COMPLETE: CPT | Performed by: INTERNAL MEDICINE

## 2024-03-29 RX ORDER — MAGNESIUM SULFATE HEPTAHYDRATE 40 MG/ML
4 INJECTION, SOLUTION INTRAVENOUS ONCE
Status: COMPLETED | OUTPATIENT
Start: 2024-03-29 | End: 2024-03-29

## 2024-03-29 RX ORDER — CLOPIDOGREL BISULFATE 75 MG/1
75 TABLET ORAL DAILY
Status: DISCONTINUED | OUTPATIENT
Start: 2024-03-30 | End: 2024-04-01 | Stop reason: HOSPADM

## 2024-03-29 RX ADMIN — INSULIN LISPRO 2 UNITS: 100 INJECTION, SOLUTION INTRAVENOUS; SUBCUTANEOUS at 18:35

## 2024-03-29 RX ADMIN — ASPIRIN 81 MG: 81 TABLET, CHEWABLE ORAL at 09:26

## 2024-03-29 RX ADMIN — CLOPIDOGREL 75 MG: 75 TABLET ORAL at 09:26

## 2024-03-29 RX ADMIN — MAGNESIUM SULFATE HEPTAHYDRATE 4 G: 40 INJECTION, SOLUTION INTRAVENOUS at 19:37

## 2024-03-29 RX ADMIN — HEPARIN SODIUM 5000 UNITS: 5000 INJECTION INTRAVENOUS; SUBCUTANEOUS at 14:46

## 2024-03-29 RX ADMIN — HEPARIN SODIUM 5000 UNITS: 5000 INJECTION INTRAVENOUS; SUBCUTANEOUS at 22:48

## 2024-03-29 RX ADMIN — INSULIN LISPRO 2 UNITS: 100 INJECTION, SOLUTION INTRAVENOUS; SUBCUTANEOUS at 12:30

## 2024-03-29 RX ADMIN — ATORVASTATIN CALCIUM 40 MG: 40 TABLET, FILM COATED ORAL at 18:35

## 2024-03-29 RX ADMIN — SODIUM CHLORIDE 75 ML/HR: 0.9 INJECTION, SOLUTION INTRAVENOUS at 14:49

## 2024-03-29 RX ADMIN — INSULIN LISPRO 1 UNITS: 100 INJECTION, SOLUTION INTRAVENOUS; SUBCUTANEOUS at 22:48

## 2024-03-29 RX ADMIN — DONEPEZIL HYDROCHLORIDE 10 MG: 5 TABLET ORAL at 22:48

## 2024-03-29 RX ADMIN — HEPARIN SODIUM 5000 UNITS: 5000 INJECTION INTRAVENOUS; SUBCUTANEOUS at 06:07

## 2024-03-29 NOTE — ASSESSMENT & PLAN NOTE
75 yo female with DM Type 2, HLD, HTN, CKD, dementia, recent falls and bilateral carotid artery stenosis presented with reported left sided weakness, slurred speech, and difficulty ambulating. Stroke alert called.  CTA head and neck obtained, vascular was consulted for bilateral high grade carotid stenosis.    Diagnostics:  -Carotid Duplex 3/28/24 showed right ICA less than 50% stenosis with velocities 134/39, ratio 1.34, greater than 50% right subclavian artery stenosis with velocities 239 and left ICA 50 to 69% stenosis with velocities 173/46, ratio 2.11  -MRI- pending   -CTA stroke alert: No evidence for acute large vessel intracranial occlusion. Chronic mild right and severe left M1 segment stenosis. Chronic bilateral ICA origin/carotid bulb high-grade stenosis measuring approximately 80 to 90%.  -CT brain: No acute intracranial abnormality. Stable chronic microangiopathic changes within the brain.  Chronic right otomastoiditis without osseous erosion.    Labs:  sCr 1.89/ eGFR 25  WBC 10.31  Hgb 10.1  A1c 8.7    Recommendations:  - Bilateral high grade ICA stenosis.  Discrepancy in regard to degree of stenosis from CTA to duplex.  Imaging with velocities  - Carotid duplex showed right ICA less than 50% stenosis with velocities 134/39 and left ICA 50 to 69% stenosis with velocities 173/46, ratio 2.11  - MRI brain done this AM, awaiting final read. R ICA <50% stenosis   - Patient appears neurologically intact.  Equal upper extremity weakness  - Continue ASA/ plavix (plavix load in ED per neurology)  - Continue statin  - ECHO pending  - PT/OT/Speech  - neurovascular checks  - will d/w Dr Remy   - Discussed with KEVIN

## 2024-03-29 NOTE — ASSESSMENT & PLAN NOTE
Lab Results   Component Value Date    CREATININE 1.89 (H) 03/29/2024    CREATININE 0.81 03/27/2024     Continue IVF  Will check FENA

## 2024-03-29 NOTE — ASSESSMENT & PLAN NOTE
Recent Labs     03/29/24  0501 03/30/24  0504   MG 1.0* 2.6     Repletion of 4 g  Resolved with supplementation

## 2024-03-29 NOTE — ASSESSMENT & PLAN NOTE
Patient was found to have at home by patient's daughter.  Confusion seems to have resolved now  Patient had left arm weakness and and what was presented as left-sided neglect and visual changes.  Since resolved  Continue stroke pathway  CTA shows: No evidence of acute large vessel intracranial occlusion.  Chronic bilateral ICA origin/carotid bulb high-grade stenosis approximately 80 to 90%  MRI- Acute to subacute posterior right temporal focus of lacunar ischemia. No associated hemorrhage.   Echocardiogram Ordered  A1c 8.1  cholesterol levels- high triglycerides  Continue aspirin and Plavix, and Statin  Neurology following

## 2024-03-29 NOTE — PROGRESS NOTES
[facility]  Progress Note  Name: Gayathri Lopes I  MRN: [id number]  Unit/Bed#: -Ramy I Date of Admission: 3/27/2024   Date of Service: 3/29/2024 I Hospital Day: 2    Assessment/Plan   * Small acute posterior ischemic right MCA stroke (HCC)  Assessment & Plan  Patient was found to have at home by patient's daughter.  Confusion seems to have resolved now  Patient had left arm weakness and and what was presented as left-sided neglect and visual changes.  Since resolved  Continue stroke pathway  CTA shows: No evidence of acute large vessel intracranial occlusion.  Chronic bilateral ICA origin/carotid bulb high-grade stenosis approximately 80 to 90%  MRI- ordered   Echocardiogram Ordered  A1c 8.1  cholesterol levels- high triglycerides  Has been loaded with aspirin and Plavix.   Continue aspirin 81 mg p.o. daily, continue statin  Allow for permissive hypertension up to systolic 220 and or diastolic 110 mmHg  Neurology following    Acute metabolic encephalopathy  Assessment & Plan  Seems to have resolved at this time  Confusion could be secondary to CVA/delirium  Clinically patient does not examine infectious, no SIRS criteria  Check UA, still awaiting collection  Sodium levels are low but not low enough to cause encephalopathy    Mild dementia (HCC)  Assessment & Plan  Continue Aricept  Supportive care  Delirium precautions     Stage 3a chronic kidney disease (HCC)  Assessment & Plan  Lab Results   Component Value Date    EGFR 25 03/29/2024    EGFR 71 03/27/2024    EGFR 54 07/11/2023    CREATININE 1.89 (H) 03/29/2024    CREATININE 0.81 03/27/2024    CREATININE 1.01 07/11/2023   Currently at baseline  Baseline around 0.8-1  MARTINA  Avoid hypotensive spells, nephrotoxic agents    Bilateral carotid artery stenosis  Assessment & Plan  CTA head and neck-Severe bilateral carotid artery stenosis 80 to 90%  Vascular surgery consulted  Continue aspirin and statin  Carotid ultrasound reveals Left There  is 50-69% stenosis noted in the internal carotid artery.right <50%    Hyponatremia  Assessment & Plan  Could be from poor p.o. intake last couple days  Started on low-dose normal saline 75 cc/h and check BMP in a.m.  Check urine studies     Lab Results   Component Value Date    SODIUM 130 (L) 03/29/2024    SODIUM 132 (L) 03/27/2024         MARTINA (acute kidney injury) ([facility])  Assessment & Plan     Lab Results   Component Value Date    CREATININE 1.89 (H) 03/29/2024    CREATININE 0.81 03/27/2024     Continue IVF  Will check FENA    Type 2 diabetes mellitus with diabetic chronic kidney disease (HCC)  Assessment & Plan  Lab Results   Component Value Date    HGBA1C 8.1 (H) 03/28/2024       Recent Labs     03/28/24  1545 03/28/24 2020 03/29/24  0728 03/29/24  1126   POCGLU 175* 216* 141* 218*         Blood Sugar Average: Last 72 hrs:  (P) 200.625    A1c of 8.7  Sliding scale insulin  Carb controlled diet           VTE Pharmacologic Prophylaxis:   Moderate Risk (Score 3-4) - Pharmacological DVT Prophylaxis Ordered: heparin.    Mobility:   Basic Mobility Inpatient Raw Score: 17  JH-HLM Goal: 5: Stand one or more mins  JH-HLM Achieved: 6: Walk 10 steps or more  JH-HLM Goal NOT achieved. Continue with multidisciplinary rounding and encourage appropriate mobility to improve upon JH-HLM goals.    Patient Centered Rounds: I performed bedside rounds with nursing staff today. Spoke with NITESH Arana [phone]  Discussions with Specialists or Other Care Team Provider: spoke with jose garibay neurology    Education and Discussions with Family / Patient: Updated  (daughter) via phone. Zohreh called and voicemail left at 1250    Total Time Spent on Date of Encounter in care of patient: 35 mins. This time was spent on one or more of the following: performing physical exam; counseling and coordination of care; obtaining or reviewing history; documenting in the medical record; reviewing/ordering tests, medications or procedures;  communicating with other healthcare professionals and discussing with patient's family/caregivers.    Current Length of Stay: 2 day(s)  Current Patient Status: Inpatient   Certification Statement: The patient will continue to require additional inpatient hospital stay due to stroke workup and improvement in renal function  Discharge Plan: Anticipate discharge in 24-48 hrs to rehab facility.    Code Status: Level 1 - Full Code    Subjective:   Patient sitting up in bed eating lunch denies any chest pain chest tightness shortness of breath nausea vomiting diarrhea she ambulated to the restroom earlier today with assistance she is eating without difficulty drinking without difficulty she states that she feels back to her usual state of health and offers no complaints.  I informed her that we are waiting for renal function to get better she verbalizes understanding I informed her that we are waiting for her MRI to be read verbalizes understanding and I also stated that we intend to discharge her in the next 24 to 48 hours to rehab facility and she is agreeable to this plan    Objective:     Vitals:   Temp (24hrs), Av.5 °F (36.9 °C), Min:98 °F (36.7 °C), Max:98.7 °F (37.1 °C)    Temp:  [98 °F (36.7 °C)-98.7 °F (37.1 °C)] 98.7 °F (37.1 °C)  HR:  [79-88] 81  Resp:  [18-20] 20  BP: (128-147)/(62-65) 139/62  SpO2:  [92 %-96 %] 93 %  Body mass index is 29.08 kg/m².     Input and Output Summary (last 24 hours):     Intake/Output Summary (Last 24 hours) at 3/29/2024 1252  Last data filed at 3/28/2024 1700  Gross per 24 hour   Intake 1520 ml   Output 200 ml   Net 1320 ml       Physical Exam:   Physical Exam  Vitals reviewed.   Constitutional:       General: She is not in acute distress.     Appearance: She is obese. She is not ill-appearing.   Cardiovascular:      Rate and Rhythm: Normal rate.   Pulmonary:      Effort: No respiratory distress.   Abdominal:      General: There is no distension.   Skin:     General: Skin is  warm.      Coloration: Skin is pale.   Neurological:      Mental Status: She is alert. Mental status is at baseline.   Psychiatric:         Mood and Affect: Mood normal.        Additional Data:     Labs:  Results from last 7 days   Lab Units 03/29/24  0501   WBC Thousand/uL 10.31*   HEMOGLOBIN g/dL 10.1*   HEMATOCRIT % 28.6*   PLATELETS Thousands/uL 229     Results from last 7 days   Lab Units 03/29/24  0501   SODIUM mmol/L 130*   POTASSIUM mmol/L 3.7   CHLORIDE mmol/L 94*   CO2 mmol/L 26   BUN mg/dL 13   CREATININE mg/dL 1.89*   ANION GAP mmol/L 10   CALCIUM mg/dL 6.2*   GLUCOSE RANDOM mg/dL 133     Results from last 7 days   Lab Units 03/27/24  1313   INR  1.19     Results from last 7 days   Lab Units 03/29/24  1126 03/29/24  0728 03/28/24  2020 03/28/24  1545 03/28/24  1317 03/28/24  1009 03/27/24  2303 03/27/24  1216   POC GLUCOSE mg/dl 218* 141* 216* 175* 175* 242* 190* 248*     Results from last 7 days   Lab Units 03/28/24  0535   HEMOGLOBIN A1C % 8.1*           Lines/Drains:  Invasive Devices       Peripheral Intravenous Line  Duration             Peripheral IV 03/27/24 Left;Proximal;Ventral (anterior) Forearm 1 day              Drain  Duration             External Urinary Catheter <1 day                      Telemetry:  Telemetry Orders (From admission, onward)               24 Hour Telemetry Monitoring  Continuous x 24 Hours (Telem)        Question:  Reason for 24 Hour Telemetry  Answer:  TIA/Suspected CVA/ Confirmed CVA                     Recent Cultures (last 7 days):   Results from last 7 days   Lab Units 03/27/24  1835 03/27/24  1828   BLOOD CULTURE  No Growth at 24 hrs. No Growth at 24 hrs.       Last 24 Hours Medication List:   Current Facility-Administered Medications   Medication Dose Route Frequency Provider Last Rate    aspirin  81 mg Oral Daily DEMETRA George      atorvastatin  40 mg Oral QPM Kishor Omalley MD      [START ON 3/30/2024] clopidogrel  75 mg Oral Daily DEMETRA George       donepezil  10 mg Oral HS Kishor Omalley MD      heparin (porcine)  5,000 Units Subcutaneous Q8H LAURIE Kishor Omalley MD      insulin lispro  1-5 Units Subcutaneous TID AC Kishor Omalley MD      insulin lispro  1-5 Units Subcutaneous HS Kishor Omalley MD      iohexol  85 mL Intravenous Once in imaging Nola Love DO      LORazepam  1 mg Intravenous Once DEMETRA Toure      sodium chloride  75 mL/hr Intravenous Continuous Kishor Omalley MD 75 mL/hr (03/28/24 0453)        Today, Patient Was Seen By: DEMETRA Toure    **Please Note: This note may have been constructed using a voice recognition system.**

## 2024-03-29 NOTE — ASSESSMENT & PLAN NOTE
Lab Results   Component Value Date    HGBA1C 8.1 (H) 03/28/2024       Recent Labs     03/28/24  1545 03/28/24 2020 03/29/24  0728 03/29/24  1126   POCGLU 175* 216* 141* 218*         Blood Sugar Average: Last 72 hrs:  (P) 200.625    A1c of 8.7  Sliding scale insulin  Carb controlled diet

## 2024-03-29 NOTE — PROGRESS NOTES
[facility]  Neurology progress Note- Name: Gayathri Lopes I  MRN: [id number] Unit/Bed#: -01 I   Date of Admission: 3/27/2024  Date of Service: 3/29/2024 I Hospital Day: 2    Assessment/Plan   * Small acute posterior ischemic right MCA stroke (HCC)  Assessment & Plan  3/29/2024: Seen today by same neurology team is right-hand-dominant 74-year-old female with prior neuro history of MCI/dementia, TIA/hypertensive urgency in late 2021, known significant carotid and L MCA stenosis, diabetes, hypertension.  Patient is maintained on daily low-dose aspirin as well as Aricept.  She was staying at her daughter's house for a week or [place] in the [place] by the daughters report she seemed to be confused in the house not recognizing the bathroom and a small chalet type house.  Presents as stroke alert on 3/27 for this confusion and altered mentation throughout morning; Per family, periods of staring off yesterday AM, confusion, could not see objects in her sight of vision, and L sided weakness noted throughout morning.   Initial NIHSS of 5.  Neuro exam with slight left upper extremity weakness, diminished sensation of the left upper extremity, questionable left visual and tactile neglect and left upper quadrant visual field deficit.   Yesterday she was out of time window for TNK.  From vascular standpoint with no LVO.  Neuroimaging:  -CT head: no acute intracranial pathology  -CTA head/neck: Chronic mild right and severe left M1 segment stenosis.    -chronic bilateral ICA origin/carotid bulb high-grade stenosis (80-90%)  - MRI has not been read yet by radiology.  On our review we note a small ischemic appearing region in the posterior parietal or the occipital region on the right.  Her flares had minimal small vessel disease remarkably given her poor CTA findings.  Plan:  -Acute ischemic stroke pathway ongoing:  -2D echocardiogram the read is pending  -Appreciate vascular surgery input with  respect to carotid stenosis, carotid dopplers pending  -Continue DAPT for now, this patient has been on low-dose aspirin longstanding according to the daughter for decades.  She did have a period of time at her last event where she was on aspirin and Plavix together, the Plavix was stopped and she was put back on aspirin alone.  We are going to continue her on dual antiplatelet therapy for 3 months.  We asked that she try and have a P2 Y12 assay done at either the [place] or the [place] lab as an outpatient.  -Lipitor 40 mg daily, lipid panel with LDL of 88  -Hemoglobin A1c of 8.1, she should be following up with her PCP.  -I spoke with her daughter today she agrees that this patient is a good candidate for rehab as she has become much more sedentary over the last 6 months.  -Neurochecks  -Telemetry monitoring  -Provide stroke education    Mild dementia (HCC)  Assessment & Plan  3/29/2024: Patient's daughter reports that if she feels her mother is stable on the donepezil.  She feels that until yesterday, aside from the acute symptoms she displayed now known to be related to her stroke, she feels her mother does very well with a somewhat complex housing situation where she rotates living for 1 week with a daughter, granddaughter, and then 2 weeks in her home in [place].  She otherwise has done well without grossly dementia behaviors.    Bilateral carotid artery stenosis  Assessment & Plan  This patient will now be on aspirin and Plavix for 90 days.  She most likely will need to see vascular as an outpatient as well.         This patient needs to be seen in our outpatient neurology office hopefully by our vascular team possibly at [address] [place] [address], as [place] is a distance for this patient to be driven.  She will now be on aspirin and Plavix for 3 months.  She needs to have a P2 Y12 assay done as an outpatient at either [place] or [place].    Subjective/Objective     Subjective: I feel good today.  I do not  "know what happened yesterday.  I do not feel like I had a stroke    ROS: The patient reports that she feels much better today.  She feels she is thinking clearly.  She has no complaints of pain.  No double vision.  She reports she feels as if the visual disturbance she had yesterday, missing things on the left, has resolved.  The remainder of her query is negative.      Current Facility-Administered Medications   Medication Dose Route Frequency    aspirin  81 mg Oral Daily    atorvastatin  40 mg Oral QPM    clopidogrel  75 mg Oral Daily    donepezil  10 mg Oral HS    heparin (porcine)  5,000 Units Subcutaneous Q8H LAURIE    insulin lispro  1-5 Units Subcutaneous TID AC    insulin lispro  1-5 Units Subcutaneous HS    iohexol  85 mL Intravenous Once in imaging    LORazepam  1 mg Intravenous Once    sodium chloride  75 mL/hr Intravenous Continuous       iohexol    Vitals: Blood pressure 139/62, pulse 81, temperature 98.7 °F (37.1 °C), temperature source Oral, resp. rate 20, height 5' 2\" (1.575 m), weight 72.1 kg (159 lb), SpO2 93%, not currently breastfeeding.,Body mass index is 29.08 kg/m².        Physical Exam:     Pat seen in: In bed after her MRI.  She is finishing breakfast  General appearance: alert,   Neck, Lungs, Heart, & abdomen: WNL  Extremities: atraumatic, no cyanosis or edema    Neurologic:   Mental status: Alert, oriented, thought content appropriate, she did initially report that she thought she was at [facility].  This patient does however go back and forth between go down in [place] when she stays with her daughter versus her .  She otherwise did well with conversation without aphasia or dysarthria.  There were no paraphasic errors.  Conversation was appropriate.  CN: exam EOM's I, Gaze conjugate.  No field cut identified on today's exam.  Non lateralizing sensory & motor exam, (PP not tested on face). Reminder CNVIII-XII normal.   Motor: full power age appropriate x 4 limbs  Sensory: " grossly intact  X 4 limbs, PP not tested  Cerebellar: no ataxia or past pointing w pronation from a modified Romberg position.    Gait: Her gait to and from the bathroom according to the nurse was stable.  DTR's: Age appropriate,  Plantars: Mute bilaterally      Lab Results: I have personally reviewed pertinent reports.  , CBC:   Results from last 7 days   Lab Units 03/29/24  0501 03/27/24  1835 03/27/24  1313   WBC Thousand/uL 10.31*  --  11.82*   RBC Million/uL 3.21*  --  3.57*   HEMOGLOBIN g/dL 10.1*  --  11.1*   HEMATOCRIT % 28.6*  --  31.7*   MCV fL 89  --  89   PLATELETS Thousands/uL 229 286 258   , BMP/CMP:   Results from last 7 days   Lab Units 03/29/24  0501 03/27/24  1313   SODIUM mmol/L 130* 132*   POTASSIUM mmol/L 3.7 3.5   CHLORIDE mmol/L 94* 93*   CO2 mmol/L 26 29   BUN mg/dL 13 3*   CREATININE mg/dL 1.89* 0.81   CALCIUM mg/dL 6.2* 7.0*   EGFR ml/min/1.73sq m 25 71   , Vitamin B12:   , HgBA1C:   Results from last 7 days   Lab Units 03/28/24  0535   HEMOGLOBIN A1C % 8.1*   , TSH:   , Coagulation:   Results from last 7 days   Lab Units 03/27/24  1313   INR  1.19   , Lipid Profile:   Results from last 7 days   Lab Units 03/28/24  0535   HDL mg/dL 41*   LDL CALC mg/dL 88   TRIGLYCERIDES mg/dL 203*        Imaging Studies: I have personally reviewed pertinent films in PACS and have reviewed the images of her MRI, all series, with today's attending.  Her MRI report or reviewed by radiology is not yet up.  We note on her flares that she has very little small vessel disease given the significant burden of her vessels.  She had 1 small region of acute appearing ischemia in the posterior left MCA territory.  There was no blood or other concern for hemorrhage identified.    EEG, Echo, Pathology, and Other Studies:  Her echo has been done but the read is also pending.      Counseling / Coordination of Care  Total time spent today approximately 50 total minutes. Greater than 50% of total time was spent with the  patient and / or family counseling and / or coordination of care. A description of the counseling / coordination of care: All of the above was discussed and reviewed with the patient's daughter over the phone.  We had an approximate 20-minute discussion in addition to the time in the room chart review as well as film review.  All of the patient's and the daughter's questions were answered somewhat redundantly as they were not together.  This also discussed with internal medicine today as well.

## 2024-03-29 NOTE — CASE MANAGEMENT
Case Management Assessment & Discharge Planning Note    Patient name Gayathri Lopes  Location /-01 MRN [id number]  : 1950 Date 3/29/2024       Current Admission Date: 3/27/2024  Current Admission Diagnosis:Small acute posterior ischemic right MCA stroke (HCC)   Patient Active Problem List    Diagnosis Date Noted    Acute metabolic encephalopathy 2024    Osteopenia after menopause 2023    Skin lesion of face 2023    Abnormal urinalysis 2023    Depression 2023    Postural dizziness with presyncope 2023    Ambulatory dysfunction 2023    Hyponatremia 2023    Hypertension 2023    Complex care coordination 2022    Other hyperlipidemia 2022    Hearing loss 2022    Mild dementia (HCC) 2022    Primary hypertension     Stage 3a chronic kidney disease (HCC) 2022    Microalbuminuria 2022    Bilateral carotid artery stenosis 2022    Dysarthria 2022    History of TIA (transient ischemic attack) 2021    Hyponatremia 2021    Diarrhea 2021    Type 2 diabetes mellitus with diabetic chronic kidney disease (HCC) 2021    Atherosclerotic plaque 2021    Small acute posterior ischemic right MCA stroke (HCC) 2021    Benign hypertension with CKD (chronic kidney disease) stage III (HCC) 2021    Fall 2021    MARTINA (acute kidney injury) ([facility]) 2021    Asymptomatic bacteriuria 2021    Hypokalemia 2021      LOS (days): 2  Geometric Mean LOS (GMLOS) (days): 4.5  Days to GMLOS:2.4     OBJECTIVE:    Risk of Unplanned Readmission Score: 14.5         Current admission status: Inpatient       Preferred Pharmacy:   [id number] [place] - [address]  [facility] [facility] [place] [address]  Phone: [phone] Fax: [phone]    [id number] [place] - [address]  [address]  [place] [address]  Phone: [phone] Fax:  [phone]    [id number] - [place], [place] - [address]  [address] [place] [address]  Phone: [phone] Fax: [phone]    Primary Care Provider: Wiliam Baker MD    Primary Insurance: [facility]  Secondary Insurance:     ASSESSMENT:  Active Health Care Proxies    There are no active Health Care Proxies on file.         Social Determinants of Health (SDOH)      Flowsheet Row Most Recent Value   Housing Stability    In the last 12 months, was there a time when you were not able to pay the mortgage or rent on time? N   In the last 12 months, how many places have you lived? 1   In the last 12 months, was there a time when you did not have a steady place to sleep or slept in a shelter (including now)? N   Transportation Needs    In the past 12 months, has lack of transportation kept you from medical appointments or from getting medications? no   In the past 12 months, has lack of transportation kept you from meetings, work, or from getting things needed for daily living? No   Food Insecurity    Within the past 12 months, you worried that your food would run out before you got the money to buy more. Never true   Within the past 12 months, the food you bought just didn't last and you didn't have money to get more. Never true   Utilities    In the past 12 months has the electric, gas, oil, or water company threatened to shut off services in your home? No            DISCHARGE DETAILS:    Discharge planning discussed with:: Patient's dtr- Apple  Freedom of Choice: Yes  Comments - Freedom of Choice: ABDIAZIZ reviewed the accepting Provider list with the patient's dtr via phone. She chose [facility] ([facility]). CM conferred with SLIM and the patient will be d/c tomorrow . CM contacted [facility] and they can accept and got the auth for an admission on 3/30. CM coordinated for a 1pm stretcher van to [facility] tomorrow. CM explained the transport process and clothing recommended  for STR therapy stay. Report numbers placed in the chart and transport scheduled for a 1:30n stretcher van to the facility.  CM contacted family/caregiver?: Yes  Were Treatment Team discharge recommendations reviewed with patient/caregiver?: Yes          Contacts  Patient Contacts: Apple Saenz  Daughter  Relationship to Patient:: Family  Contact Method: In Person  Reason/Outcome: Continuity of Care, Discharge Planning    Requested Home Health Care         Is the patient interested in HHC at discharge?: No    DME Referral Provided  Referral made for DME?: No    Other Referral/Resources/Interventions Provided:  Interventions: Short Term Rehab  Referral Comments: Patient will be d/c to [facility] STR tomorrow in a 1:30 stretcher.  [facility] got the auth         Treatment Team Recommendation: Short Term Rehab  Discharge Destination Plan:: Short Term Rehab  Transport at Discharge : Other (Comment), Stretcher van        Transported by (Company and Unit #): [facility] [phone] Awarded 1:00pm EDT  ETA of Transport (Date): 03/30/24  ETA of Transport (Time): 1300

## 2024-03-29 NOTE — ASSESSMENT & PLAN NOTE
Lab Results   Component Value Date    EGFR 25 03/29/2024    EGFR 71 03/27/2024    EGFR 54 07/11/2023    CREATININE 1.89 (H) 03/29/2024    CREATININE 0.81 03/27/2024    CREATININE 1.01 07/11/2023   Currently at baseline  Baseline around 0.8-1  MARTINA  Avoid hypotensive spells, nephrotoxic agents

## 2024-03-29 NOTE — ASSESSMENT & PLAN NOTE
Could be from poor p.o. intake last couple days  Started on low-dose normal saline 75 cc/h and check BMP in a.m.  Check urine studies     Lab Results   Component Value Date    SODIUM 130 (L) 03/29/2024    SODIUM 132 (L) 03/27/2024

## 2024-03-29 NOTE — PROGRESS NOTES
[facility]  Progress Note  Name: Gayathri Lopes I  MRN: [id number]  Unit/Bed#: MS Márquez01 I Date of Admission: 3/27/2024   Date of Service: 3/29/2024 I Hospital Day: 2    Assessment/Plan   Bilateral carotid artery stenosis  Assessment & Plan  75 yo female with DM Type 2, HLD, HTN, CKD, dementia, recent falls and bilateral carotid artery stenosis presented with reported left sided weakness, slurred speech, and difficulty ambulating. Stroke alert called.  CTA head and neck obtained, vascular was consulted for bilateral high grade carotid stenosis.    Diagnostics:  -Carotid Duplex 3/28/24 showed right ICA less than 50% stenosis with velocities 134/39, ratio 1.34, greater than 50% right subclavian artery stenosis with velocities 239 and left ICA 50 to 69% stenosis with velocities 173/46, ratio 2.11  -MRI- pending   -CTA stroke alert: No evidence for acute large vessel intracranial occlusion. Chronic mild right and severe left M1 segment stenosis. Chronic bilateral ICA origin/carotid bulb high-grade stenosis measuring approximately 80 to 90%.  -CT brain: No acute intracranial abnormality. Stable chronic microangiopathic changes within the brain.  Chronic right otomastoiditis without osseous erosion.    Labs:  sCr 1.89/ eGFR 25  WBC 10.31  Hgb 10.1  A1c 8.7    Recommendations:  - Bilateral high grade ICA stenosis.  Discrepancy in regard to degree of stenosis from CTA to duplex.  Imaging with velocities  - Carotid duplex showed right ICA less than 50% stenosis with velocities 134/39 and left ICA 50 to 69% stenosis with velocities 173/46, ratio 2.11  - MRI brain done this AM, awaiting final read. R ICA <50% stenosis   - Patient appears neurologically intact.  Equal upper extremity weakness  - Continue ASA/ plavix (plavix load in ED per neurology)  - Continue statin  - ECHO pending  - PT/OT/Speech  - neurovascular checks  - will d/w Dr Remy   - Discussed with SLIM        Subjective:  Patient  "resting comfortably in bed.  Denies any visual disturbances.  Face is symmetrical and equal upper and lower extremity strength    Forgetful, patient cannot recall visual episode upon admission     Vitals:  /62 (BP Location: Left arm)   Pulse 81   Temp 98.7 °F (37.1 °C) (Oral)   Resp 20   Ht 5' 2\" (1.575 m)   Wt 72.1 kg (159 lb)   SpO2 93%   BMI 29.08 kg/m²     I/Os:  I/O last 3 completed shifts:  In: 1520 [P.O.:620; I.V.:900]  Out: 200 [Urine:200]  I/O this shift:  In: 780 [P.O.:780]  Out: -     Lab Results and Cultures:   Lab Results   Component Value Date    WBC 10.31 (H) 03/29/2024    HGB 10.1 (L) 03/29/2024    HCT 28.6 (L) 03/29/2024    MCV 89 03/29/2024     03/29/2024     Lab Results   Component Value Date    GLUCOSE 175 (H) 01/19/2023    CALCIUM 6.2 (L) 03/29/2024    K 3.7 03/29/2024    CO2 26 03/29/2024    CL 94 (L) 03/29/2024    BUN 13 03/29/2024    CREATININE 1.89 (H) 03/29/2024     Lab Results   Component Value Date    INR 1.19 03/27/2024    INR 0.93 11/20/2021    PROTIME 15.8 (H) 03/27/2024    PROTIME 12.5 11/20/2021        Blood Culture:   Lab Results   Component Value Date    BLOODCX No Growth at 24 hrs. 03/27/2024   ,   Urinalysis:   Lab Results   Component Value Date    COLORU Yellow 03/28/2024    CLARITYU Clear 03/28/2024    SPECGRAV >=1.050 (H) 03/28/2024    PHUR 5.5 03/28/2024    LEUKOCYTESUR Moderate (A) 03/28/2024    NITRITE Negative 03/28/2024    GLUCOSEU Negative 03/28/2024    KETONESU Negative 03/28/2024    BILIRUBINUR Negative 03/28/2024    BLOODU Trace (A) 03/28/2024   ,   Urine Culture:   Lab Results   Component Value Date    URINECX >100,000 cfu/ml Escherichia coli (A) 01/20/2023   ,   Wound Culure: No results found for: \"WOUNDCULT\"    Medications:  Current Facility-Administered Medications   Medication Dose Route Frequency    aspirin chewable tablet 81 mg  81 mg Oral Daily    atorvastatin (LIPITOR) tablet 40 mg  40 mg Oral QPM    [START ON 3/30/2024] clopidogrel " (PLAVIX) tablet 75 mg  75 mg Oral Daily    donepezil (ARICEPT) tablet 10 mg  10 mg Oral HS    heparin (porcine) subcutaneous injection 5,000 Units  5,000 Units Subcutaneous Q8H LAURIE    insulin lispro (HumALOG/ADMELOG) 100 units/mL subcutaneous injection 1-5 Units  1-5 Units Subcutaneous TID AC    insulin lispro (HumALOG/ADMELOG) 100 units/mL subcutaneous injection 1-5 Units  1-5 Units Subcutaneous HS    iohexol (OMNIPAQUE) 350 MG/ML injection (MULTI-DOSE) 85 mL  85 mL Intravenous Once in imaging    LORazepam (ATIVAN) injection 1 mg  1 mg Intravenous Once    sodium chloride 0.9 % infusion  75 mL/hr Intravenous Continuous       Imaging:  VAS carotid complete study     [facility] REPORT  CLINICAL:  Indications:  Patient presents to evaluate for carotid artery stenosis s/p recent TIA/CVA.  Patient reports visual changes.  Risk Factors  The patient has history of HTN and Diabetes (NIDDM (oral meds)).  Clinical  Right Pressure:  130/63 mm Hg, Left Pressure:  130/64 mm Hg.     FINDINGS:     Right        Impression  PSV  EDV (cm/s)  Direction of Flow  Ratio    Dist. ICA                 97          39                      0.97    Mid. ICA                 123          35                      1.23    Prox. ICA    1 - 49%     134          39                      1.34    Dist CCA                  86          22                              Mid CCA                  100          14                      0.85    Prox CCA                 118          22                              Ext Carotid  Moderate    242          16                      2.42    Prox Vert                 60          16  Antegrade                   Subclavian   >50%        239          27                                 Left         Impression  PSV  EDV (cm/s)  Direction of Flow  Ratio    Dist. ICA                 99          28                      1.20    Mid. ICA                 121          28                      1.47    Prox. ICA    50 - 69%    173           46                      2.11    Dist CCA                  82          13                              Mid CCA                   82          12                      1.02    Prox CCA                  80          18                              Ext Carotid              171          11                      2.08    Prox Vert                 72          13  Antegrade                   Subclavian               117           3                                       CONCLUSION:     Impression  RIGHT:  There is <50% stenosis noted in the internal carotid artery. Plaque is  heterogenous and irregular.  Moderate stenosis is noted in the external carotid artery.  Vertebral artery flow is antegrade. There is >50% stenosis noted in the  subclavian artery.     LEFT:  There is 50-69% stenosis noted in the internal carotid artery. Plaque is  heterogenous and irregular.  Vertebral artery flow is antegrade. There is no significant subclavian artery  disease.     Tech note: Calcified plaque in the ICA bilaterally may obscure more significant  stenosis.     Compared to previous study on 1/13/2022, there is progression of the disease on  the left.  Recommend repeat testing in 6 months as per protocol unless otherwise  indicated.     SIGNATURE:  Electronically Signed by: PAULO SUÁREZ on 2024-03-28 05:20:10 PM        Physical Exam:    General appearance: alert and oriented, in no acute distress  Skin: Skin color, texture, turgor normal. No rashes or lesions  Neurologic: Grossly normal  Head: Normocephalic, without obvious abnormality, atraumatic  Eyes:  EOMI  Throat: lips, mucosa, and tongue normal; teeth and gums normal  Neck: no carotid bruit, no JVD, and supple, symmetrical, trachea midline  Lungs: clear to auscultation bilaterally  Heart: regular rate and rhythm  Abdomen: soft, non-tender; bowel sounds normal; no masses,  no organomegaly  Extremities: extremities normal, warm and well-perfused; no cyanosis, clubbing, or  edema      DEMETRA Avila  3/29/2024  [facility]  [phone]

## 2024-03-29 NOTE — ASSESSMENT & PLAN NOTE
3/29/2024: Patient's daughter reports that if she feels her mother is stable on the donepezil.  She feels that until yesterday, aside from the acute symptoms she displayed now known to be related to her stroke, she feels her mother does very well with a somewhat complex housing situation where she rotates living for 1 week with a daughter, granddaughter, and then 2 weeks in her home in [place].  She otherwise has done well without grossly dementia behaviors.

## 2024-03-29 NOTE — ASSESSMENT & PLAN NOTE
CTA head and neck-Severe bilateral carotid artery stenosis 80 to 90%  Vascular surgery consulted  Continue aspirin and statin  Carotid ultrasound reveals Left There is 50-69% stenosis noted in the internal carotid artery.right <50%

## 2024-03-29 NOTE — ASSESSMENT & PLAN NOTE
Patient was found to have at home by patient's daughter.  Confusion seems to have resolved now  Patient had left arm weakness and and what was presented as left-sided neglect and visual changes.  Since resolved  Continue stroke pathway  CTA shows: No evidence of acute large vessel intracranial occlusion.  Chronic bilateral ICA origin/carotid bulb high-grade stenosis approximately 80 to 90%  MRI- ordered   Echocardiogram Ordered  A1c 8.1  cholesterol levels- high triglycerides  Has been loaded with aspirin and Plavix.   Continue aspirin 81 mg p.o. daily, continue statin  Allow for permissive hypertension up to systolic 220 and or diastolic 110 mmHg  Neurology following

## 2024-03-29 NOTE — ASSESSMENT & PLAN NOTE
This patient will now be on aspirin and Plavix for 90 days.  She most likely will need to see vascular as an outpatient as well.

## 2024-03-29 NOTE — PLAN OF CARE
Problem: Potential for Falls  Goal: Patient will remain free of falls  Description: INTERVENTIONS:  - Educate patient/family on patient safety including physical limitations  - Instruct patient to call for assistance with activity   - Consult OT/PT to assist with strengthening/mobility   - Keep Call bell within reach  - Keep bed low and locked with side rails adjusted as appropriate  - Keep care items and personal belongings within reach  - Initiate and maintain comfort rounds  - Make Fall Risk Sign visible to staff  - Offer Toileting every 2 Hours, in advance of need  - Initiate/Maintain 2alarm  - Obtain necessary fall risk management equipment: 2  - Apply yellow socks and bracelet for high fall risk patients  - Consider moving patient to room near nurses station  Outcome: Progressing     Problem: Prexisting or High Potential for Compromised Skin Integrity  Goal: Skin integrity is maintained or improved  Description: INTERVENTIONS:  - Identify patients at risk for skin breakdown  - Assess and monitor skin integrity  - Assess and monitor nutrition and hydration status  - Monitor labs   - Assess for incontinence   - Turn and reposition patient  - Assist with mobility/ambulation  - Relieve pressure over bony prominences  - Avoid friction and shearing  - Provide appropriate hygiene as needed including keeping skin clean and dry  - Evaluate need for skin moisturizer/barrier cream  - Collaborate with interdisciplinary team   - Patient/family teaching  - Consider wound care consult   Outcome: Progressing     Problem: NEUROSENSORY - ADULT  Goal: Achieves stable or improved neurological status  Description: INTERVENTIONS  - Monitor and report changes in neurological status  - Monitor vital signs such as temperature, blood pressure, glucose, and any other labs ordered   - Initiate measures to prevent increased intracranial pressure  - Monitor for seizure activity and implement precautions if appropriate      Outcome:  Progressing     Problem: GASTROINTESTINAL - ADULT  Goal: Maintains adequate nutritional intake  Description: INTERVENTIONS:  - Monitor percentage of each meal consumed  - Identify factors contributing to decreased intake, treat as appropriate  - Assist with meals as needed  - Monitor I&O, weight, and lab values if indicated  - Obtain nutrition services referral as needed  Outcome: Progressing     Problem: METABOLIC, FLUID AND ELECTROLYTES - ADULT  Goal: Glucose maintained within target range  Description: INTERVENTIONS:  - Monitor Blood Glucose as ordered  - Assess for signs and symptoms of hyperglycemia and hypoglycemia  - Administer ordered medications to maintain glucose within target range  - Assess nutritional intake and initiate nutrition service referral as needed  Outcome: Progressing

## 2024-03-30 LAB
ANION GAP SERPL CALCULATED.3IONS-SCNC: 11 MMOL/L (ref 4–13)
BUN SERPL-MCNC: 13 MG/DL (ref 5–25)
CALCIUM SERPL-MCNC: 6.6 MG/DL (ref 8.4–10.2)
CHLORIDE SERPL-SCNC: 96 MMOL/L (ref 96–108)
CO2 SERPL-SCNC: 24 MMOL/L (ref 21–32)
CREAT SERPL-MCNC: 2.11 MG/DL (ref 0.6–1.3)
ERYTHROCYTE [DISTWIDTH] IN BLOOD BY AUTOMATED COUNT: 12.8 % (ref 11.6–15.1)
GFR SERPL CREATININE-BSD FRML MDRD: 22 ML/MIN/1.73SQ M
GLUCOSE SERPL-MCNC: 159 MG/DL (ref 65–140)
GLUCOSE SERPL-MCNC: 184 MG/DL (ref 65–140)
GLUCOSE SERPL-MCNC: 189 MG/DL (ref 65–140)
GLUCOSE SERPL-MCNC: 199 MG/DL (ref 65–140)
GLUCOSE SERPL-MCNC: 203 MG/DL (ref 65–140)
HCT VFR BLD AUTO: 29.8 % (ref 34.8–46.1)
HGB BLD-MCNC: 10.6 G/DL (ref 11.5–15.4)
MAGNESIUM SERPL-MCNC: 2.6 MG/DL (ref 1.9–2.7)
MCH RBC QN AUTO: 31.5 PG (ref 26.8–34.3)
MCHC RBC AUTO-ENTMCNC: 35.6 G/DL (ref 31.4–37.4)
MCV RBC AUTO: 89 FL (ref 82–98)
PLATELET # BLD AUTO: 266 THOUSANDS/UL (ref 149–390)
PMV BLD AUTO: 9 FL (ref 8.9–12.7)
POTASSIUM SERPL-SCNC: 3.8 MMOL/L (ref 3.5–5.3)
RBC # BLD AUTO: 3.36 MILLION/UL (ref 3.81–5.12)
SODIUM SERPL-SCNC: 131 MMOL/L (ref 135–147)
WBC # BLD AUTO: 8.94 THOUSAND/UL (ref 4.31–10.16)

## 2024-03-30 PROCEDURE — 83735 ASSAY OF MAGNESIUM: CPT | Performed by: NURSE PRACTITIONER

## 2024-03-30 PROCEDURE — 82948 REAGENT STRIP/BLOOD GLUCOSE: CPT

## 2024-03-30 PROCEDURE — 85027 COMPLETE CBC AUTOMATED: CPT | Performed by: NURSE PRACTITIONER

## 2024-03-30 PROCEDURE — 80048 BASIC METABOLIC PNL TOTAL CA: CPT | Performed by: NURSE PRACTITIONER

## 2024-03-30 PROCEDURE — 99232 SBSQ HOSP IP/OBS MODERATE 35: CPT | Performed by: NURSE PRACTITIONER

## 2024-03-30 RX ORDER — ACETAMINOPHEN 325 MG/1
650 TABLET ORAL EVERY 6 HOURS PRN
Status: DISCONTINUED | OUTPATIENT
Start: 2024-03-30 | End: 2024-04-01 | Stop reason: HOSPADM

## 2024-03-30 RX ADMIN — CLOPIDOGREL 75 MG: 75 TABLET ORAL at 08:52

## 2024-03-30 RX ADMIN — HEPARIN SODIUM 5000 UNITS: 5000 INJECTION INTRAVENOUS; SUBCUTANEOUS at 21:26

## 2024-03-30 RX ADMIN — ATORVASTATIN CALCIUM 40 MG: 40 TABLET, FILM COATED ORAL at 17:24

## 2024-03-30 RX ADMIN — SODIUM CHLORIDE 75 ML/HR: 0.9 INJECTION, SOLUTION INTRAVENOUS at 05:24

## 2024-03-30 RX ADMIN — ACETAMINOPHEN 650 MG: 325 TABLET, FILM COATED ORAL at 06:05

## 2024-03-30 RX ADMIN — HEPARIN SODIUM 5000 UNITS: 5000 INJECTION INTRAVENOUS; SUBCUTANEOUS at 13:45

## 2024-03-30 RX ADMIN — INSULIN LISPRO 1 UNITS: 100 INJECTION, SOLUTION INTRAVENOUS; SUBCUTANEOUS at 17:24

## 2024-03-30 RX ADMIN — INSULIN LISPRO 1 UNITS: 100 INJECTION, SOLUTION INTRAVENOUS; SUBCUTANEOUS at 22:34

## 2024-03-30 RX ADMIN — DONEPEZIL HYDROCHLORIDE 10 MG: 5 TABLET ORAL at 21:23

## 2024-03-30 RX ADMIN — HEPARIN SODIUM 5000 UNITS: 5000 INJECTION INTRAVENOUS; SUBCUTANEOUS at 05:26

## 2024-03-30 RX ADMIN — INSULIN LISPRO 1 UNITS: 100 INJECTION, SOLUTION INTRAVENOUS; SUBCUTANEOUS at 08:52

## 2024-03-30 RX ADMIN — ASPIRIN 81 MG: 81 TABLET, CHEWABLE ORAL at 08:52

## 2024-03-30 NOTE — ASSESSMENT & PLAN NOTE
Could be from poor p.o. intake last couple days  Gradually improving  Reaction in the setting of hyperglycemia is 133 today  Asymptomatic  Continue fluid resuscitation     Lab Results   Component Value Date    SODIUM 131 (L) 03/30/2024    SODIUM 130 (L) 03/29/2024

## 2024-03-30 NOTE — PROGRESS NOTES
Formerly [facility], [facility]  Progress Note  Name: Gayathri Lopes I  MRN: [id number]  Unit/Bed#: [facility] I Date of Admission: 3/27/2024   Date of Service: 3/30/2024 I Hospital Day: 3    Assessment/Plan   * Small acute posterior ischemic right MCA stroke (HCC)  Assessment & Plan  Patient was found to have at home by patient's daughter.  Confusion seems to have resolved now  Patient had left arm weakness and and what was presented as left-sided neglect and visual changes.  Since resolved  Continue stroke pathway  CTA shows: No evidence of acute large vessel intracranial occlusion.  Chronic bilateral ICA origin/carotid bulb high-grade stenosis approximately 80 to 90%  MRI- Acute to subacute posterior right temporal focus of lacunar ischemia. No associated hemorrhage.   Echocardiogram Ordered  A1c 8.1  cholesterol levels- high triglycerides  Continue aspirin and Plavix, and Statin  Neurology following    Hypomagnesemia  Assessment & Plan  Recent Labs     03/29/24  0501 03/30/24  0504   MG 1.0* 2.6     Repletion of 4 g  Resolved with supplementation    Acute metabolic encephalopathy  Assessment & Plan  Resolved; Back to baseline  Confusion could be secondary to CVA/delirium  UA negative  Sodium levels are improving    Mild dementia (HCC)  Assessment & Plan  Continue Aricept  Supportive care  Delirium precautions     Stage 3a chronic kidney disease (HCC)  Assessment & Plan  Lab Results   Component Value Date    EGFR 22 03/30/2024    EGFR 25 03/29/2024    EGFR 71 03/27/2024    CREATININE 2.11 (H) 03/30/2024    CREATININE 1.89 (H) 03/29/2024    CREATININE 0.81 03/27/2024     Baseline around 0.8-1  MARTINA  Multifactorial in the setting of contrast, decreased p.o. intake, and short episode of hypotension    Bilateral carotid artery stenosis  Assessment & Plan  CTA head and neck-Severe bilateral carotid artery stenosis 80 to 90%  Vascular surgery following  Continue aspirin, Plavix and statin  Carotid ultrasound  reveals Left There is 50-69% stenosis noted in the internal carotid artery.Right <50%  Recommend outpatient follow-up    Hyponatremia  Assessment & Plan  Could be from poor p.o. intake last couple days  Gradually improving  Reaction in the setting of hyperglycemia is 133 today  Asymptomatic  Continue fluid resuscitation     Lab Results   Component Value Date    SODIUM 131 (L) 03/30/2024    SODIUM 130 (L) 03/29/2024         MARTINA (acute kidney injury) ([facility])  Assessment & Plan     Lab Results   Component Value Date    CREATININE 2.11 (H) 03/30/2024    CREATININE 1.89 (H) 03/29/2024     FENA prerenal.   Decreased Po intake while encephalopathic however also component of contrast-induced nephropathy for CTA head neck, also had an episode of hypotension so MARTINA is likely multifactorial  Continue aggressive fluid resuscitation  Avoid nephrotoxins, avoid hypotension  If no improvement consider nephrology consultation    Type 2 diabetes mellitus with diabetic chronic kidney disease (HCC)  Assessment & Plan  Lab Results   Component Value Date    HGBA1C 8.1 (H) 03/28/2024       Recent Labs     03/29/24  1126 03/29/24  1556 03/29/24 2027 03/30/24  0728   POCGLU 218* 258* 195* 189*         Blood Sugar Average: Last 72 hrs:  (P) 204.6025709687885706    A1c of 8.7  Sliding scale insulin  Carb controlled diet         VTE Pharmacologic Prophylaxis:   Moderate Risk (Score 3-4) - Pharmacological DVT Prophylaxis Ordered: heparin.    Mobility:   Basic Mobility Inpatient Raw Score: 17  JH-HLM Goal: 5: Stand one or more mins  JH-HLM Achieved: 1: Laying in bed  JH-HLM Goal achieved. Continue to encourage appropriate mobility.    Patient Centered Rounds: I performed bedside rounds with nursing staff today. Shantal DOWLING [id number]  Discussions with Specialists or Other Care Team Provider: reviewed notes    Education and Discussions with Family / Patient: Updated  (daughter) via phone. Zohreh on the phone [phone]; 7min 13 sec    Total Time  Spent on Date of Encounter in care of patient: 35 mins. This time was spent on one or more of the following: performing physical exam; counseling and coordination of care; obtaining or reviewing history; documenting in the medical record; reviewing/ordering tests, medications or procedures; communicating with other healthcare professionals and discussing with patient's family/caregivers.    Current Length of Stay: 3 day(s)  Current Patient Status: Inpatient   Certification Statement: The patient will continue to require additional inpatient hospital stay due to monitoring renal fx  Discharge Plan: Anticipate discharge in 24-48 hrs to rehab facility.    Code Status: Level 1 - Full Code    Subjective:   Lying in bed resting comfortably denies any chest pain chest tightness shortness of breath or difficulty breathing I did inform her that she had a stroke she verbalizes understanding I explained to her that she is staying here in the hospital so we can monitor her kidney function she verbalizes understanding her comprehension may be a little low of these exact diagnoses given her history of dementia but she denies any additional questions or concerns at this time    Objective:     Vitals:   Temp (24hrs), Av.9 °F (37.2 °C), Min:98.5 °F (36.9 °C), Max:99.5 °F (37.5 °C)    Temp:  [98.5 °F (36.9 °C)-99.5 °F (37.5 °C)] 98.5 °F (36.9 °C)  HR:  [76-89] 76  Resp:  [18-20] 18  BP: (136-177)/(59-76) 151/76  SpO2:  [92 %-97 %] 92 %  Body mass index is 29.08 kg/m².     Input and Output Summary (last 24 hours):     Intake/Output Summary (Last 24 hours) at 3/30/2024 0905  Last data filed at 3/30/2024 0300  Gross per 24 hour   Intake 420 ml   Output 600 ml   Net -180 ml       Physical Exam:   Physical Exam  Constitutional:       General: She is not in acute distress.     Appearance: She is obese. She is not ill-appearing.   Skin:     Coloration: Skin is pale.   Neurological:      Mental Status: She is alert. Mental status is at  baseline.   Psychiatric:         Mood and Affect: Mood normal.          Additional Data:     Labs:  Results from last 7 days   Lab Units 03/30/24  0504   WBC Thousand/uL 8.94   HEMOGLOBIN g/dL 10.6*   HEMATOCRIT % 29.8*   PLATELETS Thousands/uL 266     Results from last 7 days   Lab Units 03/30/24  0504   SODIUM mmol/L 131*   POTASSIUM mmol/L 3.8   CHLORIDE mmol/L 96   CO2 mmol/L 24   BUN mg/dL 13   CREATININE mg/dL 2.11*   ANION GAP mmol/L 11   CALCIUM mg/dL 6.6*   GLUCOSE RANDOM mg/dL 203*     Results from last 7 days   Lab Units 03/27/24  1313   INR  1.19     Results from last 7 days   Lab Units 03/30/24  0728 03/29/24  2027 03/29/24  1556 03/29/24  1126 03/29/24  0728 03/28/24  2020 03/28/24  1545 03/28/24  1317 03/28/24  1009 03/27/24  2303 03/27/24  1216   POC GLUCOSE mg/dl 189* 195* 258* 218* 141* 216* 175* 175* 242* 190* 248*     Results from last 7 days   Lab Units 03/28/24  0535   HEMOGLOBIN A1C % 8.1*           Lines/Drains:  Invasive Devices       Peripheral Intravenous Line  Duration             Peripheral IV 03/27/24 Left;Proximal;Ventral (anterior) Forearm 2 days              Drain  Duration             External Urinary Catheter <1 day                      Telemetry:  Telemetry Orders (From admission, onward)               24 Hour Telemetry Monitoring  Continuous x 24 Hours (Telem)        Question:  Reason for 24 Hour Telemetry  Answer:  Metabolic/electrolyte disturbance with high probability of dysrhythmia. K level <3 or >6 OR KCL infusion >10mEq/hr                     Recent Cultures (last 7 days):   Results from last 7 days   Lab Units 03/27/24  1835 03/27/24  1828   BLOOD CULTURE  No Growth at 48 hrs. No Growth at 48 hrs.       Last 24 Hours Medication List:   Current Facility-Administered Medications   Medication Dose Route Frequency Provider Last Rate    acetaminophen  650 mg Oral Q6H PRN DEMETRA Soto      aspirin  81 mg Oral Daily DEMETRA George      atorvastatin  40 mg  Oral QPM Kishor Omalley MD      clopidogrel  75 mg Oral Daily DEMETRA George      donepezil  10 mg Oral HS Kishor Omalley MD      heparin (porcine)  5,000 Units Subcutaneous Q8H LAURIE Kishor Omalley MD      insulin lispro  1-5 Units Subcutaneous TID AC Kishor Omalley MD      insulin lispro  1-5 Units Subcutaneous HS Kishor Omalley MD      iohexol  85 mL Intravenous Once in imaging Nola Love DO      LORazepam  1 mg Intravenous Once DEMETRA Toure      sodium chloride  100 mL/hr Intravenous Continuous DEMETRA Toure 75 mL/hr (03/30/24 0515)        Today, Patient Was Seen By: DEMETRA Toure    **Please Note: This note may have been constructed using a voice recognition system.**

## 2024-03-30 NOTE — ASSESSMENT & PLAN NOTE
Resolved; Back to baseline  Confusion could be secondary to CVA/delirium  UA negative  Sodium levels are improving

## 2024-03-30 NOTE — ASSESSMENT & PLAN NOTE
Lab Results   Component Value Date    CREATININE 1.95 (H) 03/31/2024    CREATININE 2.11 (H) 03/30/2024     FENA prerenal.   Decreased Po intake while encephalopathic however also component of contrast-induced nephropathy for CTA head neck, also had an episode of hypotension so MARTINA is likely multifactorial  Imporving  Continue aggressive fluid resuscitation  Avoid nephrotoxins, avoid hypotension

## 2024-03-30 NOTE — CASE MANAGEMENT
Case Management Progress Note    Patient name Gayathri Lopes  Location /-01 MRN [id number]  : 1950 Date 3/30/2024       LOS (days): 3  Geometric Mean LOS (GMLOS) (days): 4.5  Days to GMLOS:1.6        OBJECTIVE:     Current admission status: Inpatient  Preferred Pharmacy:   [facility]pharm[id number] - [place] - [address]  [address] [facility] [address] [facility] [place] [address]  Phone: [phone] Fax: [phone]    [id number] - [place] - [address]  [address]  [place] [address]  Phone: [phone] Fax: [phone]    [id number] - [facility] [place] - [address]  [address] [address]  Phone: [phone] Fax: [phone]    Primary Care Provider: Wiliam Baker MD  [facility]  Secondary Insurance:     PROGRESS NOTE:  D/C held for today.  CM updated accepting facility in [facility].  Per SLIM, anticipated d/c in 24-48hrs.  Facility managed own auth (expires ).  PASRR attached in [facility].

## 2024-03-30 NOTE — ASSESSMENT & PLAN NOTE
Lab Results   Component Value Date    HGBA1C 8.1 (H) 03/28/2024       Recent Labs     03/29/24  1126 03/29/24  1556 03/29/24 2027 03/30/24  0728   POCGLU 218* 258* 195* 189*         Blood Sugar Average: Last 72 hrs:  (P) 204.9421533822045823    A1c of 8.7  Sliding scale insulin  Carb controlled diet

## 2024-03-30 NOTE — ASSESSMENT & PLAN NOTE
Recent Labs     03/29/24  0501 03/30/24  0504 03/31/24  0447   MG 1.0* 2.6 2.3     Resolved with supplementation

## 2024-03-30 NOTE — PLAN OF CARE
Problem: Potential for Falls  Goal: Patient will remain free of falls  Description: INTERVENTIONS:  - Educate patient/family on patient safety including physical limitations  - Instruct patient to call for assistance with activity   - Consult OT/PT to assist with strengthening/mobility   - Keep Call bell within reach  - Keep bed low and locked with side rails adjusted as appropriate  - Keep care items and personal belongings within reach  - Initiate and maintain comfort rounds  - Make Fall Risk Sign visible to staff  - Apply yellow socks and bracelet for high fall risk patients  - Consider moving patient to room near nurses station  Outcome: Progressing     Problem: Prexisting or High Potential for Compromised Skin Integrity  Goal: Skin integrity is maintained or improved  Description: INTERVENTIONS:  - Identify patients at risk for skin breakdown  - Assess and monitor skin integrity  - Assess and monitor nutrition and hydration status  - Monitor labs   - Assess for incontinence   - Turn and reposition patient  - Assist with mobility/ambulation  - Relieve pressure over bony prominences  - Avoid friction and shearing  - Provide appropriate hygiene as needed including keeping skin clean and dry  - Evaluate need for skin moisturizer/barrier cream  - Collaborate with interdisciplinary team   - Patient/family teaching  - Consider wound care consult   Outcome: Progressing     Problem: NEUROSENSORY - ADULT  Goal: Achieves stable or improved neurological status  Description: INTERVENTIONS  - Monitor and report changes in neurological status  - Monitor vital signs such as temperature, blood pressure, glucose, and any other labs ordered   - Initiate measures to prevent increased intracranial pressure  - Monitor for seizure activity and implement precautions if appropriate      Outcome: Progressing     Problem: GASTROINTESTINAL - ADULT  Goal: Maintains adequate nutritional intake  Description: INTERVENTIONS:  - Monitor  percentage of each meal consumed  - Identify factors contributing to decreased intake, treat as appropriate  - Assist with meals as needed  - Monitor I&O, weight, and lab values if indicated  - Obtain nutrition services referral as needed  Outcome: Progressing     Problem: METABOLIC, FLUID AND ELECTROLYTES - ADULT  Goal: Glucose maintained within target range  Description: INTERVENTIONS:  - Monitor Blood Glucose as ordered  - Assess for signs and symptoms of hyperglycemia and hypoglycemia  - Administer ordered medications to maintain glucose within target range  - Assess nutritional intake and initiate nutrition service referral as needed  Outcome: Progressing     Problem: Nutrition/Hydration-ADULT  Goal: Nutrient/Hydration intake appropriate for improving, restoring or maintaining nutritional needs  Description: Monitor and assess patient's nutrition/hydration status for malnutrition. Collaborate with interdisciplinary team and initiate plan and interventions as ordered.  Monitor patient's weight and dietary intake as ordered or per policy. Utilize nutrition screening tool and intervene as necessary. Determine patient's food preferences and provide high-protein, high-caloric foods as appropriate.     INTERVENTIONS:  - Monitor oral intake, urinary output, labs, and treatment plans  - Assess nutrition and hydration status and recommend course of action  - Evaluate amount of meals eaten  - Assist patient with eating if necessary   - Allow adequate time for meals  - Recommend/ encourage appropriate diets, oral nutritional supplements, and vitamin/mineral supplements  - Order, calculate, and assess calorie counts as needed  - Recommend, monitor, and adjust tube feedings and TPN/PPN based on assessed needs  - Assess need for intravenous fluids  - Provide specific nutrition/hydration education as appropriate  - Include patient/family/caregiver in decisions related to nutrition  Outcome: Progressing

## 2024-03-30 NOTE — ASSESSMENT & PLAN NOTE
Lab Results   Component Value Date    HGBA1C 8.1 (H) 03/28/2024       Recent Labs     03/29/24  1556 03/29/24 2027 03/30/24  0728 03/30/24  1055   POCGLU 258* 195* 189* 184*         Blood Sugar Average: Last 72 hrs:  (P) 202.5029147649239921    A1c of 8.7  Sliding scale insulin  Carb controlled diet

## 2024-03-30 NOTE — ASSESSMENT & PLAN NOTE
Patient was found to have at home by patient's daughter.  Confusion seems to have resolved now  Patient had left arm weakness and and what was presented as left-sided neglect and visual changes.  Since resolved  Continue stroke pathway  CTA shows: No evidence of acute large vessel intracranial occlusion.  Chronic bilateral ICA origin/carotid bulb high-grade stenosis approximately 80 to 90%  MRI- Acute to subacute posterior right temporal focus of lacunar ischemia. No associated hemorrhage.   Echocardiogram-left ventricular ejection fraction is 55%. Systolic function is normal. Although no diagnostic regional wall motion abnormality was identified,   A1c 8.1  cholesterol levels- high triglycerides  Continue aspirin and Plavix, and Statin  Neurology following

## 2024-03-30 NOTE — ASSESSMENT & PLAN NOTE
Lab Results   Component Value Date    EGFR 22 03/30/2024    EGFR 25 03/29/2024    EGFR 71 03/27/2024    CREATININE 2.11 (H) 03/30/2024    CREATININE 1.89 (H) 03/29/2024    CREATININE 0.81 03/27/2024     Baseline around 0.8-1  MARTINA  Multifactorial in the setting of contrast, decreased p.o. intake, and short episode of hypotension

## 2024-03-30 NOTE — ASSESSMENT & PLAN NOTE
CTA head and neck-Severe bilateral carotid artery stenosis 80 to 90%  Vascular surgery following  Continue aspirin, Plavix and statin  Carotid ultrasound reveals Left There is 50-69% stenosis noted in the internal carotid artery.Right <50%  Recommend outpatient follow-up

## 2024-03-30 NOTE — ASSESSMENT & PLAN NOTE
Could be from poor p.o. intake last couple days  Gradually improving  Reaction in the setting of hyperglycemia is 133 today  Asymptomatic  Continue fluid resuscitation     Lab Results   Component Value Date    SODIUM 133 (L) 03/31/2024    SODIUM 131 (L) 03/30/2024

## 2024-03-30 NOTE — ASSESSMENT & PLAN NOTE
Lab Results   Component Value Date    CREATININE 2.11 (H) 03/30/2024    CREATININE 1.89 (H) 03/29/2024     FENA prerenal.   Decreased Po intake while encephalopathic however also component of contrast-induced nephropathy for CTA head neck, also had an episode of hypotension so MARTINA is likely multifactorial  Continue aggressive fluid resuscitation  Avoid nephrotoxins, avoid hypotension  If no improvement consider nephrology consultation

## 2024-03-31 LAB
ANION GAP SERPL CALCULATED.3IONS-SCNC: 6 MMOL/L (ref 4–13)
BUN SERPL-MCNC: 11 MG/DL (ref 5–25)
CALCIUM SERPL-MCNC: 6.3 MG/DL (ref 8.4–10.2)
CHLORIDE SERPL-SCNC: 106 MMOL/L (ref 96–108)
CO2 SERPL-SCNC: 21 MMOL/L (ref 21–32)
CREAT SERPL-MCNC: 1.95 MG/DL (ref 0.6–1.3)
ERYTHROCYTE [DISTWIDTH] IN BLOOD BY AUTOMATED COUNT: 13.1 % (ref 11.6–15.1)
GFR SERPL CREATININE-BSD FRML MDRD: 24 ML/MIN/1.73SQ M
GLUCOSE SERPL-MCNC: 116 MG/DL (ref 65–140)
GLUCOSE SERPL-MCNC: 118 MG/DL (ref 65–140)
GLUCOSE SERPL-MCNC: 138 MG/DL (ref 65–140)
GLUCOSE SERPL-MCNC: 161 MG/DL (ref 65–140)
GLUCOSE SERPL-MCNC: 164 MG/DL (ref 65–140)
GLUCOSE SERPL-MCNC: 188 MG/DL (ref 65–140)
HCT VFR BLD AUTO: 29.5 % (ref 34.8–46.1)
HGB BLD-MCNC: 10 G/DL (ref 11.5–15.4)
MAGNESIUM SERPL-MCNC: 2.3 MG/DL (ref 1.9–2.7)
MCH RBC QN AUTO: 31.2 PG (ref 26.8–34.3)
MCHC RBC AUTO-ENTMCNC: 33.9 G/DL (ref 31.4–37.4)
MCV RBC AUTO: 92 FL (ref 82–98)
PLATELET # BLD AUTO: 261 THOUSANDS/UL (ref 149–390)
PMV BLD AUTO: 9 FL (ref 8.9–12.7)
POTASSIUM SERPL-SCNC: 4.5 MMOL/L (ref 3.5–5.3)
RBC # BLD AUTO: 3.21 MILLION/UL (ref 3.81–5.12)
SODIUM SERPL-SCNC: 133 MMOL/L (ref 135–147)
WBC # BLD AUTO: 7.52 THOUSAND/UL (ref 4.31–10.16)

## 2024-03-31 PROCEDURE — 85027 COMPLETE CBC AUTOMATED: CPT | Performed by: NURSE PRACTITIONER

## 2024-03-31 PROCEDURE — 80048 BASIC METABOLIC PNL TOTAL CA: CPT | Performed by: NURSE PRACTITIONER

## 2024-03-31 PROCEDURE — 82948 REAGENT STRIP/BLOOD GLUCOSE: CPT

## 2024-03-31 PROCEDURE — 99232 SBSQ HOSP IP/OBS MODERATE 35: CPT | Performed by: NURSE PRACTITIONER

## 2024-03-31 PROCEDURE — 83735 ASSAY OF MAGNESIUM: CPT | Performed by: NURSE PRACTITIONER

## 2024-03-31 RX ADMIN — HEPARIN SODIUM 5000 UNITS: 5000 INJECTION INTRAVENOUS; SUBCUTANEOUS at 22:02

## 2024-03-31 RX ADMIN — INSULIN LISPRO 1 UNITS: 100 INJECTION, SOLUTION INTRAVENOUS; SUBCUTANEOUS at 11:50

## 2024-03-31 RX ADMIN — DONEPEZIL HYDROCHLORIDE 10 MG: 5 TABLET ORAL at 22:02

## 2024-03-31 RX ADMIN — ASPIRIN 81 MG: 81 TABLET, CHEWABLE ORAL at 08:32

## 2024-03-31 RX ADMIN — ATORVASTATIN CALCIUM 40 MG: 40 TABLET, FILM COATED ORAL at 17:43

## 2024-03-31 RX ADMIN — HEPARIN SODIUM 5000 UNITS: 5000 INJECTION INTRAVENOUS; SUBCUTANEOUS at 05:53

## 2024-03-31 RX ADMIN — INSULIN LISPRO 1 UNITS: 100 INJECTION, SOLUTION INTRAVENOUS; SUBCUTANEOUS at 17:43

## 2024-03-31 RX ADMIN — SODIUM CHLORIDE 100 ML/HR: 0.9 INJECTION, SOLUTION INTRAVENOUS at 02:54

## 2024-03-31 RX ADMIN — ACETAMINOPHEN 650 MG: 325 TABLET, FILM COATED ORAL at 12:06

## 2024-03-31 RX ADMIN — CLOPIDOGREL 75 MG: 75 TABLET ORAL at 08:31

## 2024-03-31 RX ADMIN — HEPARIN SODIUM 5000 UNITS: 5000 INJECTION INTRAVENOUS; SUBCUTANEOUS at 11:59

## 2024-03-31 NOTE — ASSESSMENT & PLAN NOTE
Patient was found to have at home by patient's daughter.  Confusion seems to have resolved now  Patient had left arm weakness and and what was presented as left-sided neglect and visual changes.  Since resolved  Continue stroke pathway  CTA shows: No evidence of acute large vessel intracranial occlusion.  Chronic bilateral ICA origin/carotid bulb high-grade stenosis approximately 80 to 90%  MRI- Acute to subacute posterior right temporal focus of lacunar ischemia. No associated hemorrhage.   Echocardiogram-left ventricular ejection fraction is 55%. Systolic function is normal. Although no diagnostic regional wall motion abnormality was identified  A1c 8.1  Cholesterol levels- high triglycerides  Continue aspirin and Plavix, and Statin  Neurology following

## 2024-03-31 NOTE — ASSESSMENT & PLAN NOTE
Lab Results   Component Value Date    EGFR 35 04/01/2024    EGFR 24 03/31/2024    EGFR 22 03/30/2024    CREATININE 1.43 (H) 04/01/2024    CREATININE 1.95 (H) 03/31/2024    CREATININE 2.11 (H) 03/30/2024     Baseline around 0.8-1  MARTINA  Multifactorial in the setting of contrast, decreased p.o. intake, and short episode of hypotension

## 2024-03-31 NOTE — ASSESSMENT & PLAN NOTE
Resolved; Back to baseline  Confusion could be secondary to CVA/delirium  UA negative  Sodium levels are improving     Lab Results   Component Value Date    SODIUM 135 04/01/2024    SODIUM 133 (L) 03/31/2024

## 2024-03-31 NOTE — ASSESSMENT & PLAN NOTE
Recent Labs     03/30/24  0504 03/31/24  0447 04/01/24  0459   MG 2.6 2.3 1.7*   Resolved with supplementation

## 2024-03-31 NOTE — PROGRESS NOTES
[facility]  Progress Note  Name: Gayathri Lopes I  MRN: [id number]  Unit/Bed#: MS Gil I Date of Admission: 3/27/2024   Date of Service: 3/31/2024 I Hospital Day: 4    Assessment/Plan   * Small acute posterior ischemic right MCA stroke (HCC)  Assessment & Plan  Patient was found to have at home by patient's daughter.  Confusion seems to have resolved now  Patient had left arm weakness and and what was presented as left-sided neglect and visual changes.  Since resolved  Continue stroke pathway  CTA shows: No evidence of acute large vessel intracranial occlusion.  Chronic bilateral ICA origin/carotid bulb high-grade stenosis approximately 80 to 90%  MRI- Acute to subacute posterior right temporal focus of lacunar ischemia. No associated hemorrhage.   Echocardiogram-left ventricular ejection fraction is 55%. Systolic function is normal. Although no diagnostic regional wall motion abnormality was identified,   A1c 8.1  cholesterol levels- high triglycerides  Continue aspirin and Plavix, and Statin  Neurology following    Hypomagnesemia  Assessment & Plan  Recent Labs     03/29/24  0501 03/30/24  0504 03/31/24  0447   MG 1.0* 2.6 2.3     Resolved with supplementation    Acute metabolic encephalopathy  Assessment & Plan  Resolved; Back to baseline  Confusion could be secondary to CVA/delirium  UA negative  Sodium levels are improving    Mild dementia (HCC)  Assessment & Plan  Continue Aricept  Supportive care  Delirium precautions     Stage 3a chronic kidney disease (HCC)  Assessment & Plan  Lab Results   Component Value Date    EGFR 22 03/30/2024    EGFR 25 03/29/2024    EGFR 71 03/27/2024    CREATININE 2.11 (H) 03/30/2024    CREATININE 1.89 (H) 03/29/2024    CREATININE 0.81 03/27/2024     Baseline around 0.8-1  MARTINA  Multifactorial in the setting of contrast, decreased p.o. intake, and short episode of hypotension    Bilateral carotid artery stenosis  Assessment & Plan  CTA head and  neck-Severe bilateral carotid artery stenosis 80 to 90%  Vascular surgery following  Continue aspirin, Plavix and statin  Carotid ultrasound reveals Left There is 50-69% stenosis noted in the internal carotid artery.Right <50%  Recommend outpatient follow-up    Hyponatremia  Assessment & Plan  Could be from poor p.o. intake last couple days  Gradually improving  Reaction in the setting of hyperglycemia is 133 today  Asymptomatic  Continue fluid resuscitation     Lab Results   Component Value Date    SODIUM 133 (L) 03/31/2024    SODIUM 131 (L) 03/30/2024         MARTINA (acute kidney injury) ([facility])  Assessment & Plan     Lab Results   Component Value Date    CREATININE 1.95 (H) 03/31/2024    CREATININE 2.11 (H) 03/30/2024     FENA prerenal.   Decreased Po intake while encephalopathic however also component of contrast-induced nephropathy for CTA head neck, also had an episode of hypotension so MARTINA is likely multifactorial  Imporving  Continue aggressive fluid resuscitation  Avoid nephrotoxins, avoid hypotension    Type 2 diabetes mellitus with diabetic chronic kidney disease (HCC)  Assessment & Plan  Lab Results   Component Value Date    HGBA1C 8.1 (H) 03/28/2024       Recent Labs     03/29/24  1556 03/29/24  2027 03/30/24  0728 03/30/24  1055   POCGLU 258* 195* 189* 184*         Blood Sugar Average: Last 72 hrs:  (P) 202.2684673781957396    A1c of 8.7  Sliding scale insulin  Carb controlled diet         VTE Pharmacologic Prophylaxis:   Moderate Risk (Score 3-4) - Pharmacological DVT Prophylaxis Ordered: heparin.    Mobility:   Basic Mobility Inpatient Raw Score: 17  JH-HLM Goal: 5: Stand one or more mins  JH-HLM Achieved: 1: Laying in bed  JH-HLM Goal achieved. Continue to encourage appropriate mobility.    Patient Centered Rounds: I performed bedside rounds with nursing staff today.   Discussions with Specialists or Other Care Team Provider: talked with RN and CM. Coleen Ray tested update at 1122    Education and  Discussions with Family / Patient: Updated  (daughter) via phone. Zohreh Chavez    Total Time Spent on Date of Encounter in care of patient: 35 mins. This time was spent on one or more of the following: performing physical exam; counseling and coordination of care; obtaining or reviewing history; documenting in the medical record; reviewing/ordering tests, medications or procedures; communicating with other healthcare professionals and discussing with patient's family/caregivers.    Current Length of Stay: 4 day(s)  Current Patient Status: Inpatient   Certification Statement: The patient will continue to require additional inpatient hospital stay due to STR placment  going to [facility]   Discharge Plan: Anticipate discharge tomorrow to rehab facility.    Code Status: Level 1 - Full Code    Subjective:   Doing well, is resting in bed, offers no complaints at this time    Objective:     Vitals:   Temp (24hrs), Av °F (37.2 °C), Min:98.5 °F (36.9 °C), Max:99.5 °F (37.5 °C)    Temp:  [98.5 °F (36.9 °C)-99.5 °F (37.5 °C)] 98.8 °F (37.1 °C)  HR:  [75-84] 76  Resp:  [18] 18  BP: (138-152)/(61-74) 145/66  SpO2:  [91 %-96 %] 93 %  Body mass index is 29.08 kg/m².     Input and Output Summary (last 24 hours):     Intake/Output Summary (Last 24 hours) at 3/31/2024 1119  Last data filed at 3/30/2024 2257  Gross per 24 hour   Intake --   Output 800 ml   Net -800 ml       Physical Exam:   Physical Exam  Vitals reviewed.   Cardiovascular:      Rate and Rhythm: Normal rate.   Pulmonary:      Effort: No respiratory distress.   Abdominal:      Palpations: Abdomen is soft.   Skin:     Coloration: Skin is pale.   Neurological:      Mental Status: She is alert. Mental status is at baseline.   Psychiatric:         Mood and Affect: Mood normal.          Additional Data:     Labs:  Results from last 7 days   Lab Units 24  0552   WBC Thousand/uL 7.52   HEMOGLOBIN g/dL 10.0*   HEMATOCRIT % 29.5*    PLATELETS Thousands/uL 261     Results from last 7 days   Lab Units 03/31/24  0447   SODIUM mmol/L 133*   POTASSIUM mmol/L 4.5   CHLORIDE mmol/L 106   CO2 mmol/L 21   BUN mg/dL 11   CREATININE mg/dL 1.95*   ANION GAP mmol/L 6   CALCIUM mg/dL 6.3*   GLUCOSE RANDOM mg/dL 118     Results from last 7 days   Lab Units 03/27/24  1313   INR  1.19     Results from last 7 days   Lab Units 03/31/24  0733 03/30/24  2037 03/30/24  1624 03/30/24  1055 03/30/24  0728 03/29/24  2027 03/29/24  1556 03/29/24  1126 03/29/24  0728 03/28/24  2020 03/28/24  1545 03/28/24  1317   POC GLUCOSE mg/dl 116 159* 199* 184* 189* 195* 258* 218* 141* 216* 175* 175*     Results from last 7 days   Lab Units 03/28/24  0535   HEMOGLOBIN A1C % 8.1*           Lines/Drains:  Invasive Devices       Peripheral Intravenous Line  Duration             Peripheral IV 03/31/24 Left;Lateral Wrist <1 day              Drain  Duration             External Urinary Catheter 2 days                          Recent Cultures (last 7 days):   Results from last 7 days   Lab Units 03/27/24  1835 03/27/24  1828   BLOOD CULTURE  No Growth at 72 hrs. No Growth at 72 hrs.       Last 24 Hours Medication List:   Current Facility-Administered Medications   Medication Dose Route Frequency Provider Last Rate    acetaminophen  650 mg Oral Q6H PRN DEMETRA Soto      aspirin  81 mg Oral Daily DEMETRA George      atorvastatin  40 mg Oral QPM Kishor Omalley MD      clopidogrel  75 mg Oral Daily DEMETRA George      donepezil  10 mg Oral HS Kishor Omalley MD      heparin (porcine)  5,000 Units Subcutaneous Q8H LAURIE Kishor Omalley MD      insulin lispro  1-5 Units Subcutaneous TID AC Kishor Omalley MD      insulin lispro  1-5 Units Subcutaneous HS Kishor Omalley MD      iohexol  85 mL Intravenous Once in imaging Bilal NELL Love, DO      LORazepam  1 mg Intravenous Once DEMETRA Toure      sodium chloride  75 mL/hr Intravenous Continuous DEMETRA Toure 100 mL/hr  (03/31/24 0254)        Today, Patient Was Seen By: DEMETRA Toure    **Please Note: This note may have been constructed using a voice recognition system.**

## 2024-03-31 NOTE — ASSESSMENT & PLAN NOTE
Lab Results   Component Value Date    HGBA1C 8.1 (H) 03/28/2024       Recent Labs     03/31/24  1221 03/31/24  1531 03/31/24 2028 04/01/24  0731   POCGLU 161* 164* 138 139       Blood Sugar Average: Last 72 hrs:  (P) 174.0986296669690654    A1c of 8.7  Sliding scale insulin  Carb controlled diet

## 2024-03-31 NOTE — ASSESSMENT & PLAN NOTE
Lab Results   Component Value Date    CREATININE 1.43 (H) 04/01/2024    CREATININE 1.95 (H) 03/31/2024     FENA prerenal.   Decreased Po intake while encephalopathic however also component of contrast-induced nephropathy for CTA head neck, also had an episode of hypotension so MARTINA is likely multifactorial  Imporving  Continue aggressive fluid resuscitation  Avoid nephrotoxins, avoid hypotension

## 2024-03-31 NOTE — ASSESSMENT & PLAN NOTE
Could be from poor p.o. intake last couple days  Gradually improvig  Asymptomatic  Continue fluid resuscitation     Lab Results   Component Value Date    SODIUM 135 04/01/2024    SODIUM 133 (L) 03/31/2024

## 2024-04-01 VITALS
BODY MASS INDEX: 29.26 KG/M2 | RESPIRATION RATE: 18 BRPM | HEART RATE: 80 BPM | SYSTOLIC BLOOD PRESSURE: 159 MMHG | TEMPERATURE: 98.8 F | WEIGHT: 159 LBS | HEIGHT: 62 IN | OXYGEN SATURATION: 95 % | DIASTOLIC BLOOD PRESSURE: 69 MMHG

## 2024-04-01 LAB
ANION GAP SERPL CALCULATED.3IONS-SCNC: 7 MMOL/L (ref 4–13)
BACTERIA BLD CULT: NORMAL
BACTERIA BLD CULT: NORMAL
BUN SERPL-MCNC: 10 MG/DL (ref 5–25)
CALCIUM SERPL-MCNC: 6.8 MG/DL (ref 8.4–10.2)
CHLORIDE SERPL-SCNC: 105 MMOL/L (ref 96–108)
CO2 SERPL-SCNC: 23 MMOL/L (ref 21–32)
CREAT SERPL-MCNC: 1.43 MG/DL (ref 0.6–1.3)
ERYTHROCYTE [DISTWIDTH] IN BLOOD BY AUTOMATED COUNT: 13.2 % (ref 11.6–15.1)
GFR SERPL CREATININE-BSD FRML MDRD: 35 ML/MIN/1.73SQ M
GLUCOSE SERPL-MCNC: 122 MG/DL (ref 65–140)
GLUCOSE SERPL-MCNC: 139 MG/DL (ref 65–140)
GLUCOSE SERPL-MCNC: 151 MG/DL (ref 65–140)
HCT VFR BLD AUTO: 31.2 % (ref 34.8–46.1)
HGB BLD-MCNC: 10.6 G/DL (ref 11.5–15.4)
MAGNESIUM SERPL-MCNC: 1.7 MG/DL (ref 1.9–2.7)
MCH RBC QN AUTO: 31.5 PG (ref 26.8–34.3)
MCHC RBC AUTO-ENTMCNC: 34 G/DL (ref 31.4–37.4)
MCV RBC AUTO: 93 FL (ref 82–98)
PLATELET # BLD AUTO: 263 THOUSANDS/UL (ref 149–390)
PMV BLD AUTO: 8.7 FL (ref 8.9–12.7)
POTASSIUM SERPL-SCNC: 3.7 MMOL/L (ref 3.5–5.3)
RBC # BLD AUTO: 3.37 MILLION/UL (ref 3.81–5.12)
SODIUM SERPL-SCNC: 135 MMOL/L (ref 135–147)
WBC # BLD AUTO: 9.01 THOUSAND/UL (ref 4.31–10.16)

## 2024-04-01 PROCEDURE — 99239 HOSP IP/OBS DSCHRG MGMT >30: CPT

## 2024-04-01 PROCEDURE — 82948 REAGENT STRIP/BLOOD GLUCOSE: CPT

## 2024-04-01 PROCEDURE — 99232 SBSQ HOSP IP/OBS MODERATE 35: CPT

## 2024-04-01 PROCEDURE — 80048 BASIC METABOLIC PNL TOTAL CA: CPT | Performed by: NURSE PRACTITIONER

## 2024-04-01 PROCEDURE — 85027 COMPLETE CBC AUTOMATED: CPT | Performed by: NURSE PRACTITIONER

## 2024-04-01 PROCEDURE — 83735 ASSAY OF MAGNESIUM: CPT | Performed by: NURSE PRACTITIONER

## 2024-04-01 RX ORDER — AMLODIPINE BESYLATE 5 MG/1
5 TABLET ORAL DAILY
Qty: 30 TABLET | Refills: 0
Start: 2024-04-01 | End: 2024-05-01

## 2024-04-01 RX ORDER — CLOPIDOGREL BISULFATE 75 MG/1
75 TABLET ORAL DAILY
Qty: 30 TABLET | Refills: 0
Start: 2024-04-02 | End: 2024-06-28

## 2024-04-01 RX ORDER — LISINOPRIL 5 MG/1
5 TABLET ORAL DAILY
Status: DISCONTINUED | OUTPATIENT
Start: 2024-04-01 | End: 2024-04-01 | Stop reason: HOSPADM

## 2024-04-01 RX ADMIN — ASPIRIN 81 MG: 81 TABLET, CHEWABLE ORAL at 09:18

## 2024-04-01 RX ADMIN — CLOPIDOGREL 75 MG: 75 TABLET ORAL at 09:18

## 2024-04-01 RX ADMIN — LISINOPRIL 5 MG: 5 TABLET ORAL at 09:18

## 2024-04-01 RX ADMIN — SODIUM CHLORIDE 75 ML/HR: 0.9 INJECTION, SOLUTION INTRAVENOUS at 12:45

## 2024-04-01 RX ADMIN — INSULIN LISPRO 1 UNITS: 100 INJECTION, SOLUTION INTRAVENOUS; SUBCUTANEOUS at 12:37

## 2024-04-01 RX ADMIN — HEPARIN SODIUM 5000 UNITS: 5000 INJECTION INTRAVENOUS; SUBCUTANEOUS at 05:08

## 2024-04-01 NOTE — PROGRESS NOTES
[facility], [facility]  Progress Note  Name: Gayathri Lopes I  MRN: [id number]  Unit/Bed#: MS Gil I Date of Admission: 3/27/2024   Date of Service: 4/1/2024 I Hospital Day: 5    Assessment/Plan   Bilateral carotid artery stenosis  Assessment & Plan  73 yo female with DM Type 2, HLD, HTN, CKD, dementia, recent falls and bilateral carotid artery stenosis presented with reported left sided weakness, slurred speech, and difficulty ambulating. Stroke alert called.  CTA head and neck obtained, vascular was consulted for bilateral high grade carotid stenosis.    Diagnostics:  -Carotid Duplex 3/28/24 showed right ICA less than 50% stenosis with velocities 134/39, ratio 1.34, greater than 50% right subclavian artery stenosis with velocities 239 and left ICA 50 to 69% stenosis with velocities 173/46, ratio 2.11  -MRI brain- Acute to subacute posterior right temporal focus of lacunar ischemia. No associated hemorrhage.   Age-related involutional and minimal chronic microvascular ischemic change.  -CTA stroke alert: No evidence for acute large vessel intracranial occlusion. Chronic mild right and severe left M1 segment stenosis. Chronic bilateral ICA origin/carotid bulb high-grade stenosis measuring approximately 80 to 90%.  -CT brain: No acute intracranial abnormality. Stable chronic microangiopathic changes within the brain.  Chronic right otomastoiditis without osseous erosion.    Labs:  sCr 1.43/ eGFR 35  WBC 9.01  Hgb 10.6  A1c 8.7    Recommendations:  - Bilateral high grade ICA stenosis.  Discrepancy in regard to degree of stenosis from CTA to duplex  imaging with velocities  - Carotid duplex showed right ICA less than 50% stenosis with velocities 134/39 and left ICA 50 to 69% stenosis with velocities 173/46, ratio 2.11  - MRI brain demonstrates acute to subacute posterior right temporal focus of lacunar ischemia.   - Neurology following. Etiology of CVA thought to be more related to ICAD than  "symptomatic carotid artery disease.   - Follow up with vascular surgery as outpatient.   - Continue DAPT and statin therapy  - ECHO done 3/29 LVEF 55%.   - PT/OT/Speech  - neurovascular checks  - D/w neurology.   - Will d/w Dr Remy          Subjective: Patient denies any acute complaints today. No overnight events. No change in vision, slurred speech, facial droop or worsening lateralizing symptoms.         Vitals:  /69   Pulse 80   Temp 98.8 °F (37.1 °C)   Resp 18   Ht 5' 2\" (1.575 m)   Wt 72.1 kg (159 lb)   SpO2 95%   BMI 29.08 kg/m²     I/Os:  I/O last 3 completed shifts:  In: 3848.8 [I.V.:3848.8]  Out: 3050 [Urine:3050]  I/O this shift:  In: 240 [P.O.:240]  Out: 800 [Urine:800]             Lab Results and Cultures:   CBC with diff:   Lab Results   Component Value Date    WBC 9.01 04/01/2024    HGB 10.6 (L) 04/01/2024    HCT 31.2 (L) 04/01/2024    MCV 93 04/01/2024     04/01/2024    RBC 3.37 (L) 04/01/2024    MCH 31.5 04/01/2024    MCHC 34.0 04/01/2024    RDW 13.2 04/01/2024    MPV 8.7 (L) 04/01/2024    NRBC 0 01/21/2023   ,   BMP/CMP:  Lab Results   Component Value Date    SODIUM 135 04/01/2024    SODIUM 137 10/20/2018    K 3.7 04/01/2024    K 4.5 10/20/2018     04/01/2024     10/20/2018    CO2 23 04/01/2024    CO2 26 01/19/2023    CO2 24 10/20/2018    BUN 10 04/01/2024    BUN 14 10/20/2018    CREATININE 1.43 (H) 04/01/2024    CREATININE 0.98 10/20/2018    GLUCOSE 175 (H) 01/19/2023    CALCIUM 6.8 (L) 04/01/2024    CALCIUM 8.4 (L) 10/20/2018    AST 30 12/23/2021    ALT 42 12/23/2021    ALKPHOS 79 12/23/2021    EGFR 35 04/01/2024    EGFR 59 (L) 10/20/2018   ,   Lipid Panel: No results found for: \"CHOL\",   Coags:   Lab Results   Component Value Date    PTT 25 03/27/2024    INR 1.19 03/27/2024   ,     Blood Culture:   Lab Results   Component Value Date    BLOODCX No Growth After 4 Days. 03/27/2024   ,   Urinalysis:   Lab Results   Component Value Date    COLORU Yellow 03/28/2024    " "CLARITYU Clear 03/28/2024    SPECGRAV >=1.050 (H) 03/28/2024    PHUR 5.5 03/28/2024    LEUKOCYTESUR Moderate (A) 03/28/2024    NITRITE Negative 03/28/2024    GLUCOSEU Negative 03/28/2024    KETONESU Negative 03/28/2024    BILIRUBINUR Negative 03/28/2024    BLOODU Trace (A) 03/28/2024   ,   Urine Culture:   Lab Results   Component Value Date    URINECX >100,000 cfu/ml Escherichia coli (A) 01/20/2023   ,   Wound Culure: No results found for: \"WOUNDCULT\"    Medications:  Current Facility-Administered Medications   Medication Dose Route Frequency    acetaminophen (TYLENOL) tablet 650 mg  650 mg Oral Q6H PRN    aspirin chewable tablet 81 mg  81 mg Oral Daily    atorvastatin (LIPITOR) tablet 40 mg  40 mg Oral QPM    clopidogrel (PLAVIX) tablet 75 mg  75 mg Oral Daily    donepezil (ARICEPT) tablet 10 mg  10 mg Oral HS    heparin (porcine) subcutaneous injection 5,000 Units  5,000 Units Subcutaneous Q8H LAURIE    insulin lispro (HumALOG/ADMELOG) 100 units/mL subcutaneous injection 1-5 Units  1-5 Units Subcutaneous TID AC    insulin lispro (HumALOG/ADMELOG) 100 units/mL subcutaneous injection 1-5 Units  1-5 Units Subcutaneous HS    iohexol (OMNIPAQUE) 350 MG/ML injection (MULTI-DOSE) 85 mL  85 mL Intravenous Once in imaging    lisinopril (ZESTRIL) tablet 5 mg  5 mg Oral Daily    LORazepam (ATIVAN) injection 1 mg  1 mg Intravenous Once    sodium chloride 0.9 % infusion  75 mL/hr Intravenous Continuous       Imaging:  As above     Physical Exam:    General: Alert and oriented to person, place.   Neck: Right carotid bruit  CV: RRR  Respiratory: CTA B/L.   Abdominal: Soft. NT. ND  Extremities: BLE warm, perfused without cyanosis or edema.   Neurologic: No facial droop, speech clear, moving all 4 limbs appropriately. Normal strength in BUE and BLE.       Kayla Welch PA-C  4/1/2024        "

## 2024-04-01 NOTE — CASE MANAGEMENT
Case Management Discharge Planning Note    Patient name Gayathri Lopes  Location /-01 MRN [id number]  : 1950 Date 2024       Current Admission Date: 3/27/2024  Current Admission Diagnosis:Small acute posterior ischemic right MCA stroke (HCC)   Patient Active Problem List    Diagnosis Date Noted    Hypomagnesemia 2024    Acute metabolic encephalopathy 2024    Osteopenia after menopause 2023    Skin lesion of face 2023    Abnormal urinalysis 2023    Depression 2023    Postural dizziness with presyncope 2023    Ambulatory dysfunction 2023    Hyponatremia 2023    Hypertension 2023    Complex care coordination 2022    Other hyperlipidemia 2022    Hearing loss 2022    Mild dementia (HCC) 2022    Primary hypertension     Stage 3a chronic kidney disease (HCC) 2022    Microalbuminuria 2022    Bilateral carotid artery stenosis 2022    Dysarthria 2022    History of TIA (transient ischemic attack) 2021    Hyponatremia 2021    Diarrhea 2021    Type 2 diabetes mellitus with diabetic chronic kidney disease (HCC) 2021    Atherosclerotic plaque 2021    Small acute posterior ischemic right MCA stroke (HCC) 2021    Benign hypertension with CKD (chronic kidney disease) stage III ([facility]) 2021    Fall 2021    MARTINA (acute kidney injury) ([facility]) 2021    Asymptomatic bacteriuria 2021    Hypokalemia 2021      LOS (days): 5  Geometric Mean LOS (GMLOS) (days): 4.5  Days to GMLOS:-0.2     OBJECTIVE:  Risk of Unplanned Readmission Score: 15.25         Current admission status: Inpatient   Preferred Pharmacy:   [id number] [place] - [address]  [address] [place] [address]  Phone: [phone] Fax: [phone]    [id number] - [place] - [address]  [address]  [place] [address]  Phone: [phone] Fax:  [phone]    [id number] - [facility] [place] - [address]  [address] [place] [address]  Phone: [phone] Fax: [phone]    Primary Care Provider: Wiliam Baker MD    Primary Insurance: [facility]  Secondary Insurance:     DISCHARGE DETAILS:    Discharge planning discussed with:: pt at bedside and dgt Apple via phone  Freedom of Choice: Yes  Comments - Freedom of Choice: Pt to be d/keyanna to [facility] today.  Both pt and dgt Apple are ageeable to this dcp.  CM contacted [facility] and auth is still good and they will accept pt today.  IMM reviewed and signed by pt.  Copy to pt and copy to MR for scanning.  [facility] transport requested for 14:00  CM contacted family/caregiver?: Yes  Were Treatment Team discharge recommendations reviewed with patient/caregiver?: Yes  Did patient/caregiver verbalize understanding of patient care needs?: Yes  Were patient/caregiver advised of the risks associated with not following Treatment Team discharge recommendations?: Yes    Contacts  Patient Contacts: Apple  Relationship to Patient:: Family  Contact Method: Phone  Phone Number: [phone]  Reason/Outcome: Discharge Planning    Requested Home Health Care         Is the patient interested in HHC at discharge?: No    DME Referral Provided  Referral made for DME?: No    Other Referral/Resources/Interventions Provided:  Interventions: Short Term Rehab, Transportation  Referral Comments: Pt to be d/keyanna to [facility] today with transport arranged for 14:00    Would you like to participate in our [facility] service program?  : No - Declined    Treatment Team Recommendation: Short Term Rehab  Discharge Destination Plan:: Short Term Rehab  Transport at Discharge : [facility]  Dispatcher Contacted: Yes  Number/Name of Dispatcher: Roundtrip     ETA of Transport (Date): 04/01/24  ETA of Transport (Time): 1400              IMM Given (Date)::  04/01/24  IMM Given to:: Patient  Family notified:: kristina Chiu

## 2024-04-01 NOTE — PLAN OF CARE
Problem: Potential for Falls  Goal: Patient will remain free of falls  Description: INTERVENTIONS:  - Educate patient/family on patient safety including physical limitations  - Instruct patient to call for assistance with activity   - Consult OT/PT to assist with strengthening/mobility   - Keep Call bell within reach  - Keep bed low and locked with side rails adjusted as appropriate  - Keep care items and personal belongings within reach  - Initiate and maintain comfort rounds  - Make Fall Risk Sign visible to staff  - Offer Toileting every 2 Hours, in advance of need  - Initiate/Maintain bed alarm  - Obtain necessary fall risk management equipment  - Apply yellow socks and bracelet for high fall risk patients  - Consider moving patient to room near nurses station  Outcome: Progressing     Problem: Prexisting or High Potential for Compromised Skin Integrity  Goal: Skin integrity is maintained or improved  Description: INTERVENTIONS:  - Identify patients at risk for skin breakdown  - Assess and monitor skin integrity  - Assess and monitor nutrition and hydration status  - Monitor labs   - Assess for incontinence   - Turn and reposition patient  - Assist with mobility/ambulation  - Relieve pressure over bony prominences  - Avoid friction and shearing  - Provide appropriate hygiene as needed including keeping skin clean and dry  - Evaluate need for skin moisturizer/barrier cream  - Collaborate with interdisciplinary team   - Patient/family teaching  - Consider wound care consult   Outcome: Progressing     Problem: NEUROSENSORY - ADULT  Goal: Achieves stable or improved neurological status  Description: INTERVENTIONS  - Monitor and report changes in neurological status  - Monitor vital signs such as temperature, blood pressure, glucose, and any other labs ordered   - Initiate measures to prevent increased intracranial pressure  - Monitor for seizure activity and implement precautions if appropriate      Outcome:  Progressing     Problem: GASTROINTESTINAL - ADULT  Goal: Maintains adequate nutritional intake  Description: INTERVENTIONS:  - Monitor percentage of each meal consumed  - Identify factors contributing to decreased intake, treat as appropriate  - Assist with meals as needed  - Monitor I&O, weight, and lab values if indicated  - Obtain nutrition services referral as needed  Outcome: Progressing     Problem: METABOLIC, FLUID AND ELECTROLYTES - ADULT  Goal: Glucose maintained within target range  Description: INTERVENTIONS:  - Monitor Blood Glucose as ordered  - Assess for signs and symptoms of hyperglycemia and hypoglycemia  - Administer ordered medications to maintain glucose within target range  - Assess nutritional intake and initiate nutrition service referral as needed  Outcome: Progressing     Problem: Nutrition/Hydration-ADULT  Goal: Nutrient/Hydration intake appropriate for improving, restoring or maintaining nutritional needs  Description: Monitor and assess patient's nutrition/hydration status for malnutrition. Collaborate with interdisciplinary team and initiate plan and interventions as ordered.  Monitor patient's weight and dietary intake as ordered or per policy. Utilize nutrition screening tool and intervene as necessary. Determine patient's food preferences and provide high-protein, high-caloric foods as appropriate.     INTERVENTIONS:  - Monitor oral intake, urinary output, labs, and treatment plans  - Assess nutrition and hydration status and recommend course of action  - Evaluate amount of meals eaten  - Assist patient with eating if necessary   - Allow adequate time for meals  - Recommend/ encourage appropriate diets, oral nutritional supplements, and vitamin/mineral supplements  - Order, calculate, and assess calorie counts as needed  - Recommend, monitor, and adjust tube feedings and TPN/PPN based on assessed needs  - Assess need for intravenous fluids  - Provide specific nutrition/hydration  education as appropriate  - Include patient/family/caregiver in decisions related to nutrition  Outcome: Progressing

## 2024-04-01 NOTE — ASSESSMENT & PLAN NOTE
75 yo female with DM Type 2, HLD, HTN, CKD, dementia, recent falls and bilateral carotid artery stenosis presented with reported left sided weakness, slurred speech, and difficulty ambulating. Stroke alert called.  CTA head and neck obtained, vascular was consulted for bilateral high grade carotid stenosis.    Diagnostics:  -Carotid Duplex 3/28/24 showed right ICA less than 50% stenosis with velocities 134/39, ratio 1.34, greater than 50% right subclavian artery stenosis with velocities 239 and left ICA 50 to 69% stenosis with velocities 173/46, ratio 2.11  -MRI brain- Acute to subacute posterior right temporal focus of lacunar ischemia. No associated hemorrhage.   Age-related involutional and minimal chronic microvascular ischemic change.  -CTA stroke alert: No evidence for acute large vessel intracranial occlusion. Chronic mild right and severe left M1 segment stenosis. Chronic bilateral ICA origin/carotid bulb high-grade stenosis measuring approximately 80 to 90%.  -CT brain: No acute intracranial abnormality. Stable chronic microangiopathic changes within the brain.  Chronic right otomastoiditis without osseous erosion.    Labs:  sCr 1.89/ eGFR 25  WBC 10.31  Hgb 10.1  A1c 8.7    Recommendations:  - Bilateral high grade ICA stenosis.  Discrepancy in regard to degree of stenosis from CTA to duplex.  Imaging with velocities  - Carotid duplex showed right ICA less than 50% stenosis with velocities 134/39 and left ICA 50 to 69% stenosis with velocities 173/46, ratio 2.11  - MRI brain done this AM, awaiting final read. R ICA <50% stenosis   - Patient appears neurologically intact.  Equal upper extremity weakness  - Continue ASA/ plavix (plavix load in ED per neurology)  - Continue statin  - ECHO pending  - PT/OT/Speech  - neurovascular checks  - will d/w Dr Remy   - Discussed with KEVIN

## 2024-04-01 NOTE — PROGRESS NOTES
[facility]  Progress Note  Name: Gayathri Lopes I  MRN: [id number]  Unit/Bed#: MS Louise I Date of Admission: 3/27/2024   Date of Service: 4/1/2024 I Hospital Day: 5    Assessment/Plan   * Small acute posterior ischemic right MCA stroke (HCC)  Assessment & Plan  Patient was found to have at home by patient's daughter.  Confusion seems to have resolved now  Patient had left arm weakness and and what was presented as left-sided neglect and visual changes.  Since resolved  Continue stroke pathway  CTA shows: No evidence of acute large vessel intracranial occlusion.  Chronic bilateral ICA origin/carotid bulb high-grade stenosis approximately 80 to 90%  MRI- Acute to subacute posterior right temporal focus of lacunar ischemia. No associated hemorrhage.   Echocardiogram-left ventricular ejection fraction is 55%. Systolic function is normal. Although no diagnostic regional wall motion abnormality was identified  A1c 8.1  Cholesterol levels- high triglycerides  Continue aspirin and Plavix, and Statin  Neurology following    Hypomagnesemia  Assessment & Plan  Recent Labs     03/29/24  0501 03/30/24  0504 03/31/24  0447   MG 1.0* 2.6 2.3     Resolved with supplementation    Acute metabolic encephalopathy  Assessment & Plan  Resolved; Back to baseline  Confusion could be secondary to CVA/delirium  UA negative  Sodium levels are improving     Lab Results   Component Value Date    SODIUM 133 (L) 03/31/2024    SODIUM 131 (L) 03/30/2024        Mild dementia (HCC)  Assessment & Plan  Continue Aricept  Supportive care  Delirium precautions     Stage 3a chronic kidney disease (HCC)  Assessment & Plan  Lab Results   Component Value Date    EGFR 35 04/01/2024    EGFR 24 03/31/2024    EGFR 22 03/30/2024    CREATININE 1.43 (H) 04/01/2024    CREATININE 1.95 (H) 03/31/2024    CREATININE 2.11 (H) 03/30/2024     Baseline around 0.8-1  MARTINA  Multifactorial in the setting of contrast, decreased p.o. intake, and short  episode of hypotension    Bilateral carotid artery stenosis  Assessment & Plan  CTA head and neck-Severe bilateral carotid artery stenosis 80 to 90%  Vascular surgery following  Continue aspirin, Plavix and statin  Carotid ultrasound reveals Left There is 50-69% stenosis noted in the internal carotid artery.Right <50%  Recommend outpatient follow-up    Hyponatremia  Assessment & Plan  Could be from poor p.o. intake last couple days  Gradually improvig  Asymptomatic  Continue fluid resuscitation     Lab Results   Component Value Date    SODIUM 135 04/01/2024    SODIUM 133 (L) 03/31/2024       MARTINA (acute kidney injury) (HCC)  Assessment & Plan    Lab Results   Component Value Date    CREATININE 1.43 (H) 04/01/2024    CREATININE 1.95 (H) 03/31/2024     FENA prerenal.   Decreased Po intake while encephalopathic however also component of contrast-induced nephropathy for CTA head neck, also had an episode of hypotension so MARTINA is likely multifactorial  Imporving  Continue aggressive fluid resuscitation  Avoid nephrotoxins, avoid hypotension    Type 2 diabetes mellitus with diabetic chronic kidney disease (HCC)  Assessment & Plan  Lab Results   Component Value Date    HGBA1C 8.1 (H) 03/28/2024       Recent Labs     03/30/24  2037 03/31/24  0733 03/31/24  1149 03/31/24  1221   POCGLU 159* 116 188* 161*         Blood Sugar Average: Last 72 hrs:  (P) 187.9660571141058830    A1c of 8.7  Sliding scale insulin  Carb controlled diet           VTE Pharmacologic Prophylaxis:   Moderate Risk (Score 3-4) - Pharmacological DVT Prophylaxis Ordered: heparin.    Mobility:   Basic Mobility Inpatient Raw Score: 17  JH-HLM Goal: 5: Stand one or more mins  JH-HLM Achieved: 1: Laying in bed  JH-HLM Goal achieved. Continue to encourage appropriate mobility.    Patient Centered Rounds: I performed bedside rounds with nursing staff today.   Discussions with Specialists or Other Care Team Provider: CM    Education and Discussions with Family /  Patient: {Family Communication:13098}    Total Time Spent on Date of Encounter in care of patient: 5 mins. This time was spent on one or more of the following: performing physical exam; counseling and coordination of care; obtaining or reviewing history; documenting in the medical record; reviewing/ordering tests, medications or procedures; communicating with other healthcare professionals and discussing with patient's family/caregivers.    Current Length of Stay: 5 day(s)  Current Patient Status: Inpatient   Certification Statement: {Certification Statement:93608}  Discharge Plan: {Discharge Plan:35784}    Code Status: Level 1 - Full Code    Subjective:   ***    Objective:     Vitals:   Temp (24hrs), Av °F (37.2 °C), Min:98.6 °F (37 °C), Max:99.4 °F (37.4 °C)    Temp:  [98.6 °F (37 °C)-99.4 °F (37.4 °C)] 98.8 °F (37.1 °C)  HR:  [72-91] 80  Resp:  [18-20] 18  BP: (149-186)/(60-97) 159/69  SpO2:  [93 %-95 %] 95 %  Body mass index is 29.08 kg/m².     Input and Output Summary (last 24 hours):     Intake/Output Summary (Last 24 hours) at 2024 1126  Last data filed at 2024 0930  Gross per 24 hour   Intake 4088.75 ml   Output 3650 ml   Net 438.75 ml       Physical Exam:   Physical Exam ***    Additional Data:     Labs:  Results from last 7 days   Lab Units 24  0459   WBC Thousand/uL 9.01   HEMOGLOBIN g/dL 10.6*   HEMATOCRIT % 31.2*   PLATELETS Thousands/uL 263     Results from last 7 days   Lab Units 24  0459   SODIUM mmol/L 135   POTASSIUM mmol/L 3.7   CHLORIDE mmol/L 105   CO2 mmol/L 23   BUN mg/dL 10   CREATININE mg/dL 1.43*   ANION GAP mmol/L 7   CALCIUM mg/dL 6.8*   GLUCOSE RANDOM mg/dL 122     Results from last 7 days   Lab Units 24  1313   INR  1.19     Results from last 7 days   Lab Units 24  0731 248 24  1531 24  1221 24  1149 24  0733 24  2037 24  1624 24  1055 24  0728 24  1556   POC GLUCOSE mg/dl  139 138 164* 161* 188* 116 159* 199* 184* 189* 195* 258*     Results from last 7 days   Lab Units 03/28/24  0535   HEMOGLOBIN A1C % 8.1*           Lines/Drains:  Invasive Devices       Peripheral Intravenous Line  Duration             Peripheral IV 03/31/24 Left;Lateral Wrist 1 day              Drain  Duration             External Urinary Catheter 1 day                          Imaging: {Radiology Review:84677}    Recent Cultures (last 7 days):   Results from last 7 days   Lab Units 03/27/24  1835 03/27/24  1828   BLOOD CULTURE  No Growth After 4 Days. No Growth After 4 Days.       Last 24 Hours Medication List:   Current Facility-Administered Medications   Medication Dose Route Frequency Provider Last Rate    acetaminophen  650 mg Oral Q6H PRN DEMETRA Soto      aspirin  81 mg Oral Daily DEMETRA George      atorvastatin  40 mg Oral QPM Kishor Omalley MD      clopidogrel  75 mg Oral Daily DEMETRA George      donepezil  10 mg Oral HS Kishor Omalley MD      heparin (porcine)  5,000 Units Subcutaneous Q8H LAURIE Kishor Omalley MD      insulin lispro  1-5 Units Subcutaneous TID AC Kishor Omalley MD      insulin lispro  1-5 Units Subcutaneous HS Kishor Omalley MD      iohexol  85 mL Intravenous Once in imaging Bilal NELL Love,       lisinopril  5 mg Oral Daily Jassi Nielsen PA-C      LORazepam  1 mg Intravenous Once DEMETRA Toure      sodium chloride  75 mL/hr Intravenous Continuous DEMETRA Toure 75 mL/hr (03/31/24 1142)        Today, Patient Was Seen By: Jassi Nielsen PA-C    **Please Note: This note may have been constructed using a voice recognition system.**    {Progress note PORCH templates:19940}

## 2024-04-01 NOTE — DISCHARGE INSTR - AVS FIRST PAGE
Please be aware it is recommended to take both a baby aspirin and Plavix daily for 90 days.  After 90 days you may transition to only Plavix and stop taking your baby aspirin.  I have also added a blood pressure medication called amlodipine.  This blood pressure medication should be taken daily.  You may further discuss restarting your lisinopril instead of taking your amlodipine once your kidney function continues to improve.  You will need to follow-up with your PCP for continued maintenance of your kidney function.  It is encouraged to drink plenty of fluids over the next 2 to 3 days to ensure the contrast used potentially affecting her kidneys becomes diluted and your kidney function improves.  Please follow-up with your PCP within a week.

## 2024-04-01 NOTE — DISCHARGE SUMMARY
[facility]  Discharge- Gayathri Lopes 1950, 74 y.o. female MRN: [id number]  Unit/Bed#: MS Leonardo-Ramy Encounter: [id number]  Primary Care Provider: Wiliam Baker MD   Date and time admitted to hospital: 3/27/2024 12:14 PM    * Small acute posterior ischemic right MCA stroke (HCC)  Assessment & Plan  Patient was found to have at home by patient's daughter.  Confusion seems to have resolved now  Patient had left arm weakness and and what was presented as left-sided neglect and visual changes.  Since resolved  Continue stroke pathway  CTA shows: No evidence of acute large vessel intracranial occlusion.  Chronic bilateral ICA origin/carotid bulb high-grade stenosis approximately 80 to 90%  MRI- Acute to subacute posterior right temporal focus of lacunar ischemia. No associated hemorrhage.   Echocardiogram-left ventricular ejection fraction is 55%. Systolic function is normal. Although no diagnostic regional wall motion abnormality was identified  A1c 8.1  Cholesterol levels- high triglycerides  Continue aspirin and Plavix, and Statin  Neurology following    Hypomagnesemia  Assessment & Plan  Recent Labs     03/30/24  0504 03/31/24  0447 04/01/24  0459   MG 2.6 2.3 1.7*   Resolved with supplementation    Acute metabolic encephalopathy  Assessment & Plan  Resolved; Back to baseline  Confusion could be secondary to CVA/delirium  UA negative  Sodium levels are improving     Lab Results   Component Value Date    SODIUM 135 04/01/2024    SODIUM 133 (L) 03/31/2024        Mild dementia (HCC)  Assessment & Plan  Continue Aricept  Supportive care  Delirium precautions     Stage 3a chronic kidney disease (HCC)  Assessment & Plan  Lab Results   Component Value Date    EGFR 35 04/01/2024    EGFR 24 03/31/2024    EGFR 22 03/30/2024    CREATININE 1.43 (H) 04/01/2024    CREATININE 1.95 (H) 03/31/2024    CREATININE 2.11 (H) 03/30/2024     Baseline around 0.8-1  MARTINA  Multifactorial in the setting of contrast,  decreased p.o. intake, and short episode of hypotension    Bilateral carotid artery stenosis  Assessment & Plan  CTA head and neck-Severe bilateral carotid artery stenosis 80 to 90%  Vascular surgery following  Continue aspirin, Plavix and statin  Carotid ultrasound reveals Left There is 50-69% stenosis noted in the internal carotid artery.Right <50%  Recommend outpatient follow-up    Hyponatremia  Assessment & Plan  Could be from poor p.o. intake last couple days  Gradually improvig  Asymptomatic  Continue fluid resuscitation     Lab Results   Component Value Date    SODIUM 135 04/01/2024    SODIUM 133 (L) 03/31/2024       MARTINA (acute kidney injury) (HCC)  Assessment & Plan    Lab Results   Component Value Date    CREATININE 1.43 (H) 04/01/2024    CREATININE 1.95 (H) 03/31/2024     FENA prerenal.   Decreased Po intake while encephalopathic however also component of contrast-induced nephropathy for CTA head neck, also had an episode of hypotension so MARTINA is likely multifactorial  Imporving  Continue aggressive fluid resuscitation  Avoid nephrotoxins, avoid hypotension    Type 2 diabetes mellitus with diabetic chronic kidney disease (HCC)  Assessment & Plan  Lab Results   Component Value Date    HGBA1C 8.1 (H) 03/28/2024       Recent Labs     03/31/24  1221 03/31/24  1531 03/31/24 2028 04/01/24  0731   POCGLU 161* 164* 138 139       Blood Sugar Average: Last 72 hrs:  (P) 174.5666440968411241    A1c of 8.7  Sliding scale insulin  Carb controlled diet      Medical Problems       Resolved Problems  Date Reviewed: 3/27/2024   None       Discharging Physician / Practitioner: Jassi Nielsen PA-C  PCP: Wiliam Baker MD  Admission Date:   Admission Orders (From admission, onward)       Ordered        03/27/24 1549  INPATIENT ADMISSION  Once                          Discharge Date: 04/01/24    Consultations During Hospital Stay:  Neurology, vascular    Procedures Performed:   MRI brain wo contrast  Impression: Acute to  subacute posterior right temporal focus of lacunar ischemia. No associated hemorrhage. Age-related involutional and minimal chronic microvascular ischemic change    Echo complete w/ contrast if indicated  Result Date: 3/29/2024  Narrative:   Left Ventricle: Left ventricular cavity size is normal. Wall thickness is moderately increased. The left ventricular ejection fraction is 55%. Systolic function is normal. Although no diagnostic regional wall motion abnormality was identified, this possibility cannot be completely excluded on the basis of this study. Diastolic function is mildly abnormal, consistent with grade I (abnormal) relaxation.   IVS: There is sigmoid appearance of the septum.   Right Ventricle: Right ventricular cavity size is normal. Systolic function is normal.     VAS carotid complete study  CONCLUSION:  Impression RIGHT: There is <50% stenosis noted in the internal carotid artery. Plaque is heterogenous and irregular. Moderate stenosis is noted in the external carotid artery. Vertebral artery flow is antegrade. There is >50% stenosis noted in the subclavian artery.  LEFT: There is 50-69% stenosis noted in the internal carotid artery. Plaque is heterogenous and irregular. Vertebral artery flow is antegrade. There is no significant subclavian artery disease.  Tech note: Calcified plaque in the ICA bilaterally may obscure more significant stenosis.  Compared to previous study on 1/13/2022, there is progression of the disease on the left. Recommend repeat testing in 6 months as per protocol unless otherwise indicated    CT stroke alert brain  Impression: No acute intracranial abnormality. Stable chronic microangiopathic changes within the brain. Chronic right otomastoiditis without osseous erosion    CTA stroke alert (head/neck)  Impression: 1. No evidence for acute large vessel intracranial occlusion. Chronic mild right and severe left M1 segment stenosis. 2. Chronic bilateral ICA origin/carotid bulb  high-grade stenosis measuring approximately 80 to 90%. And findings were directly discussed with Monroe Clifton at 3/27/2024 at 1:25 p.m.     CT cervical spine without contrast  Impression: No cervical spine fracture or traumatic malalignment    CT facial bones without contrast  Impression: Bilateral distal nasal bone fractures    CT head without contrast  Impression: No acute intracranial abnormality. Chronic microangiopathic ischemic changes       Significant Findings / Test Results:   Blood culture showed no growth after 4 days  Hemoglobin A1c 8.1  Lipid panel showed total cholesterol 170, triglyceride 203, HDL 41, LDL 88      Incidental Findings:   None      Test Results Pending at Discharge (will require follow up):   P2 Y12 platelet inhibitor     Outpatient Tests Requested:  None    Complications: None    Reason for Admission: Small acute posterior ischemic right MCA    Hospital Course:   Gayathri Lopes is a 74 y.o. female patient who originally presented to the hospital on 3/27/2024 due to having left arm weakness and visual defects.  The patient was evaluated and determined to have strokelike symptoms and was admitted under stroke workup.  Neurology was consulted and a CTA conducted showed chronic bilateral ICA high-grade stenosis.  Ultimately vascular was additionally consulted.  It is recommended by the neurology team to start DAPT and continue stroke pathway.  An MRI brain was conducted which showed a small acute posterior ischemic right MCA.  It was recommended to continue DAPT for 90 days and transition to Plavix monotherapy.  Is also recommended to follow-up with the vascular surgical team in the outpatient office for additional surveillance.  PT/OT evaluated patient and determined level 2 recommendation for moderate intensity rehabilitation.  A rehab facility was found on 4/1 patient was medically stable for discharge.  At this time patient medically stable for discharge and agreeable for plan at  "discharge.  For further information in regards to course of hospitalization, please see notes attached.      Please see above list of diagnoses and related plan for additional information.     Condition at Discharge: good    Discharge Day Visit / Exam:   Subjective: Patient reports feeling well and like to be discharged to a rehab facility.  She currently denies any chest pain/pressure, palpitations, lightheadedness, nausea, shortness of breath, or chills.  Vitals: Blood Pressure: 159/69 (04/01/24 1115)  Pulse: 80 (04/01/24 1115)  Temperature: 98.8 °F (37.1 °C) (04/01/24 1115)  Temp Source: Oral (04/01/24 1100)  Respirations: 18 (04/01/24 1100)  Height: 5' 2\" (157.5 cm) (03/29/24 0855)  Weight - Scale: 72.1 kg (159 lb) (03/29/24 0855)  SpO2: 95 % (04/01/24 1115)  Exam:   Vitals reviewed.   Constitutional:       General: She is not in acute distress.     Appearance: She is obese. She is not ill-appearing.   Cardiovascular:      Rate and Rhythm: Normal rate.   Pulmonary:      Effort: No respiratory distress.   Abdominal:      General: There is no distension.   Skin:     General: Skin is warm.      Coloration: Skin is pale.   Neurological:      Mental Status: She is alert. Mental status is at baseline.   Psychiatric:         Mood and Affect: Mood normal.     Discussion with Family: Attempted to update  (daughter) via phone. Left voicemail.     Discharge instructions/Information to patient and family:   See after visit summary for information provided to patient and family.      Provisions for Follow-Up Care:  See after visit summary for information related to follow-up care and any pertinent home health orders.      Mobility at time of Discharge:   Basic Mobility Inpatient Raw Score: 17  JH-HLM Goal: 5: Stand one or more mins  JH-HLM Achieved: 1: Laying in bed  HLM Goal achieved. Continue to encourage appropriate mobility.     Disposition:   Assisted Living Facility at [place] [facility]    Planned " Readmission: No     Discharge Statement:  I spent 60 minutes discharging the patient. This time was spent on the day of discharge. I had direct contact with the patient on the day of discharge. Greater than 50% of the total time was spent examining patient, answering all patient questions, arranging and discussing plan of care with patient as well as directly providing post-discharge instructions.  Additional time then spent on discharge activities.    Discharge Medications:  See after visit summary for reconciled discharge medications provided to patient and/or family.      **Please Note: This note may have been constructed using a voice recognition system*

## 2024-04-02 ENCOUNTER — TRANSITIONAL CARE MANAGEMENT (OUTPATIENT)
Dept: FAMILY MEDICINE CLINIC | Facility: OTHER | Age: 74
End: 2024-04-02

## 2024-04-02 ENCOUNTER — NURSING HOME VISIT (OUTPATIENT)
Dept: GERIATRICS | Facility: OTHER | Age: 74
End: 2024-04-02
Payer: COMMERCIAL

## 2024-04-02 ENCOUNTER — TELEPHONE (OUTPATIENT)
Age: 74
End: 2024-04-02

## 2024-04-02 VITALS
HEART RATE: 78 BPM | RESPIRATION RATE: 18 BRPM | SYSTOLIC BLOOD PRESSURE: 165 MMHG | DIASTOLIC BLOOD PRESSURE: 75 MMHG | OXYGEN SATURATION: 98 % | WEIGHT: 146.2 LBS | BODY MASS INDEX: 26.74 KG/M2 | TEMPERATURE: 97.8 F

## 2024-04-02 DIAGNOSIS — I10 PRIMARY HYPERTENSION: ICD-10-CM

## 2024-04-02 DIAGNOSIS — E11.22 TYPE 2 DIABETES MELLITUS WITH DIABETIC CHRONIC KIDNEY DISEASE, UNSPECIFIED CKD STAGE, UNSPECIFIED WHETHER LONG TERM INSULIN USE (HCC): ICD-10-CM

## 2024-04-02 DIAGNOSIS — N18.31 STAGE 3A CHRONIC KIDNEY DISEASE (HCC): ICD-10-CM

## 2024-04-02 DIAGNOSIS — D63.1 ANEMIA DUE TO STAGE 3B CHRONIC KIDNEY DISEASE  (HCC): ICD-10-CM

## 2024-04-02 DIAGNOSIS — F02.A4 MILD DEMENTIA DUE TO PARKINSON'S DISEASE, WITH ANXIETY (HCC): ICD-10-CM

## 2024-04-02 DIAGNOSIS — I65.23 BILATERAL CAROTID ARTERY STENOSIS: ICD-10-CM
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
## 2024-04-02 DIAGNOSIS — N18.32 ANEMIA DUE TO STAGE 3B CHRONIC KIDNEY DISEASE  (HCC): ICD-10-CM

## 2024-04-02 DIAGNOSIS — H91.93 BILATERAL HEARING LOSS, UNSPECIFIED HEARING LOSS TYPE: ICD-10-CM

## 2024-04-02 DIAGNOSIS — I63.511 ACUTE ISCHEMIC RIGHT MCA STROKE (HCC): Primary | ICD-10-CM

## 2024-04-02 DIAGNOSIS — F32.A DEPRESSION, UNSPECIFIED DEPRESSION TYPE: ICD-10-CM

## 2024-04-02 DIAGNOSIS — G20.A1 MILD DEMENTIA DUE TO PARKINSON'S DISEASE, WITH ANXIETY (HCC): ICD-10-CM

## 2024-04-02 PROCEDURE — 99306 1ST NF CARE HIGH MDM 50: CPT | Performed by: FAMILY MEDICINE

## 2024-04-02 NOTE — ASSESSMENT & PLAN NOTE
- Carotid duplex 3/28/24: right ICA less than 50% stenosis with velocities 134/39, ratio 1.34, greater than 50% right subclavian artery stenosis with velocities 239 and left ICA 50 to 69% stenosis with velocities 173/46, ratio 2.11   Vascular surgery following outpatient

## 2024-04-02 NOTE — TELEPHONE ENCOUNTER
Patient was dc from hospital and now at Rehab. Daughter will call office to schedule when dc from rehab

## 2024-04-02 NOTE — ASSESSMENT & PLAN NOTE
- AAOx2 at time of encounter, oriented to time, person  - Current medications: Aricept 10 mg   - Last MOCA Aug 2023: 22/30, previous score of 14/30  - At baseline, independent with IADLs/dependent with ADLS  Official mental cognition evaluation  Delirium precautions  Fall precautions  Continue Aricept 10 mg daily

## 2024-04-02 NOTE — ASSESSMENT & PLAN NOTE
Lab Results   Component Value Date    EGFR 35 04/01/2024    EGFR 24 03/31/2024    EGFR 22 03/30/2024    CREATININE 1.43 (H) 04/01/2024    CREATININE 1.95 (H) 03/31/2024    CREATININE 2.11 (H) 03/30/2024     - Baseline 0.8-1.0  Avoid nephrotoxic agents  Maintain adequate oral hydration  Monitor intermittently

## 2024-04-02 NOTE — ASSESSMENT & PLAN NOTE
- BP: 165/75, permissive hypertension goal <160/90 for about 2 weeks post stroke  - Current meds: amlodipine 5 mg daily, lisinopril 2.5 mg daily  - Denies visual changes, headaches, chest pain, sob  - CKD 3b, GFR 35  Monitor BP daily  Monitor kidney function  Follow up with vascular outpatient

## 2024-04-02 NOTE — PROGRESS NOTES
[facility]  [address]  [place] [id number]   [facility]  History and Physical    NAME: Gayathri Lopes  AGE: 74 y.o. SEX: female [phone]    DATE OF ENCOUNTER: 4/2/2024    Code status:  CPR    Assessment and Plan     Small acute posterior ischemic right MCA stroke (HCC)  - Brought into hospital by daughter for confusion, L sided neglect, L arm weakness, concern for stroke on 3/27/24  - Stroke pathway  CTA shows: No evidence of acute large vessel intracranial occlusion.  Chronic bilateral ICA origin/carotid bulb high-grade stenosis approximately 80 to 90%  MRI- Acute to subacute posterior right temporal focus of lacunar ischemia. No associated hemorrhage.   Echocardiogram-left ventricular ejection fraction is 55%. Systolic function is normal. Although no diagnostic regional wall motion abnormality was identified  - Vascular following  - Medications: ASA, Plavix, Statin for 90 days, only plavix thereafter  - VSS  - PE: no gross cranial nerve abnormality, strength 2+ slight weakness in R arm  - PT/OT recommendations for level 2 therapy    Plan:  Continue ASA, plavix, statin  Continue PT/OT  Monitor speech/swallowing  Monitor vitals daily  Fall precautions  Delirium precautions  Encourage visual/hearing aids as needed  Outpatient vascular surgery as recommended/scheduled    Bilateral carotid artery stenosis  - Carotid duplex 3/28/24: right ICA less than 50% stenosis with velocities 134/39, ratio 1.34, greater than 50% right subclavian artery stenosis with velocities 239 and left ICA 50 to 69% stenosis with velocities 173/46, ratio 2.11   Vascular surgery following outpatient      Primary hypertension  - BP: 165/75, permissive hypertension goal <160/90 for about 2 weeks post stroke  - Current meds: amlodipine 5 mg daily, lisinopril 2.5 mg daily  - Denies visual changes, headaches, chest pain, sob  - CKD 3b, GFR 35  Monitor BP daily  Monitor kidney function  Follow up  with vascular outpatient         Type 2 diabetes mellitus with diabetic chronic kidney disease (HCC)  - HgbA1C         Lab Results   Component Value Date    HGBA1C 8.1 (H) 03/28/2024     - MicAlb:Cr     Lab Results   Component Value Date    CREATININEUR 109.5 03/29/2024    LABMICR 94.4 (H) 06/30/2023    MICROALBCRE 97 (H) 06/30/2023     - Renal      Lab Results   Component Value Date    BUN 10 04/01/2024    CREATININE 1.43 (H) 04/01/2024    EGFR 35 04/01/2024     - Current medications: glipizide 2.5 mg daily  - On statin: atorvastatin 40 mg daily  - Diabetic foot exam: 3/14/24, risk: 1  - Iris exam: due  Medication changes: none  Continue current regimen  Avoid hypoglycemia  Continue daily glucose monitoring      Mild dementia (HCC)  - AAOx2 at time of encounter, oriented to time, person  - Current medications: Aricept 10 mg   - Last MOCA Aug 2023: 22/30, previous score of 14/30  - At baseline, independent with IADLs/dependent with ADLS  Official mental cognition evaluation  Delirium precautions  Fall precautions  Continue Aricept 10 mg daily        Hearing loss  - Hx of b/l hearing loss, was prescribed hearing aids, does not wear at baseline  Encourage hearing aids daily    Stage 3a chronic kidney disease (HCC)  Lab Results   Component Value Date    EGFR 35 04/01/2024    EGFR 24 03/31/2024    EGFR 22 03/30/2024    CREATININE 1.43 (H) 04/01/2024    CREATININE 1.95 (H) 03/31/2024    CREATININE 2.11 (H) 03/30/2024     - Baseline 0.8-1.0  Avoid nephrotoxic agents  Maintain adequate oral hydration  Monitor intermittently    Falls  - Hx of recent fall prior to stroke, broke nasal bones  - Denies need for ambulatory aids  - Current meds: ASA, Plavix   Fall precautions  PT/OT  Use ambulatory aids as necessary      Depression  - Stable on remeron 15 mg daily  Continue remeron 15 mg daily    Anemia due to stage 3b chronic kidney disease  (HCC)  - Last Hgb at 10.6, previous baseline around 10-11  - Likely due to CKD 3, r/o  iron deficiency (ordered, not performed), B12 (2022), folate deficiency  - No active bleeding at this time  - Now on ASA, Plavix   Consider iron def, B12, folate check  Continue to monitor for acute bleeding   Fall precautions    All medications and routine orders were reviewed and updated as needed.    Plan discussed with: Dr. Martinez    Chief Complaint     Seen for admission at Nursing Facility    History of Present Illness     75 yo female with PMH of TIA, recent falls, HTN, DM not on insulin, hearing loss, depression, mild dementia admitted to [facility] on 4/1/24 after discharge from [facility] from 3/27/24 - 4/1/24 for posterior ischemic right MCA stroke. Patient was found by daughter at home with L sided arm weakness, L sided neglect and brought to the ED for evaluation. Symptoms had resolved by then, however stroke pathway was called. Multiple imaging confirmed MCA stroke. Vascular and Neurology were consulted with recommendation to start ASA, plavix and statin with outpatient follow up. Patient was also seen by PT/OT with recommendation for level 2 moderate support rehab post discharge.     At baseline, patient lives with , cooks, feeds and dresses herself. Does not drive (has not for a while,  does driving),  handles bills, does not use ambulatory aids at baseline. Recommended for hearing aids, however does not use them. Medications handled by daughter. Most recently, patient was evaluated for fall that broke her nasal bones.     Today, patient was AAO-2 during encounter.    HISTORY:  Past Medical History:   Diagnosis Date    MARTINA (acute kidney injury) (HCC) 11/21/2021    Diabetes mellitus (HCC)     Hypertension      Family History   Problem Relation Age of Onset    Breast cancer Mother         unknown age    No Known Problems Father     No Known Problems Daughter     No Known Problems Maternal Grandmother     No Known Problems Maternal Grandfather     No  Known Problems Paternal Grandmother     No Known Problems Paternal Grandfather     No Known Problems Son     No Known Problems Son     No Known Problems Maternal Aunt     No Known Problems Maternal Aunt     No Known Problems Maternal Aunt     No Known Problems Maternal Aunt     No Known Problems Paternal Aunt     No Known Problems Paternal Aunt     No Known Problems Paternal Aunt      Social History     Socioeconomic History    Marital status: /Civil Union     Spouse name: None    Number of children: None    Years of education: None    Highest education level: None   Occupational History    None   Tobacco Use    Smoking status: Never    Smokeless tobacco: Never   Vaping Use    Vaping status: Never Used   Substance and Sexual Activity    Alcohol use: No    Drug use: Never    Sexual activity: Not Currently   Other Topics Concern    None   Social History Narrative    ** Merged History Encounter **         ** Merged History Encounter **          Social Determinants of Health     Financial Resource Strain: Low Risk  (2/22/2024)    Overall Financial Resource Strain (CARDIA)     Difficulty of Paying Living Expenses: Not very hard   Food Insecurity: No Food Insecurity (3/28/2024)    Hunger Vital Sign     Worried About Running Out of Food in the Last Year: Never true     Ran Out of Food in the Last Year: Never true   Transportation Needs: No Transportation Needs (3/28/2024)    PRAPARE - Transportation     Lack of Transportation (Medical): No     Lack of Transportation (Non-Medical): No   Physical Activity: Not on file   Stress: Not on file   Social Connections: Not on file   Intimate Partner Violence: Not on file   Housing Stability: Low Risk  (3/28/2024)    Housing Stability Vital Sign     Unable to Pay for Housing in the Last Year: No     Number of Places Lived in the Last Year: 1     Unstable Housing in the Last Year: No       Allergies:  No Known Allergies    Review of Systems     Review of Systems   Unable to  perform ROS: Dementia   Constitutional:  Negative for chills and fever.   HENT:  Negative for ear pain and sore throat.    Eyes:  Negative for pain and visual disturbance.   Respiratory:  Negative for cough and shortness of breath.    Cardiovascular:  Negative for chest pain and palpitations.   Gastrointestinal:  Negative for abdominal pain and vomiting.   Genitourinary:  Negative for dysuria and hematuria.   Musculoskeletal:  Negative for arthralgias and back pain.   Skin:  Negative for color change and rash.   Neurological:  Negative for seizures and syncope.   All other systems reviewed and are negative.      Medications and orders     All medications reviewed and updated in [facility] EMR.      Objective     Vitals: /75   Pulse 78   Temp 97.8 °F (36.6 °C)   Resp 18   Wt 66.3 kg (146 lb 3.2 oz)   SpO2 98%   BMI 26.74 kg/m²      Physical Exam  Vitals reviewed.   Constitutional:       General: She is awake. She is not in acute distress.     Appearance: Normal appearance. She is not ill-appearing.      Comments: Mild dysarthria   HENT:      Head: Normocephalic and atraumatic.      Right Ear: External ear normal.      Left Ear: External ear normal.      Nose: Nose normal.      Mouth/Throat:      Mouth: Mucous membranes are moist.      Tongue: Tongue does not deviate from midline.      Pharynx: Oropharynx is clear.   Eyes:      Extraocular Movements: Extraocular movements intact.   Cardiovascular:      Rate and Rhythm: Normal rate and regular rhythm.      Pulses: Normal pulses.      Heart sounds: Normal heart sounds. No murmur heard.  Pulmonary:      Effort: Pulmonary effort is normal. No respiratory distress.      Breath sounds: Normal breath sounds and air entry. No wheezing or rales.   Abdominal:      General: Bowel sounds are normal. There is no distension.      Palpations: Abdomen is soft. There is no mass.      Tenderness: There is no abdominal tenderness. There is no guarding or rebound.    Musculoskeletal:         General: No swelling, deformity or signs of injury. Normal range of motion.      Cervical back: Normal range of motion and neck supple.   Skin:     General: Skin is warm and dry.      Capillary Refill: Capillary refill takes less than 2 seconds.      Findings: Bruising present. No rash.      Comments: On arms   Neurological:      General: No focal deficit present.      Mental Status: She is alert and oriented to person, place, and time. Mental status is at baseline.      Comments: Cranial nerves grossly intact   Psychiatric:         Mood and Affect: Mood normal.         Behavior: Behavior normal. Behavior is cooperative.         Thought Content: Thought content normal.         Pertinent Laboratory/Diagnostic Studies:   The following labs/studies were reviewed please see chart or hospital paperwork for details.

## 2024-04-02 NOTE — ASSESSMENT & PLAN NOTE
- Hx of recent fall prior to stroke, broke nasal bones  - Denies need for ambulatory aids  - Current meds: ASA, Plavix   Fall precautions  PT/OT  Use ambulatory aids as necessary

## 2024-04-02 NOTE — UTILIZATION REVIEW
NOTIFICATION OF ADMISSION DISCHARGE   This is a Notification of Discharge from [facility]. Please be advised that this patient has been discharge from our facility. Below you will find the admission and discharge date and time including the patient’s disposition.   UTILIZATION REVIEW CONTACT:  Jessica Salamanca  Utilization   Network Utilization Review Department  Phone: [phone] x carefully listen to the prompts. All voicemails are confidential.  Email: [email or web address]     ADMISSION INFORMATION  PRESENTATION DATE: 3/27/2024 12:14 PM  OBERVATION ADMISSION DATE:   INPATIENT ADMISSION DATE: 3/27/24  3:50 PM   DISCHARGE DATE: 4/1/2024  3:19 PM   DISPOSITION:Non [facility] SNF/TCU/SNU    Network Utilization Review Department  ATTENTION: Please call with any questions or concerns to [phone] and carefully listen to the prompts so that you are directed to the right person. All voicemails are confidential.   For Discharge needs, contact Care Management DC Support Team at [phone] opt. 2  Send all requests for admission clinical reviews, approved or denied determinations and any other requests to dedicated fax number below belonging to the campus where the patient is receiving treatment. List of dedicated fax numbers for the Facilities:  FACILITY NAME UR FAX NUMBER   ADMISSION DENIALS (Administrative/Medical Necessity) [phone]   DISCHARGE SUPPORT TEAM ([facility]) [phone]   PARENT CHILD HEALTH (Maternity/NICU/Pediatrics) [phone]   [facility] [phone]   [facility] [phone]   [facility] [phone]   [facility] [phone]   [facility] [phone]   [facility] [phone]   [facility] [phone]   [facility]  [phone]   [facility] [phone]   Count includes the [facility] [phone]   [facility] [phone]   [facility] [phone]

## 2024-04-02 NOTE — ASSESSMENT & PLAN NOTE
- Brought into hospital by daughter for confusion, L sided neglect, L arm weakness, concern for stroke on 3/27/24  - Stroke pathway  CTA shows: No evidence of acute large vessel intracranial occlusion.  Chronic bilateral ICA origin/carotid bulb high-grade stenosis approximately 80 to 90%  MRI- Acute to subacute posterior right temporal focus of lacunar ischemia. No associated hemorrhage.   Echocardiogram-left ventricular ejection fraction is 55%. Systolic function is normal. Although no diagnostic regional wall motion abnormality was identified  - Vascular following  - Medications: ASA, Plavix, Statin for 90 days, only plavix thereafter  - VSS  - PE: no gross cranial nerve abnormality, strength 2+ slight weakness in R arm  - PT/OT recommendations for level 2 therapy    Plan:  Continue ASA, plavix, statin  Continue PT/OT  Monitor speech/swallowing  Monitor vitals daily  Fall precautions  Delirium precautions  Encourage visual/hearing aids as needed  Outpatient vascular surgery as recommended/scheduled

## 2024-04-02 NOTE — ASSESSMENT & PLAN NOTE
- HgbA1C         Lab Results   Component Value Date    HGBA1C 8.1 (H) 03/28/2024     - MicAlb:Cr     Lab Results   Component Value Date    CREATININEUR 109.5 03/29/2024    LABMICR 94.4 (H) 06/30/2023    MICROALBCRE 97 (H) 06/30/2023     - Renal      Lab Results   Component Value Date    BUN 10 04/01/2024    CREATININE 1.43 (H) 04/01/2024    EGFR 35 04/01/2024     - Current medications: glipizide 2.5 mg daily  - On statin: atorvastatin 40 mg daily  - Diabetic foot exam: 3/14/24, risk: 1  - Iris exam: due  Medication changes: none  Continue current regimen  Avoid hypoglycemia  Continue daily glucose monitoring

## 2024-04-02 NOTE — ASSESSMENT & PLAN NOTE
- Last Hgb at 10.6, previous baseline around 10-11  - Likely due to CKD 3, r/o iron deficiency (ordered, not performed), B12 (2022), folate deficiency  - No active bleeding at this time  - Now on ASA, Plavix   Consider iron def, B12, folate check  Continue to monitor for acute bleeding   Fall precautions

## 2024-04-03 ENCOUNTER — TELEPHONE (OUTPATIENT)
Dept: NEUROLOGY | Facility: CLINIC | Age: 74
End: 2024-04-03

## 2024-04-03 NOTE — TELEPHONE ENCOUNTER
Patient was seen by you 3/29/24 in the hospital. The instructions in chart to follow up with outpatient neuro vascular. Please specify if patient can be seen with attending or AP.     Thank you

## 2024-04-04 ENCOUNTER — NURSING HOME VISIT (OUTPATIENT)
Dept: GERIATRICS | Facility: OTHER | Age: 74
End: 2024-04-04
Payer: COMMERCIAL

## 2024-04-04 VITALS
OXYGEN SATURATION: 98 % | SYSTOLIC BLOOD PRESSURE: 165 MMHG | DIASTOLIC BLOOD PRESSURE: 75 MMHG | TEMPERATURE: 97.8 F | HEART RATE: 78 BPM | RESPIRATION RATE: 18 BRPM

## 2024-04-04 DIAGNOSIS — F02.A4 MILD DEMENTIA DUE TO PARKINSON'S DISEASE, WITH ANXIETY (HCC): ICD-10-CM

## 2024-04-04 DIAGNOSIS — I10 PRIMARY HYPERTENSION: ICD-10-CM

## 2024-04-04 DIAGNOSIS — E11.22 TYPE 2 DIABETES MELLITUS WITH DIABETIC CHRONIC KIDNEY DISEASE, UNSPECIFIED CKD STAGE, UNSPECIFIED WHETHER LONG TERM INSULIN USE (HCC): ICD-10-CM

## 2024-04-04 DIAGNOSIS — G20.A1 MILD DEMENTIA DUE TO PARKINSON'S DISEASE, WITH ANXIETY (HCC): ICD-10-CM

## 2024-04-04 DIAGNOSIS — I63.511 ACUTE ISCHEMIC RIGHT MCA STROKE (HCC): Primary | ICD-10-CM

## 2024-04-04 DIAGNOSIS — R26.2 AMBULATORY DYSFUNCTION: ICD-10-CM

## 2024-04-04 DIAGNOSIS — N18.31 STAGE 3A CHRONIC KIDNEY DISEASE (HCC): ICD-10-CM

## 2024-04-04 PROCEDURE — 99309 SBSQ NF CARE MODERATE MDM 30: CPT

## 2024-04-04 NOTE — PROGRESS NOTES
[facility]  [address] [address]  [phone]  [place] [facility]  Code 31 (STR)          NAME: Gayathri Lopes  AGE: 74 y.o. SEX: female CODE STATUS: CPR    DATE OF ENCOUNTER: 4/5/2024    Assessment and Plan     1. Small acute posterior ischemic right MCA stroke (HCC)  Assessment & Plan:  C/o left arm weakness and visual deficits  CTA showed chronic b/l ICA high grade stenosis.    MRI of brain showed small acute posterior ischemic right MCA.  Continue DAPT x 90 days then Plavix monotherapy  OP f/u with neurology  Continue PT/OT      2. Primary hypertension  Assessment & Plan:  /75  Continue monitor BP  Continue amlodipine 5 mg daily      3. Type 2 diabetes mellitus with diabetic chronic kidney disease, unspecified CKD stage, unspecified whether long term insulin use ([facility])  Assessment & Plan:  Per geriatric guidelines, goal HgA1c is > 7.5 to prevent hypoglycemic event in the older adult with cognitive decline   Lab Results   Component Value Date    HGBA1C 8.1 (H) 03/28/2024   Morning   Continue accu-cheks  Continue glipizide 2.5 mg daily  Encourage diabetic diet  Avoid hypoglycemia      4. 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 Stage 3a chronic kidney disease (HCC)  Assessment & Plan:  Lab Results   Component Value Date    EGFR 35 04/01/2024    EGFR 24 03/31/2024    EGFR 22 03/30/2024    CREATININE 1.43 (H) 04/01/2024    CREATININE 1.95 (H) 03/31/2024    CREATININE 2.11 (H) 03/30/2024   Avoid nephrotoxins  Avoid hypotension  Encourage adequate hydration  Monitor kidney  functions      6. Ambulatory dysfunction  Assessment & Plan:  Multifactorial  Continue PT/OT  Fall Precautions  Ensure adequate nutrition/hydration   Monitor CBC/BMP    following for d/c planning             All medications and routine orders were reviewed and updated as needed.    Chief Complaint     STR follow up visit  Patient's care was coordinated with nursing facility staff. Recent vitals, labs, and updated medications were review on [facility] system in facility.  Past Medical and Surgical History      Past Medical History:   Diagnosis Date    MARTINA (acute kidney injury) (HCC) 11/21/2021    Diabetes mellitus (HCC)     Hypertension      No past surgical history on file.  No Known Allergies       History of Present Illness     HPI  Gayathri Lopes is a 74 year old female, she is STR patient of [facility] since 4/1/24. Past Medical Hx including but not limited to CVA, CKD, diabetes,dementia,HTN, HLD, depression. She was seen in collaboration with nursing for medical mgmt and STR follow up.   Hospital Course  Patient was admitted to the hospital 3/27/24 d/t left arm weakness and visual deficits. Followed by neurology, CTA showed chronic b/l ICA high grade stenosis.  Recommended to start DAPT and continue stroke pathway.  MRI of brain showed small acute posterior ischemic right MCA.  Recommended to continue DAPT x 90 then transition to Plavix monotherapy. Patient discharged to [facility] for [facility].    Rehab  Gayathri was seen and examined at bedside today. Patient is awake and alert. She c/o left shoulder pain, will order Lidoderm patch. She denies difficulty sleeping and has a good appetite. She states therapy has been going well. She is hoping to go home soon.     Denies CP/SOB/N/V/D. Denies lightheadedness, dizziness, headaches, vision changes. Patient states they are eating well and staying hydrated. Denies any bowel or bladder issues. Per review of SNF records, Patient is eating 3  meals per day, consuming  %. Last documented BM 4/3/24. No concerns from nursing at this time.    The patient's allergies, past medical, surgical, social and family history were reviewed and unchanged.    Review of Systems     Review of Systems   Constitutional:  Positive for activity change. Negative for appetite change and fever.   HENT: Negative.  Negative for congestion.    Respiratory:  Negative for cough, shortness of breath and wheezing.    Cardiovascular: Negative.  Negative for chest pain, palpitations and leg swelling.   Gastrointestinal: Negative.  Negative for constipation, diarrhea, nausea and vomiting.   Endocrine: Negative.    Genitourinary:  Negative for difficulty urinating and dysuria.   Musculoskeletal:  Positive for arthralgias and gait problem.   Skin: Negative.    Neurological:  Positive for weakness. Negative for dizziness and headaches.   Hematological: Negative.    Psychiatric/Behavioral:  Positive for confusion. Negative for sleep disturbance.          Objective     Vitals:   Vitals:    04/04/24 1300   BP: 165/75   Pulse: 78   Resp: 18   Temp: 97.8 °F (36.6 °C)   SpO2: 98%         Physical Exam  Vitals and nursing note reviewed.   Constitutional:       General: She is not in acute distress.     Appearance: Normal appearance. She is not ill-appearing.   HENT:      Head: Normocephalic and atraumatic.      Nose: No congestion.   Eyes:      Conjunctiva/sclera: Conjunctivae normal.   Cardiovascular:      Rate and Rhythm: Normal rate and regular rhythm.   Pulmonary:      Effort: Pulmonary effort is normal. No respiratory distress.      Breath sounds: Normal breath sounds. No wheezing.   Abdominal:      General: Bowel sounds are normal.      Palpations: Abdomen is soft.   Musculoskeletal:      Right lower leg: No edema.      Left lower leg: No edema.   Neurological:      Mental Status: She is alert.      Motor: Weakness present.      Gait: Gait abnormal.   Psychiatric:         Mood and  "Affect: Mood normal.         Behavior: Behavior normal.         Pertinent Laboratory/Diagnostic Studies:   Reviewed in facility chart-stable      Current Medications   Medications reviewed and updated see facility MAR for details.      Current Outpatient Medications:     amLODIPine (NORVASC) 5 mg tablet, Take 1 tablet (5 mg total) by mouth daily, Disp: 30 tablet, Rfl: 0    aspirin (ECOTRIN LOW STRENGTH) 81 mg EC tablet, Take 81 mg by mouth daily, Disp: , Rfl:     atorvastatin (LIPITOR) 40 mg tablet, TAKE 1 TABLET BY MOUTH EVERY DAY IN THE EVENING, Disp: 90 tablet, Rfl: 1    calcium carbonate (OS-ALYSON) 600 MG tablet, Take 2 tablets (1,200 mg total) by mouth daily, Disp: , Rfl:     cholecalciferol (VITAMIN D3) 1,000 units tablet, Take 1,000 Units by mouth daily, Disp: , Rfl:     clopidogrel (PLAVIX) 75 mg tablet, Take 1 tablet (75 mg total) by mouth daily for 87 doses, Disp: 30 tablet, Rfl: 0    donepezil (ARICEPT) 10 mg tablet, TAKE 1 TABLET BY MOUTH EVERYDAY AT BEDTIME, Disp: 90 tablet, Rfl: 1    glipiZIDE (GLUCOTROL XL) 2.5 mg 24 hr tablet, Take 1 tablet (2.5 mg total) by mouth daily, Disp: 90 tablet, Rfl: 1    lisinopril (ZESTRIL) 2.5 mg tablet, TAKE 1 TABLET DAILY AS NEEDED ONLY IF SYSTOLIC BP IS GREATER THEN 160MMHG, Disp: 90 tablet, Rfl: 1    mirtazapine (REMERON) 15 mg tablet, Take 1 tablet (15 mg total) by mouth daily at bedtime, Disp: 90 tablet, Rfl: 1     Please note:  Voice-recognition software may have been used in the preparation of this document.  Occasional wrong word or \"sound-alike\" substitutions may have occurred due to the inherent limitations of voice recognition software.  Interpretation should be guided by context.         DEMETRA Aranda  4/5/2024  2:51 PM    "

## 2024-04-05 NOTE — ASSESSMENT & PLAN NOTE
Per geriatric guidelines, goal HgA1c is > 7.5 to prevent hypoglycemic event in the older adult with cognitive decline   Lab Results   Component Value Date    HGBA1C 8.1 (H) 03/28/2024   Morning   Continue accu-cheks  Continue glipizide 2.5 mg daily  Encourage diabetic diet  Avoid hypoglycemia

## 2024-04-05 NOTE — ASSESSMENT & PLAN NOTE
C/o left arm weakness and visual deficits  CTA showed chronic b/l ICA high grade stenosis.    MRI of brain showed small acute posterior ischemic right MCA.  Continue DAPT x 90 days then Plavix monotherapy  OP f/u with neurology  Continue PT/OT

## 2024-04-05 NOTE — ASSESSMENT & PLAN NOTE
Provide redirection, reorientation, distraction techniques  Fall Precautions  Assist with ADLs/IADLs  Avoid deliriogenic medications such as tramadol, benzodiazepines, anticholinergics, benadryl  Encourage Hydration/ Nutrition  Implement sleep hygiene, limit night time interuptions   Encourage participation in group activities when appropriate

## 2024-04-05 NOTE — ASSESSMENT & PLAN NOTE
Lab Results   Component Value Date    EGFR 35 04/01/2024    EGFR 24 03/31/2024    EGFR 22 03/30/2024    CREATININE 1.43 (H) 04/01/2024    CREATININE 1.95 (H) 03/31/2024    CREATININE 2.11 (H) 03/30/2024   Avoid nephrotoxins  Avoid hypotension  Encourage adequate hydration  Monitor kidney functions

## 2024-04-08 NOTE — TELEPHONE ENCOUNTER
Called Mary from [facility] left a message to schedule HFU appointment per directions below from Sulema Dudley.

## 2024-04-09 ENCOUNTER — NURSING HOME VISIT (OUTPATIENT)
Dept: GERIATRICS | Facility: OTHER | Age: 74
End: 2024-04-09
Payer: COMMERCIAL

## 2024-04-09 VITALS
TEMPERATURE: 97.2 F | SYSTOLIC BLOOD PRESSURE: 152 MMHG | DIASTOLIC BLOOD PRESSURE: 60 MMHG | RESPIRATION RATE: 16 BRPM | HEART RATE: 78 BPM | OXYGEN SATURATION: 96 %

## 2024-04-09 DIAGNOSIS — G20.A1 MILD DEMENTIA DUE TO PARKINSON'S DISEASE, WITH ANXIETY (HCC): ICD-10-CM

## 2024-04-09 DIAGNOSIS — N18.31 STAGE 3A CHRONIC KIDNEY DISEASE (HCC): ICD-10-CM

## 2024-04-09 DIAGNOSIS — I63.511 ACUTE ISCHEMIC RIGHT MCA STROKE (HCC): Primary | ICD-10-CM

## 2024-04-09 DIAGNOSIS — R26.2 AMBULATORY DYSFUNCTION: ICD-10-CM

## 2024-04-09 DIAGNOSIS — E11.22 TYPE 2 DIABETES MELLITUS WITH DIABETIC CHRONIC KIDNEY DISEASE, UNSPECIFIED CKD STAGE, UNSPECIFIED WHETHER LONG TERM INSULIN USE (HCC): ICD-10-CM

## 2024-04-09 DIAGNOSIS — I10 PRIMARY HYPERTENSION: ICD-10-CM

## 2024-04-09 DIAGNOSIS — K59.1 FUNCTIONAL DIARRHEA: ICD-10-CM

## 2024-04-09 DIAGNOSIS — F02.A4 MILD DEMENTIA DUE TO PARKINSON'S DISEASE, WITH ANXIETY (HCC): ICD-10-CM

## 2024-04-09 PROCEDURE — 99309 SBSQ NF CARE MODERATE MDM 30: CPT

## 2024-04-09 NOTE — PROGRESS NOTES
[facility]  [address] [address]  [phone]  [place] [facility]  Code 31 (STR)          NAME: Gayathri Lopes  AGE: 74 y.o. SEX: female CODE STATUS: CPR    DATE OF ENCOUNTER: 4/9/2024    Assessment and Plan     1. Small acute posterior ischemic right MCA stroke (HCC)  Assessment & Plan:  Hospitalized for c/o left arm weakness and visual deficits  CTA showed chronic b/l ICA high grade stenosis.    MRI of brain showed small acute posterior ischemic right MCA.  Continue DAPT x 90 days then Plavix monotherapy  OP f/u with neurology  Continue PT/OT      2. Primary hypertension  Assessment & Plan:  /60  Continue monitor BP, adjust medications as needed  Continue amlodipine 5 mg daily      3. Type 2 diabetes mellitus with diabetic chronic kidney disease, unspecified CKD stage, unspecified whether long term insulin use ([facility])  Assessment & Plan:  Per geriatric guidelines, goal HgA1c is > 7.5 to prevent hypoglycemic event in the older adult with cognitive decline   Lab Results   Component Value Date    HGBA1C 8.1 (H) 03/28/2024   Morning   Continue accu-cheks  Continue glipizide 2.5 mg daily  Encourage diabetic diet  Avoid hypoglycemia      4. 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 Stage 3a chronic kidney disease (HCC)  Assessment & Plan:  Lab Results   Component Value Date    EGFR 35 04/01/2024    EGFR 24 03/31/2024    EGFR 22 03/30/2024    CREATININE 1.43 (H) 04/01/2024    CREATININE 1.95 (H) 03/31/2024    CREATININE 2.11 (H) 03/30/2024 4/8/24 Cr 0.7/ BUN 7/ GFR >60  Avoid  nephrotoxins  Avoid hypotension  Encourage adequate hydration  Monitor kidney functions      6. Ambulatory dysfunction  Assessment & Plan:  Multifactorial  Continue PT/OT  Fall Precautions  Ensure adequate nutrition/hydration   Monitor CBC/BMP    following for d/c planning        7. Functional diarrhea  Assessment & Plan:  Staff reports patient had 6 episodes of watery diarrhea yesterday  Patient denies abdominal pain on palpation  She denied diarrhea this morning  Senna was discontinued  Patient reportedly ate a large amount of sugar free candy  Will encourage hydration   Continue to monitor           All medications and routine orders were reviewed and updated as needed.    Chief Complaint     STR follow up visit  Patient's care was coordinated with nursing facility staff. Recent vitals, labs, and updated medications were review on [facility] system in facility.  Past Medical and Surgical History      Past Medical History:   Diagnosis Date    MARTINA (acute kidney injury) (HCC) 11/21/2021    Diabetes mellitus (HCC)     Hypertension      History reviewed. No pertinent surgical history.  No Known Allergies       History of Present Illness     HPI  Gayathri Lopes is a 74 year old female, she is STR patient of [facility] since 4/1/24. Past Medical Hx including but not limited to CVA, CKD, diabetes,dementia,HTN, HLD, depression. She was seen in collaboration with nursing for medical mgmt and STR follow up.   Hospital Course  Patient was admitted to the hospital 3/27/24 d/t left arm weakness and visual deficits. Followed by neurology, CTA showed chronic b/l ICA high grade stenosis.  Recommended to start DAPT and continue stroke pathway.  MRI of brain showed small acute posterior ischemic right MCA.  Recommended to continue DAPT x 90 then transition to Plavix monotherapy. [facility].    Rehab  Gayathri was seen and examined at bedside today. Patient is awake and  alert.  She denies pain, difficulty sleeping and has a good appetite. She states therapy has been going well. She has no concerns this morning.     Denies CP/SOB/N/V/D. Denies lightheadedness, dizziness, headaches, vision changes. Patient states they are eating well and staying hydrated. Denies any bowel or bladder issues. Per review of SNF records, Patient is eating 3 meals per day, consuming  %. Last documented BM 4/9/24. No concerns from nursing at this time.    The patient's allergies, past medical, surgical, social and family history were reviewed and unchanged.    Review of Systems     Review of Systems   Constitutional:  Positive for activity change. Negative for appetite change and fever.   HENT: Negative.  Negative for congestion.    Respiratory:  Negative for cough, shortness of breath and wheezing.    Cardiovascular: Negative.  Negative for chest pain, palpitations and leg swelling.   Gastrointestinal:  Positive for diarrhea. Negative for constipation, nausea and vomiting.   Endocrine: Negative.    Genitourinary:  Negative for difficulty urinating and dysuria.   Musculoskeletal:  Positive for arthralgias and gait problem.   Skin: Negative.    Neurological:  Positive for weakness. Negative for dizziness and headaches.   Hematological: Negative.    Psychiatric/Behavioral:  Positive for confusion. Negative for sleep disturbance.          Objective     Vitals:   Vitals:    04/09/24 1805   BP: 152/60   Pulse: 78   Resp: 16   Temp: (!) 97.2 °F (36.2 °C)   SpO2: 96%           Physical Exam  Vitals and nursing note reviewed.   Constitutional:       General: She is not in acute distress.     Appearance: Normal appearance. She is not ill-appearing.   HENT:      Head: Normocephalic and atraumatic.      Nose: No congestion.   Eyes:      Conjunctiva/sclera: Conjunctivae normal.   Cardiovascular:      Rate and Rhythm: Normal rate and regular rhythm.   Pulmonary:      Effort: Pulmonary effort is normal. No  "respiratory distress.      Breath sounds: Normal breath sounds. No wheezing.   Abdominal:      General: Bowel sounds are normal.      Palpations: Abdomen is soft.   Musculoskeletal:      Right lower leg: No edema.      Left lower leg: No edema.   Neurological:      Mental Status: She is alert.      Motor: Weakness present.      Gait: Gait abnormal.   Psychiatric:         Mood and Affect: Mood normal.         Behavior: Behavior normal.         Pertinent Laboratory/Diagnostic Studies:   Reviewed in facility chart-stable      Current Medications   Medications reviewed and updated see facility MAR for details.      Current Outpatient Medications:     amLODIPine (NORVASC) 5 mg tablet, Take 1 tablet (5 mg total) by mouth daily, Disp: 30 tablet, Rfl: 0    aspirin (ECOTRIN LOW STRENGTH) 81 mg EC tablet, Take 81 mg by mouth daily, Disp: , Rfl:     atorvastatin (LIPITOR) 40 mg tablet, TAKE 1 TABLET BY MOUTH EVERY DAY IN THE EVENING, Disp: 90 tablet, Rfl: 1    calcium carbonate (OS-ALYSON) 600 MG tablet, Take 2 tablets (1,200 mg total) by mouth daily, Disp: , Rfl:     cholecalciferol (VITAMIN D3) 1,000 units tablet, Take 1,000 Units by mouth daily, Disp: , Rfl:     clopidogrel (PLAVIX) 75 mg tablet, Take 1 tablet (75 mg total) by mouth daily for 87 doses, Disp: 30 tablet, Rfl: 0    donepezil (ARICEPT) 10 mg tablet, TAKE 1 TABLET BY MOUTH EVERYDAY AT BEDTIME, Disp: 90 tablet, Rfl: 1    glipiZIDE (GLUCOTROL XL) 2.5 mg 24 hr tablet, Take 1 tablet (2.5 mg total) by mouth daily, Disp: 90 tablet, Rfl: 1    lisinopril (ZESTRIL) 2.5 mg tablet, TAKE 1 TABLET DAILY AS NEEDED ONLY IF SYSTOLIC BP IS GREATER THEN 160MMHG, Disp: 90 tablet, Rfl: 1    mirtazapine (REMERON) 15 mg tablet, Take 1 tablet (15 mg total) by mouth daily at bedtime, Disp: 90 tablet, Rfl: 1     Please note:  Voice-recognition software may have been used in the preparation of this document.  Occasional wrong word or \"sound-alike\" substitutions may have occurred due to the " inherent limitations of voice recognition software.  Interpretation should be guided by context.         DEMETRA Aranda  4/9/2024  6:14 PM

## 2024-04-09 NOTE — ASSESSMENT & PLAN NOTE
Staff reports patient had 6 episodes of watery diarrhea yesterday  Patient denies abdominal pain on palpation  She denied diarrhea this morning  Senna was discontinued  Patient reportedly ate a large amount of sugar free candy  Will encourage hydration   Continue to monitor

## 2024-04-09 NOTE — TELEPHONE ENCOUNTER
Place another call to [facility]. Left a voicemail for Mary to call back to schedule HFU for patient.

## 2024-04-09 NOTE — ASSESSMENT & PLAN NOTE
Hospitalized for c/o left arm weakness and visual deficits  CTA showed chronic b/l ICA high grade stenosis.    MRI of brain showed small acute posterior ischemic right MCA.  Continue DAPT x 90 days then Plavix monotherapy  OP f/u with neurology  Continue PT/OT

## 2024-04-09 NOTE — ASSESSMENT & PLAN NOTE
Lab Results   Component Value Date    EGFR 35 04/01/2024    EGFR 24 03/31/2024    EGFR 22 03/30/2024    CREATININE 1.43 (H) 04/01/2024    CREATININE 1.95 (H) 03/31/2024    CREATININE 2.11 (H) 03/30/2024 4/8/24 Cr 0.7/ BUN 7/ GFR >60  Avoid nephrotoxins  Avoid hypotension  Encourage adequate hydration  Monitor kidney functions

## 2024-04-10 ENCOUNTER — TELEPHONE (OUTPATIENT)
Dept: OTHER | Facility: OTHER | Age: 74
End: 2024-04-10

## 2024-04-10 NOTE — TELEPHONE ENCOUNTER
Nursing home or Independent living name:  If its not nursing home or Independent living, do they see PCP in Network:  Who is calling: [facility]  Callback number: [phone]  Symptoms: diarrhea 4x past hour  Did you page oncall or place in triage hub:    TT Dr. Galeano

## 2024-04-10 NOTE — TELEPHONE ENCOUNTER
GEORGEU/ SREEDHAR Carter/Small acute posterior ischemic right MCA stroke     DC- 4/1/24- [facility]      Scheduled:  Stephen 5/22/24 2:00pm [place]

## 2024-04-11 ENCOUNTER — NURSING HOME VISIT (OUTPATIENT)
Dept: GERIATRICS | Facility: OTHER | Age: 74
End: 2024-04-11

## 2024-04-11 DIAGNOSIS — N18.31 STAGE 3A CHRONIC KIDNEY DISEASE (HCC): ICD-10-CM

## 2024-04-11 DIAGNOSIS — F02.A4 MILD DEMENTIA DUE TO PARKINSON'S DISEASE, WITH ANXIETY (HCC): ICD-10-CM

## 2024-04-11 DIAGNOSIS — R19.7 DIARRHEA, UNSPECIFIED TYPE: ICD-10-CM

## 2024-04-11 DIAGNOSIS — R26.2 AMBULATORY DYSFUNCTION: ICD-10-CM

## 2024-04-11 DIAGNOSIS — E87.1 HYPONATREMIA: Primary | ICD-10-CM

## 2024-04-11 DIAGNOSIS — E11.22 TYPE 2 DIABETES MELLITUS WITH DIABETIC CHRONIC KIDNEY DISEASE, UNSPECIFIED CKD STAGE, UNSPECIFIED WHETHER LONG TERM INSULIN USE (HCC): ICD-10-CM

## 2024-04-11 DIAGNOSIS — G20.A1 MILD DEMENTIA DUE TO PARKINSON'S DISEASE, WITH ANXIETY (HCC): ICD-10-CM

## 2024-04-11 DIAGNOSIS — I10 PRIMARY HYPERTENSION: ICD-10-CM

## 2024-04-11 NOTE — ASSESSMENT & PLAN NOTE
Lab Results   Component Value Date    EGFR 35 04/01/2024    EGFR 24 03/31/2024    EGFR 22 03/30/2024    CREATININE 1.43 (H) 04/01/2024    CREATININE 1.95 (H) 03/31/2024    CREATININE 2.11 (H) 03/30/2024 4/11/24 Cr 0.8/ BUN 7/ GFR >60  Avoid nephrotoxins  Avoid hypotension  Encourage adequate hydration  Monitor kidney functions

## 2024-04-11 NOTE — ASSESSMENT & PLAN NOTE
Several episodes of loose stool reported by staff  On call notified last night  Started on banana flakes q 6 PRN, appears to have only received once, discussed with staff

## 2024-04-11 NOTE — ASSESSMENT & PLAN NOTE
4/8/24 Na was 132  Patient has had several episodes of diarrhea over the last 24 hours  4/11/24 Na was 124  Will start on clysis NSS x 3L  Repeat CBC, BMP 4/12/24  VS q shift x 72 hours

## 2024-04-12 VITALS
DIASTOLIC BLOOD PRESSURE: 68 MMHG | TEMPERATURE: 97.6 F | SYSTOLIC BLOOD PRESSURE: 132 MMHG | HEART RATE: 68 BPM | RESPIRATION RATE: 18 BRPM | OXYGEN SATURATION: 98 %

## 2024-04-17 ENCOUNTER — NURSING HOME VISIT (OUTPATIENT)
Dept: GERIATRICS | Facility: OTHER | Age: 74
End: 2024-04-17
Payer: COMMERCIAL

## 2024-04-17 VITALS
TEMPERATURE: 97.9 F | WEIGHT: 145.4 LBS | HEART RATE: 69 BPM | OXYGEN SATURATION: 97 % | DIASTOLIC BLOOD PRESSURE: 82 MMHG | BODY MASS INDEX: 26.59 KG/M2 | RESPIRATION RATE: 18 BRPM | SYSTOLIC BLOOD PRESSURE: 144 MMHG

## 2024-04-17 DIAGNOSIS — E11.22 TYPE 2 DIABETES MELLITUS WITH DIABETIC CHRONIC KIDNEY DISEASE, UNSPECIFIED CKD STAGE, UNSPECIFIED WHETHER LONG TERM INSULIN USE (HCC): ICD-10-CM

## 2024-04-17 DIAGNOSIS — I10 PRIMARY HYPERTENSION: Primary | ICD-10-CM

## 2024-04-17 DIAGNOSIS — F02.A4 MILD DEMENTIA DUE TO PARKINSON'S DISEASE, WITH ANXIETY (HCC): ICD-10-CM

## 2024-04-17 DIAGNOSIS — N18.31 STAGE 3A CHRONIC KIDNEY DISEASE (HCC): ICD-10-CM

## 2024-04-17 DIAGNOSIS — E87.1 HYPONATREMIA: ICD-10-CM

## 2024-04-17 DIAGNOSIS — G20.A1 MILD DEMENTIA DUE TO PARKINSON'S DISEASE, WITH ANXIETY (HCC): ICD-10-CM

## 2024-04-17 PROCEDURE — 99309 SBSQ NF CARE MODERATE MDM 30: CPT

## 2024-04-17 NOTE — ASSESSMENT & PLAN NOTE
Per geriatric guidelines, goal HgA1c is > 7.5 to prevent hypoglycemic event in the older adult with cognitive decline   Lab Results   Component Value Date    HGBA1C 8.1 (H) 03/28/2024   Morning   Continue accu-cheks  Continue glipizide 2.5 mg daily  Encourage diabetic diet

## 2024-04-17 NOTE — PROGRESS NOTES
[facility]  [address] [address]  [phone]  [place] [facility]  Code 31 (STR)          NAME: Gayathri Lopes  AGE: 74 y.o. SEX: female CODE STATUS: CPR    DATE OF ENCOUNTER: 4/17/2024    Assessment and Plan     1. Primary hypertension  Assessment & Plan:  /82  Continue monitor BP, adjust medications as needed  Continue amlodipine 5 mg daily      2. Type 2 diabetes mellitus with diabetic chronic kidney disease, unspecified CKD stage, unspecified whether long term insulin use (HCC)  Assessment & Plan:  Per geriatric guidelines, goal HgA1c is > 7.5 to prevent hypoglycemic event in the older adult with cognitive decline   Lab Results   Component Value Date    HGBA1C 8.1 (H) 03/28/2024   Morning   Continue accu-cheks  Continue glipizide 2.5 mg daily  Encourage diabetic diet      3. 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 Stage 3a chronic kidney disease (HCC)  Assessment & Plan:  Lab Results   Component Value Date    EGFR 35 04/01/2024    EGFR 24 03/31/2024    EGFR 22 03/30/2024    CREATININE 1.43 (H) 04/01/2024    CREATININE 1.95 (H) 03/31/2024    CREATININE 2.11 (H) 03/30/2024 4/11/24 Cr 0.8/ BUN 7/ GFR >60  Avoid nephrotoxins  Avoid hypotension  Encourage adequate hydration  Monitor kidney functions      5. Hyponatremia  Assessment & Plan:  4/8/24 Na was 132  Patient has had several episodes of diarrhea   4/11/24 Na was 124, patient received clysis  4/15/24 Na 126, appears stable  Ordered Sodium chloride 1 gr daily  Repeat BMP 4/19           All medications and routine orders were  reviewed and updated as needed.    Chief Complaint     STR follow up visit  Patient's care was coordinated with nursing facility staff. Recent vitals, labs, and updated medications were review on [facility] system in facility.  Past Medical and Surgical History      Past Medical History:   Diagnosis Date    MARTINA (acute kidney injury) (HCC) 11/21/2021    Diabetes mellitus (HCC)     Hypertension      History reviewed. No pertinent surgical history.  No Known Allergies       History of Present Illness     HPI  Gayathri Lopes is a 74 year old female, she is STR patient of [facility] since 4/1/24. Past Medical Hx including but not limited to CVA, CKD, diabetes,dementia,HTN, HLD, depression. She was seen in collaboration with nursing for medical mgmt and STR follow up.   Hospital Course  Patient was admitted to the hospital 3/27/24 d/t left arm weakness and visual deficits. Followed by neurology, CTA showed chronic b/l ICA high grade stenosis.  Recommended to start DAPT and continue stroke pathway.  MRI of brain showed small acute posterior ischemic right MCA.  Recommended to continue DAPT x 90 then transition to Plavix monotherapy. Patient discharged to [facility] for [facility].    Rehab  Gayathri was seen and examined at bedside today. Patient is awake and alert.  She denies pain, difficulty sleeping and has a good appetite. She states she has been doing therapy. She says she feels ready to go home.  Patients  is at bedside. He is concerned about her eating too much candy, advised him to not bring patient candy from home. BS has been consistently under 150 when checked.  Denies CP/SOB/N/V/D. Denies lightheadedness, dizziness, headaches, vision changes. Patient states they are eating well and staying hydrated. Denies any bowel or bladder issues. Per review of SNF records, Patient is eating 3 meals per day, consuming  %. Last documented BM 4/14/24. No concerns from nursing at this time.    The  patient's allergies, past medical, surgical, social and family history were reviewed and unchanged.    Review of Systems     Review of Systems   Constitutional:  Positive for activity change. Negative for appetite change and fever.   HENT: Negative.  Negative for congestion.    Respiratory:  Negative for cough, shortness of breath and wheezing.    Cardiovascular: Negative.  Negative for chest pain, palpitations and leg swelling.   Gastrointestinal:  Negative for constipation, diarrhea, nausea and vomiting.   Endocrine: Negative.    Genitourinary:  Negative for difficulty urinating and dysuria.   Musculoskeletal:  Positive for gait problem.   Skin: Negative.    Neurological:  Negative for dizziness and headaches.   Hematological: Negative.    Psychiatric/Behavioral:  Negative for sleep disturbance.          Objective     Vitals:   Vitals:    04/17/24 0929   BP: 144/82   Pulse: 69   Resp: 18   Temp: 97.9 °F (36.6 °C)   SpO2: 97%               Physical Exam  Vitals and nursing note reviewed.   Constitutional:       General: She is not in acute distress.     Appearance: Normal appearance. She is not ill-appearing.   HENT:      Head: Normocephalic and atraumatic.      Nose: No congestion.   Eyes:      Conjunctiva/sclera: Conjunctivae normal.   Cardiovascular:      Rate and Rhythm: Normal rate and regular rhythm.   Pulmonary:      Effort: Pulmonary effort is normal. No respiratory distress.      Breath sounds: Normal breath sounds. No wheezing.   Abdominal:      General: Bowel sounds are normal.      Palpations: Abdomen is soft.   Musculoskeletal:      Right lower leg: No edema.      Left lower leg: No edema.   Neurological:      Mental Status: She is alert.      Motor: Weakness present.      Gait: Gait abnormal.   Psychiatric:         Mood and Affect: Mood normal.         Behavior: Behavior normal.         Pertinent Laboratory/Diagnostic Studies:   Reviewed in facility chart-stable      Current Medications   Medications  "reviewed and updated see facility MAR for details.      Current Outpatient Medications:     amLODIPine (NORVASC) 5 mg tablet, Take 1 tablet (5 mg total) by mouth daily, Disp: 30 tablet, Rfl: 0    aspirin (ECOTRIN LOW STRENGTH) 81 mg EC tablet, Take 81 mg by mouth daily, Disp: , Rfl:     atorvastatin (LIPITOR) 40 mg tablet, TAKE 1 TABLET BY MOUTH EVERY DAY IN THE EVENING, Disp: 90 tablet, Rfl: 1    calcium carbonate (OS-ALYSON) 600 MG tablet, Take 2 tablets (1,200 mg total) by mouth daily, Disp: , Rfl:     cholecalciferol (VITAMIN D3) 1,000 units tablet, Take 1,000 Units by mouth daily, Disp: , Rfl:     clopidogrel (PLAVIX) 75 mg tablet, Take 1 tablet (75 mg total) by mouth daily for 87 doses, Disp: 30 tablet, Rfl: 0    donepezil (ARICEPT) 10 mg tablet, TAKE 1 TABLET BY MOUTH EVERYDAY AT BEDTIME, Disp: 90 tablet, Rfl: 1    glipiZIDE (GLUCOTROL XL) 2.5 mg 24 hr tablet, Take 1 tablet (2.5 mg total) by mouth daily, Disp: 90 tablet, Rfl: 1    mirtazapine (REMERON) 15 mg tablet, Take 1 tablet (15 mg total) by mouth daily at bedtime, Disp: 90 tablet, Rfl: 1     Please note:  Voice-recognition software may have been used in the preparation of this document.  Occasional wrong word or \"sound-alike\" substitutions may have occurred due to the inherent limitations of voice recognition software.  Interpretation should be guided by context.         DEMETRA Aranda  4/17/2024  3:09 PM    "

## 2024-04-17 NOTE — ASSESSMENT & PLAN NOTE
4/8/24 Na was 132  Patient has had several episodes of diarrhea   4/11/24 Na was 124, patient received clysis  4/15/24 Na 126, appears stable  Ordered Sodium chloride 1 gr daily  Repeat BMP 4/19

## 2024-04-19 ENCOUNTER — NURSING HOME VISIT (OUTPATIENT)
Dept: GERIATRICS | Facility: OTHER | Age: 74
End: 2024-04-19
Payer: COMMERCIAL

## 2024-04-19 VITALS
OXYGEN SATURATION: 97 % | SYSTOLIC BLOOD PRESSURE: 144 MMHG | DIASTOLIC BLOOD PRESSURE: 82 MMHG | TEMPERATURE: 97.9 F | RESPIRATION RATE: 18 BRPM | HEART RATE: 69 BPM

## 2024-04-19 DIAGNOSIS — E11.22 TYPE 2 DIABETES MELLITUS WITH DIABETIC CHRONIC KIDNEY DISEASE, UNSPECIFIED CKD STAGE, UNSPECIFIED WHETHER LONG TERM INSULIN USE (HCC): ICD-10-CM

## 2024-04-19 DIAGNOSIS — I10 PRIMARY HYPERTENSION: ICD-10-CM

## 2024-04-19 DIAGNOSIS — E87.1 HYPONATREMIA: ICD-10-CM

## 2024-04-19 DIAGNOSIS — G20.A1 MILD DEMENTIA DUE TO PARKINSON'S DISEASE, WITH ANXIETY (HCC): ICD-10-CM

## 2024-04-19 DIAGNOSIS — F02.A4 MILD DEMENTIA DUE TO PARKINSON'S DISEASE, WITH ANXIETY (HCC): ICD-10-CM

## 2024-04-19 DIAGNOSIS — I63.511 ACUTE ISCHEMIC RIGHT MCA STROKE (HCC): Primary | ICD-10-CM

## 2024-04-19 DIAGNOSIS — N18.31 STAGE 3A CHRONIC KIDNEY DISEASE (HCC): ICD-10-CM

## 2024-04-19 PROCEDURE — 99316 NF DSCHRG MGMT 30 MIN+: CPT

## 2024-04-19 RX ORDER — CLOPIDOGREL BISULFATE 75 MG/1
75 TABLET ORAL DAILY
Qty: 30 TABLET | Refills: 0 | Status: SHIPPED | OUTPATIENT
Start: 2024-04-19 | End: 2024-07-15

## 2024-04-19 RX ORDER — AMLODIPINE BESYLATE 5 MG/1
5 TABLET ORAL DAILY
Qty: 30 TABLET | Refills: 0 | Status: SHIPPED | OUTPATIENT
Start: 2024-04-19 | End: 2024-05-19

## 2024-04-19 RX ORDER — SODIUM CHLORIDE 1 G/1
1 TABLET ORAL DAILY
Qty: 90 TABLET | Refills: 0 | Status: SHIPPED | OUTPATIENT
Start: 2024-04-19

## 2024-04-19 RX ORDER — SODIUM CHLORIDE 1 G/1
1 TABLET ORAL DAILY
COMMUNITY
End: 2024-04-19 | Stop reason: SDUPTHER

## 2024-04-19 NOTE — ASSESSMENT & PLAN NOTE
Lab Results   Component Value Date    EGFR 35 04/01/2024    EGFR 24 03/31/2024    EGFR 22 03/30/2024    CREATININE 1.43 (H) 04/01/2024    CREATININE 1.95 (H) 03/31/2024    CREATININE 2.11 (H) 03/30/2024 4/11/24 Cr 0.6/ BUN 7/ GFR >60  Avoid nephrotoxins  Avoid hypotension  Encourage adequate hydration  Monitor kidney functions

## 2024-04-19 NOTE — ASSESSMENT & PLAN NOTE
Last documented /82  Continue monitor BP, adjust medications as needed  Continue amlodipine 5 mg daily

## 2024-04-19 NOTE — PROGRESS NOTES
[facility]  [address] [address]  [phone]  DISCHARGE SUMMARY  Facility: [facility]  Code 31    NAME: Gayathri Lopes  AGE: 74 y.o. SEX: female   CODE STATUS: CPR    DATE OF ADMISSION: 4/1/24   DATE OF DISCHARGE: 4/21/24   DISCHARGE DISPOSITION: Stable for discharge to home with family support and home health PT/OT/SN services.   Reason for Admission: Patient was admitted to [facility] for rehabilitation after hospitalization for weakness.    Past Medical and Surgical History:   Past Medical History:   Diagnosis Date    MARTINA (acute kidney injury) (HCC) 11/21/2021    Diabetes mellitus (HCC)     Hypertension       No past surgical history on file.    Course of stay:   HPI  Gayathri Lopes is a 74 year old female, she is STR patient of [facility] since 4/1/24. Past Medical Hx including but not limited to CVA, CKD, diabetes,dementia,HTN, HLD, depression. She was seen in collaboration with nursing for medical mgmt and STR follow up.   Hospital Course  Patient was admitted to the hospital 3/27/24 d/t left arm weakness and visual deficits. Followed by neurology, CTA showed chronic b/l ICA high grade stenosis.  Recommended to start DAPT and continue stroke pathway.  MRI of brain showed small acute posterior ischemic right MCA.  Recommended to continue DAPT x 90 then transition to Plavix monotherapy. Patient discharged to [facility] for [facility]hab.     Rehab  Gayathri was seen and examined.  Patient is awake and alert.  She denies pain, difficulty sleeping and has a good appetite. She states she has been doing therapy. She says she feels ready to go home.  Labs done 4/19/24 showed K+ of 5.7, banana flakes discontinued, Na of 125, patient has chronic hyponatremia, increased sodium tabs to TID, order placed for repeat BMP within 1 week.  Spoke with patients daughter Apple to update her on lab results and medication changes. She states she is arranging follow up appointments.  Denies  CP/SOB/N/V/D. Denies lightheadedness, dizziness, headaches, vision changes. Patient states they are eating well and staying hydrated. Denies any bowel or bladder issues. Per review of SNF records, Patient is eating 3 meals per day, consuming  %. Last documented BM 4/19/24. No concerns from nursing at this time.  During the patients stay at [facility], she received skilled nursing care, PT, OT, social service support, dietician support, and medical management.  Pt scheduled to be discharged on 4/21/24.  ROS:  Review of Systems   Constitutional:  Positive for activity change. Negative for appetite change and fever.   HENT: Negative.  Negative for congestion.    Respiratory:  Negative for cough, shortness of breath and wheezing.    Cardiovascular: Negative.  Negative for chest pain, palpitations and leg swelling.   Gastrointestinal:  Negative for constipation, diarrhea, nausea and vomiting.   Endocrine: Negative.    Genitourinary:  Negative for difficulty urinating and dysuria.   Musculoskeletal:  Positive for gait problem.   Skin: Negative.    Neurological:  Negative for dizziness and headaches.   Hematological: Negative.    Psychiatric/Behavioral:  Negative for sleep disturbance.        PHYSICAL EXAM:  VITALS:   Vitals:    04/19/24 1145   BP: 144/82   Pulse: 69   Resp: 18   Temp: 97.9 °F (36.6 °C)   SpO2: 97%        Physical Exam  Vitals and nursing note reviewed.   Constitutional:       General: She is not in acute distress.     Appearance: Normal appearance. She is not ill-appearing.   HENT:      Head: Normocephalic and atraumatic.      Nose: No congestion.   Eyes:      Conjunctiva/sclera: Conjunctivae normal.   Cardiovascular:      Rate and Rhythm: Normal rate and regular rhythm.   Pulmonary:      Effort: Pulmonary effort is normal. No respiratory distress.      Breath sounds: Normal breath sounds. No wheezing.   Abdominal:      General: Bowel sounds are normal. There is no distension.      Palpations: Abdomen is  soft.      Tenderness: There is no abdominal tenderness.   Musculoskeletal:      Right lower leg: No edema.      Left lower leg: No edema.   Neurological:      Mental Status: She is alert.      Motor: Weakness present.      Gait: Gait abnormal.   Psychiatric:         Mood and Affect: Mood normal.         Behavior: Behavior normal.         Admission Diagnoses:   1. Small acute posterior ischemic right MCA stroke (HCC)  Assessment & Plan:  Hospitalized for c/o left arm weakness and visual deficits  CTA showed chronic b/l ICA high grade stenosis.    MRI of brain showed small acute posterior ischemic right MCA.  Continue DAPT x 90 days then Plavix monotherapy  OP f/u with neurology  Continue PT/OT    Orders:  -     clopidogrel (PLAVIX) 75 mg tablet; Take 1 tablet (75 mg total) by mouth daily for 87 doses    2. Primary hypertension  Assessment & Plan:  Last documented /82  Continue monitor BP, adjust medications as needed  Continue amlodipine 5 mg daily    Orders:  -     amLODIPine (NORVASC) 5 mg tablet; Take 1 tablet (5 mg total) by mouth daily    3. Type 2 diabetes mellitus with diabetic chronic kidney disease, unspecified CKD stage, unspecified whether long term insulin use ([facility])  Assessment & Plan:  Per geriatric guidelines, goal HgA1c is > 7.5 to prevent hypoglycemic event in the older adult with cognitive decline   Lab Results   Component Value Date    HGBA1C 8.1 (H) 03/28/2024   Morning   Continue accu-cheks  Continue glipizide 2.5 mg daily  Encourage diabetic diet      4. 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  Stage 3a chronic kidney disease (HCC)  Assessment & Plan:  Lab Results   Component Value Date    EGFR 35 04/01/2024    EGFR 24 03/31/2024    EGFR 22 03/30/2024    CREATININE 1.43 (H) 04/01/2024    CREATININE 1.95 (H) 03/31/2024    CREATININE 2.11 (H) 03/30/2024 4/11/24 Cr 0.6/ BUN 7/ GFR >60  Avoid nephrotoxins  Avoid hypotension  Encourage adequate hydration  Monitor kidney functions      6. Hyponatremia  Assessment & Plan:  4/8/24 Na was 132  Patient has had several episodes of diarrhea   4/15/24 Na 126, 4/19 125  Increased sodium tabs to TID  Repeat BMP next week    Orders:  -     Basic metabolic panel; Future; Expected date: 04/26/2024  -     sodium chloride 1 g tablet; Take 1 tablet (1 g total) by mouth in the morning         Follow-up Recommendations:    Outpatient Follow up with PCP in the next 2 weeks  OP f/u with vascular  OP f/u with neurology  Home Health PT/OT/SN services    Labs and testing performed during stay:      Discharge Medications: See discharge medication list which was reviewed and signed.      Current Outpatient Medications:     amLODIPine (NORVASC) 5 mg tablet, Take 1 tablet (5 mg total) by mouth daily, Disp: 30 tablet, Rfl: 0    aspirin (ECOTRIN LOW STRENGTH) 81 mg EC tablet, Take 81 mg by mouth daily, Disp: , Rfl:     atorvastatin (LIPITOR) 40 mg tablet, TAKE 1 TABLET BY MOUTH EVERY DAY IN THE EVENING, Disp: 90 tablet, Rfl: 1    calcium carbonate (OS-ALYSON) 600 MG tablet, Take 2 tablets (1,200 mg total) by mouth daily, Disp: , Rfl:     cholecalciferol (VITAMIN D3) 1,000 units tablet, Take 1,000 Units by mouth daily, Disp: , Rfl:     clopidogrel (PLAVIX) 75 mg tablet, Take 1 tablet (75 mg total) by mouth daily for 87 doses, Disp: 30 tablet, Rfl: 0    donepezil (ARICEPT) 10 mg tablet, TAKE 1 TABLET BY MOUTH EVERYDAY AT BEDTIME, Disp: 90 tablet, Rfl: 1    glipiZIDE (GLUCOTROL XL) 2.5 mg 24 hr tablet, Take 1 tablet (2.5 mg total) by mouth daily, Disp: 90 tablet, Rfl: 1    mirtazapine  "(REMERON) 15 mg tablet, Take 1 tablet (15 mg total) by mouth daily at bedtime, Disp: 90 tablet, Rfl: 1    sodium chloride 1 g tablet, Take 1 tablet (1 g total) by mouth in the morning, Disp: 90 tablet, Rfl: 0     Discussion with patient/family and further instructions:  -Fall precautions  -Aspiration precautions  -Bleeding precautions  -Monitor for signs/symptoms of infection  -Medication list was reviewed and signed  -DME form was completed    Status at time of discharge: Stable     Billing based on time. Time spent on unit, 40 minutes. Time spent counseling pt on debility/condition, 30 minutes.    Please note:  Voice-recognition software may have been used in the preparation of this document.  Occasional wrong word or \"sound-alike\" substitutions may have occurred due to the inherent limitations of voice recognition software.  Interpretation should be guided by context.        DEMETRA Aranda  4/19/2024   "

## 2024-04-19 NOTE — ASSESSMENT & PLAN NOTE
4/8/24 Na was 132  Patient has had several episodes of diarrhea   4/15/24 Na 126, 4/19 125  Increased sodium tabs to TID  Repeat BMP next week

## 2024-05-01 ENCOUNTER — VBI (OUTPATIENT)
Dept: ADMINISTRATIVE | Facility: OTHER | Age: 74
End: 2024-05-01

## 2024-05-02 ENCOUNTER — OFFICE VISIT (OUTPATIENT)
Age: 74
End: 2024-05-02
Payer: COMMERCIAL

## 2024-05-02 ENCOUNTER — TELEPHONE (OUTPATIENT)
Age: 74
End: 2024-05-02

## 2024-05-02 ENCOUNTER — TRANSITIONAL CARE MANAGEMENT (OUTPATIENT)
Age: 74
End: 2024-05-02

## 2024-05-02 VITALS
DIASTOLIC BLOOD PRESSURE: 70 MMHG | RESPIRATION RATE: 18 BRPM | SYSTOLIC BLOOD PRESSURE: 126 MMHG | WEIGHT: 154.2 LBS | BODY MASS INDEX: 28.37 KG/M2 | OXYGEN SATURATION: 97 % | TEMPERATURE: 98 F | HEIGHT: 62 IN | HEART RATE: 96 BPM

## 2024-05-02 DIAGNOSIS — G20.A1 MILD DEMENTIA DUE TO PARKINSON'S DISEASE, WITH ANXIETY (HCC): ICD-10-CM

## 2024-05-02 DIAGNOSIS — E11.22 TYPE 2 DIABETES MELLITUS WITH DIABETIC CHRONIC KIDNEY DISEASE, UNSPECIFIED CKD STAGE, UNSPECIFIED WHETHER LONG TERM INSULIN USE (HCC): ICD-10-CM

## 2024-05-02 DIAGNOSIS — D63.1 ANEMIA DUE TO STAGE 3B CHRONIC KIDNEY DISEASE  (HCC): ICD-10-CM

## 2024-05-02 DIAGNOSIS — N18.32 ANEMIA DUE TO STAGE 3B CHRONIC KIDNEY DISEASE  (HCC): ICD-10-CM

## 2024-05-02 DIAGNOSIS — E83.42 HYPOMAGNESEMIA: ICD-10-CM

## 2024-05-02 DIAGNOSIS — F02.A4 MILD DEMENTIA DUE TO PARKINSON'S DISEASE, WITH ANXIETY (HCC): ICD-10-CM

## 2024-05-02 DIAGNOSIS — I63.511 ACUTE ISCHEMIC RIGHT MCA STROKE (HCC): Primary | ICD-10-CM

## 2024-05-02 PROCEDURE — 99495 TRANSJ CARE MGMT MOD F2F 14D: CPT

## 2024-05-02 NOTE — PROGRESS NOTES
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  She was recently discharged from [facility] after inpatient admission during which CTA showed chronic bilateral ICA high-grade stenosis and MRI showed small acute posterior ischemic right MCA ischemia.    I discussed with Gayathri's daughter today the medications that Gayathri should be taking postdischarge.  This includes dual antiplatelet therapy for 90 days from the time of her ischemic event, after which time she will be switched to Plavix monotherapy.  She states Gayathri was previously started on mirtazapine 15 mg daily at bedtime for appetite stimulation and has gained approximately 45 pounds since the initiation of medication.  She would like to discontinue medication at this time, which I feel is reasonable.  Will trial Gayathri off of medication follow-up with weight check at next chronic condition appointment.      There is some concern for Gayathri being able to manage her own medications, including OTC/PRN medications.  Per conversation with her daughter today, she will frequently consume excessive doses of OTC medications such as Imodium, Tums, or cough medicine \"like candy\" because she desires to have something in her mouth at all times.      She also stated since being home Gayathri has been taking sodium chloride 1 g tablets 3 times daily with meals based on the instructions on the container the medication was dispensed in.  To the best of my knowledge based on " documentation in the chart, she should only be taking 1 g sodium chloride tablets once a day.  I recommended to Gayathri's daughter that she decrease the sodium tablets to once daily and follow up with a BMP which will be drawn by visiting lab services on Monday.  If she is significantly hyponatremic at that time, will consider reinitiation of second or third sodium chloride tablet daily.  If she is mildly hyponatremic or within normal range, will plan to continue 1 g daily dosing.    Return in about 6 months (around 11/2/2024) for Recheck diabetes.    Subjective     Transitional Care Management Review:   Gayathri Lopes is a 74 y.o. female here for TCM follow up.  She is accompanied by her daughter, who provides her history.  Her daughter states this appointment was originally scheduled because she slipped and fell and broke her nose at [facility].  She then had a strokelike symptoms 5 days later, for which she was admitted to [facility] after presenting with left arm weakness and visual deficits.  Neurology was consulted on admission, CTA showed chronic bilateral ICA high-grade stenosis.  MRI brain showed a small acute posterior ischemic right MCA.    Recommendation of neurology team wants to continue dual antiplatelet therapy for 90 days and then transition to Plavix monotherapy at that point.  She was also recommended to follow-up with vascular surgical team in the outpatient office for additional surveillance.    The patient's daughter states the patient was discharged to rehab for 3 weeks and recently discharged from rehab.  She reports the patient has been taking 1 g sodium tablets 3 times daily with meals.  Patient lives at home with her partner, her daughter helps with transportation to appointments.  Her daughter also provides paperwork for adult  today which requires records of a TB two-step Mantoux test.  This appears to have been performed at rehab by chart review, however results are  "not present in the chart and will need to call rehab facility for documentation.        During the TCM phone call patient stated:  TCM Call       Date and time call was made  5/2/2024 12:56 PM    Hospital care reviewed  Records reviewed    Patient was hospitialized at  [facility]    Date of Admission  04/01/24    Date of discharge  04/21/24    Diagnosis  Small acute posterior ischemic right MCA stroke    Disposition  Nursing Home  [facility]    Were the patients medications reviewed and updated  Yes    Current Symptoms  None          TCM Call       Post hospital issues  None    Should patient be enrolled in anticoag monitoring?  No    Scheduled for follow up?  Yes    Did you obtain your prescribed medications  Yes    Do you need help managing your prescriptions or medications  No    Is transportation to your appointment needed  No    I have advised the patient to call PCP with any new or worsening symptoms  farnaz page    Living Arrangements  Spouse or Significiant other    Support System  Family    The type of support provided  Emotional; Financial; Physical    Do you have social support  Yes, as much as I need    Are you recieving any outpatient services  No    Are you recieving home care services  Yes    Types of home care services  Nurse visit    Counseling  Family    Counseling topics  Risk factor reduction; Home health agency benefits; patient and family education; Importance of RX compliance; Activities of daily living            Review of Systems   Reason unable to perform ROS: limited due to dementia, patient answers \"no\" to all questions but daughter provides additional information.   Constitutional:  Negative for appetite change, chills and fever.   HENT:  Negative for congestion and rhinorrhea.         Chronic dry mouth   Respiratory:  Positive for cough (dry cough, intermittent). Negative for shortness of breath.    Gastrointestinal:  Positive for constipation and diarrhea. " "  Musculoskeletal:  Positive for back pain and gait problem (uses walker, trouble walking).   Neurological:  Positive for dizziness (upon rapid standing). Negative for syncope, weakness and light-headedness.   Psychiatric/Behavioral:  Negative for sleep disturbance.        Objective     /70   Pulse 96   Temp 98 °F (36.7 °C)   Resp 18   Ht 5' 2\" (1.575 m)   Wt 69.9 kg (154 lb 3.2 oz)   SpO2 97%   BMI 28.20 kg/m²        Physical Exam  Vitals reviewed.   Constitutional:       General: She is not in acute distress.     Appearance: Normal appearance. She is not ill-appearing, toxic-appearing or diaphoretic.   HENT:      Head: Normocephalic and atraumatic.      Right Ear: External ear normal.      Left Ear: External ear normal.      Nose: Nose normal.   Eyes:      Extraocular Movements: Extraocular movements intact.      Conjunctiva/sclera: Conjunctivae normal.   Cardiovascular:      Rate and Rhythm: Normal rate and regular rhythm.      Heart sounds: Normal heart sounds.   Pulmonary:      Effort: Pulmonary effort is normal. No respiratory distress.      Breath sounds: Normal breath sounds.   Abdominal:      General: Abdomen is flat. Bowel sounds are normal.      Palpations: Abdomen is soft.   Musculoskeletal:      Right lower leg: No edema.      Left lower leg: No edema.   Skin:     General: Skin is warm and dry.      Capillary Refill: Capillary refill takes less than 2 seconds.   Neurological:      Mental Status: She is alert. Mental status is at baseline.      Gait: Gait abnormal (uses walker).   Psychiatric:         Mood and Affect: Mood normal.       Medications have been reviewed by provider in current encounter        "

## 2024-05-02 NOTE — PATIENT INSTRUCTIONS
Stop taking Mirtazapine (Remeron) 15mg nightly.  Decrease salt tablets to once a day from three times daily.

## 2024-05-03 ENCOUNTER — TELEPHONE (OUTPATIENT)
Dept: ADMINISTRATIVE | Facility: OTHER | Age: 74
End: 2024-05-03

## 2024-05-03 PROBLEM — I10 PRIMARY HYPERTENSION: Status: ACTIVE | Noted: 2023-01-19

## 2024-05-03 NOTE — LETTER
Diabetic Eye Exam Form    Date Requested: 24  Patient: Gayathri Lopes  Patient : 1950   Referring Provider: Wiliam Baker MD      DIABETIC Eye Exam Date _______________________________      Type of Exam MUST be documented for Diabetic Eye Exams. Please CHECK ONE.     Retinal Exam       Dilated Retinal Exam       OCT       Optomap-Iris Exam      Fundus Photography       Left Eye - Please check Retinopathy or No Retinopathy        Exam did show retinopathy    Exam did not show retinopathy       Right Eye - Please check Retinopathy or No Retinopathy       Exam did show retinopathy    Exam did not show retinopathy       Comments __________________________________________________________    Practice Providing Exam ______________________________________________    Exam Performed By (print name) _______________________________________      Provider Signature ___________________________________________________      These reports are needed for  compliance.  Please fax this completed form and a copy of the Diabetic Eye Exam report to our office located at [address] [place] [address] as soon as possible via Fax [phone] attention [place]: Phone [phone]  We thank you for your assistance in treating our mutual patient.   [facility] - [phone] f [phone]

## 2024-05-03 NOTE — TELEPHONE ENCOUNTER
----- Message from Jennifer Blancas sent at 5/2/2024  2:31 PM EDT -----  Regarding: Care Gap Request  05/02/24 2:31 PM    Hello, our patient Gayathri Lopes has had Diabetic Eye Exam completed/performed. Please assist in updating the patient chart by making an External outreach to eye land facility located in [place]. The date of service is 09/2023.    Thank you,  Jennifer BELLE

## 2024-05-03 NOTE — LETTER
Diabetic Eye Exam Form    Date Requested: 24  Patient: Gayathri Lopse  Patient : 1950   Referring Provider: Wiliam Baker MD      DIABETIC Eye Exam Date _______________________________      Type of Exam MUST be documented for Diabetic Eye Exams. Please CHECK ONE.     Retinal Exam       Dilated Retinal Exam       OCT       Optomap-Iris Exam      Fundus Photography       Left Eye - Please check Retinopathy or No Retinopathy        Exam did show retinopathy    Exam did not show retinopathy       Right Eye - Please check Retinopathy or No Retinopathy       Exam did show retinopathy    Exam did not show retinopathy       Comments __________________________________________________________    Practice Providing Exam ______________________________________________    Exam Performed By (print name) _______________________________________      Provider Signature ___________________________________________________      These reports are needed for  compliance.  Please fax this completed form and a copy of the Diabetic Eye Exam report to our office located at [address] [place] [address] as soon as possible via Fax [phone] attention [place]: Phone [phone]  We thank you for your assistance in treating our mutual patient.   [facility] - [phone] f [phone]

## 2024-05-03 NOTE — TELEPHONE ENCOUNTER
Upon review of the In Basket request and the patient's chart, initial outreach has been made via fax to facility. Please see Contacts section for details.     Thank you  Nixon Guevara

## 2024-05-06 ENCOUNTER — APPOINTMENT (OUTPATIENT)
Dept: LAB | Facility: HOSPITAL | Age: 74
End: 2024-05-06
Payer: COMMERCIAL

## 2024-05-06 DIAGNOSIS — D64.9 ANEMIA, UNSPECIFIED TYPE: ICD-10-CM

## 2024-05-06 DIAGNOSIS — E78.1 HYPERTRIGLYCERIDEMIA: ICD-10-CM

## 2024-05-06 DIAGNOSIS — E87.1 HYPONATREMIA: ICD-10-CM

## 2024-05-06 DIAGNOSIS — E11.22 TYPE 2 DIABETES MELLITUS WITH DIABETIC CHRONIC KIDNEY DISEASE, UNSPECIFIED CKD STAGE, UNSPECIFIED WHETHER LONG TERM INSULIN USE (HCC): ICD-10-CM

## 2024-05-06 DIAGNOSIS — N18.32 CHRONIC KIDNEY DISEASE, STAGE 3B (HCC): ICD-10-CM

## 2024-05-06 DIAGNOSIS — M85.852 OSTEOPENIA OF NECK OF LEFT FEMUR: ICD-10-CM

## 2024-05-06 LAB
25(OH)D3 SERPL-MCNC: 49.1 NG/ML (ref 30–100)
ALBUMIN SERPL BCP-MCNC: 3.6 G/DL (ref 3.5–5)
ALP SERPL-CCNC: 75 U/L (ref 34–104)
ALT SERPL W P-5'-P-CCNC: 17 U/L (ref 7–52)
ANION GAP SERPL CALCULATED.3IONS-SCNC: 13 MMOL/L (ref 4–13)
AST SERPL W P-5'-P-CCNC: 18 U/L (ref 13–39)
BASOPHILS # BLD AUTO: 0.11 THOUSANDS/ÂΜL (ref 0–0.1)
BASOPHILS NFR BLD AUTO: 1 % (ref 0–1)
BILIRUB SERPL-MCNC: 0.4 MG/DL (ref 0.2–1)
BUN SERPL-MCNC: 7 MG/DL (ref 5–25)
CALCIUM SERPL-MCNC: 8.5 MG/DL (ref 8.4–10.2)
CHLORIDE SERPL-SCNC: 98 MMOL/L (ref 96–108)
CHOLEST SERPL-MCNC: 159 MG/DL
CO2 SERPL-SCNC: 26 MMOL/L (ref 21–32)
CREAT SERPL-MCNC: 0.98 MG/DL (ref 0.6–1.3)
EOSINOPHIL # BLD AUTO: 0.44 THOUSAND/ÂΜL (ref 0–0.61)
EOSINOPHIL NFR BLD AUTO: 4 % (ref 0–6)
ERYTHROCYTE [DISTWIDTH] IN BLOOD BY AUTOMATED COUNT: 13.2 % (ref 11.6–15.1)
FERRITIN SERPL-MCNC: 199 NG/ML (ref 11–307)
GFR SERPL CREATININE-BSD FRML MDRD: 56 ML/MIN/1.73SQ M
GLUCOSE SERPL-MCNC: 161 MG/DL (ref 65–140)
HCT VFR BLD AUTO: 40.5 % (ref 34.8–46.1)
HDLC SERPL-MCNC: 51 MG/DL
HGB BLD-MCNC: 12.7 G/DL (ref 11.5–15.4)
IMM GRANULOCYTES # BLD AUTO: 0.03 THOUSAND/UL (ref 0–0.2)
IMM GRANULOCYTES NFR BLD AUTO: 0 % (ref 0–2)
IRON SATN MFR SERPL: 24 % (ref 15–50)
IRON SERPL-MCNC: 48 UG/DL (ref 50–212)
LDLC SERPL CALC-MCNC: 71 MG/DL (ref 0–100)
LYMPHOCYTES # BLD AUTO: 2.6 THOUSANDS/ÂΜL (ref 0.6–4.47)
LYMPHOCYTES NFR BLD AUTO: 25 % (ref 14–44)
MCH RBC QN AUTO: 30.2 PG (ref 26.8–34.3)
MCHC RBC AUTO-ENTMCNC: 31.4 G/DL (ref 31.4–37.4)
MCV RBC AUTO: 96 FL (ref 82–98)
MONOCYTES # BLD AUTO: 0.52 THOUSAND/ÂΜL (ref 0.17–1.22)
MONOCYTES NFR BLD AUTO: 5 % (ref 4–12)
NEUTROPHILS # BLD AUTO: 6.69 THOUSANDS/ÂΜL (ref 1.85–7.62)
NEUTS SEG NFR BLD AUTO: 65 % (ref 43–75)
NONHDLC SERPL-MCNC: 108 MG/DL
NRBC BLD AUTO-RTO: 0 /100 WBCS
PLATELET # BLD AUTO: 394 THOUSANDS/UL (ref 149–390)
PMV BLD AUTO: 8.9 FL (ref 8.9–12.7)
POTASSIUM SERPL-SCNC: 4 MMOL/L (ref 3.5–5.3)
PROT SERPL-MCNC: 7.4 G/DL (ref 6.4–8.4)
RBC # BLD AUTO: 4.2 MILLION/UL (ref 3.81–5.12)
SODIUM SERPL-SCNC: 137 MMOL/L (ref 135–147)
TIBC SERPL-MCNC: 204 UG/DL (ref 250–450)
TRIGL SERPL-MCNC: 185 MG/DL
UIBC SERPL-MCNC: 156 UG/DL (ref 155–355)
WBC # BLD AUTO: 10.39 THOUSAND/UL (ref 4.31–10.16)

## 2024-05-06 PROCEDURE — 36415 COLL VENOUS BLD VENIPUNCTURE: CPT

## 2024-05-06 PROCEDURE — 80053 COMPREHEN METABOLIC PANEL: CPT

## 2024-05-06 PROCEDURE — 83550 IRON BINDING TEST: CPT

## 2024-05-06 PROCEDURE — 85025 COMPLETE CBC W/AUTO DIFF WBC: CPT

## 2024-05-06 PROCEDURE — 82306 VITAMIN D 25 HYDROXY: CPT

## 2024-05-06 PROCEDURE — 82728 ASSAY OF FERRITIN: CPT

## 2024-05-06 PROCEDURE — 83540 ASSAY OF IRON: CPT

## 2024-05-06 PROCEDURE — 80061 LIPID PANEL: CPT

## 2024-05-09 ENCOUNTER — TELEPHONE (OUTPATIENT)
Age: 74
End: 2024-05-09

## 2024-05-09 NOTE — TELEPHONE ENCOUNTER
Called daughter to discuss recent labs completed by the patient (Gayathri Lopes).  Left a detailed voice message about results.    CBC and CMP were unremarkable.  Vitamin D levels were within normal limits and cholesterol levels improving.  Only concern was mildly low iron levels.  Recommended implementing diet rich in iron containing foods like spinach, lentils etc.     Will continue to monitor with future labs.

## 2024-05-09 NOTE — TELEPHONE ENCOUNTER
As a follow-up, a second attempt has been made for outreach via fax to facility. Please see Contacts section for details.    Thank you  Nixon Guevara

## 2024-05-13 ENCOUNTER — TELEPHONE (OUTPATIENT)
Dept: NEUROLOGY | Facility: CLINIC | Age: 74
End: 2024-05-13

## 2024-05-13 NOTE — TELEPHONE ENCOUNTER
Spoke to patient daughter , I confirmed their upcoming appointment. Patient was informed of date/time and location of visit.

## 2024-05-14 NOTE — TELEPHONE ENCOUNTER
As a final attempt, a third outreach has been made via telephone call to facility. Please see Contacts section for details. This encounter will be closed and completed by end of day. Should we receive the requested information because of previous outreach attempts, the requested patient's chart will be updated appropriately.     Thank you  Nixon Guevara

## 2024-05-17 NOTE — TELEPHONE ENCOUNTER
Upon review of the In Basket request we were able to locate, review, and update the patient chart as requested for Diabetic Eye Exam.    Any additional questions or concerns should be emailed to the Practice Liaisons via the appropriate education email address, please do not reply via In Basket.    Thank you  Nixon Guevara

## 2024-05-23 ENCOUNTER — OFFICE VISIT (OUTPATIENT)
Age: 74
End: 2024-05-23
Payer: COMMERCIAL

## 2024-05-23 VITALS
WEIGHT: 148.8 LBS | HEART RATE: 96 BPM | TEMPERATURE: 97.8 F | DIASTOLIC BLOOD PRESSURE: 62 MMHG | BODY MASS INDEX: 27.38 KG/M2 | SYSTOLIC BLOOD PRESSURE: 118 MMHG | OXYGEN SATURATION: 99 % | HEIGHT: 62 IN

## 2024-05-23 DIAGNOSIS — G20.A1 MILD DEMENTIA DUE TO PARKINSON'S DISEASE, WITH ANXIETY (HCC): Primary | ICD-10-CM

## 2024-05-23 DIAGNOSIS — R26.2 AMBULATORY DYSFUNCTION: ICD-10-CM

## 2024-05-23 DIAGNOSIS — H91.93 BILATERAL HEARING LOSS, UNSPECIFIED HEARING LOSS TYPE: ICD-10-CM

## 2024-05-23 DIAGNOSIS — F32.A DEPRESSION, UNSPECIFIED DEPRESSION TYPE: ICD-10-CM

## 2024-05-23 DIAGNOSIS — I63.511 ACUTE ISCHEMIC RIGHT MCA STROKE (HCC): ICD-10-CM

## 2024-05-23 DIAGNOSIS — F02.A4 MILD DEMENTIA DUE TO PARKINSON'S DISEASE, WITH ANXIETY (HCC): Primary | ICD-10-CM

## 2024-05-23 DIAGNOSIS — I10 PRIMARY HYPERTENSION: ICD-10-CM

## 2024-05-23 PROCEDURE — 99214 OFFICE O/P EST MOD 30 MIN: CPT | Performed by: NURSE PRACTITIONER

## 2024-05-23 RX ORDER — CLOPIDOGREL BISULFATE 75 MG/1
75 TABLET ORAL DAILY
Qty: 30 TABLET | Refills: 0 | Status: SHIPPED | OUTPATIENT
Start: 2024-05-23 | End: 2024-08-18

## 2024-05-23 RX ORDER — AMLODIPINE BESYLATE 5 MG/1
5 TABLET ORAL DAILY
Qty: 30 TABLET | Refills: 0 | Status: SHIPPED | OUTPATIENT
Start: 2024-05-23 | End: 2024-06-22

## 2024-05-23 NOTE — PROGRESS NOTES
Assessment & Plan:   1. Mild dementia due to Parkinson's disease, with anxiety (HCC)  Assessment & Plan:  MOCA:  16/30.  Deficits in:  all domains but naming  Cognition update: seems to be about the same.  Had new stroke in March  Pt is independent adl/dependent iadls.  Lives with , dgt supportive  Pt's current cognitive level is consistent with mild dementia w/vascular component  Memory medications: aricept continues, tolerating  Mobility:  walker, fall hx  Weight change in 6 mo:  up 10 pounds - eating better  Medication review:  seems appropriate for current conditions  Check with pharmacist/provider before starting any new OTC/prescription medications for potential cognitive side effectsContinue to stay active.  Current activity level:  decreased.  Doesn't participate in many social, cognitive, physical activity.  Safety concerns:  concerning as pt is starting to need more care since hospital, dgt trying to get Waiver to assist, sees pt weekly to help  Caregiver stress:  working and caring for pt as well as working through waiver program  Monitor for changes in mood, sleep, pain control.  Notify provider if any concerns  Optimize all acute and chronic conditions.  Continue to follow-up with PCP and specialist as directed for management  Ensure adequate hydration, good nutrition  Recommended next follow up:  6 mo for cog/fxn eval, pt/family support    2. Bilateral hearing loss, unspecified hearing loss type  Assessment & Plan:  [place], doesn't wear HAs  Cont to enforce use of HAs as needed, speak clearly toward patient when communicating.  Decrease outside noise distractions  3. Depression, unspecified depression type  Assessment & Plan:  Patient reports stable mood, daughter reports chronic stable mood  Patient does not take any antidepressants  Continue emotional support, relaxation techniques  Continue positive socialization  4. Ambulatory dysfunction  Assessment & Plan:  Use of walker currently on [facility]  PT, did have fall before hospitalization  Continue fall precautions and exercises taught by PT      HPI:  We had the pleasure of evaluating Gayathri Lopes who is a 74 y.o. female   in Geriatric follow up today.  Previous MOCA:  22/30.  Comorbidities include dementia, ambulatory dysfunction, Craig. .    She lives with her   Ms. Lopes is in the office with her daughter, Apple    Memory update per patient:  Doing about the same.  She had a stroke in May (it was actually March).  PT is coming until tomorrow, then done.  She is getting around ok in the house - no AD in home, walker for longer distances.  No weakness of left side.  No memory or thought changes.  Appetite is ok.  Sleep is good.  Mood stable.  3 kids- Apple (lives in the [facility]), one in [place], one in [place] (she did have to think about this).  Actually [place].  Current activities:  Cognitive:  find a words  Physical:  still doing PT  Social:  not too much, unless dgt there.      She has difficulty finding the right word while speaking: No worse  Patient requires repeat information or ask the same question repeatedly: No worse  Do you do your own bathing, dressing, feeding yourself Yes  Can you make your own meals and do light cleaning/chores Yes  Do you take care of your own medications No -  puts on table  Do you drive: No  - stopped years ago     Do you handle your own financial affairs such as balancing your checkbook, paying bills, investments: No - for last 3 years  Have you or your family noted any change in your mood or personality:No, chronic depression/irritability.    Are you currently or have you been treated in the past for depression or anxiety: No   Have you noticed any gait or balance disorder: No  Uses :Walker: when out.  Did have fall on wet floor at [facility].   No residual arm pain.  Any hallucination or delusion:   Sleep Issues: No  Urinary/Stool Incontinence: Yes- accidents, does toliet  Hearing and vision  issue: No - doesn't use HAs, needs new glasses  ADL/IADL:  independent/dependent  Appetite/swallow:  good/good  Pain:  none    Memory update per family:  Lives at home with partner.  Having harder times with hygiene - normally in pj's.  Dgt goes weekly to make sure she's showering.  Has MOW.  Trying to get info for Waiver.  She worries a lot about .  Dgt working FT + and trying to manage.  [facility] lost her glasses.  She's sitting a lot, gets out of bed for the day, then go back to bed.  She doesn't talk too much, doesn't call too much.   sometimes is tough.  TOmorrow is last day of HHC.  Dgt is considering LTc- but  and house are issue.  BS were very high before hospital.  Doesn't cook or use the microwave.  Doesn't use the stove.  Yesterday knew she was going to doc, today needed to repeat.   Family Review of Behavior [facility]:    pacing. No    agressive/combative behavior. No    agitated. No   wandering. No   resistance to care. No   hoarding/hiding objects.No    suspicious    withdrawn Yes  rummaging/pillaging.    misplacing/losing objects Not more than usual  personal hygiene problems.Yes  forgetfulness of actions Not more than usual    ROS: Review of Systems   Constitutional:  Negative for activity change, appetite change, chills and fatigue.   HENT:  Negative for congestion, hearing loss and trouble swallowing.    Eyes:  Positive for visual disturbance.   Respiratory:  Negative for cough and shortness of breath.    Cardiovascular:  Negative for chest pain.   Gastrointestinal:  Negative for abdominal pain, constipation, diarrhea, nausea and vomiting.   Genitourinary:  Negative for difficulty urinating.   Musculoskeletal:  Positive for gait problem. Negative for arthralgias and back pain.   Neurological:  Negative for dizziness and light-headedness.   Psychiatric/Behavioral:  Positive for decreased concentration (forgetful). Negative for dysphoric mood and sleep disturbance. The patient is not  nervous/anxious.        Past Medical, surgical, social, medication and allergy history and patients previous records reviewed.  Allergies:   No Known Allergies    Medications:      Current Outpatient Medications:     aspirin (ECOTRIN LOW STRENGTH) 81 mg EC tablet, Take 81 mg by mouth daily, Disp: , Rfl:     atorvastatin (LIPITOR) 40 mg tablet, TAKE 1 TABLET BY MOUTH EVERY DAY IN THE EVENING, Disp: 90 tablet, Rfl: 1    calcium carbonate (OS-ALYSON) 600 MG tablet, Take 2 tablets (1,200 mg total) by mouth daily, Disp: , Rfl:     cholecalciferol (VITAMIN D3) 1,000 units tablet, Take 1,000 Units by mouth daily, Disp: , Rfl:     donepezil (ARICEPT) 10 mg tablet, TAKE 1 TABLET BY MOUTH EVERYDAY AT BEDTIME, Disp: 90 tablet, Rfl: 1    glipiZIDE (GLUCOTROL XL) 2.5 mg 24 hr tablet, Take 1 tablet (2.5 mg total) by mouth daily, Disp: 90 tablet, Rfl: 1    sodium chloride 1 g tablet, Take 1 tablet (1 g total) by mouth in the morning, Disp: 90 tablet, Rfl: 0    amLODIPine (NORVASC) 5 mg tablet, Take 1 tablet (5 mg total) by mouth daily, Disp: 30 tablet, Rfl: 0    clopidogrel (PLAVIX) 75 mg tablet, Take 1 tablet (75 mg total) by mouth daily for 87 doses, Disp: 30 tablet, Rfl: 0    Vitals:  Vitals:    05/23/24 1126   BP: 118/62   Pulse: 96   Temp: 97.8 °F (36.6 °C)   SpO2: 99%       History:  Past Medical History:   Diagnosis Date    MARTINA (acute kidney injury) (HCC) 11/21/2021    Diabetes mellitus (HCC)     Hypertension      History reviewed. No pertinent surgical history.  Family History   Problem Relation Age of Onset    Breast cancer Mother         unknown age    No Known Problems Father     No Known Problems Daughter     No Known Problems Maternal Grandmother     No Known Problems Maternal Grandfather     No Known Problems Paternal Grandmother     No Known Problems Paternal Grandfather     No Known Problems Son     No Known Problems Son     No Known Problems Maternal Aunt     No Known Problems Maternal Aunt     No Known Problems  Maternal Aunt     No Known Problems Maternal Aunt     No Known Problems Paternal Aunt     No Known Problems Paternal Aunt     No Known Problems Paternal Aunt      Social History     Socioeconomic History    Marital status: /Civil Union     Spouse name: Not on file    Number of children: Not on file    Years of education: Not on file    Highest education level: Not on file   Occupational History    Not on file   Tobacco Use    Smoking status: Never    Smokeless tobacco: Never   Vaping Use    Vaping status: Never Used   Substance and Sexual Activity    Alcohol use: No    Drug use: Never    Sexual activity: Not Currently   Other Topics Concern    Not on file   Social History Narrative    ** Merged History Encounter **         ** Merged History Encounter **          Social Determinants of Health     Financial Resource Strain: Low Risk  (2/22/2024)    Overall Financial Resource Strain (CARDIA)     Difficulty of Paying Living Expenses: Not very hard   Food Insecurity: No Food Insecurity (3/28/2024)    Hunger Vital Sign     Worried About Running Out of Food in the Last Year: Never true     Ran Out of Food in the Last Year: Never true   Transportation Needs: No Transportation Needs (3/28/2024)    PRAPARE - Transportation     Lack of Transportation (Medical): No     Lack of Transportation (Non-Medical): No   Physical Activity: Not on file   Stress: Not on file   Social Connections: Not on file   Intimate Partner Violence: Not on file   Housing Stability: Low Risk  (3/28/2024)    Housing Stability Vital Sign     Unable to Pay for Housing in the Last Year: No     Number of Places Lived in the Last Year: 1     Unstable Housing in the Last Year: No     History reviewed. No pertinent surgical history.      Physical Exam:  Observed ambulation:  walker, slower, fairly steady   Physical Exam  Vitals and nursing note reviewed.   Constitutional:       General: She is not in acute distress.     Appearance: Normal appearance. She  is well-developed. She is not diaphoretic.   HENT:      Head: Normocephalic.   Cardiovascular:      Rate and Rhythm: Normal rate.      Heart sounds: No murmur heard.     No friction rub. No gallop.   Pulmonary:      Effort: Pulmonary effort is normal. No respiratory distress.      Breath sounds: Normal breath sounds. No wheezing or rales.   Abdominal:      General: Bowel sounds are normal. There is no distension.      Palpations: Abdomen is soft.      Tenderness: There is no abdominal tenderness. There is no rebound.   Musculoskeletal:         General: Normal range of motion.   Skin:     General: Skin is warm and dry.   Neurological:      General: No focal deficit present.      Mental Status: She is alert and oriented to person, place, and time. Mental status is at baseline.      Comments: forgetful   Psychiatric:         Mood and Affect: Mood normal.         Behavior: Behavior normal.

## 2024-05-23 NOTE — PROGRESS NOTES
[facility]  [address], [address]  [place] [address]  [phone]    Social Work Follow-Up    MSW met with patient's daughter, Apple, during follow-up visit.  During visit, daughter reviewed that [employer] had previously completed an assessment with patient and determined that patient was Waiver eligible.  At this time, Apple has been working through the application process for Waiver.  However, at this time, Apple expressed frustrations with the application process and the challenges with getting the information required.  Apple stated that all the paperwork for the Waiver application must be submitted to [facility] rather than [facility].  MSW unsure of same and advised call be placed to question why they would not be working with [facility] since this is the [facility] that patient resides in.  Apple expressed that she has placed many calls to the [facility] to discuss questions and has not gotten much response.  MSW discussed possible private duty care until Waiver is completed and same was declined due to inability to afford same.  MSW expressed understanding of same.  MSW discussed any additional future planning and made aware that they may have to consider placement if needed, but working for home care at this time.  MSW expressed understanding of same and and attempted to provide the following resources, but same was declined:   -Home care, Waiver, [employer]  -[employer] brochure  -Elder Law  list    MSW to remain available as needed.

## 2024-05-23 NOTE — ASSESSMENT & PLAN NOTE
Use of walker currently on [facility] PT, did have fall before hospitalization  Continue fall precautions and exercises taught by PT

## 2024-05-23 NOTE — ASSESSMENT & PLAN NOTE
[place], doesn't wear HAs  Cont to enforce use of HAs as needed, speak clearly toward patient when communicating.  Decrease outside noise distractions

## 2024-05-23 NOTE — ASSESSMENT & PLAN NOTE
MOCA:  16/30.  Deficits in:  all domains but naming  Cognition update: seems to be about the same.  Had new stroke in March  Pt is independent adl/dependent iadls.  Lives with , dgt supportive  Pt's current cognitive level is consistent with mild dementia w/vascular component  Memory medications: aricept continues, tolerating  Mobility:  walker, fall hx  Weight change in 6 mo:  up 10 pounds - eating better  Medication review:  seems appropriate for current conditions  Check with pharmacist/provider before starting any new OTC/prescription medications for potential cognitive side effectsContinue to stay active.  Current activity level:  decreased.  Doesn't participate in many social, cognitive, physical activity.  Safety concerns:  concerning as pt is starting to need more care since hospital, dgt trying to get Waiver to assist, sees pt weekly to help  Caregiver stress:  working and caring for pt as well as working through waiver program  Monitor for changes in mood, sleep, pain control.  Notify provider if any concerns  Optimize all acute and chronic conditions.  Continue to follow-up with PCP and specialist as directed for management  Ensure adequate hydration, good nutrition  Recommended next follow up:  6 mo for cog/fxn eval, pt/family support

## 2024-05-23 NOTE — TELEPHONE ENCOUNTER
Previously given to patient by nursing home doctor. She is no longer in the nursing home and would like for her PCP to take over refilling the medications.     Reason for call:   [x] Refill   [] Prior Auth  [] Other:     Office:   [x] PCP/Provider -   [] Specialty/Provider -     Medication:     Clopidogrel 75 mg tablet taken by mouth daily for 87 doses #30 tabs     Amlodipine 5 mg tablet taken by mouth once daily #90 tabs     Pharmacy: [facility]/pharmacy #[id number] - [place] - [address] [phone]     Does the patient have enough for 3 days?   [] Yes   [x] No - Send as HP to POD

## 2024-05-23 NOTE — ASSESSMENT & PLAN NOTE
Patient reports stable mood, daughter reports chronic stable mood  Patient does not take any antidepressants  Continue emotional support, relaxation techniques  Continue positive socialization

## 2024-06-03 ENCOUNTER — TELEPHONE (OUTPATIENT)
Age: 74
End: 2024-06-03

## 2024-06-03 DIAGNOSIS — E87.1 HYPONATREMIA: ICD-10-CM

## 2024-06-03 DIAGNOSIS — I65.23 BILATERAL CAROTID ARTERY STENOSIS: ICD-10-CM

## 2024-06-03 DIAGNOSIS — I10 PRIMARY HYPERTENSION: ICD-10-CM

## 2024-06-03 DIAGNOSIS — I63.511 ACUTE ISCHEMIC RIGHT MCA STROKE (HCC): ICD-10-CM

## 2024-06-03 RX ORDER — ASPIRIN 81 MG/1
TABLET, COATED ORAL
Qty: 90 TABLET | Refills: 3 | OUTPATIENT
Start: 2024-06-03

## 2024-06-03 RX ORDER — AMLODIPINE BESYLATE 5 MG/1
TABLET ORAL
Qty: 90 TABLET | Refills: 3 | OUTPATIENT
Start: 2024-06-03

## 2024-06-03 RX ORDER — SODIUM CHLORIDE 1 G/1
TABLET ORAL
Qty: 90 TABLET | Refills: 3 | OUTPATIENT
Start: 2024-06-03

## 2024-06-03 RX ORDER — CLOPIDOGREL BISULFATE 75 MG
TABLET ORAL
Qty: 90 TABLET | Refills: 3 | Status: CANCELLED | OUTPATIENT
Start: 2024-06-03

## 2024-06-03 RX ORDER — GLIPIZIDE 2.5 MG/1
TABLET ORAL
Qty: 90 TABLET | Refills: 3 | OUTPATIENT
Start: 2024-06-03

## 2024-06-03 RX ORDER — CALCIUM CARBONATE/VITAMIN D3 500 MG-10
TABLET ORAL
Qty: 180 TABLET | Refills: 3 | OUTPATIENT
Start: 2024-06-03

## 2024-06-03 RX ORDER — PRASUGREL 5 MG/1
TABLET, FILM COATED ORAL
Refills: 0 | OUTPATIENT
Start: 2024-06-03

## 2024-06-03 RX ORDER — ATORVASTATIN CALCIUM 40 MG/1
TABLET, FILM COATED ORAL
Qty: 90 TABLET | Refills: 3 | OUTPATIENT
Start: 2024-06-03

## 2024-06-03 NOTE — TELEPHONE ENCOUNTER
Called   [facility] coron[facility] office stated mishel passed away at home  Fax no provided she will send paperwork to be signed by doctor

## 2024-06-03 NOTE — TELEPHONE ENCOUNTER
Shantel from [place] coron[facility] office is calling to report that patient passed away on 6/2/2024, and wants to know if death certificate can be completed, please review  Thank you

## 2024-06-04 NOTE — TELEPHONE ENCOUNTER
Called left detailed message on machine for Shantel ([phone]) from the  office to fax over   The death certificate

## 2024-06-11 RX ORDER — ATORVASTATIN CALCIUM 40 MG/1
TABLET, FILM COATED ORAL
Qty: 90 TABLET | Refills: 3 | OUTPATIENT
Start: 2024-06-11

## 2024-06-11 RX ORDER — AMLODIPINE BESYLATE 5 MG/1
TABLET ORAL
Qty: 90 TABLET | Refills: 3 | OUTPATIENT
Start: 2024-06-11

## 2024-06-11 RX ORDER — SODIUM CHLORIDE 1 G/1
TABLET ORAL
Qty: 90 TABLET | Refills: 3 | OUTPATIENT
Start: 2024-06-11

## 2024-06-11 RX ORDER — ASPIRIN 81 MG/1
TABLET, COATED ORAL
Qty: 90 TABLET | Refills: 3 | OUTPATIENT
Start: 2024-06-11

## 2024-06-11 RX ORDER — CALCIUM CARBONATE/VITAMIN D3 500 MG-10
TABLET ORAL
Qty: 180 TABLET | Refills: 3 | OUTPATIENT
Start: 2024-06-11

## 2024-06-11 RX ORDER — CLOPIDOGREL BISULFATE 75 MG
TABLET ORAL
Qty: 90 TABLET | Refills: 3 | OUTPATIENT
Start: 2024-06-11

## 2024-06-11 RX ORDER — GLIPIZIDE 2.5 MG/1
TABLET ORAL
Qty: 90 TABLET | Refills: 3 | OUTPATIENT
Start: 2024-06-11

## 2024-06-14 RX ORDER — ATORVASTATIN CALCIUM 40 MG/1
TABLET, FILM COATED ORAL
Qty: 90 TABLET | Refills: 3 | OUTPATIENT
Start: 2024-06-14

## 2024-06-14 RX ORDER — CLOPIDOGREL BISULFATE 75 MG
TABLET ORAL
Qty: 90 TABLET | Refills: 3 | OUTPATIENT
Start: 2024-06-14

## 2024-06-14 RX ORDER — SODIUM CHLORIDE 1 G/1
TABLET ORAL
Qty: 90 TABLET | Refills: 3 | OUTPATIENT
Start: 2024-06-14

## 2024-06-14 RX ORDER — GLIPIZIDE 2.5 MG/1
TABLET ORAL
Qty: 90 TABLET | Refills: 3 | OUTPATIENT
Start: 2024-06-14

## 2024-06-14 RX ORDER — ASPIRIN 81 MG/1
TABLET, COATED ORAL
Qty: 90 TABLET | Refills: 3 | OUTPATIENT
Start: 2024-06-14

## 2024-06-14 RX ORDER — AMLODIPINE BESYLATE 5 MG/1
TABLET ORAL
Qty: 90 TABLET | Refills: 3 | OUTPATIENT
Start: 2024-06-14

## 2024-06-14 RX ORDER — CALCIUM CARBONATE/VITAMIN D3 500 MG-10
TABLET ORAL
Qty: 180 TABLET | Refills: 3 | OUTPATIENT
Start: 2024-06-14

## 2025-05-01 NOTE — PROGRESS NOTES
Anesthesia Post Evaluation    Patient: Pavel Graff    Procedure(s) Performed: Procedure(s) (LRB):  COLONOSCOPY, SCREENING, HIGH RISK PATIENT (N/A)    Final Anesthesia Type: general      Patient location during evaluation: PACU  Patient participation: Yes- Able to Participate  Level of consciousness: awake and alert and oriented  Post-procedure vital signs: reviewed and stable  Pain management: adequate  Airway patency: patent    PONV status at discharge: No PONV  Anesthetic complications: no      Cardiovascular status: hemodynamically stable  Respiratory status: unassisted  Hydration status: euvolemic  Follow-up not needed.              Vitals Value Taken Time   /67 05/01/25 14:51   Temp 36.5 °C (97.7 °F) 05/01/25 14:20   Pulse 67 05/01/25 14:51   Resp 18 05/01/25 14:51   SpO2 96 % 05/01/25 14:51         Event Time   Out of Recovery 15:01:43         Pain/Mark Score: Mark Score: 10 (5/1/2025  2:51 PM)           [facility]  [address] [address] [phone]  [place] [facility]  Code 31 (STR)          NAME: Gayathri Lopes  AGE: 74 y.o. SEX: female CODE STATUS: CPR    DATE OF ENCOUNTER: 4/12/2024    Assessment and Plan     1. Hyponatremia  Assessment & Plan:  4/8/24 Na was 132  Patient has had several episodes of diarrhea over the last 24 hours  4/11/24 Na was 124  Will start on clysis NSS x 3L  Repeat CBC, BMP 4/12/24  VS q shift x 72 hours      2. Diarrhea, unspecified type  Assessment & Plan:  Several episodes of loose stool reported by staff  On call notified last night  Started on banana flakes q 6 PRN, appears to have only received once, discussed with staff      3. Ambulatory dysfunction  Assessment & Plan:  Multifactorial  Continue PT/OT  Fall Precautions  Ensure adequate nutrition/hydration   Monitor CBC/BMP    following for d/c planning        4. Stage 3a chronic kidney disease (HCC)  Assessment & Plan:  Lab Results   Component Value Date    EGFR 35 04/01/2024    EGFR 24 03/31/2024    EGFR 22 03/30/2024    CREATININE 1.43 (H) 04/01/2024    CREATININE 1.95 (H) 03/31/2024    CREATININE 2.11 (H) 03/30/2024 4/11/24 Cr 0.8/ BUN 7/ GFR >60  Avoid nephrotoxins  Avoid hypotension  Encourage adequate hydration  Monitor kidney functions      5. 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 Type 2 diabetes mellitus with diabetic chronic kidney disease, unspecified CKD stage, unspecified whether long term insulin use (HCC)  Assessment & Plan:  Per geriatric guidelines, goal HgA1c is > 7.5 to  prevent hypoglycemic event in the older adult with cognitive decline   Lab Results   Component Value Date    HGBA1C 8.1 (H) 03/28/2024   Morning   Continue accu-cheks  Continue glipizide 2.5 mg daily  Encourage diabetic diet  Avoid hypoglycemia      7. Primary hypertension  Assessment & Plan:  /68  Continue monitor BP, adjust medications as needed  Continue amlodipine 5 mg daily           All medications and routine orders were reviewed and updated as needed.    Chief Complaint     STR follow up visit  Patient's care was coordinated with nursing facility staff. Recent vitals, labs, and updated medications were review on [facility] system in facility.  Past Medical and Surgical History      Past Medical History:   Diagnosis Date    MARTINA (acute kidney injury) (HCC) 11/21/2021    Diabetes mellitus (HCC)     Hypertension      History reviewed. No pertinent surgical history.  No Known Allergies       History of Present Illness     HPI  Gaaythri Lopes is a 74 year old female, she is STR patient of [facility] since 4/1/24. Past Medical Hx including but not limited to CVA, CKD, diabetes,dementia,HTN, HLD, depression. She was seen in collaboration with nursing for medical mgmt and STR follow up.   Hospital Course  Patient was admitted to the hospital 3/27/24 d/t left arm weakness and visual deficits. Followed by neurology, CTA showed chronic b/l ICA high grade stenosis.  Recommended to start DAPT and continue stroke pathway.  MRI of brain showed small acute posterior ischemic right MCA.  Recommended to continue DAPT x 90 then transition to Plavix monotherapy. Patient discharged to [facility] for rehab.    Rehab  Gayathri was seen and examined at bedside today. Patient is awake and alert x 3.  She denies pain, difficulty sleeping and has a good appetite. She states therapy has been going well. She reportedly had several episodes of loose stool yesterday, she denies any BM's this morning, abdomen  non-tender, continues on clysis x 3 L, repeat labs, 4/12/24.    Denies CP/SOB/N/V/D. Denies lightheadedness, dizziness, headaches, vision changes. Patient states they are eating well and staying hydrated. Denies any bowel or bladder issues. Per review of SNF records, Patient is eating 3 meals per day, consuming  %. Last documented BM 4/11/24. No concerns from nursing at this time.    The patient's allergies, past medical, surgical, social and family history were reviewed and unchanged.    Review of Systems     Review of Systems   Constitutional:  Positive for activity change. Negative for appetite change and fever.   HENT: Negative.  Negative for congestion.    Respiratory:  Negative for cough, shortness of breath and wheezing.    Cardiovascular: Negative.  Negative for chest pain, palpitations and leg swelling.   Gastrointestinal:  Positive for diarrhea. Negative for constipation, nausea and vomiting.   Endocrine: Negative.    Genitourinary:  Negative for difficulty urinating and dysuria.   Musculoskeletal:  Positive for arthralgias and gait problem.   Skin: Negative.    Neurological:  Positive for weakness. Negative for dizziness and headaches.   Hematological: Negative.    Psychiatric/Behavioral:  Positive for confusion. Negative for sleep disturbance.          Objective     Vitals:   Vitals:    04/12/24 1010   BP: 132/68   Pulse: 68   Resp: 18   Temp: 97.6 °F (36.4 °C)   SpO2: 98%             Physical Exam  Vitals and nursing note reviewed.   Constitutional:       General: She is not in acute distress.     Appearance: Normal appearance. She is not ill-appearing.   HENT:      Head: Normocephalic and atraumatic.      Nose: No congestion.   Eyes:      Conjunctiva/sclera: Conjunctivae normal.   Cardiovascular:      Rate and Rhythm: Normal rate and regular rhythm.   Pulmonary:      Effort: Pulmonary effort is normal. No respiratory distress.      Breath sounds: Normal breath sounds. No wheezing.   Abdominal:     "  General: Bowel sounds are normal.      Palpations: Abdomen is soft.   Musculoskeletal:      Right lower leg: No edema.      Left lower leg: No edema.   Neurological:      Mental Status: She is alert.      Motor: Weakness present.      Gait: Gait abnormal.   Psychiatric:         Mood and Affect: Mood normal.         Behavior: Behavior normal.         Pertinent Laboratory/Diagnostic Studies:   Reviewed in facility chart-stable      Current Medications   Medications reviewed and updated see facility MAR for details.      Current Outpatient Medications:     amLODIPine (NORVASC) 5 mg tablet, Take 1 tablet (5 mg total) by mouth daily, Disp: 30 tablet, Rfl: 0    aspirin (ECOTRIN LOW STRENGTH) 81 mg EC tablet, Take 81 mg by mouth daily, Disp: , Rfl:     atorvastatin (LIPITOR) 40 mg tablet, TAKE 1 TABLET BY MOUTH EVERY DAY IN THE EVENING, Disp: 90 tablet, Rfl: 1    calcium carbonate (OS-ALYSON) 600 MG tablet, Take 2 tablets (1,200 mg total) by mouth daily, Disp: , Rfl:     cholecalciferol (VITAMIN D3) 1,000 units tablet, Take 1,000 Units by mouth daily, Disp: , Rfl:     clopidogrel (PLAVIX) 75 mg tablet, Take 1 tablet (75 mg total) by mouth daily for 87 doses, Disp: 30 tablet, Rfl: 0    donepezil (ARICEPT) 10 mg tablet, TAKE 1 TABLET BY MOUTH EVERYDAY AT BEDTIME, Disp: 90 tablet, Rfl: 1    glipiZIDE (GLUCOTROL XL) 2.5 mg 24 hr tablet, Take 1 tablet (2.5 mg total) by mouth daily, Disp: 90 tablet, Rfl: 1    mirtazapine (REMERON) 15 mg tablet, Take 1 tablet (15 mg total) by mouth daily at bedtime, Disp: 90 tablet, Rfl: 1     Please note:  Voice-recognition software may have been used in the preparation of this document.  Occasional wrong word or \"sound-alike\" substitutions may have occurred due to the inherent limitations of voice recognition software.  Interpretation should be guided by context.         DEMETRA Aranda  4/12/2024  10:27 AM    "